# Patient Record
Sex: MALE | Race: WHITE | NOT HISPANIC OR LATINO | URBAN - METROPOLITAN AREA
[De-identification: names, ages, dates, MRNs, and addresses within clinical notes are randomized per-mention and may not be internally consistent; named-entity substitution may affect disease eponyms.]

---

## 2022-01-15 ENCOUNTER — INPATIENT (INPATIENT)
Facility: HOSPITAL | Age: 82
LOS: 25 days | Discharge: NURSING, REHAB OR LONG-TERM CARE | DRG: 64 | End: 2022-02-10
Attending: STUDENT IN AN ORGANIZED HEALTH CARE EDUCATION/TRAINING PROGRAM | Admitting: NEUROLOGICAL SURGERY
Payer: MEDICARE

## 2022-01-15 VITALS
DIASTOLIC BLOOD PRESSURE: 70 MMHG | HEART RATE: 69 BPM | HEIGHT: 74 IN | OXYGEN SATURATION: 98 % | WEIGHT: 309.97 LBS | TEMPERATURE: 98 F | SYSTOLIC BLOOD PRESSURE: 120 MMHG | RESPIRATION RATE: 18 BRPM

## 2022-01-15 DIAGNOSIS — I61.9 NONTRAUMATIC INTRACEREBRAL HEMORRHAGE, UNSPECIFIED: ICD-10-CM

## 2022-01-15 LAB
ALBUMIN SERPL ELPH-MCNC: 4.4 G/DL — SIGNIFICANT CHANGE UP (ref 3.3–5)
ALP SERPL-CCNC: 65 U/L — SIGNIFICANT CHANGE UP (ref 40–120)
ALT FLD-CCNC: 18 U/L — SIGNIFICANT CHANGE UP (ref 10–45)
ANION GAP SERPL CALC-SCNC: 15 MMOL/L — SIGNIFICANT CHANGE UP (ref 5–17)
APTT BLD: 26.5 SEC — LOW (ref 27.5–35.5)
AST SERPL-CCNC: 32 U/L — SIGNIFICANT CHANGE UP (ref 10–40)
BASOPHILS # BLD AUTO: 0.02 K/UL — SIGNIFICANT CHANGE UP (ref 0–0.2)
BASOPHILS NFR BLD AUTO: 0.2 % — SIGNIFICANT CHANGE UP (ref 0–2)
BILIRUB SERPL-MCNC: 0.6 MG/DL — SIGNIFICANT CHANGE UP (ref 0.2–1.2)
BLD GP AB SCN SERPL QL: NEGATIVE — SIGNIFICANT CHANGE UP
BUN SERPL-MCNC: 30 MG/DL — HIGH (ref 7–23)
CALCIUM SERPL-MCNC: 10 MG/DL — SIGNIFICANT CHANGE UP (ref 8.4–10.5)
CHLORIDE SERPL-SCNC: 99 MMOL/L — SIGNIFICANT CHANGE UP (ref 96–108)
CO2 SERPL-SCNC: 25 MMOL/L — SIGNIFICANT CHANGE UP (ref 22–31)
CREAT SERPL-MCNC: 1.16 MG/DL — SIGNIFICANT CHANGE UP (ref 0.5–1.3)
EOSINOPHIL # BLD AUTO: 0.02 K/UL — SIGNIFICANT CHANGE UP (ref 0–0.5)
EOSINOPHIL NFR BLD AUTO: 0.2 % — SIGNIFICANT CHANGE UP (ref 0–6)
GLUCOSE BLDC GLUCOMTR-MCNC: 145 MG/DL — HIGH (ref 70–99)
GLUCOSE BLDC GLUCOMTR-MCNC: 165 MG/DL — HIGH (ref 70–99)
GLUCOSE SERPL-MCNC: 162 MG/DL — HIGH (ref 70–99)
HCT VFR BLD CALC: 42.1 % — SIGNIFICANT CHANGE UP (ref 39–50)
HGB BLD-MCNC: 13.7 G/DL — SIGNIFICANT CHANGE UP (ref 13–17)
IMM GRANULOCYTES NFR BLD AUTO: 0.7 % — SIGNIFICANT CHANGE UP (ref 0–1.5)
LYMPHOCYTES # BLD AUTO: 1.02 K/UL — SIGNIFICANT CHANGE UP (ref 1–3.3)
LYMPHOCYTES # BLD AUTO: 12 % — LOW (ref 13–44)
MCHC RBC-ENTMCNC: 30.6 PG — SIGNIFICANT CHANGE UP (ref 27–34)
MCHC RBC-ENTMCNC: 32.5 GM/DL — SIGNIFICANT CHANGE UP (ref 32–36)
MCV RBC AUTO: 94 FL — SIGNIFICANT CHANGE UP (ref 80–100)
MONOCYTES # BLD AUTO: 0.42 K/UL — SIGNIFICANT CHANGE UP (ref 0–0.9)
MONOCYTES NFR BLD AUTO: 4.9 % — SIGNIFICANT CHANGE UP (ref 2–14)
NEUTROPHILS # BLD AUTO: 6.96 K/UL — SIGNIFICANT CHANGE UP (ref 1.8–7.4)
NEUTROPHILS NFR BLD AUTO: 82 % — HIGH (ref 43–77)
NRBC # BLD: 0 /100 WBCS — SIGNIFICANT CHANGE UP (ref 0–0)
PA ADP PRP-ACNC: 171 PRU — LOW (ref 194–417)
PLATELET # BLD AUTO: 158 K/UL — SIGNIFICANT CHANGE UP (ref 150–400)
PLATELET RESPONSE ASPIRIN RESULT: 564 ARU — SIGNIFICANT CHANGE UP (ref 350–700)
POTASSIUM SERPL-MCNC: 5.1 MMOL/L — SIGNIFICANT CHANGE UP (ref 3.5–5.3)
POTASSIUM SERPL-SCNC: 5.1 MMOL/L — SIGNIFICANT CHANGE UP (ref 3.5–5.3)
PROT SERPL-MCNC: 7.7 G/DL — SIGNIFICANT CHANGE UP (ref 6–8.3)
RAPID RVP RESULT: SIGNIFICANT CHANGE UP
RBC # BLD: 4.48 M/UL — SIGNIFICANT CHANGE UP (ref 4.2–5.8)
RBC # FLD: 14 % — SIGNIFICANT CHANGE UP (ref 10.3–14.5)
RH IG SCN BLD-IMP: POSITIVE — SIGNIFICANT CHANGE UP
RH IG SCN BLD-IMP: POSITIVE — SIGNIFICANT CHANGE UP
SARS-COV-2 RNA SPEC QL NAA+PROBE: SIGNIFICANT CHANGE UP
SODIUM SERPL-SCNC: 139 MMOL/L — SIGNIFICANT CHANGE UP (ref 135–145)
WBC # BLD: 8.5 K/UL — SIGNIFICANT CHANGE UP (ref 3.8–10.5)
WBC # FLD AUTO: 8.5 K/UL — SIGNIFICANT CHANGE UP (ref 3.8–10.5)

## 2022-01-15 PROCEDURE — 72170 X-RAY EXAM OF PELVIS: CPT | Mod: 26

## 2022-01-15 PROCEDURE — 74177 CT ABD & PELVIS W/CONTRAST: CPT | Mod: 26

## 2022-01-15 PROCEDURE — 93010 ELECTROCARDIOGRAM REPORT: CPT

## 2022-01-15 PROCEDURE — 72125 CT NECK SPINE W/O DYE: CPT | Mod: 26

## 2022-01-15 PROCEDURE — 99291 CRITICAL CARE FIRST HOUR: CPT

## 2022-01-15 PROCEDURE — 71260 CT THORAX DX C+: CPT | Mod: 26

## 2022-01-15 PROCEDURE — 71045 X-RAY EXAM CHEST 1 VIEW: CPT | Mod: 26

## 2022-01-15 PROCEDURE — 70450 CT HEAD/BRAIN W/O DYE: CPT | Mod: 26,59

## 2022-01-15 PROCEDURE — 70496 CT ANGIOGRAPHY HEAD: CPT | Mod: 26

## 2022-01-15 PROCEDURE — 70498 CT ANGIOGRAPHY NECK: CPT | Mod: 26

## 2022-01-15 PROCEDURE — 70450 CT HEAD/BRAIN W/O DYE: CPT | Mod: 26,59,77

## 2022-01-15 RX ORDER — DEXTROSE 50 % IN WATER 50 %
25 SYRINGE (ML) INTRAVENOUS ONCE
Refills: 0 | Status: DISCONTINUED | OUTPATIENT
Start: 2022-01-15 | End: 2022-01-29

## 2022-01-15 RX ORDER — SODIUM CHLORIDE 9 MG/ML
1000 INJECTION, SOLUTION INTRAVENOUS
Refills: 0 | Status: DISCONTINUED | OUTPATIENT
Start: 2022-01-15 | End: 2022-01-21

## 2022-01-15 RX ORDER — THIAMINE MONONITRATE (VIT B1) 100 MG
100 TABLET ORAL DAILY
Refills: 0 | Status: DISCONTINUED | OUTPATIENT
Start: 2022-01-15 | End: 2022-01-15

## 2022-01-15 RX ORDER — CHLORHEXIDINE GLUCONATE 213 G/1000ML
1 SOLUTION TOPICAL
Refills: 0 | Status: DISCONTINUED | OUTPATIENT
Start: 2022-01-15 | End: 2022-01-24

## 2022-01-15 RX ORDER — GLUCAGON INJECTION, SOLUTION 0.5 MG/.1ML
1 INJECTION, SOLUTION SUBCUTANEOUS ONCE
Refills: 0 | Status: DISCONTINUED | OUTPATIENT
Start: 2022-01-15 | End: 2022-01-20

## 2022-01-15 RX ORDER — SODIUM CHLORIDE 9 MG/ML
1000 INJECTION, SOLUTION INTRAVENOUS
Refills: 0 | Status: DISCONTINUED | OUTPATIENT
Start: 2022-01-15 | End: 2022-01-29

## 2022-01-15 RX ORDER — LEVETIRACETAM 250 MG/1
500 TABLET, FILM COATED ORAL EVERY 12 HOURS
Refills: 0 | Status: DISCONTINUED | OUTPATIENT
Start: 2022-01-15 | End: 2022-01-16

## 2022-01-15 RX ORDER — DEXTROSE 50 % IN WATER 50 %
15 SYRINGE (ML) INTRAVENOUS ONCE
Refills: 0 | Status: DISCONTINUED | OUTPATIENT
Start: 2022-01-15 | End: 2022-01-20

## 2022-01-15 RX ORDER — DESMOPRESSIN ACETATE 0.1 MG/1
40 TABLET ORAL ONCE
Refills: 0 | Status: COMPLETED | OUTPATIENT
Start: 2022-01-15 | End: 2022-01-15

## 2022-01-15 RX ORDER — INSULIN LISPRO 100/ML
VIAL (ML) SUBCUTANEOUS EVERY 6 HOURS
Refills: 0 | Status: DISCONTINUED | OUTPATIENT
Start: 2022-01-15 | End: 2022-01-29

## 2022-01-15 RX ORDER — SODIUM CHLORIDE 5 G/100ML
1000 INJECTION, SOLUTION INTRAVENOUS
Refills: 0 | Status: DISCONTINUED | OUTPATIENT
Start: 2022-01-15 | End: 2022-01-16

## 2022-01-15 RX ORDER — ACETAMINOPHEN 500 MG
650 TABLET ORAL EVERY 6 HOURS
Refills: 0 | Status: DISCONTINUED | OUTPATIENT
Start: 2022-01-15 | End: 2022-01-21

## 2022-01-15 RX ORDER — DEXTROSE 50 % IN WATER 50 %
12.5 SYRINGE (ML) INTRAVENOUS ONCE
Refills: 0 | Status: DISCONTINUED | OUTPATIENT
Start: 2022-01-15 | End: 2022-01-29

## 2022-01-15 RX ORDER — SODIUM CHLORIDE 9 MG/ML
1000 INJECTION INTRAMUSCULAR; INTRAVENOUS; SUBCUTANEOUS
Refills: 0 | Status: DISCONTINUED | OUTPATIENT
Start: 2022-01-15 | End: 2022-01-15

## 2022-01-15 RX ORDER — THIAMINE MONONITRATE (VIT B1) 100 MG
100 TABLET ORAL DAILY
Refills: 0 | Status: DISCONTINUED | OUTPATIENT
Start: 2022-01-15 | End: 2022-01-20

## 2022-01-15 RX ORDER — SENNA PLUS 8.6 MG/1
2 TABLET ORAL AT BEDTIME
Refills: 0 | Status: DISCONTINUED | OUTPATIENT
Start: 2022-01-15 | End: 2022-01-16

## 2022-01-15 RX ADMIN — LEVETIRACETAM 400 MILLIGRAM(S): 250 TABLET, FILM COATED ORAL at 17:33

## 2022-01-15 RX ADMIN — Medication 2: at 23:17

## 2022-01-15 RX ADMIN — SODIUM CHLORIDE 75 MILLILITER(S): 9 INJECTION INTRAMUSCULAR; INTRAVENOUS; SUBCUTANEOUS at 16:15

## 2022-01-15 RX ADMIN — DESMOPRESSIN ACETATE 240 MICROGRAM(S): 0.1 TABLET ORAL at 18:28

## 2022-01-15 RX ADMIN — SODIUM CHLORIDE 50 MILLILITER(S): 5 INJECTION, SOLUTION INTRAVENOUS at 18:28

## 2022-01-15 RX ADMIN — SODIUM CHLORIDE 50 MILLILITER(S): 5 INJECTION, SOLUTION INTRAVENOUS at 19:46

## 2022-01-15 RX ADMIN — CHLORHEXIDINE GLUCONATE 1 APPLICATION(S): 213 SOLUTION TOPICAL at 21:19

## 2022-01-15 NOTE — PROGRESS NOTE ADULT - ASSESSMENT
ASSESSMENT/PLAN:    81M hx of HTN, not on AC/AP, txer from [facility] after mechanical fall, CTH with R frontal IPH w/ mass effect, minimal MLS, ICH score 2.    NEURO:  - neuro checks q1h  - repeat CTH 4h  - Keppra 500mg BID  - ARU/PRU  - MIND Screen  - Activity: PT/OT as tolerated    CVS:  Hx of AAA repair 7 years ago ([facility]), also with known Aneurysm of ascending and iliac aorta >5cm, planned for evaluation for possible stenting this tuesday prior to arrival  - SBP goal 100-160  - TTE  - vascular consult    PULM:  hx of COVID 2 years ago and 2 weeks ago  - RA  - IS As tolerated  - Aspiration precautions    RENAL:  - Fluids: 75cc/h while NPO  - daily IOs    GI:  - Diet: NPO  - Bowel regimen: miralax, senna    ENDO:   - FS goal 120-180    HEME/ONC:  - SCDs  - Chemoppx: hold d/t fresh heme    ID:  - monitor for fevers      Patient is at high risk of neurologic deterioration/death due to:  ***      Time spent: 35 critical care minutes

## 2022-01-15 NOTE — CONSULT NOTE ADULT - ASSESSMENT
Assessment:  81y Male here with large right ICH, vascular consulted to evaluate as patient has known hx of ascending aortic aneurysm 4cm, infrarenal AAA 5cm and right ALEXA aneurysm 5cm, this was reported by daughter based on last CT scan done in [place] on 11/20/21.     Plan:  - no acute vascular surgery intervention at this time  - patient can either follow up with his vascular surgeon or Dr Hays in office  - plan dw fellow on behalf of fellow Assessment:  81y Male here with large right ICH, vascular consulted to evaluate as patient has known hx of ascending aortic aneurysm 4cm, infrarenal AAA 5cm and right ALEXA aneurysm 5cm, this was reported by daughter based on last CT scan done in [place] on 11/20/21.     Plan:  - no acute vascular surgery intervention at this time  - patient can either follow up with his vascular surgeon or Dr Hays in office  - please reconsult as necessary  - plan dw fellow on behalf of fellow    Vascular [id number]

## 2022-01-15 NOTE — H&P ADULT - NSHPPHYSICALEXAM_GEN_ALL_CORE
Exam: French-speaking, AOx2, Left pupil 3R, Right pupil 2R, Left nasolabial flattening.  RUE 5/5, LUE 4/5, RHF 4+/5, RLE distally 3/5, LHF 4/5, LLE distally 2/5.

## 2022-01-15 NOTE — ED PROVIDER NOTE - ATTENDING CONTRIBUTION TO CARE
Attending Colleen: I performed a history and physical exam of the patient and discussed their management with the resident/fellow/ACP/student. I have reviewed the resident/fellow/ACP/student note and agree with the documented findings and plan of care, except as noted. I have personally performed a substantive portion of the visit including all aspects of the medical decision making. My medical decision making and observations are found above. Please refer to any progress notes for updates on clinical course.

## 2022-01-15 NOTE — ED PROVIDER NOTE - PHYSICAL EXAMINATION
Physical Exam:  Gen: NAD, AOx2 (person and place)  Head: NCAT  HEENT: EOMI, R pupil 1mm reactive, L pupil 2mm reactive, normal conjunctiva, tongue midline, oral mucosa moist  Lung: CTAB, no respiratory distress, no wheezes/rhonchi/rales B/L, speaking in full sentences  CV: RRR, no murmurs, rubs or gallops  Abd: soft, NT, ND, no guarding, no rigidity  MSK: no visible deformities, MS RUE 5/5, LUE 4/5, RLE 5/5, LLE 4L5  Neuro: CN2-12 grossly intact, A&Ox2, gross sensation intact in UE and LE BL, negative pronator drift  Skin: Warm, well perfused, no rash, 2+ pitting edema to B/L MISSY Shore D.O. -Resident

## 2022-01-15 NOTE — ED ADULT NURSE NOTE - OBJECTIVE STATEMENT
81 y M pmhx of HTN and AAA presents to the ED from [facility] after a fall due to weakness with a head bleed. Pt is confused and baseline is normally A&Ox3, per EMS. Pt asking inappropriate questions and minimally following commands. On assessment, Alert L pupil 2mm and reactive to light and R pupil 1mm and reactive to light. [place]. no facial droop. strength weaker on the L than the R. Pt has some effort against gravity in lower extremities. Breathing spontaneously and unlabored on Room air. No Peripheral edema. Cap refill 2s. No JVD. Peripheral pulses strong and equal bilaterally. On cardiac monitor. Abdomen soft, nontender, nondistended. Chavarria placed in pt. 16Fr catheter used. 10 cc Balloon inflated. 2 RN at bedside for insertion, sterile technique maintained. Procedure explained to pt. Tolerated well. Urine collected and sent to lab. IV placed 20g in L hand from [facility] and R forearm from RN. Labs drawn and sent. Pt safety maintained. Call bell within reach. Side rails in upward position. Pt awaiting dispo.   Neuro flowsheet placed in chart. 81 y M pmhx of HTN and AAA presents to the ED from [facility] after a fall due to weakness with a head bleed. Pt is confused and baseline is normally A&Ox3, per EMS. Pt asking inappropriate questions and minimally following commands. On assessment, Alert L pupil 2mm and reactive to light and R pupil 1mm and reactive to light. [place]. no facial droop. strength weaker on the L than the R. 5/5 in LUE and RUE and 3/5 in LLE and 5/5 in LUE. Pt has some effort against gravity in lower extremities. Breathing spontaneously and unlabored on Room air. No Peripheral edema. Cap refill 2s. No JVD. Peripheral pulses strong and equal bilaterally. On cardiac monitor. Abdomen soft, nontender, nondistended. Chavarria placed in pt. 16Fr catheter used. 10 cc Balloon inflated. 2 RN at bedside for insertion, sterile technique maintained. Procedure explained to pt. Tolerated well. Urine collected and sent to lab. IV placed 20g in L hand from [facility] and R forearm from RN. Labs drawn and sent. Pt safety maintained. Call bell within reach. Side rails in upward position. Pt awaiting dispo.   Neuro flowsheet placed in chart. 81 y M pmhx of HTN and AAA presents to the ED from [facility] after a fall due to weakness with a head bleed. Pt is confused and baseline is normally A&Ox3, per EMS. Pt asking inappropriate questions and minimally following commands. Per EMS pt received keppra and reglan at [facility]. On assessment, Alert L pupil 2mm and reactive to light and R pupil 1mm and reactive to light. [place]. no facial droop. strength weaker on the L than the R. 5/5 in LUE and RUE and 3/5 in LLE and 5/5 in LUE. Pt has some effort against gravity in lower extremities. Breathing spontaneously and unlabored on Room air. No Peripheral edema. Cap refill 2s. No JVD. Peripheral pulses strong and equal bilaterally. On cardiac monitor. Abdomen soft, nontender, nondistended. Chavarria placed in pt. 16Fr catheter used. 10 cc Balloon inflated. 2 RN at bedside for insertion, sterile technique maintained. Procedure explained to pt. Tolerated well. Urine collected and sent to lab. IV placed 20g in L hand from [facility] and R forearm from RN. Labs drawn and sent. Pt safety maintained. Call bell within reach. Side rails in upward position. Pt awaiting dispo.   Neuro flowsheet placed in chart. 81 y M pmhx of HTN, AAA, covid 2 weeks ago presents to the ED from [facility] after a fall due to weakness with a head bleed. Pt is confused and baseline is normally A&Ox3, per EMS. Pt asking inappropriate questions and minimally following commands. Per EMS pt received keppra and reglan at [facility]. On assessment, Alert L pupil 2mm and reactive to light and R pupil 1mm and reactive to light. [place]. no facial droop. strength weaker on the L than the R. 5/5 in LUE and RUE and 3/5 in LLE and 5/5 in LUE. Pt has some effort against gravity in lower extremities. Breathing spontaneously and unlabored on Room air. No Peripheral edema. Cap refill 2s. No JVD. Peripheral pulses strong and equal bilaterally. On cardiac monitor. Abdomen soft, nontender, nondistended. Chavarria placed in pt. 16Fr catheter used. 10 cc Balloon inflated. 2 RN at bedside for insertion, sterile technique maintained. Procedure explained to pt. Tolerated well. Urine collected and sent to lab. IV placed 20g in L hand from [facility] and R forearm from RN. Labs drawn and sent. Pt safety maintained. Call bell within reach. Side rails in upward position. Pt awaiting dispo.   Neuro flowsheet placed in chart.

## 2022-01-15 NOTE — ED PROVIDER NOTE - PROGRESS NOTE DETAILS
L pupil > R pupil. Pan scan. NSGY aware. WIll admit. BP goal 160 SBP Attending Colleen: [facility] accepted pt to NSICU under Dr. Moreno Attending Colleen: [facility] accepted pt to NSICU under Dr. Vazquez L pupil > R pupil. Pan scan. NSGY aware. Will admit. BP goal 160 SBP

## 2022-01-15 NOTE — PROGRESS NOTE ADULT - SUBJECTIVE AND OBJECTIVE BOX
HPI:  81y m W/ PMHx HTN presenting to the ED from [facility] as a transfer for R frontal parenchymal hemorrhage on CT after fall at home. Patient is poor historian but states that he was diagnosed with COVID 2 weeks ago and has been feeling "tired" at home, states he felt dizzy earlier today but unable to state what time he fell or further elucidate. Patient denies AC use. Per EMS was also recently diagnosed with a AAA but has been unable to undergo surgical repair due to recent dx of COVID. Patient still endorsing he feels "tired", denies current headache, vision changes, dizziness, chest pain, nausea, back pain, focal numbness/tingling. Endorses weakness to LLE 2/2 pain. (15 Mono 2022 15:50)  ICH score 2  NIHSS 6    EVENTS:   No acute events overnight.    VITALS:  T(C): , Max: 36.8 (01-15-22 @ 14:42)  HR:  (69 - 88)  BP:  (116/61 - 151/92)  ABP: --  RR:  (13 - 20)  SpO2:  (94% - 100%)  Wt(kg): --      01-15-22 @ 07:01  -  01-15-22 @ 20:19  --------------------------------------------------------  IN: 350 mL / OUT: 550 mL / NET: -200 mL      LABS:  Na: 139 (01-15 @ 15:25)  K: 5.1 (01-15 @ 15:25)  Cl: 99 (01-15 @ 15:25)  CO2: 25 (01-15 @ 15:25)  BUN: 30 (01-15 @ 15:25)  Cr: 1.16 (01-15 @ 15:25)  Glu: 162(01-15 @ 15:25)    Hgb: 13.7 (01-15 @ 15:25)  Hct: 42.1 (01-15 @ 15:25)  WBC: 8.50 (01-15 @ 15:25)  Plt: 158 (01-15 @ 15:25)    INR: 0.99 01-15-22 @ 15:25  PTT: 26.5 01-15-22 @ 15:25    IMAGING:   Recent imaging studies were reviewed.    MEDICATIONS:  acetaminophen     Tablet .. 650 milliGRAM(s) Oral every 6 hours PRN  chlorhexidine 4% Liquid 1 Application(s) Topical <User Schedule>  dextrose 40% Gel 15 Gram(s) Oral once  dextrose 5%. 1000 milliLiter(s) IV Continuous <Continuous>  dextrose 5%. 1000 milliLiter(s) IV Continuous <Continuous>  dextrose 50% Injectable 25 Gram(s) IV Push once  dextrose 50% Injectable 12.5 Gram(s) IV Push once  dextrose 50% Injectable 25 Gram(s) IV Push once  glucagon  Injectable 1 milliGRAM(s) IntraMuscular once  insulin lispro (ADMELOG) corrective regimen sliding scale   SubCutaneous every 6 hours  levETIRAcetam  IVPB 500 milliGRAM(s) IV Intermittent every 12 hours  senna 2 Tablet(s) Oral at bedtime PRN  sodium chloride 2% . 1000 milliLiter(s) IV Continuous <Continuous>  thiamine Injectable 100 milliGRAM(s) IV Push daily    PHYSICAL EXAM:    General: calm  CVS: RRR  Pulm: CTAB  GI: Soft, NTND  Extremities: No LE Edema  Neuro: per NSG (in transport):  AOx2, Left pupil 3R, Right pupil 2R, Left facial, moves all limbs against gravity, left UE drift    HPI:  81y m W/ PMHx HTN presenting to the ED from [facility] as a transfer for R frontal parenchymal hemorrhage on CT after fall at home. Patient is poor historian but states that he was diagnosed with COVID 2 weeks ago and has been feeling "tired" at home, states he felt dizzy earlier today but unable to state what time he fell or further elucidate. Patient denies AC use. Per EMS was also recently diagnosed with a AAA but has been unable to undergo surgical repair due to recent dx of COVID. Patient still endorsing he feels "tired", denies current headache, vision changes, dizziness, chest pain, nausea, back pain, focal numbness/tingling. Endorses weakness to LLE 2/2 pain. (15 Mono 2022 15:50)  ICH score 2  NIHSS 7    EVENTS:   No acute events overnight.    VITALS:  T(C): , Max: 36.8 (01-15-22 @ 14:42)  HR:  (69 - 88)  BP:  (116/61 - 151/92)  ABP: --  RR:  (13 - 20)  SpO2:  (94% - 100%)  Wt(kg): --      01-15-22 @ 07:01  -  01-15-22 @ 20:19  --------------------------------------------------------  IN: 350 mL / OUT: 550 mL / NET: -200 mL      LABS:  Na: 139 (01-15 @ 15:25)  K: 5.1 (01-15 @ 15:25)  Cl: 99 (01-15 @ 15:25)  CO2: 25 (01-15 @ 15:25)  BUN: 30 (01-15 @ 15:25)  Cr: 1.16 (01-15 @ 15:25)  Glu: 162(01-15 @ 15:25)    Hgb: 13.7 (01-15 @ 15:25)  Hct: 42.1 (01-15 @ 15:25)  WBC: 8.50 (01-15 @ 15:25)  Plt: 158 (01-15 @ 15:25)    INR: 0.99 01-15-22 @ 15:25  PTT: 26.5 01-15-22 @ 15:25    IMAGING:   Recent imaging studies were reviewed.    MEDICATIONS:  acetaminophen     Tablet .. 650 milliGRAM(s) Oral every 6 hours PRN  chlorhexidine 4% Liquid 1 Application(s) Topical <User Schedule>  dextrose 40% Gel 15 Gram(s) Oral once  dextrose 5%. 1000 milliLiter(s) IV Continuous <Continuous>  dextrose 5%. 1000 milliLiter(s) IV Continuous <Continuous>  dextrose 50% Injectable 25 Gram(s) IV Push once  dextrose 50% Injectable 12.5 Gram(s) IV Push once  dextrose 50% Injectable 25 Gram(s) IV Push once  glucagon  Injectable 1 milliGRAM(s) IntraMuscular once  insulin lispro (ADMELOG) corrective regimen sliding scale   SubCutaneous every 6 hours  levETIRAcetam  IVPB 500 milliGRAM(s) IV Intermittent every 12 hours  senna 2 Tablet(s) Oral at bedtime PRN  sodium chloride 2% . 1000 milliLiter(s) IV Continuous <Continuous>  thiamine Injectable 100 milliGRAM(s) IV Push daily    PHYSICAL EXAM:    General: calm  CVS: RRR  Pulm: CTAB  GI: Soft, NTND  Extremities: No LE Edema  Neuro: per NSG (in transport):  AOx2, Left pupil 3R, Right pupil 2R, Left facial, moves all limbs against gravity, left UE drift, left LE 3/5 at the hip, 2/5 at knees and ankles

## 2022-01-15 NOTE — CHART NOTE - NSCHARTNOTEFT_GEN_A_CORE
CAPRINI SCORE [CLOT] Score on Admission for     AGE RELATED RISK FACTORS                                                       MOBILITY RELATED FACTORS  [ ] Age 41-60 years                                            (1 Point)                  [ ] Bed rest                                                        (1 Point)  [ ] Age: 61-74 years                                           (2 Points)                 [ ] Plaster cast                                                   (2 Points)  [ x ] Age= 75 years                                              (3 Points)                 [ ] Bed bound for more than 72 hours                 (2 Points)    DISEASE RELATED RISK FACTORS                                               GENDER SPECIFIC FACTORS  [ ] Edema in the lower extremities                       (1 Point)                  [ ] Pregnancy                                                     (1 Point)  [ ] Varicose veins                                               (1 Point)                  [ ] Post-partum < 6 weeks                                   (1 Point)             [ x ] BMI > 25 Kg/m2                                            (1 Point)                  [ ] Hormonal therapy  or oral contraception          (1 Point)                 [ ] Sepsis (in the previous month)                        (1 Point)                  [ ] History of pregnancy complications                 (1 point)  [ ] Pneumonia or serious lung disease                                               [ ] Unexplained or recurrent                     (1 Point)           (in the previous month)                               (1 Point)  [ ] Abnormal pulmonary function test                     (1 Point)                 SURGERY RELATED RISK FACTORS (include planned surgeries)  [ ] Acute myocardial infarction                              (1 Point)                 [ ]  Section                                             (1 Point)  [ ] Congestive heart failure (in the previous month)  (1 Point)         [ ] Minor surgery                                                  (1 Point)   [ ] Inflammatory bowel disease                             (1 Point)                 [ ] Arthroscopic surgery                                        (2 Points)  [ ] Central venous access                                      (2 Points)                [ ] General surgery lasting more than 45 minutes   (2 Points)       [ x ] Stroke (in the previous month)                          (5 Points)               [ ] Elective arthroplasty                                         (5 Points)            [ ] current or past malignancy                              (2 Points)                                                                                                       HEMATOLOGY RELATED FACTORS                                                 TRAUMA RELATED RISK FACTORS  [ ] Prior episodes of VTE                                     (3 Points)                [ ] Fracture of the hip, pelvis, or leg                       (5 Points)  [ ] Positive family history for VTE                         (3 Points)                 [ ] Acute spinal cord injury (in the previous month)  (5 Points)  [ ] Prothrombin 89165 A                                     (3 Points)                 [ ] Paralysis  (less than 1 month)                             (5 Points)  [ ] Factor V Leiden                                             (3 Points)                  [ ] Multiple Trauma within 1 month                        (5 Points)  [ ] Lupus anticoagulants                                     (3 Points)                                                           [ ] Anticardiolipin antibodies                               (3 Points)                                                       [ ] High homocysteine in the blood                      (3 Points)                                             [ ] Other congenital or acquired thrombophilia      (3 Points)                                                [ ] Heparin induced thrombocytopenia                  (3 Points)   [ x ] Previous Covid-19 confirmed infection             ( 3 Points)                                        Total Score [     12      ]    Risk:  Very low 0   Low 1 to 2   Moderate 3 to 4   High =5       VTE Prophylasix Recommednations:  [ x ] mechanical pneumatic compression devices                                      [ ] contraindicated due to: _____________________  [ ] chemo prophylasix                                                                                   [ x ] contraindicated due to: Fresh bleed    **** MODERATE/HIGH LIKELIHOOD DVT PRESENT ON ADMISSION  [ x ] (please order LE dopplers within 24 hours of admission)

## 2022-01-15 NOTE — ED PROVIDER NOTE - CLINICAL SUMMARY MEDICAL DECISION MAKING FREE TEXT BOX
81y m W/ PMHx HTN presenting to the ED from [facility] as a transfer for R frontal parenchymal hemorrhage on CT after fall at home. 81y m W/ PMHx HTN presenting to the ED from [facility] as a transfer for R frontal parenchymal hemorrhage on CT after fall at home.    Attending Taylero: 82 y/o M w/ PMH of HTN presenting w/ ICH. Transfer from [place]. Seen in green. Colombian speaking,  used. Pt A&O x2. Hx from pt and EMS. Pt w/ recent covid infection, has been feeling weak as a result. Earlier today had felt particularly weak and fell, striking head. Unwitnessed, unclear if LOC. Pt denies AC use. Pt endorsing feeling tired and has L leg weakness. At OSH was found to have frontal IPH, transferred here for NSGY eval. Pt well appearing on exam, no acute distress. Lungs clear. HR regular. Abdomen nondistended/soft/nontender. L pupil 2 mm, 2 pupil 2 mm. Moving all extremities, LLE weaker compared to RLE. Head NCAT. No midline spinal tenderness. Pt w/ IPH s/p fall at home. NSGY at bedside shortly after arrival. Requesting rpt scan, goal SBP <160. Given pt not fully A&O, will obtain pan scan to eval for additional traumatic imaging. Expect admission to NSICU. Plan for labs, imaging, EKG, meds PRN. Will reassess the need for additional interventions as clinically warranted.

## 2022-01-15 NOTE — ED PROVIDER NOTE - OBJECTIVE STATEMENT
81y m W/ PMHx HTN presenting to the ED from [facility] as a transfer for R frontal parenchymal hemorrhage on CT after fall at home. Patient is poor historian but states that he was diagnosed with COVID 2 weeks ago and has been feeling "tired" at home, states he felt dizzy earlier today but unable to state what time he fell or further elucidate. Patient denies AC use. Per EMS was also recently diagnosed with a AAA but has been unable to undergo surgical repair due to recent dx of COVID. Patient still endorsing he feels "tired", denies current headache, vision changes, dizziness, chest pain, nausea, back pain, focal numbness/tingling. Endorses weakness to LLE 2/2 pain.

## 2022-01-15 NOTE — H&P ADULT - HISTORY OF PRESENT ILLNESS
81y m W/ PMHx HTN presenting to the ED from [facility] as a transfer for R frontal parenchymal hemorrhage on CT after fall at home. Patient is poor historian but states that he was diagnosed with COVID 2 weeks ago and has been feeling "tired" at home, states he felt dizzy earlier today but unable to state what time he fell or further elucidate. Patient denies AC use. Per EMS was also recently diagnosed with a AAA but has been unable to undergo surgical repair due to recent dx of COVID. Patient still endorsing he feels "tired", denies current headache, vision changes, dizziness, chest pain, nausea, back pain, focal numbness/tingling. Endorses weakness to LLE 2/2 pain.

## 2022-01-15 NOTE — H&P ADULT - ASSESSMENT
Fundator, Led  81M w/ HTN, no AC/AP per patient, txfer from [facility]. S/p mechanical fall this AM. CT: Right frontal IPH w/ mass effect, minimal MLS. ICH score 2. Exam: French-speaking, AOx2, Left pupil 3R, Right pupil 2R, Left nasolabial flattening.  RUE 5/5, LUE 4/5, RHF 4+/5, RLE distally 3/5, LHF 4/5, LLE distally 2/5.   -Adm NSCU, SBP<160  -4hr repeat CT at 4:30pm  -ARU/PRU  -Keppra 500BID  -Plt, coag labs pending  - MIND screen. Pending h/o from family

## 2022-01-15 NOTE — CONSULT NOTE ADULT - SUBJECTIVE AND OBJECTIVE BOX
FUNDATORNAKIA [id number]  81y Male      HPI:  81y m W/ PMHx HTN presenting to the ED from [facility] as a transfer for R frontal parenchymal hemorrhage on CT after fall at home. Patient is poor historian but states that he was diagnosed with COVID 2 weeks ago and has been feeling "tired" at home, states he felt dizzy earlier today but unable to state what time he fell or further elucidate. Patient denies AC use. Per EMS was also recently diagnosed with a AAA but has been unable to undergo surgical repair due to recent dx of COVID. Patient still endorsing he feels "tired", denies current headache, vision changes, dizziness, chest pain, nausea, back pain, focal numbness/tingling. Endorses weakness to LLE 2/2 pain. (15 Mono 2022 15:50)    Vascular surgery was consulted as patient has hx of aneurysms. History was obtained from daughter Amy. Patient had a history of ?open thoracic aneurysm repair 7 years ago in [place]. And patient has known hx ascending aorta aneurysm measuring 4cm, infrarenal AAA 5cm and right common iliac aneurysm measuring 5cm as reported by daughter based on last CT scan done in [place] in November 11/20. Patient is here with ICH. Vascular was called to evaluate.      MEDICATIONS  (STANDING):  desmopressin IVPB 40 MICROGram(s) IV Intermittent once  dextrose 40% Gel 15 Gram(s) Oral once  dextrose 5%. 1000 milliLiter(s) (50 mL/Hr) IV Continuous <Continuous>  dextrose 5%. 1000 milliLiter(s) (100 mL/Hr) IV Continuous <Continuous>  dextrose 50% Injectable 25 Gram(s) IV Push once  dextrose 50% Injectable 12.5 Gram(s) IV Push once  dextrose 50% Injectable 25 Gram(s) IV Push once  glucagon  Injectable 1 milliGRAM(s) IntraMuscular once  insulin lispro (ADMELOG) corrective regimen sliding scale   SubCutaneous every 6 hours  levETIRAcetam  IVPB 500 milliGRAM(s) IV Intermittent every 12 hours  sodium chloride 2% . 1000 milliLiter(s) (50 mL/Hr) IV Continuous <Continuous>    MEDICATIONS  (PRN):  acetaminophen     Tablet .. 650 milliGRAM(s) Oral every 6 hours PRN Mild Pain (1 - 3)  senna 2 Tablet(s) Oral at bedtime PRN Constipation      Allergies    No Known Allergies    Intolerances        REVIEW OF SYSTEMS    [ ] A ten-point review of systems was otherwise negative except as noted.  [ ] Due to altered mental status/intubation, subjective information were not able to be obtained from the patient. History was obtained, to the extent possible, from review of the chart and collateral sources of information.      Vital Signs Last 24 Hrs  T(C): 36.4 (15 Mono 2022 17:30), Max: 36.8 (15 Mono 2022 14:42)  T(F): 97.5 (15 Mono 2022 17:30), Max: 98.2 (15 Mono 2022 14:42)  HR: 72 (15 Mono 2022 17:30) (69 - 88)  BP: 144/82 (15 Mono 2022 17:30) (116/61 - 144/82)  BP(mean): 101 (15 Mono 2022 17:30) (101 - 101)  RR: 19 (15 Mono 2022 17:30) (18 - 19)  SpO2: 98% (15 Mono 2022 17:30) (94% - 98%)    PHYSICAL EXAM:  GENERAL: NAD  ABDOMEN: Soft, Nontender, distended, right groin erythema  EXTREMITIES:  palpable distal pulses      LABS:  Labs:  CAPILLARY BLOOD GLUCOSE      POCT Blood Glucose.: 145 mg/dL (15 Mono 2022 17:30)                          13.7   8.50  )-----------( 158      ( 15 Mono 2022 15:25 )             42.1       Auto Immature Granulocyte %: 0.7 % (01-15-22 @ 15:25)  Auto Neutrophil %: 82.0 % (01-15-22 @ 15:25)    01-15    139  |  99  |  30<H>  ----------------------------<  162<H>  5.1   |  25  |  1.16      eGFR if Non African American: 59 mL/min/1.73M2 (01-15-22 @ 15:25)      LFTs:             7.7  | 0.6  | 32       ------------------[65      ( 15 Mono 2022 15:25 )  4.4  | x    | 18          Lipase:x      Amylase:x           Coags:     11.9   ----< 0.99    ( 15 Mono 2022 15:25 )     26.5        RADIOLOGY & ADDITIONAL STUDIES:  CT pending official read

## 2022-01-15 NOTE — PROGRESS NOTE ADULT - SUBJECTIVE AND OBJECTIVE BOX
NSCU Progress Note    Assessment/Hospital Course:        24 Hour Events/Subjective:  - Admitted.      REVIEW OF SYSTEMS:  - negative except as above    VITALS:   - T(C): 36.6 (01-15-22 @ 15:14), Max: 36.8 (01-15-22 @ 14:42)  T(F): 97.9 (01-15-22 @ 15:14), Max: 98.2 (01-15-22 @ 14:42)  HR: 88 (01-15-22 @ 15:14) (69 - 88)  BP: 116/61 (01-15-22 @ 15:14) (116/61 - 120/70)  ABP: --  ABP(mean): --  RR: 18 (01-15-22 @ 15:14) (18 - 18)  SpO2: 94% (01-15-22 @ 15:14) (94% - 98%)      IMAGING/DATA:   - Reviewed          PHYSICAL EXAM:    General: calm  CVS: RRR  Pulm: CTAB  GI: Soft, NTND  Extremities: No LE Edema  Neuro: per NSG (in transport):  AOx2, Left pupil 3R, Right pupil 2R, Left nasolabial flattening.  RUE 5/5, LUE 4/5, RHF 4+/5, RLE distally 3/5, LHF 4/5, LLE distally 2/5.   NSCU Progress Note      24 Hour Events/Subjective:  - Admitted.      REVIEW OF SYSTEMS:  - negative except as above    VITALS:   - T(C): 36.6 (01-15-22 @ 15:14), Max: 36.8 (01-15-22 @ 14:42)  T(F): 97.9 (01-15-22 @ 15:14), Max: 98.2 (01-15-22 @ 14:42)  HR: 88 (01-15-22 @ 15:14) (69 - 88)  BP: 116/61 (01-15-22 @ 15:14) (116/61 - 120/70)  ABP: --  ABP(mean): --  RR: 18 (01-15-22 @ 15:14) (18 - 18)  SpO2: 94% (01-15-22 @ 15:14) (94% - 98%)      IMAGING/DATA:   - Reviewed          PHYSICAL EXAM:    General: calm  CVS: RRR  Pulm: CTAB  GI: Soft, NTND  Extremities: No LE Edema  Neuro: per NSG (in transport):  AOx2, Left pupil 3R, Right pupil 2R, Left nasolabial flattening.  RUE 5/5, LUE 4/5, RHF 4+/5, RLE distally 3/5, LHF 4/5, LLE distally 2/5.

## 2022-01-15 NOTE — PROGRESS NOTE ADULT - ASSESSMENT
ASSESSMENT/PLAN:    81M hx of HTN, not on AC/AP, txer from [place] after mechanical fall, CTH with R frontal IPH w/ mass effect, minimal MLS, ICH score 2.    NEURO:  - neuro checks q1h  - repeat CTH 4h  - Keppra 500mg BID  - ARU/PRU  - MIND Screen  - Activity: PT/OT as tolerated    CVS:  Hx of AAA repair 7 years ago ([place]), also with known Aneurysm of ascending and iliac aorta >5cm, planned for evaluation for possible stenting this tuesday prior to arrival  - SBP goal 100-160  - TTE  - vascular consult    PULM:  hx of COVID 2 years ago and 2 weeks ago  - RA  - IS As tolerated  - Aspiration precautions    RENAL:  - Fluids: 75cc/h while NPO  - daily IOs    GI:  - Diet: NPO  - Bowel regimen: miralax, senna    ENDO:   - FS goal 120-180    HEME/ONC:  - SCDs  - Chemoppx: hold d/t fresh heme    ID:  - monitor for fevers      Patient is at high risk of neurologic deterioration/death due to:  ***   ASSESSMENT/PLAN:    81M hx of HTN, not on AC/AP, txer from [place] after mechanical fall, CTH with R frontal IPH w/ mass effect, minimal MLS, ICH score 2.    NEURO:  - neuro checks q1h  - CT head follow up is stable  - Keppra 500mg BID  - /  -Repeat PRU in the morning  - MIND Screen  - Activity: PT/OT as tolerated  -Thiamine    CVS:  Hx of AAA repair 7 years ago ([place]), also with known Aneurysm of ascending and iliac aorta >5cm, planned for evaluation for possible stenting this tuesday prior to arrival  - SBP goal 100-160  - TTE  - vascular consult    PULM:  hx of COVID 2 years ago and 2 weeks ago  - RA  - IS As tolerated  - Aspiration precautions    RENAL:  - Fluids: 75cc/h 2% NS  - daily IOs  - Na 145-155    GI:  - Diet: NPO  -Speech and swallow assessment   - Bowel regimen: miralax, senna    ENDO:   - FS goal 120-180    HEME/ONC:  - SCDs  - Chemoppx: hold d/t fresh heme    ID:  - monitor for fevers      Patient is at high risk of neurologic deterioration/death due to: expansion of the hematoma    ASSESSMENT/PLAN:    81M hx of HTN, not on AC/AP, txer from [place] after mechanical fall, CTH with R frontal IPH w/ mass effect, minimal MLS, ICH score 2.    NEURO:  - neuro checks q1h  - CT head follow up is stable with slight IVH  - Repeat CT tomorrow morning   - Keppra 500mg BID  - /  -Repeat PRU in the morning  - MIND Screen  - Activity: PT/OT as tolerated  -Thiamine    CVS:  Hx of AAA repair 7 years ago (con), also with known Aneurysm of ascending and iliac aorta >5cm, planned for evaluation for possible stenting this tuesday prior to arrival  - SBP goal 100-160  - TTE  - vascular consult    PULM:  hx of COVID 2 years ago and 2 weeks ago  - RA  - IS As tolerated  - Aspiration precautions    RENAL:  - Fluids: 75cc/h 2% NS  - daily IOs  - Na 145-155    GI:  - Diet: NPO  -Speech and swallow assessment   - Bowel regimen: miralax, senna    ENDO:   - FS goal 120-180    HEME/ONC:  - SCDs  - Chemoppx: hold d/t fresh heme    ID:  - monitor for fevers      Patient is at high risk of neurologic deterioration/death due to: expansion of the hematoma

## 2022-01-15 NOTE — ED ADULT NURSE NOTE - NSIMPLEMENTINTERV_GEN_ALL_ED
Problem: Patient Care Overview  Goal: Plan of Care Review  Outcome: Ongoing (interventions implemented as appropriate)  Infant remains on 2lpm Vapotherm nasal cannula @ 23-25%.        Implemented All Fall with Harm Risk Interventions:  [place] to call system. Call bell, personal items and telephone within reach. Instruct patient to call for assistance. Room bathroom lighting operational. Non-slip footwear when patient is off stretcher. Physically safe environment: no spills, clutter or unnecessary equipment. Stretcher in lowest position, wheels locked, appropriate side rails in place. Provide visual cue, wrist band, yellow gown, etc. Monitor gait and stability. Monitor for mental status changes and reorient to person, place, and time. Review medications for side effects contributing to fall risk. Reinforce activity limits and safety measures with patient and family. Provide visual clues: red socks.

## 2022-01-16 LAB
ANION GAP SERPL CALC-SCNC: 12 MMOL/L — SIGNIFICANT CHANGE UP (ref 5–17)
ANION GAP SERPL CALC-SCNC: 13 MMOL/L — SIGNIFICANT CHANGE UP (ref 5–17)
ANION GAP SERPL CALC-SCNC: 14 MMOL/L — SIGNIFICANT CHANGE UP (ref 5–17)
ANION GAP SERPL CALC-SCNC: 15 MMOL/L — SIGNIFICANT CHANGE UP (ref 5–17)
BUN SERPL-MCNC: 11 MG/DL — SIGNIFICANT CHANGE UP (ref 7–23)
BUN SERPL-MCNC: 13 MG/DL — SIGNIFICANT CHANGE UP (ref 7–23)
BUN SERPL-MCNC: 13 MG/DL — SIGNIFICANT CHANGE UP (ref 7–23)
BUN SERPL-MCNC: 14 MG/DL — SIGNIFICANT CHANGE UP (ref 7–23)
BUN SERPL-MCNC: 15 MG/DL — SIGNIFICANT CHANGE UP (ref 7–23)
BUN SERPL-MCNC: 22 MG/DL — SIGNIFICANT CHANGE UP (ref 7–23)
CALCIUM SERPL-MCNC: 8.1 MG/DL — LOW (ref 8.4–10.5)
CALCIUM SERPL-MCNC: 8.4 MG/DL — SIGNIFICANT CHANGE UP (ref 8.4–10.5)
CALCIUM SERPL-MCNC: 9.2 MG/DL — SIGNIFICANT CHANGE UP (ref 8.4–10.5)
CALCIUM SERPL-MCNC: 9.3 MG/DL — SIGNIFICANT CHANGE UP (ref 8.4–10.5)
CALCIUM SERPL-MCNC: 9.3 MG/DL — SIGNIFICANT CHANGE UP (ref 8.4–10.5)
CALCIUM SERPL-MCNC: 9.5 MG/DL — SIGNIFICANT CHANGE UP (ref 8.4–10.5)
CHLORIDE SERPL-SCNC: 100 MMOL/L — SIGNIFICANT CHANGE UP (ref 96–108)
CHLORIDE SERPL-SCNC: 100 MMOL/L — SIGNIFICANT CHANGE UP (ref 96–108)
CHLORIDE SERPL-SCNC: 101 MMOL/L — SIGNIFICANT CHANGE UP (ref 96–108)
CHLORIDE SERPL-SCNC: 101 MMOL/L — SIGNIFICANT CHANGE UP (ref 96–108)
CHLORIDE SERPL-SCNC: 103 MMOL/L — SIGNIFICANT CHANGE UP (ref 96–108)
CHLORIDE SERPL-SCNC: 104 MMOL/L — SIGNIFICANT CHANGE UP (ref 96–108)
CO2 SERPL-SCNC: 22 MMOL/L — SIGNIFICANT CHANGE UP (ref 22–31)
CO2 SERPL-SCNC: 24 MMOL/L — SIGNIFICANT CHANGE UP (ref 22–31)
CO2 SERPL-SCNC: 25 MMOL/L — SIGNIFICANT CHANGE UP (ref 22–31)
CO2 SERPL-SCNC: 25 MMOL/L — SIGNIFICANT CHANGE UP (ref 22–31)
CO2 SERPL-SCNC: 26 MMOL/L — SIGNIFICANT CHANGE UP (ref 22–31)
CO2 SERPL-SCNC: 27 MMOL/L — SIGNIFICANT CHANGE UP (ref 22–31)
CREAT SERPL-MCNC: 0.72 MG/DL — SIGNIFICANT CHANGE UP (ref 0.5–1.3)
CREAT SERPL-MCNC: 0.73 MG/DL — SIGNIFICANT CHANGE UP (ref 0.5–1.3)
CREAT SERPL-MCNC: 0.77 MG/DL — SIGNIFICANT CHANGE UP (ref 0.5–1.3)
CREAT SERPL-MCNC: 0.85 MG/DL — SIGNIFICANT CHANGE UP (ref 0.5–1.3)
CREAT SERPL-MCNC: 0.89 MG/DL — SIGNIFICANT CHANGE UP (ref 0.5–1.3)
CREAT SERPL-MCNC: 0.91 MG/DL — SIGNIFICANT CHANGE UP (ref 0.5–1.3)
GLUCOSE BLDC GLUCOMTR-MCNC: 145 MG/DL — HIGH (ref 70–99)
GLUCOSE BLDC GLUCOMTR-MCNC: 154 MG/DL — HIGH (ref 70–99)
GLUCOSE BLDC GLUCOMTR-MCNC: 166 MG/DL — HIGH (ref 70–99)
GLUCOSE SERPL-MCNC: 142 MG/DL — HIGH (ref 70–99)
GLUCOSE SERPL-MCNC: 145 MG/DL — HIGH (ref 70–99)
GLUCOSE SERPL-MCNC: 150 MG/DL — HIGH (ref 70–99)
GLUCOSE SERPL-MCNC: 152 MG/DL — HIGH (ref 70–99)
GLUCOSE SERPL-MCNC: 152 MG/DL — HIGH (ref 70–99)
GLUCOSE SERPL-MCNC: 173 MG/DL — HIGH (ref 70–99)
HCT VFR BLD CALC: 38.9 % — LOW (ref 39–50)
HGB BLD-MCNC: 12.9 G/DL — LOW (ref 13–17)
MAGNESIUM SERPL-MCNC: 1.8 MG/DL — SIGNIFICANT CHANGE UP (ref 1.6–2.6)
MAGNESIUM SERPL-MCNC: 1.8 MG/DL — SIGNIFICANT CHANGE UP (ref 1.6–2.6)
MAGNESIUM SERPL-MCNC: 1.9 MG/DL — SIGNIFICANT CHANGE UP (ref 1.6–2.6)
MAGNESIUM SERPL-MCNC: 2 MG/DL — SIGNIFICANT CHANGE UP (ref 1.6–2.6)
MCHC RBC-ENTMCNC: 30.5 PG — SIGNIFICANT CHANGE UP (ref 27–34)
MCHC RBC-ENTMCNC: 33.2 GM/DL — SIGNIFICANT CHANGE UP (ref 32–36)
MCV RBC AUTO: 92 FL — SIGNIFICANT CHANGE UP (ref 80–100)
NRBC # BLD: 0 /100 WBCS — SIGNIFICANT CHANGE UP (ref 0–0)
PA ADP PRP-ACNC: 230 PRU — SIGNIFICANT CHANGE UP (ref 194–417)
PHOSPHATE SERPL-MCNC: 2.4 MG/DL — LOW (ref 2.5–4.5)
PHOSPHATE SERPL-MCNC: 2.5 MG/DL — SIGNIFICANT CHANGE UP (ref 2.5–4.5)
PHOSPHATE SERPL-MCNC: 3.5 MG/DL — SIGNIFICANT CHANGE UP (ref 2.5–4.5)
PHOSPHATE SERPL-MCNC: 3.5 MG/DL — SIGNIFICANT CHANGE UP (ref 2.5–4.5)
PLATELET # BLD AUTO: 142 K/UL — LOW (ref 150–400)
POTASSIUM SERPL-MCNC: 4 MMOL/L — SIGNIFICANT CHANGE UP (ref 3.5–5.3)
POTASSIUM SERPL-MCNC: 4.1 MMOL/L — SIGNIFICANT CHANGE UP (ref 3.5–5.3)
POTASSIUM SERPL-MCNC: 4.1 MMOL/L — SIGNIFICANT CHANGE UP (ref 3.5–5.3)
POTASSIUM SERPL-MCNC: 4.5 MMOL/L — SIGNIFICANT CHANGE UP (ref 3.5–5.3)
POTASSIUM SERPL-MCNC: >9 MMOL/L — CRITICAL HIGH (ref 3.5–5.3)
POTASSIUM SERPL-MCNC: >9 MMOL/L — CRITICAL HIGH (ref 3.5–5.3)
POTASSIUM SERPL-SCNC: 4 MMOL/L — SIGNIFICANT CHANGE UP (ref 3.5–5.3)
POTASSIUM SERPL-SCNC: 4.1 MMOL/L — SIGNIFICANT CHANGE UP (ref 3.5–5.3)
POTASSIUM SERPL-SCNC: 4.1 MMOL/L — SIGNIFICANT CHANGE UP (ref 3.5–5.3)
POTASSIUM SERPL-SCNC: 4.5 MMOL/L — SIGNIFICANT CHANGE UP (ref 3.5–5.3)
POTASSIUM SERPL-SCNC: >9 MMOL/L — CRITICAL HIGH (ref 3.5–5.3)
POTASSIUM SERPL-SCNC: >9 MMOL/L — CRITICAL HIGH (ref 3.5–5.3)
RBC # BLD: 4.23 M/UL — SIGNIFICANT CHANGE UP (ref 4.2–5.8)
RBC # FLD: 14.1 % — SIGNIFICANT CHANGE UP (ref 10.3–14.5)
SODIUM SERPL-SCNC: 137 MMOL/L — SIGNIFICANT CHANGE UP (ref 135–145)
SODIUM SERPL-SCNC: 140 MMOL/L — SIGNIFICANT CHANGE UP (ref 135–145)
SODIUM SERPL-SCNC: 141 MMOL/L — SIGNIFICANT CHANGE UP (ref 135–145)
WBC # BLD: 7.24 K/UL — SIGNIFICANT CHANGE UP (ref 3.8–10.5)
WBC # FLD AUTO: 7.24 K/UL — SIGNIFICANT CHANGE UP (ref 3.8–10.5)

## 2022-01-16 PROCEDURE — 70450 CT HEAD/BRAIN W/O DYE: CPT | Mod: 26

## 2022-01-16 PROCEDURE — 99291 CRITICAL CARE FIRST HOUR: CPT

## 2022-01-16 PROCEDURE — 95720 EEG PHY/QHP EA INCR W/VEEG: CPT

## 2022-01-16 PROCEDURE — 99292 CRITICAL CARE ADDL 30 MIN: CPT

## 2022-01-16 PROCEDURE — 93970 EXTREMITY STUDY: CPT | Mod: 26

## 2022-01-16 PROCEDURE — 99223 1ST HOSP IP/OBS HIGH 75: CPT

## 2022-01-16 RX ORDER — MAGNESIUM SULFATE 500 MG/ML
1 VIAL (ML) INJECTION ONCE
Refills: 0 | Status: COMPLETED | OUTPATIENT
Start: 2022-01-16 | End: 2022-01-16

## 2022-01-16 RX ORDER — MAGNESIUM SULFATE 500 MG/ML
1 VIAL (ML) INJECTION ONCE
Refills: 0 | Status: DISCONTINUED | OUTPATIENT
Start: 2022-01-16 | End: 2022-01-17

## 2022-01-16 RX ORDER — SODIUM CHLORIDE 5 G/100ML
1000 INJECTION, SOLUTION INTRAVENOUS
Refills: 0 | Status: DISCONTINUED | OUTPATIENT
Start: 2022-01-16 | End: 2022-01-19

## 2022-01-16 RX ORDER — POTASSIUM PHOSPHATE, MONOBASIC POTASSIUM PHOSPHATE, DIBASIC 236; 224 MG/ML; MG/ML
30 INJECTION, SOLUTION INTRAVENOUS ONCE
Refills: 0 | Status: COMPLETED | OUTPATIENT
Start: 2022-01-16 | End: 2022-01-16

## 2022-01-16 RX ORDER — HYDRALAZINE HCL 50 MG
10 TABLET ORAL ONCE
Refills: 0 | Status: COMPLETED | OUTPATIENT
Start: 2022-01-16 | End: 2022-01-16

## 2022-01-16 RX ORDER — ENOXAPARIN SODIUM 100 MG/ML
40 INJECTION SUBCUTANEOUS
Refills: 0 | Status: DISCONTINUED | OUTPATIENT
Start: 2022-01-16 | End: 2022-01-16

## 2022-01-16 RX ORDER — LACOSAMIDE 50 MG/1
100 TABLET ORAL EVERY 12 HOURS
Refills: 0 | Status: DISCONTINUED | OUTPATIENT
Start: 2022-01-16 | End: 2022-01-20

## 2022-01-16 RX ORDER — SENNA PLUS 8.6 MG/1
2 TABLET ORAL AT BEDTIME
Refills: 0 | Status: DISCONTINUED | OUTPATIENT
Start: 2022-01-17 | End: 2022-01-21

## 2022-01-16 RX ORDER — POLYETHYLENE GLYCOL 3350 17 G/17G
17 POWDER, FOR SOLUTION ORAL DAILY
Refills: 0 | Status: DISCONTINUED | OUTPATIENT
Start: 2022-01-17 | End: 2022-01-21

## 2022-01-16 RX ADMIN — SODIUM CHLORIDE 75 MILLILITER(S): 5 INJECTION, SOLUTION INTRAVENOUS at 01:00

## 2022-01-16 RX ADMIN — Medication 100 MILLIGRAM(S): at 11:45

## 2022-01-16 RX ADMIN — Medication 10 MILLIGRAM(S): at 22:21

## 2022-01-16 RX ADMIN — LACOSAMIDE 120 MILLIGRAM(S): 50 TABLET ORAL at 10:55

## 2022-01-16 RX ADMIN — LEVETIRACETAM 400 MILLIGRAM(S): 250 TABLET, FILM COATED ORAL at 05:09

## 2022-01-16 RX ADMIN — Medication 2: at 05:09

## 2022-01-16 RX ADMIN — SODIUM CHLORIDE 75 MILLILITER(S): 5 INJECTION, SOLUTION INTRAVENOUS at 10:55

## 2022-01-16 RX ADMIN — LACOSAMIDE 120 MILLIGRAM(S): 50 TABLET ORAL at 17:31

## 2022-01-16 RX ADMIN — Medication 100 GRAM(S): at 18:38

## 2022-01-16 RX ADMIN — CHLORHEXIDINE GLUCONATE 1 APPLICATION(S): 213 SOLUTION TOPICAL at 22:21

## 2022-01-16 RX ADMIN — SODIUM CHLORIDE 100 MILLILITER(S): 5 INJECTION, SOLUTION INTRAVENOUS at 22:21

## 2022-01-16 RX ADMIN — Medication 100 GRAM(S): at 02:25

## 2022-01-16 RX ADMIN — POTASSIUM PHOSPHATE, MONOBASIC POTASSIUM PHOSPHATE, DIBASIC 83.33 MILLIMOLE(S): 236; 224 INJECTION, SOLUTION INTRAVENOUS at 03:30

## 2022-01-16 RX ADMIN — Medication 2: at 11:44

## 2022-01-16 NOTE — SWALLOW BEDSIDE ASSESSMENT ADULT - SWALLOW EVAL: RECOMMENDED DIET
NPO/ NGT pending GOC; Pt's mentation is NOT supportive of po at this time; high risk for aspiration. NPO/ NGT pending GOC; Pt's mentation is NOT supportive of po at this time; high risk for aspiration. Plan to follow up in 2-3 days.

## 2022-01-16 NOTE — PROGRESS NOTE ADULT - ASSESSMENT
81 M s/p  fall and R ICH    - Repeat CTH x 2 stable  - Hold ASA  - ARU and PRU elevated with no reported use of Plavix. Will repeat tomorrow   - CTH in AM  - Vascular surgery for Aortic and iliac aneurysms. Will monitor and treat possible as outpatient due to c/i to AC  - Possible MIND study candidate

## 2022-01-16 NOTE — PHYSICAL THERAPY INITIAL EVALUATION ADULT - PERTINENT HX OF CURRENT PROBLEM, REHAB EVAL
81y m W/ PMHx HTN presenting to the ED from [facility] as a transfer for R frontal parenchymal hemorrhage on CT after fall at home. Patient is poor historian but states that he was diagnosed with COVID 2 weeks ago and has been feeling "tired" at home, states he felt dizzy earlier today but unable to state what time he fell or further elucidate. Patient denies AC use.

## 2022-01-16 NOTE — SWALLOW BEDSIDE ASSESSMENT ADULT - NS SPL SWALLOW CLINIC TRIAL FT
Pt was SIGNIFICANTLY LETHARGIC and require frequent cues to maintain alertness. Currently mentation is NOT supportive of safe po intake. Plan to follow up in 2-3 days pending improvement in mentation. D/w RN. Purposeful proactive rounding reinforced and 5 Ps addressed. Pt left in no distress.

## 2022-01-16 NOTE — SWALLOW BEDSIDE ASSESSMENT ADULT - SWALLOW EVAL: DIAGNOSIS
Pt p/w R ICA in presence of encephalopathy; AMS/Lethargic state. Oropharyngeal swallow skills p/w a deficit in presence of AMS, limited evaluation given poor participation, ability to follow commands, as well as remain alert. Pt is at a high risk for aspiration given current AMS. Pt is a 81 M s/p fall and R ICH in presence of encephalopathy; AMS/Lethargic state. Oropharyngeal swallow skills p/w a deficit in presence of AMS, limited evaluation given poor participation, ability to follow commands, as well as remain alert. Pt is at a high risk for aspiration given current AMS.

## 2022-01-16 NOTE — SPEECH LANGUAGE PATHOLOGY EVALUATION - COMMENTS
Auditory comprehension skills p/w deficits, however limited intervention given current AMS; reduced ability to maintain wakeful state.  indicating difficulty with comprehension of the patient at times. Noted pt speaking Kazakh and English; code switching. Pt did not open his eyes despite max cues for functional assessment; unable to assess. Cognitive linguistic skills p/w deficits, however limited intervention given current AMS; reduced ability to maintain wakeful state. Writing was not able to be assessed given current AMS; reduced ability to maintain wakeful state. Verbal expression skills p/w deficits, however limited intervention given current AMS; reduced ability to maintain wakeful state.   Additional testing warranted, pending improvement in state. >GOAL; Pt to improve expressive/receptive language skills for functional communication during course. Unable to trial as pt not opening his eyes Continued hx:   > Vascular surgery for Aortic and iliac aneurysms. Will monitor and treat possible as outpatient due to c/i to AC  > Possible MIND study candidate    Swallow / Speech history: No reports in SCM. No studies in PACS. Pt is new to this service.

## 2022-01-16 NOTE — OCCUPATIONAL THERAPY INITIAL EVALUATION ADULT - ANTICIPATED DISCHARGE DISPOSITION, OT EVAL
Please also note IR notes from Thursday and Friday about abdominal pain, fever, etc. Patient is taking oral antibiotics prescribed by IR.    Acute TBI rehab/rehabilitation facility

## 2022-01-16 NOTE — PROGRESS NOTE ADULT - ASSESSMENT
ASSESSMENT/PLAN:    81M hx of HTN, not on AC/AP, txer from [place] after mechanical fall, CTH with R frontal IPH w/ mass effect, minimal MLS, ICH score 2.    NEURO:  - neuro checks q1h  - CT head follow up is stable with slight IVH  - Repeat CT tomorrow morning   - Keppra 500mg BID  - /  -Repeat PRU in the morning  - MIND Screen  - Activity: PT/OT as tolerated  -Thiamine    CVS:  Hx of AAA repair 7 years ago (con), also with known Aneurysm of ascending and iliac aorta >5cm, planned for evaluation for possible stenting this tuesday prior to arrival  - SBP goal 100-160  - TTE  - vascular consult    PULM:  hx of COVID 2 years ago and 2 weeks ago  - RA  - IS As tolerated  - Aspiration precautions    RENAL:  - Fluids: 75cc/h 2% NS  - daily IOs  - Na 145-155    GI:  - Diet: NPO  -Speech and swallow assessment   - Bowel regimen: miralax, senna    ENDO:   - FS goal 120-180    HEME/ONC:  - SCDs  - Chemoppx: hold d/t fresh heme    ID:  - monitor for fevers      Patient is at high risk of neurologic deterioration/death due to: expansion of the hematoma    ASSESSMENT/PLAN:    81M hx of HTN, not on AC/AP, txer from [place] after mechanical fall, CTH with R frontal IPH w/ mass effect, minimal MLS, ICH score 2.    NEURO:  - neuro checks q1h  - CT head follow up is stable with slight IVH  - Repeat CT today  - Keppra 500mg BID  - /  -Repeat PRU in the morning  - MIND Screen  - Activity: PT/OT as tolerated  -Thiamine    CVS:  Hx of AAA repair 7 years ago ([place]), also with known Aneurysm of ascending and iliac aorta >5cm, planned for evaluation for possible stenting this tuesday prior to arrival  - SBP goal 100-160  - TTE- p  - vascular consult    PULM:  hx of COVID 2 years ago and 2 weeks ago  - RA  - IS As tolerated- ?comply  - Aspiration precautions    RENAL:  - Fluids: 75cc/h 2% NS  - daily IOs  - Na 145-155    GI:  - Diet: NPO  -Speech and swallow assessment   - Bowel regimen: miralax, senna    ENDO: Type 2 DM   Mod dose SSI  Check HGBa1C  Lipid panel   - FS goal 120-180    HEME/ONC:  - SCDs  - Chemoppx: hold d/t fresh heme    ID:  - monitor for fevers      Patient is at high risk of neurologic deterioration/death due to: expansion of the hematoma    ASSESSMENT/PLAN:    81M hx of HTN, not on AC/AP, txer from [place] after mechanical fall, CTH with R frontal IPH w/ mass effect, minimal MLS, ICH score 2.    NEURO:  - neuro checks q1h  - EEG  - CT head follow up is stable with slight IVH  - Repeat CT today  - Keppra 500mg BID---> Vimpat 100mg  [id number]  -Repeat PRU in the morning  - MIND Screen  - Activity: PT/OT as tolerated  -Thiamine    CVS:  Hx of AAA repair 7 years ago (con), also with known Aneurysm of ascending and iliac aorta >5cm, planned for evaluation for possible stenting this tuesday prior to arrival  - SBP goal 100-160  - TTE- p  - vascular consult    PULM:  hx of COVID 2 years ago and 2 weeks ago  - RA  - IS As tolerated- ?comply  - Aspiration precautions    RENAL:  - Fluids: 75cc/h 2% NS  - daily IOs  - Na 145-155    GI:  - Diet: NPO  -Speech and swallow assessment   - Bowel regimen: miralax, senna    ENDO: Type 2 DM   Mod dose SSI  Check HGBa1C  Lipid panel   - FS goal 120-180    HEME/ONC:  - SCDs  - Chemoppx: hold d/t fresh heme    ID:  - monitor for fevers      Patient is at high risk of neurologic deterioration/death due to: expansion of the hematoma    ASSESSMENT/PLAN:    81M hx of HTN, not on AC/AP, txer from [place] after mechanical fall, CTH with R frontal IPH w/ mass effect, minimal MLS, ICH score 2.    NEURO:  - neuro checks q1h- fluctuating exam likely seizure place on continuous EEG  - CT head follow up is stable with slight IVH  - Repeat CT today  - Keppra 500mg BID---> Vimpat 100mg  [id number]  -Repeat PRU in the morning  - MIND Screen  - Activity: PT/OT as tolerated  -Thiamine    CVS:  Hx of AAA repair 7 years ago (con), also with known Aneurysm of ascending and iliac aorta >5cm, planned for evaluation for possible stenting this tuesday prior to arrival  - SBP goal 100-160  - TTE- p  - vascular consult    PULM:  hx of COVID 2 years ago and 2 weeks ago  - RA  - IS As tolerated- ?comply  - Aspiration precautions    RENAL:  - Fluids: 75cc/h 2% NS  - daily IOs  - Na 145-155    GI:  - Diet: NPO  -Speech and swallow assessment   - Bowel regimen: miralax, senna    ENDO: Type 2 DM   Mod dose SSI  Check HGBa1C  Lipid panel   - FS goal 120-180    HEME/ONC:  - SCDs  - Chemoppx: hold d/t fresh heme    ID:  - monitor for fevers      Patient is at high risk of neurologic deterioration/death due to: expansion of the hematoma

## 2022-01-16 NOTE — SWALLOW BEDSIDE ASSESSMENT ADULT - ORAL PREPARATORY PHASE
Required instruction to open mouth/orient to tsp/Reduced oral grading/Bolus falls into anterior sulcus/Bolus falls into left lateral sulci

## 2022-01-16 NOTE — SWALLOW BEDSIDE ASSESSMENT ADULT - SLP PERTINENT HISTORY OF CURRENT PROBLEM
Mr. Robert Woody is an 81y m W/ PMHx HTN presenting to the ED from [facility] as a transfer for R frontal parenchymal hemorrhage on CT after fall at home. Patient is poor historian but states that he was diagnosed with COVID 2 weeks ago and has been feeling "tired" at home, states he felt dizzy earlier today but unable to state what time he fell or further elucidate. Patient denies AC use. Per EMS was also recently diagnosed with a AAA but has been unable to undergo surgical repair due to recent dx of COVID. Vascular surgery was consulted as patient has hx of aneurysms. History was obtained from daughter Amy. Patient had a history of ?open thoracic aneurysm repair 7 years ago in [place]. And patient has known hx ascending aorta aneurysm measuring 4cm, infrarenal AAA 5cm and right common iliac aneurysm measuring 5cm as reported by daughter based on last CT scan done in [place] in November 11/20. Patient is here with ICH. Vascular was called to evaluate.

## 2022-01-16 NOTE — SPEECH LANGUAGE PATHOLOGY EVALUATION - SLP PERTINENT HISTORY OF CURRENT PROBLEM
Mr. Robert Woody is an 81y m W/ PMHx HTN presenting to the ED from [facility] as a transfer for R frontal parenchymal hemorrhage on CT after fall at home. Patient is poor historian but states that he was diagnosed with COVID 2 weeks ago and has been feeling "tired" at home, states he felt dizzy earlier today but unable to state what time he fell or further elucidate. Patient denies AC use. Per EMS was also recently diagnosed with a AAA but has been unable to undergo surgical repair due to recent dx of COVID. Vascular surgery was consulted as patient has hx of aneurysms. History was obtained from daughter Amy. Patient had a history of ?open thoracic aneurysm repair 7 years ago in [place]. And patient has known hx ascending aorta aneurysm measuring 4cm, infrarenal AAA 5cm and right common iliac aneurysm measuring 5cm as reported by daughter based on last CT scan done in [place] in November 11/20. Patient is here with ICH. Vascular was called to evaluate.

## 2022-01-16 NOTE — PROGRESS NOTE ADULT - SUBJECTIVE AND OBJECTIVE BOX
HPI:  81y m W/ PMHx HTN presenting to the ED from [facility] as a transfer for R frontal parenchymal hemorrhage on CT after fall at home. Patient is poor historian but states that he was diagnosed with COVID 2 weeks ago and has been feeling "tired" at home, states he felt dizzy earlier today but unable to state what time he fell or further elucidate. Patient denies AC use. Per EMS was also recently diagnosed with a AAA but has been unable to undergo surgical repair due to recent dx of COVID. Patient still endorsing he feels "tired", denies current headache, vision changes, dizziness, chest pain, nausea, back pain, focal numbness/tingling. Endorses weakness to LLE 2/2 pain. (15 Mono 2022 15:50)  ICH score 2  NIHSS 7    OVERNIGHT EVENTS:   No acute events overnight.    VITALS:  T(C): , Max: 37.8 (01-16-22 @ 03:00)  HR:  (68 - 88)  BP:  (116/61 - 152/93)  ABP: --  RR:  (13 - 20)  SpO2:  (92% - 100%)  Wt(kg): --      01-15-22 @ 07:01  -  01-16-22 @ 05:21  --------------------------------------------------------  IN: 1383.2 mL / OUT: 2015 mL / NET: -631.8 mL      LABS:  Na: 140 (01-16 @ 00:11), 139 (01-15 @ 15:25)  K: 4.0 (01-16 @ 00:11), 5.1 (01-15 @ 15:25)  Cl: 101 (01-16 @ 00:11), 99 (01-15 @ 15:25)  CO2: 26 (01-16 @ 00:11), 25 (01-15 @ 15:25)  BUN: 22 (01-16 @ 00:11), 30 (01-15 @ 15:25)  Cr: 0.91 (01-16 @ 00:11), 1.16 (01-15 @ 15:25)  Glu: 173(01-16 @ 00:11), 162(01-15 @ 15:25)    Hgb: 12.9 (01-16 @ 00:11), 13.7 (01-15 @ 15:25)  Hct: 38.9 (01-16 @ 00:11), 42.1 (01-15 @ 15:25)  WBC: 7.24 (01-16 @ 00:11), 8.50 (01-15 @ 15:25)  Plt: 142 (01-16 @ 00:11), 158 (01-15 @ 15:25)    INR: 0.99 01-15-22 @ 15:25  PTT: 26.5 01-15-22 @ 15:25    IMAGING:   Recent imaging studies were reviewed.    MEDICATIONS:  acetaminophen     Tablet .. 650 milliGRAM(s) Oral every 6 hours PRN  chlorhexidine 4% Liquid 1 Application(s) Topical <User Schedule>  dextrose 40% Gel 15 Gram(s) Oral once  dextrose 5%. 1000 milliLiter(s) IV Continuous <Continuous>  dextrose 5%. 1000 milliLiter(s) IV Continuous <Continuous>  dextrose 50% Injectable 25 Gram(s) IV Push once  dextrose 50% Injectable 12.5 Gram(s) IV Push once  dextrose 50% Injectable 25 Gram(s) IV Push once  glucagon  Injectable 1 milliGRAM(s) IntraMuscular once  insulin lispro (ADMELOG) corrective regimen sliding scale   SubCutaneous every 6 hours  levETIRAcetam  IVPB 500 milliGRAM(s) IV Intermittent every 12 hours  senna 2 Tablet(s) Oral at bedtime PRN  sodium chloride 2% . 1000 milliLiter(s) IV Continuous <Continuous>  thiamine Injectable 100 milliGRAM(s) IV Push daily    PHYSICAL EXAM:    General: calm  CVS: RRR  Pulm: CTAB  GI: Soft, NTND  Extremities: No LE Edema  Neuro: per NSG (in transport):  AOx2, Left pupil 3R, Right pupil 2R, Left facial, moves all limbs against gravity, left UE drift, left LE 3/5 at the hip, 2/5 at knees and ankles HPI:  81y m W/ PMHx HTN presenting to the ED from [facility] as a transfer for R frontal parenchymal hemorrhage on CT after fall at home. Patient is poor historian but states that he was diagnosed with COVID 2 weeks ago and has been feeling "tired" at home, states he felt dizzy earlier today but unable to state what time he fell or further elucidate. Patient denies AC use. Per EMS was also recently diagnosed with a AAA but has been unable to undergo surgical repair due to recent dx of COVID. Patient still endorsing he feels "tired", denies current headache, vision changes, dizziness, chest pain, nausea, back pain, focal numbness/tingling. Endorses weakness to LLE 2/2 pain. (15 Mono 2022 15:50)  ICH score 2  NIHSS 7    OVERNIGHT EVENTS:   No acute events overnight.    VITALS:  T(C): , Max: 37.8 (01-16-22 @ 03:00)  HR:  (68 - 88)  BP:  (116/61 - 152/93)  ABP: --  RR:  (13 - 20)  SpO2:  (92% - 100%)  Wt(kg): --      01-15-22 @ 07:01  -  01-16-22 @ 05:21  --------------------------------------------------------  IN: 1383.2 mL / OUT: 2015 mL / NET: -631.8 mL      LABS:      Na: 140 (01-16 @ 00:11), 139 (01-15 @ 15:25)  K: 4.0 (01-16 @ 00:11), 5.1 (01-15 @ 15:25)  Cl: 101 (01-16 @ 00:11), 99 (01-15 @ 15:25)  CO2: 26 (01-16 @ 00:11), 25 (01-15 @ 15:25)  BUN: 22 (01-16 @ 00:11), 30 (01-15 @ 15:25)  Cr: 0.91 (01-16 @ 00:11), 1.16 (01-15 @ 15:25)  Glu: 173(01-16 @ 00:11), 162(01-15 @ 15:25)    Hgb: 12.9 (01-16 @ 00:11), 13.7 (01-15 @ 15:25)  Hct: 38.9 (01-16 @ 00:11), 42.1 (01-15 @ 15:25)  WBC: 7.24 (01-16 @ 00:11), 8.50 (01-15 @ 15:25)  Plt: 142 (01-16 @ 00:11), 158 (01-15 @ 15:25)    INR: 0.99 01-15-22 @ 15:25  PTT: 26.5 01-15-22 @ 15:25    IMAGING:    CT Head No Cont (01.15.22 @ 21:07) >  IMPRESSION:    A large right frontal lobe parenchymal hematoma with surrounding edema   and mass effect is again noted, grossly stable in overall size. The right   to left shift is also unchanged.    New layering intraventricular hemorrhage is noted within the atrium and   occipital horn of the right lateral ventricle. The ventricles are however   stable in size. Serial imaging follow-up is recommended to monitor for   stability.      CT Angio Head w/ IV Cont (01.15.22 @ 17:16) >  IMPRESSION:  *  NO TRAUMATIC OCCLUSION, DISSECTION, OR PSEUDOANEURYSM.  *  NO ABNORMAL ENHANCEMENT ASSOCIATED WITH THE MODERATE TO LARGE RIGHT   FRONTAL PARENCHYMAL HEMORRHAGE.  *  NO PUDDLING OF CONTRAST. NO ANEURYSM OR AVM SEEN.        CT Abdomen and Pelvis w/ IV Cont (01.15.22 @ 17:17) >  IMPRESSION:  1.  No evidence of acute traumatic injury in the chest, abdomen, or   pelvis.  2.  Hepatic steatosis.  3.  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  4.  Bladder wall thickening.  5.  Infrarenal abdominal aortic aneurysm measuring 4.7 cm. 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 Smaller saccular aneurysm   of the LEFT common iliac artery measuring 3 cm.  6.  Small fat-containing periumbilical hernia.    >        MEDICATIONS:  acetaminophen     Tablet .. 650 milliGRAM(s) Oral every 6 hours PRN  chlorhexidine 4% Liquid 1 Application(s) Topical <User Schedule>  dextrose 40% Gel 15 Gram(s) Oral once  dextrose 5%. 1000 milliLiter(s) IV Continuous <Continuous>  dextrose 5%. 1000 milliLiter(s) IV Continuous <Continuous>  dextrose 50% Injectable 25 Gram(s) IV Push once  dextrose 50% Injectable 12.5 Gram(s) IV Push once  dextrose 50% Injectable 25 Gram(s) IV Push once  glucagon  Injectable 1 milliGRAM(s) IntraMuscular once  insulin lispro (ADMELOG) corrective regimen sliding scale   SubCutaneous every 6 hours  levETIRAcetam  IVPB 500 milliGRAM(s) IV Intermittent every 12 hours  senna 2 Tablet(s) Oral at bedtime PRN  sodium chloride 2% . 1000 milliLiter(s) IV Continuous <Continuous>  thiamine Injectable 100 milliGRAM(s) IV Push daily    PHYSICAL EXAM:    General: calm  CVS: RRR  Pulm: CTAB  GI: Soft, NTND  Extremities: No LE Edema  Neuro: per NSG (in transport):  AOx2, Left pupil 3R, Right pupil 2R, Left facial, moves all limbs against gravity, left UE drift, left LE 3/5 at the hip, 2/5 at knees and ankles HPI:  81y m W/ PMHx HTN presenting to the ED from [facility] as a transfer for R frontal parenchymal hemorrhage on CT after fall at home. Patient is poor historian but states that he was diagnosed with COVID 2 weeks ago and has been feeling "tired" at home, states he felt dizzy earlier today but unable to state what time he fell or further elucidate. Patient denies AC use. Per EMS was also recently diagnosed with a AAA but has been unable to undergo surgical repair due to recent dx of COVID. Patient still endorsing he feels "tired", denies current headache, vision changes, dizziness, chest pain, nausea, back pain, focal numbness/tingling. Endorses weakness to LLE 2/2 pain. (15 Mono 2022 15:50)  ICH score 2  NIHSS 7    OVERNIGHT EVENTS:   No acute events overnight.    VITALS:  T(C): , Max: 37.8 (01-16-22 @ 03:00)  HR:  (68 - 88)  BP:  (116/61 - 152/93)  ABP: --  RR:  (13 - 20)  SpO2:  (92% - 100%)  Wt(kg): --      01-15-22 @ 07:01  -  01-16-22 @ 05:21  --------------------------------------------------------  IN: 1383.2 mL / OUT: 2015 mL / NET: -631.8 mL      LABS:      Na: 140 (01-16 @ 00:11), 139 (01-15 @ 15:25)  K: 4.0 (01-16 @ 00:11), 5.1 (01-15 @ 15:25)  Cl: 101 (01-16 @ 00:11), 99 (01-15 @ 15:25)  CO2: 26 (01-16 @ 00:11), 25 (01-15 @ 15:25)  BUN: 22 (01-16 @ 00:11), 30 (01-15 @ 15:25)  Cr: 0.91 (01-16 @ 00:11), 1.16 (01-15 @ 15:25)  Glu: 173(01-16 @ 00:11), 162(01-15 @ 15:25)    Hgb: 12.9 (01-16 @ 00:11), 13.7 (01-15 @ 15:25)  Hct: 38.9 (01-16 @ 00:11), 42.1 (01-15 @ 15:25)  WBC: 7.24 (01-16 @ 00:11), 8.50 (01-15 @ 15:25)  Plt: 142 (01-16 @ 00:11), 158 (01-15 @ 15:25)    INR: 0.99 01-15-22 @ 15:25  PTT: 26.5 01-15-22 @ 15:25        IMAGING:    CT Head No Cont (01.15.22 @ 21:07) >  IMPRESSION:    A large right frontal lobe parenchymal hematoma with surrounding edema   and mass effect is again noted, grossly stable in overall size. The right   to left shift is also unchanged.    New layering intraventricular hemorrhage is noted within the atrium and   occipital horn of the right lateral ventricle. The ventricles are however   stable in size. Serial imaging follow-up is recommended to monitor for   stability.      CT Angio Head w/ IV Cont (01.15.22 @ 17:16) >  IMPRESSION:  *  NO TRAUMATIC OCCLUSION, DISSECTION, OR PSEUDOANEURYSM.  *  NO ABNORMAL ENHANCEMENT ASSOCIATED WITH THE MODERATE TO LARGE RIGHT   FRONTAL PARENCHYMAL HEMORRHAGE.  *  NO PUDDLING OF CONTRAST. NO ANEURYSM OR AVM SEEN.        CT Abdomen and Pelvis w/ IV Cont (01.15.22 @ 17:17) >  IMPRESSION:  1.  No evidence of acute traumatic injury in the chest, abdomen, or   pelvis.  2.  Hepatic steatosis.  3.  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  4.  Bladder wall thickening.  5.  Infrarenal abdominal aortic aneurysm measuring 4.7 cm. 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 Smaller saccular aneurysm   of the LEFT common iliac artery measuring 3 cm.  6.  Small fat-containing periumbilical hernia.    >        MEDICATIONS:  acetaminophen     Tablet .. 650 milliGRAM(s) Oral every 6 hours PRN  chlorhexidine 4% Liquid 1 Application(s) Topical <User Schedule>  dextrose 40% Gel 15 Gram(s) Oral once  dextrose 5%. 1000 milliLiter(s) IV Continuous <Continuous>  dextrose 5%. 1000 milliLiter(s) IV Continuous <Continuous>  dextrose 50% Injectable 25 Gram(s) IV Push once  dextrose 50% Injectable 12.5 Gram(s) IV Push once  dextrose 50% Injectable 25 Gram(s) IV Push once  glucagon  Injectable 1 milliGRAM(s) IntraMuscular once  insulin lispro (ADMELOG) corrective regimen sliding scale   SubCutaneous every 6 hours  levETIRAcetam  IVPB 500 milliGRAM(s) IV Intermittent every 12 hours  senna 2 Tablet(s) Oral at bedtime PRN  sodium chloride 2% . 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 Patient is poor historian but states that he was diagnosed with COVID 2 weeks ago and has been feeling "tired" at home, states he felt dizzy earlier today but unable to state what time he fell or further elucidate. Patient denies AC use. Per EMS was also recently diagnosed with a AAA but has been unable to undergo surgical repair due to recent dx of COVID. Patient still endorsing he feels "tired", denies current headache, vision changes, dizziness, chest pain, nausea, back pain, focal numbness/tingling. Endorses weakness to LLE 2/2 pain. (15 Mono 2022 15:50)  ICH score 2  NIHSS 7    OVERNIGHT EVENTS:   No acute events overnight.    VITALS:  T(C): , Max: 37.8 (01-16-22 @ 03:00)  HR:  (68 - 88)  BP:  (116/61 - 152/93)  ABP: --  RR:  (13 - 20)  SpO2:  (92% - 100%)  Wt(kg): --      01-15-22 @ 07:01  -  01-16-22 @ 05:21  --------------------------------------------------------  IN: 1383.2 mL / OUT: 2015 mL / NET: -631.8 mL      LABS:      Na: 140 (01-16 @ 00:11), 139 (01-15 @ 15:25)  K: 4.0 (01-16 @ 00:11), 5.1 (01-15 @ 15:25)  Cl: 101 (01-16 @ 00:11), 99 (01-15 @ 15:25)  CO2: 26 (01-16 @ 00:11), 25 (01-15 @ 15:25)  BUN: 22 (01-16 @ 00:11), 30 (01-15 @ 15:25)  Cr: 0.91 (01-16 @ 00:11), 1.16 (01-15 @ 15:25)  Glu: 173(01-16 @ 00:11), 162(01-15 @ 15:25)    Hgb: 12.9 (01-16 @ 00:11), 13.7 (01-15 @ 15:25)  Hct: 38.9 (01-16 @ 00:11), 42.1 (01-15 @ 15:25)  WBC: 7.24 (01-16 @ 00:11), 8.50 (01-15 @ 15:25)  Plt: 142 (01-16 @ 00:11), 158 (01-15 @ 15:25)    INR: 0.99 01-15-22 @ 15:25  PTT: 26.5 01-15-22 @ 15:25        IMAGING:    CT Head No Cont (01.15.22 @ 21:07) >  IMPRESSION:    A large right frontal lobe parenchymal hematoma with surrounding edema   and mass effect is again noted, grossly stable in overall size. The right   to left shift is also unchanged.    New layering intraventricular hemorrhage is noted within the atrium and   occipital horn of the right lateral ventricle. The ventricles are however   stable in size. Serial imaging follow-up is recommended to monitor for   stability.      CT Angio Head w/ IV Cont (01.15.22 @ 17:16) >  IMPRESSION:  *  NO TRAUMATIC OCCLUSION, DISSECTION, OR PSEUDOANEURYSM.  *  NO ABNORMAL ENHANCEMENT ASSOCIATED WITH THE MODERATE TO LARGE RIGHT   FRONTAL PARENCHYMAL HEMORRHAGE.  *  NO PUDDLING OF CONTRAST. NO ANEURYSM OR AVM SEEN.        CT Abdomen and Pelvis w/ IV Cont (01.15.22 @ 17:17) >  IMPRESSION:  1.  No evidence of acute traumatic injury in the chest, abdomen, or   pelvis.  2.  Hepatic steatosis.  3.  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  4.  Bladder wall thickening.  5.  Infrarenal abdominal aortic aneurysm measuring 4.7 cm. 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 Smaller saccular aneurysm   of the LEFT common iliac artery measuring 3 cm.  6.  Small fat-containing periumbilical hernia.    >        MEDICATIONS:  acetaminophen     Tablet .. 650 milliGRAM(s) Oral every 6 hours PRN  chlorhexidine 4% Liquid 1 Application(s) Topical <User Schedule>  dextrose 40% Gel 15 Gram(s) Oral once  dextrose 5%. 1000 milliLiter(s) IV Continuous <Continuous>  dextrose 5%. 1000 milliLiter(s) IV Continuous <Continuous>  dextrose 50% Injectable 25 Gram(s) IV Push once  dextrose 50% Injectable 12.5 Gram(s) IV Push once  dextrose 50% Injectable 25 Gram(s) IV Push once  glucagon  Injectable 1 milliGRAM(s) IntraMuscular once  insulin lispro (ADMELOG) corrective regimen sliding scale   SubCutaneous every 6 hours  levETIRAcetam  IVPB 500 milliGRAM(s) IV Intermittent every 12 hours  senna 2 Tablet(s) Oral at bedtime PRN  sodium chloride 2% . 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

## 2022-01-16 NOTE — OCCUPATIONAL THERAPY INITIAL EVALUATION ADULT - PERTINENT HX OF CURRENT PROBLEM, REHAB EVAL
81y m W/ PMHx HTN presenting to the ED from [facility] as a transfer for R frontal parenchymal hemorrhage on CT after fall at home. Patient is poor historian but states that he was diagnosed with COVID 2 weeks ago and has been feeling "tired" at home, states he felt dizzy earlier today but unable to state what time he fell or further elucidate. Patient denies AC use.

## 2022-01-16 NOTE — OCCUPATIONAL THERAPY INITIAL EVALUATION ADULT - PRECAUTIONS/LIMITATIONS, REHAB EVAL
Per EMS was also recently diagnosed with a AAA but has been unable to undergo surgical repair due to recent dx of COVID. Patient still endorsing he feels "tired", denies current headache, vision changes, dizziness, chest pain, nausea, back pain, focal numbness/tingling. Endorses weakness to LLE 2/2 pain. ICH score 2, NIHSS 7. CT head follow up is stable with slight IVH. Hx AAA repair 7 yrs ago, known Anyeursym of ascending and iliac aorta. SBP goal 100-160. CThead Slightly decreased size of large right frontal parenchymal hemorrhage./fall precautions

## 2022-01-16 NOTE — PHYSICAL THERAPY INITIAL EVALUATION ADULT - PRECAUTIONS/LIMITATIONS, REHAB EVAL
Per EMS was also recently diagnosed with a AAA but has been unable to undergo surgical repair due to recent dx of COVID. Patient still endorsing he feels "tired", denies current headache, vision changes, dizziness, chest pain, nausea, back pain, focal numbness/tingling. Endorses weakness to LLE 2/2 pain. ICH score 2, NIHSS 7. CT head follow up is stable with slight IVH. Hx AAA repair 7 yrs ago, known Anyeursym of ascending and iliac aorta. SBP goal 100-160. Per EMS was also recently diagnosed with a AAA but has been unable to undergo surgical repair due to recent dx of COVID. Patient still endorsing he feels "tired", denies current headache, vision changes, dizziness, chest pain, nausea, back pain, focal numbness/tingling. Endorses weakness to LLE 2/2 pain. ICH score 2, NIHSS 7. CT head follow up is stable with slight IVH. Hx AAA repair 7 yrs ago, known Anyeursym of ascending and iliac aorta. SBP goal 100-160. CThead Slightly decreased size of large right frontal parenchymal hemorrhage. Per EMS was also recently diagnosed with a AAA but has been unable to undergo surgical repair due to recent dx of COVID. Patient still endorsing he feels "tired", denies current headache, vision changes, dizziness, chest pain, nausea, back pain, focal numbness/tingling. Endorses weakness to LLE 2/2 pain. ICH score 2, NIHSS 7. CT head follow up is stable with slight IVH. Hx AAA repair 7 yrs ago, known Anyeursym of ascending and iliac aorta. SBP goal 100-160. CThead Slightly decreased size of large right frontal parenchymal hemorrhage./fall precautions/seizure precautions

## 2022-01-16 NOTE — PHYSICAL THERAPY INITIAL EVALUATION ADULT - GENERAL OBSERVATIONS, REHAB EVAL
Pt received supine in bed with +VEEG, +PIV, +cardiac monitor, +Chavarria. Pt minimally responsive in supine, improved in seated edge of bed.

## 2022-01-16 NOTE — SWALLOW BEDSIDE ASSESSMENT ADULT - COMMENTS
Continued hx:   > Vascular surgery for Aortic and iliac aneurysms. Will monitor and treat possible as outpatient due to c/i to AC  > Possible MIND study candidate    Swallow history: No reports in SCM. No studies in PACS. Pt is new to this service.

## 2022-01-16 NOTE — SWALLOW BEDSIDE ASSESSMENT ADULT - PHARYNGEAL PHASE
significant latent swallow response, with max verbal cues to 'swallow'. significant latent swallow response, with max verbal cues to 'swallow'. Cessation of trials as pt would not maintain alertness despite max verbal and tactile stimuli including noxious stim.

## 2022-01-16 NOTE — CHART NOTE - NSCHARTNOTEFT_GEN_A_CORE
Patient reviewed and all imaging reviewed. The CTA abdomen shows infrarenal AAA (<5cm in size) and right common iliac aneurysm approx 5.1cm in size (however imaging cuts off short of full extent of iliac artery and in coronal cross section cuts you cannot accurately measure diameter; furthermore this a fusiform aneurysm not saccular). Patient has no extremity hypoperfusion and no signs of impending rupture and is not symptomatic from this pathology.    Patient has known aneurysm and had vascular out patient follow up for it.    There is no contraindication to NSG procedure, it should take priority. If A/C will need to be held post-operatively is also does not interfere with patient's vascular management.    We will follow patient in the office and plan for elective endovascular repair of iliac aneurysm once he is discharged and overall functional status improves.    Case, imaging & plan discussed in detail with Dr. Chaitanya Hays vascular surgeon on call.

## 2022-01-16 NOTE — PROGRESS NOTE ADULT - SUBJECTIVE AND OBJECTIVE BOX
Patient seen and examined at bedside.    --Anticoagulation--    T(C): 36.9 (01-15-22 @ 19:00), Max: 36.9 (01-15-22 @ 19:00)  HR: 78 (01-15-22 @ 20:00) (69 - 88)  BP: 151/90 (01-15-22 @ 21:00) (116/61 - 151/92)  RR: 20 (01-15-22 @ 21:00) (13 - 20)  SpO2: 100% (01-15-22 @ 21:00) (94% - 100%)  Wt(kg): --    Exam:  Ox1-2, sleepy but arousable,fc, R 4/5, LUE2/5, LLE 3/5

## 2022-01-16 NOTE — PROVIDER CONTACT NOTE (OTHER) - SITUATION
patient is neurologically more lethargic than baseline exam, patient has a new finding of numbness on RLE

## 2022-01-16 NOTE — PROVIDER CONTACT NOTE (OTHER) - SITUATION
pt stroke scale score has increased from 6am 9 to 7am 15. pt is not following commands and is only oriented to self. pt is increasingly lethargic than what previous nurse had told during report.

## 2022-01-16 NOTE — OCCUPATIONAL THERAPY INITIAL EVALUATION ADULT - ADL RETRAINING, OT EVAL
Pt will be I with UB dressing within 4 weeks. Pt will be I with basic grooming within 4 weeks. Pt will perform LB dressing within minimal assist within 4 weeks.

## 2022-01-16 NOTE — SWALLOW BEDSIDE ASSESSMENT ADULT - SLP GENERAL OBSERVATIONS
Encountered in bed, resting however awake. Oriented to himself as well as location/hospital; indicating he was in [place] when provided with multiple choices, not able to indicate year despite choices or reason for admission.

## 2022-01-16 NOTE — CHART NOTE - NSCHARTNOTEFT_GEN_A_CORE
PRELIMINARY EEG REVIEW    EEG reviewed to 17:05  Date: 01-16-22    - No epileptiform pattern or seizure seen.    This Preliminary report is based on fellow review. Final report pending Completion of study tomorrow morning and following attending review.    Reading Room: [phone]  On Call Service After Hours: [phone]    Josue Moreira MD PGY-5  Epilepsy Fellow

## 2022-01-16 NOTE — PROGRESS NOTE ADULT - SUBJECTIVE AND OBJECTIVE BOX
O: EEG done showed no seizures or epileptiform discharges     T(C): 36.9 (01-16-22 @ 15:00), Max: 37.8 (01-16-22 @ 03:00)  HR: 71 (01-16-22 @ 18:00) (55 - 82)  BP: 161/77 (01-16-22 @ 18:00) (130/79 - 163/79)  RR: 21 (01-16-22 @ 18:00) (16 - 28)  SpO2: 95% (01-16-22 @ 18:00) (92% - 100%)  01-15-22 @ 07:01  -  01-16-22 @ 07:00  --------------------------------------------------------  IN: 1641.5 mL / OUT: 2470 mL / NET: -828.5 mL    01-16-22 @ 07:01 - 01-16-22 @ 19:03  --------------------------------------------------------  IN: 1183.3 mL / OUT: 2050 mL / NET: -866.7 mL    acetaminophen     Tablet .. 650 milliGRAM(s) Oral every 6 hours PRN  chlorhexidine 4% Liquid 1 Application(s) Topical <User Schedule>  dextrose 40% Gel 15 Gram(s) Oral once  dextrose 5%. 1000 milliLiter(s) IV Continuous <Continuous>  dextrose 5%. 1000 milliLiter(s) IV Continuous <Continuous>  dextrose 50% Injectable 25 Gram(s) IV Push once  dextrose 50% Injectable 12.5 Gram(s) IV Push once  dextrose 50% Injectable 25 Gram(s) IV Push once  glucagon  Injectable 1 milliGRAM(s) IntraMuscular once  insulin lispro (ADMELOG) corrective regimen sliding scale   SubCutaneous every 6 hours  lacosamide IVPB 100 milliGRAM(s) IV Intermittent every 12 hours  senna 2 Tablet(s) Oral at bedtime PRN  sodium chloride 2% . 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 , Left facial, RUE - 4/5, RLE- 4/5 - on stimulation  against gravity, L UE - increased tone min withdrawal and RLE - spasticity  F/U exam in One hour- Now oriented and alert x3, follows simple commands ; RUE and RLE 4/5 and LUE 3/5 and LLE - 4-/5; sensation intact    LABS:  Na: 140 (01-16 @ 12:37), 140 (01-16 @ 09:30), 140 (01-16 @ 08:12), 141 (01-16 @ 06:40), 140 (01-16 @ 00:11), 139 (01-15 @ 15:25)  K: 4.1 (01-16 @ 12:37), 4.1 (01-16 @ 09:30), >9.0 (01-16 @ 08:12), >9.0 (01-16 @ 06:40), 4.0 (01-16 @ 00:11), 5.1 (01-15 @ 15:25)  Cl: 101 (01-16 @ 12:37), 100 (01-16 @ 09:30), 103 (01-16 @ 08:12), 104 (01-16 @ 06:40), 101 (01-16 @ 00:11), 99 (01-15 @ 15:25)  CO2: 25 (01-16 @ 12:37), 27 (01-16 @ 09:30), 25 (01-16 @ 08:12), 22 (01-16 @ 06:40), 26 (01-16 @ 00:11), 25 (01-15 @ 15:25)  BUN: 13 (01-16 @ 12:37), 14 (01-16 @ 09:30), 13 (01-16 @ 08:12), 15 (01-16 @ 06:40), 22 (01-16 @ 00:11), 30 (01-15 @ 15:25)  Cr: 0.85 (01-16 @ 12:37), 0.89 (01-16 @ 09:30), 0.73 (01-16 @ 08:12), 0.72 (01-16 @ 06:40), 0.91 (01-16 @ 00:11), 1.16 (01-15 @ 15:25)  Glu: 150(01-16 @ 12:37), 152(01-16 @ 09:30), 142(01-16 @ 08:12), 145(01-16 @ 06:40), 173(01-16 @ 00:11), 162(01-15 @ 15:25)    Hgb: 12.9 (01-16 @ 00:11), 13.7 (01-15 @ 15:25)  Hct: 38.9 (01-16 @ 00:11), 42.1 (01-15 @ 15:25)  WBC: 7.24 (01-16 @ 00:11), 8.50 (01-15 @ 15:25)  Plt: 142 (01-16 @ 00:11), 158 (01-15 @ 15:25)    INR: 0.99 01-15-22 @ 15:25  PTT: 26.5 01-15-22 @ 15:25            a/p right frontal ICH with brain edema and brain compression with IVH   could be from amyloid angiopathy   neuro checks q 1 hr   fluctuation in neuro exam, EEG no seizures on vimpat 100 mg q 12 hr   CTA no vascular malformation   PT/OT  Brain edema,  keep sodium in 140-150    CV : SBP goal   100-160 mmhg    CTA abdomen shows infrarenal AAA (<5cm in size) and right common iliac aneurysm approx 5.1cm in size  vascular surgery on on consult   Pulm: acute respiratory failure, intubated, ABG and adjust vent settings accordingly   GI: on TF, at goal   Renal: see neuro   ID afebrile  Endo: DM, target sugar 120-180   Hem: SCD,   hold chemoprophylaxis as POD     35 critical care time as patient is at risk for brain henrniation, seizures, ICH expansion  O: EEG done showed no seizures or epileptiform discharges     T(C): 36.9 (01-16-22 @ 15:00), Max: 37.8 (01-16-22 @ 03:00)  HR: 71 (01-16-22 @ 18:00) (55 - 82)  BP: 161/77 (01-16-22 @ 18:00) (130/79 - 163/79)  RR: 21 (01-16-22 @ 18:00) (16 - 28)  SpO2: 95% (01-16-22 @ 18:00) (92% - 100%)  01-15-22 @ 07:01  -  01-16-22 @ 07:00  --------------------------------------------------------  IN: 1641.5 mL / OUT: 2470 mL / NET: -828.5 mL    01-16-22 @ 07:01 - 01-16-22 @ 19:03  --------------------------------------------------------  IN: 1183.3 mL / OUT: 2050 mL / NET: -866.7 mL    acetaminophen     Tablet .. 650 milliGRAM(s) Oral every 6 hours PRN  chlorhexidine 4% Liquid 1 Application(s) Topical <User Schedule>  dextrose 40% Gel 15 Gram(s) Oral once  dextrose 5%. 1000 milliLiter(s) IV Continuous <Continuous>  dextrose 5%. 1000 milliLiter(s) IV Continuous <Continuous>  dextrose 50% Injectable 25 Gram(s) IV Push once  dextrose 50% Injectable 12.5 Gram(s) IV Push once  dextrose 50% Injectable 25 Gram(s) IV Push once  glucagon  Injectable 1 milliGRAM(s) IntraMuscular once  insulin lispro (ADMELOG) corrective regimen sliding scale   SubCutaneous every 6 hours  lacosamide IVPB 100 milliGRAM(s) IV Intermittent every 12 hours  senna 2 Tablet(s) Oral at bedtime PRN  sodium chloride 2% . 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 , Left facial, RUE - 4/5, RLE- 4/5 - on stimulation  against gravity, L UE - weaker compared to right but at least antigravity and left LE withdraws   F/U exam in One hour- Now oriented and alert x3, follows simple commands ; RUE and RLE 4/5 and LUE 3/5 and LLE - 4-/5; sensation intact    LABS:  Na: 140 (01-16 @ 12:37), 140 (01-16 @ 09:30), 140 (01-16 @ 08:12), 141 (01-16 @ 06:40), 140 (01-16 @ 00:11), 139 (01-15 @ 15:25)  K: 4.1 (01-16 @ 12:37), 4.1 (01-16 @ 09:30), >9.0 (01-16 @ 08:12), >9.0 (01-16 @ 06:40), 4.0 (01-16 @ 00:11), 5.1 (01-15 @ 15:25)  Cl: 101 (01-16 @ 12:37), 100 (01-16 @ 09:30), 103 (01-16 @ 08:12), 104 (01-16 @ 06:40), 101 (01-16 @ 00:11), 99 (01-15 @ 15:25)  CO2: 25 (01-16 @ 12:37), 27 (01-16 @ 09:30), 25 (01-16 @ 08:12), 22 (01-16 @ 06:40), 26 (01-16 @ 00:11), 25 (01-15 @ 15:25)  BUN: 13 (01-16 @ 12:37), 14 (01-16 @ 09:30), 13 (01-16 @ 08:12), 15 (01-16 @ 06:40), 22 (01-16 @ 00:11), 30 (01-15 @ 15:25)  Cr: 0.85 (01-16 @ 12:37), 0.89 (01-16 @ 09:30), 0.73 (01-16 @ 08:12), 0.72 (01-16 @ 06:40), 0.91 (01-16 @ 00:11), 1.16 (01-15 @ 15:25)  Glu: 150(01-16 @ 12:37), 152(01-16 @ 09:30), 142(01-16 @ 08:12), 145(01-16 @ 06:40), 173(01-16 @ 00:11), 162(01-15 @ 15:25)    Hgb: 12.9 (01-16 @ 00:11), 13.7 (01-15 @ 15:25)  Hct: 38.9 (01-16 @ 00:11), 42.1 (01-15 @ 15:25)  WBC: 7.24 (01-16 @ 00:11), 8.50 (01-15 @ 15:25)  Plt: 142 (01-16 @ 00:11), 158 (01-15 @ 15:25)    INR: 0.99 01-15-22 @ 15:25  PTT: 26.5 01-15-22 @ 15:25            a/p right frontal ICH with brain edema and brain compression with IVH   could be from amyloid angiopathy   neuro checks q 1 hr   fluctuation in neuro exam, EEG no seizures on vimpat 100 mg q 12 hr   CTA no vascular malformation   PT/OT  Brain edema,  keep sodium in 140-150    CV : SBP goal   100-160 mmhg    CTA abdomen shows infrarenal AAA (<5cm in size) and right common iliac aneurysm approx 5.1cm in size  vascular surgery on on consult   Pulm: acute respiratory failure, intubated, ABG and adjust vent settings accordingly   GI: on TF, at goal   Renal: see neuro   ID afebrile  Endo: DM, target sugar 120-180   Hem: SCD,   hold chemoprophylaxis as POD     35 critical care time as patient is at risk for brain henrniation, seizures, ICH expansion  O: EEG done showed no seizures or epileptiform discharges     T(C): 36.9 (01-16-22 @ 15:00), Max: 37.8 (01-16-22 @ 03:00)  HR: 71 (01-16-22 @ 18:00) (55 - 82)  BP: 161/77 (01-16-22 @ 18:00) (130/79 - 163/79)  RR: 21 (01-16-22 @ 18:00) (16 - 28)  SpO2: 95% (01-16-22 @ 18:00) (92% - 100%)  01-15-22 @ 07:01  -  01-16-22 @ 07:00  --------------------------------------------------------  IN: 1641.5 mL / OUT: 2470 mL / NET: -828.5 mL    01-16-22 @ 07:01 - 01-16-22 @ 19:03  --------------------------------------------------------  IN: 1183.3 mL / OUT: 2050 mL / NET: -866.7 mL    acetaminophen     Tablet .. 650 milliGRAM(s) Oral every 6 hours PRN  chlorhexidine 4% Liquid 1 Application(s) Topical <User Schedule>  dextrose 40% Gel 15 Gram(s) Oral once  dextrose 5%. 1000 milliLiter(s) IV Continuous <Continuous>  dextrose 5%. 1000 milliLiter(s) IV Continuous <Continuous>  dextrose 50% Injectable 25 Gram(s) IV Push once  dextrose 50% Injectable 12.5 Gram(s) IV Push once  dextrose 50% Injectable 25 Gram(s) IV Push once  glucagon  Injectable 1 milliGRAM(s) IntraMuscular once  insulin lispro (ADMELOG) corrective regimen sliding scale   SubCutaneous every 6 hours  lacosamide IVPB 100 milliGRAM(s) IV Intermittent every 12 hours  senna 2 Tablet(s) Oral at bedtime PRN  sodium chloride 2% . 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 , Left facial, RUE - 4/5, RLE- 4/5 - on stimulation  against gravity, L UE - weaker compared to right but at least antigravity and left LE withdraws   F/U exam in One hour- Now oriented and alert x3, follows simple commands ; RUE and RLE 4/5 and LUE 3/5 and LLE - 4-/5; sensation intact    LABS:  Na: 140 (01-16 @ 12:37), 140 (01-16 @ 09:30), 140 (01-16 @ 08:12), 141 (01-16 @ 06:40), 140 (01-16 @ 00:11), 139 (01-15 @ 15:25)  K: 4.1 (01-16 @ 12:37), 4.1 (01-16 @ 09:30), >9.0 (01-16 @ 08:12), >9.0 (01-16 @ 06:40), 4.0 (01-16 @ 00:11), 5.1 (01-15 @ 15:25)  Cl: 101 (01-16 @ 12:37), 100 (01-16 @ 09:30), 103 (01-16 @ 08:12), 104 (01-16 @ 06:40), 101 (01-16 @ 00:11), 99 (01-15 @ 15:25)  CO2: 25 (01-16 @ 12:37), 27 (01-16 @ 09:30), 25 (01-16 @ 08:12), 22 (01-16 @ 06:40), 26 (01-16 @ 00:11), 25 (01-15 @ 15:25)  BUN: 13 (01-16 @ 12:37), 14 (01-16 @ 09:30), 13 (01-16 @ 08:12), 15 (01-16 @ 06:40), 22 (01-16 @ 00:11), 30 (01-15 @ 15:25)  Cr: 0.85 (01-16 @ 12:37), 0.89 (01-16 @ 09:30), 0.73 (01-16 @ 08:12), 0.72 (01-16 @ 06:40), 0.91 (01-16 @ 00:11), 1.16 (01-15 @ 15:25)  Glu: 150(01-16 @ 12:37), 152(01-16 @ 09:30), 142(01-16 @ 08:12), 145(01-16 @ 06:40), 173(01-16 @ 00:11), 162(01-15 @ 15:25)    Hgb: 12.9 (01-16 @ 00:11), 13.7 (01-15 @ 15:25)  Hct: 38.9 (01-16 @ 00:11), 42.1 (01-15 @ 15:25)  WBC: 7.24 (01-16 @ 00:11), 8.50 (01-15 @ 15:25)  Plt: 142 (01-16 @ 00:11), 158 (01-15 @ 15:25)    INR: 0.99 01-15-22 @ 15:25  PTT: 26.5 01-15-22 @ 15:25            A/P right frontal ICH with brain edema and brain compression with IVH   could be from amyloid angiopathy  CT head today stable    neuro checks q 1 hr   fluctuation in neuro exam, EEG no seizures on vimpat 100 mg q 12 hr   CTA no vascular malformation   PT/OT  Brain edema,  keep sodium in 140-150  , 2 % 75 ml/hr, BMP q 6 hr   CV : SBP goal   100-160 mmhg    CTA abdomen shows infrarenal AAA (<5cm in size) and right common iliac aneurysm approx 5.1cm in size  vascular surgery on on consult   Pulm: acute respiratory failure, intubated, ABG and adjust vent settings accordingly   GI: NPO for now  failed dysphagia   Renal: see neuro   d/c aly   ID afebrile  Endo: DM, target sugar 120-180   finger sticks q 6 hr   Hem: SCD,   hold chemoprophylaxis as PBD  1      35 critical care time as patient is at risk for brain herniation seizures, ICH expansion

## 2022-01-17 LAB
A1C WITH ESTIMATED AVERAGE GLUCOSE RESULT: 6.7 % — HIGH (ref 4–5.6)
ANION GAP SERPL CALC-SCNC: 11 MMOL/L — SIGNIFICANT CHANGE UP (ref 5–17)
ANION GAP SERPL CALC-SCNC: 13 MMOL/L — SIGNIFICANT CHANGE UP (ref 5–17)
ANION GAP SERPL CALC-SCNC: 15 MMOL/L — SIGNIFICANT CHANGE UP (ref 5–17)
BUN SERPL-MCNC: 10 MG/DL — SIGNIFICANT CHANGE UP (ref 7–23)
BUN SERPL-MCNC: 7 MG/DL — SIGNIFICANT CHANGE UP (ref 7–23)
BUN SERPL-MCNC: 9 MG/DL — SIGNIFICANT CHANGE UP (ref 7–23)
CALCIUM SERPL-MCNC: 5.8 MG/DL — CRITICAL LOW (ref 8.4–10.5)
CALCIUM SERPL-MCNC: 9 MG/DL — SIGNIFICANT CHANGE UP (ref 8.4–10.5)
CALCIUM SERPL-MCNC: 9.1 MG/DL — SIGNIFICANT CHANGE UP (ref 8.4–10.5)
CALCIUM SERPL-MCNC: 9.1 MG/DL — SIGNIFICANT CHANGE UP (ref 8.4–10.5)
CALCIUM SERPL-MCNC: 9.2 MG/DL — SIGNIFICANT CHANGE UP (ref 8.4–10.5)
CHLORIDE SERPL-SCNC: 100 MMOL/L — SIGNIFICANT CHANGE UP (ref 96–108)
CHLORIDE SERPL-SCNC: 100 MMOL/L — SIGNIFICANT CHANGE UP (ref 96–108)
CHLORIDE SERPL-SCNC: 102 MMOL/L — SIGNIFICANT CHANGE UP (ref 96–108)
CHLORIDE SERPL-SCNC: 102 MMOL/L — SIGNIFICANT CHANGE UP (ref 96–108)
CHLORIDE SERPL-SCNC: >135 MMOL/L — HIGH (ref 96–108)
CHOLEST SERPL-MCNC: 96 MG/DL — SIGNIFICANT CHANGE UP
CO2 SERPL-SCNC: 15 MMOL/L — LOW (ref 22–31)
CO2 SERPL-SCNC: 22 MMOL/L — SIGNIFICANT CHANGE UP (ref 22–31)
CO2 SERPL-SCNC: 23 MMOL/L — SIGNIFICANT CHANGE UP (ref 22–31)
CO2 SERPL-SCNC: 23 MMOL/L — SIGNIFICANT CHANGE UP (ref 22–31)
CO2 SERPL-SCNC: 24 MMOL/L — SIGNIFICANT CHANGE UP (ref 22–31)
CREAT SERPL-MCNC: 0.47 MG/DL — LOW (ref 0.5–1.3)
CREAT SERPL-MCNC: 0.66 MG/DL — SIGNIFICANT CHANGE UP (ref 0.5–1.3)
CREAT SERPL-MCNC: 0.68 MG/DL — SIGNIFICANT CHANGE UP (ref 0.5–1.3)
CREAT SERPL-MCNC: 0.69 MG/DL — SIGNIFICANT CHANGE UP (ref 0.5–1.3)
CREAT SERPL-MCNC: 0.72 MG/DL — SIGNIFICANT CHANGE UP (ref 0.5–1.3)
ESTIMATED AVERAGE GLUCOSE: 146 MG/DL — HIGH (ref 68–114)
GLUCOSE BLDC GLUCOMTR-MCNC: 119 MG/DL — HIGH (ref 70–99)
GLUCOSE BLDC GLUCOMTR-MCNC: 166 MG/DL — HIGH (ref 70–99)
GLUCOSE BLDC GLUCOMTR-MCNC: 167 MG/DL — HIGH (ref 70–99)
GLUCOSE BLDC GLUCOMTR-MCNC: 169 MG/DL — HIGH (ref 70–99)
GLUCOSE SERPL-MCNC: 124 MG/DL — HIGH (ref 70–99)
GLUCOSE SERPL-MCNC: 162 MG/DL — HIGH (ref 70–99)
GLUCOSE SERPL-MCNC: 165 MG/DL — HIGH (ref 70–99)
GLUCOSE SERPL-MCNC: 167 MG/DL — HIGH (ref 70–99)
GLUCOSE SERPL-MCNC: 169 MG/DL — HIGH (ref 70–99)
HCT VFR BLD CALC: 31.1 % — LOW (ref 39–50)
HDLC SERPL-MCNC: 31 MG/DL — LOW
HGB BLD-MCNC: 10.4 G/DL — LOW (ref 13–17)
LIPID PNL WITH DIRECT LDL SERPL: 43 MG/DL — SIGNIFICANT CHANGE UP
MAGNESIUM SERPL-MCNC: 1.2 MG/DL — LOW (ref 1.6–2.6)
MAGNESIUM SERPL-MCNC: 1.8 MG/DL — SIGNIFICANT CHANGE UP (ref 1.6–2.6)
MAGNESIUM SERPL-MCNC: 2 MG/DL — SIGNIFICANT CHANGE UP (ref 1.6–2.6)
MAGNESIUM SERPL-MCNC: 2.1 MG/DL — SIGNIFICANT CHANGE UP (ref 1.6–2.6)
MAGNESIUM SERPL-MCNC: 2.3 MG/DL — SIGNIFICANT CHANGE UP (ref 1.6–2.6)
MCHC RBC-ENTMCNC: 31.1 PG — SIGNIFICANT CHANGE UP (ref 27–34)
MCHC RBC-ENTMCNC: 33.4 GM/DL — SIGNIFICANT CHANGE UP (ref 32–36)
MCV RBC AUTO: 93.1 FL — SIGNIFICANT CHANGE UP (ref 80–100)
NON HDL CHOLESTEROL: 65 MG/DL — SIGNIFICANT CHANGE UP
NRBC # BLD: 0 /100 WBCS — SIGNIFICANT CHANGE UP (ref 0–0)
PHOSPHATE SERPL-MCNC: 1.4 MG/DL — LOW (ref 2.5–4.5)
PHOSPHATE SERPL-MCNC: 2.2 MG/DL — LOW (ref 2.5–4.5)
PHOSPHATE SERPL-MCNC: 2.4 MG/DL — LOW (ref 2.5–4.5)
PHOSPHATE SERPL-MCNC: 2.8 MG/DL — SIGNIFICANT CHANGE UP (ref 2.5–4.5)
PHOSPHATE SERPL-MCNC: 2.8 MG/DL — SIGNIFICANT CHANGE UP (ref 2.5–4.5)
PLATELET # BLD AUTO: 113 K/UL — LOW (ref 150–400)
POTASSIUM SERPL-MCNC: 2.6 MMOL/L — CRITICAL LOW (ref 3.5–5.3)
POTASSIUM SERPL-MCNC: 3.9 MMOL/L — SIGNIFICANT CHANGE UP (ref 3.5–5.3)
POTASSIUM SERPL-MCNC: 4 MMOL/L — SIGNIFICANT CHANGE UP (ref 3.5–5.3)
POTASSIUM SERPL-SCNC: 2.6 MMOL/L — CRITICAL LOW (ref 3.5–5.3)
POTASSIUM SERPL-SCNC: 3.9 MMOL/L — SIGNIFICANT CHANGE UP (ref 3.5–5.3)
POTASSIUM SERPL-SCNC: 4 MMOL/L — SIGNIFICANT CHANGE UP (ref 3.5–5.3)
RBC # BLD: 3.34 M/UL — LOW (ref 4.2–5.8)
RBC # FLD: 13.8 % — SIGNIFICANT CHANGE UP (ref 10.3–14.5)
SODIUM SERPL-SCNC: 137 MMOL/L — SIGNIFICANT CHANGE UP (ref 135–145)
SODIUM SERPL-SCNC: 137 MMOL/L — SIGNIFICANT CHANGE UP (ref 135–145)
SODIUM SERPL-SCNC: 138 MMOL/L — SIGNIFICANT CHANGE UP (ref 135–145)
SODIUM SERPL-SCNC: 138 MMOL/L — SIGNIFICANT CHANGE UP (ref 135–145)
SODIUM SERPL-SCNC: >170 MMOL/L — CRITICAL HIGH (ref 135–145)
TRIGL SERPL-MCNC: 110 MG/DL — SIGNIFICANT CHANGE UP
WBC # BLD: 6.52 K/UL — SIGNIFICANT CHANGE UP (ref 3.8–10.5)
WBC # FLD AUTO: 6.52 K/UL — SIGNIFICANT CHANGE UP (ref 3.8–10.5)

## 2022-01-17 PROCEDURE — 99291 CRITICAL CARE FIRST HOUR: CPT

## 2022-01-17 PROCEDURE — 95720 EEG PHY/QHP EA INCR W/VEEG: CPT

## 2022-01-17 PROCEDURE — 71045 X-RAY EXAM CHEST 1 VIEW: CPT | Mod: 26

## 2022-01-17 RX ORDER — HYDRALAZINE HCL 50 MG
10 TABLET ORAL ONCE
Refills: 0 | Status: COMPLETED | OUTPATIENT
Start: 2022-01-17 | End: 2022-01-17

## 2022-01-17 RX ORDER — HYDRALAZINE HCL 50 MG
5 TABLET ORAL ONCE
Refills: 0 | Status: COMPLETED | OUTPATIENT
Start: 2022-01-17 | End: 2022-01-17

## 2022-01-17 RX ORDER — MAGNESIUM SULFATE 500 MG/ML
2 VIAL (ML) INJECTION ONCE
Refills: 0 | Status: COMPLETED | OUTPATIENT
Start: 2022-01-17 | End: 2022-01-17

## 2022-01-17 RX ORDER — POTASSIUM PHOSPHATE, MONOBASIC POTASSIUM PHOSPHATE, DIBASIC 236; 224 MG/ML; MG/ML
15 INJECTION, SOLUTION INTRAVENOUS ONCE
Refills: 0 | Status: COMPLETED | OUTPATIENT
Start: 2022-01-17 | End: 2022-01-17

## 2022-01-17 RX ORDER — LEVETIRACETAM 250 MG/1
1000 TABLET, FILM COATED ORAL EVERY 12 HOURS
Refills: 0 | Status: DISCONTINUED | OUTPATIENT
Start: 2022-01-17 | End: 2022-01-19

## 2022-01-17 RX ORDER — LEVETIRACETAM 250 MG/1
1000 TABLET, FILM COATED ORAL ONCE
Refills: 0 | Status: COMPLETED | OUTPATIENT
Start: 2022-01-17 | End: 2022-01-17

## 2022-01-17 RX ORDER — SODIUM CHLORIDE 9 MG/ML
2 INJECTION INTRAMUSCULAR; INTRAVENOUS; SUBCUTANEOUS EVERY 6 HOURS
Refills: 0 | Status: DISCONTINUED | OUTPATIENT
Start: 2022-01-17 | End: 2022-01-19

## 2022-01-17 RX ADMIN — CHLORHEXIDINE GLUCONATE 1 APPLICATION(S): 213 SOLUTION TOPICAL at 21:34

## 2022-01-17 RX ADMIN — Medication 2 MILLIGRAM(S): at 15:30

## 2022-01-17 RX ADMIN — POTASSIUM PHOSPHATE, MONOBASIC POTASSIUM PHOSPHATE, DIBASIC 62.5 MILLIMOLE(S): 236; 224 INJECTION, SOLUTION INTRAVENOUS at 05:35

## 2022-01-17 RX ADMIN — LACOSAMIDE 120 MILLIGRAM(S): 50 TABLET ORAL at 17:14

## 2022-01-17 RX ADMIN — LEVETIRACETAM 400 MILLIGRAM(S): 250 TABLET, FILM COATED ORAL at 15:56

## 2022-01-17 RX ADMIN — LACOSAMIDE 120 MILLIGRAM(S): 50 TABLET ORAL at 05:35

## 2022-01-17 RX ADMIN — SODIUM CHLORIDE 100 MILLILITER(S): 5 INJECTION, SOLUTION INTRAVENOUS at 21:34

## 2022-01-17 RX ADMIN — LEVETIRACETAM 400 MILLIGRAM(S): 250 TABLET, FILM COATED ORAL at 17:14

## 2022-01-17 RX ADMIN — Medication 5 MILLIGRAM(S): at 11:19

## 2022-01-17 RX ADMIN — Medication 100 MILLIGRAM(S): at 11:14

## 2022-01-17 RX ADMIN — Medication 10 MILLIGRAM(S): at 21:33

## 2022-01-17 RX ADMIN — POTASSIUM PHOSPHATE, MONOBASIC POTASSIUM PHOSPHATE, DIBASIC 62.5 MILLIMOLE(S): 236; 224 INJECTION, SOLUTION INTRAVENOUS at 21:38

## 2022-01-17 RX ADMIN — Medication 2: at 05:35

## 2022-01-17 RX ADMIN — Medication 2: at 12:09

## 2022-01-17 RX ADMIN — Medication 25 GRAM(S): at 04:32

## 2022-01-17 RX ADMIN — Medication 2: at 17:28

## 2022-01-17 NOTE — PROGRESS NOTE ADULT - SUBJECTIVE AND OBJECTIVE BOX
O:  T(C): 36.3 (01-17-22 @ 11:00), Max: 37.3 (01-16-22 @ 23:00)  HR: 73 (01-17-22 @ 16:00) (64 - 76)  BP: 153/90 (01-17-22 @ 16:00) (132/114 - 174/93)  RR: 20 (01-17-22 @ 16:00) (16 - 24)  SpO2: 95% (01-17-22 @ 16:00) (94% - 100%)  01-16-22 @ 07:01  -  01-17-22 @ 07:00  --------------------------------------------------------  IN: 2558.3 mL / OUT: 3375 mL / NET: -816.7 mL    01-17-22 @ 07:01  -  01-17-22 @ 18:01  --------------------------------------------------------  IN: 1000 mL / OUT: 0 mL / NET: 1000 mL    acetaminophen     Tablet .. 650 milliGRAM(s) Oral every 6 hours PRN  chlorhexidine 4% Liquid 1 Application(s) Topical <User Schedule>  dextrose 40% Gel 15 Gram(s) Oral once  dextrose 5%. 1000 milliLiter(s) IV Continuous <Continuous>  dextrose 5%. 1000 milliLiter(s) IV Continuous <Continuous>  dextrose 50% Injectable 25 Gram(s) IV Push once  dextrose 50% Injectable 12.5 Gram(s) IV Push once  dextrose 50% Injectable 25 Gram(s) IV Push once  glucagon  Injectable 1 milliGRAM(s) IntraMuscular once  insulin lispro (ADMELOG) corrective regimen sliding scale   SubCutaneous every 6 hours  lacosamide IVPB 100 milliGRAM(s) IV Intermittent every 12 hours  levETIRAcetam  IVPB 1000 milliGRAM(s) IV Intermittent every 12 hours  polyethylene glycol 3350 17 Gram(s) Oral daily  potassium phosphate IVPB 15 milliMole(s) IV Intermittent once  senna 2 Tablet(s) Oral at bedtime  sodium chloride 2 Gram(s) Oral every 6 hours  sodium chloride 2% . 1000 milliLiter(s) IV Continuous <Continuous>  thiamine Injectable 100 milliGRAM(s) IV Push daily    NEURO EXAM     LABS:  Na: 138 (01-17 @ 16:46), 137 (01-17 @ 10:12), 137 (01-17 @ 03:40), >170 (01-17 @ 02:31), 137 (01-16 @ 18:50), 140 (01-16 @ 12:37), 140 (01-16 @ 09:30), 140 (01-16 @ 08:12), 141 (01-16 @ 06:40), 140 (01-16 @ 00:11), 139 (01-15 @ 15:25)  K: 3.9 (01-17 @ 16:46), 4.0 (01-17 @ 10:12), 3.9 (01-17 @ 03:40), 2.6 (01-17 @ 02:31), 4.5 (01-16 @ 18:50), 4.1 (01-16 @ 12:37), 4.1 (01-16 @ 09:30), >9.0 (01-16 @ 08:12), >9.0 (01-16 @ 06:40), 4.0 (01-16 @ 00:11), 5.1 (01-15 @ 15:25)  Cl: 102 (01-17 @ 16:46), 100 (01-17 @ 10:12), 100 (01-17 @ 03:40), >135 (01-17 @ 02:31), 100 (01-16 @ 18:50), 101 (01-16 @ 12:37), 100 (01-16 @ 09:30), 103 (01-16 @ 08:12), 104 (01-16 @ 06:40), 101 (01-16 @ 00:11), 99 (01-15 @ 15:25)  CO2: 23 (01-17 @ 16:46), 24 (01-17 @ 10:12), 22 (01-17 @ 03:40), 15 (01-17 @ 02:31), 24 (01-16 @ 18:50), 25 (01-16 @ 12:37), 27 (01-16 @ 09:30), 25 (01-16 @ 08:12), 22 (01-16 @ 06:40), 26 (01-16 @ 00:11), 25 (01-15 @ 15:25)  BUN: 10 (01-17 @ 16:46), 9 (01-17 @ 10:12), 10 (01-17 @ 03:40), 7 (01-17 @ 02:31), 11 (01-16 @ 18:50), 13 (01-16 @ 12:37), 14 (01-16 @ 09:30), 13 (01-16 @ 08:12), 15 (01-16 @ 06:40), 22 (01-16 @ 00:11), 30 (01-15 @ 15:25)  Cr: 0.66 (01-17 @ 16:46), 0.69 (01-17 @ 10:12), 0.72 (01-17 @ 03:40), 0.47 (01-17 @ 02:31), 0.77 (01-16 @ 18:50), 0.85 (01-16 @ 12:37), 0.89 (01-16 @ 09:30), 0.73 (01-16 @ 08:12), 0.72 (01-16 @ 06:40), 0.91 (01-16 @ 00:11), 1.16 (01-15 @ 15:25)  Glu: 169(01-17 @ 16:46), 162(01-17 @ 10:12), 167(01-17 @ 03:40), 124(01-17 @ 02:31), 152(01-16 @ 18:50), 150(01-16 @ 12:37), 152(01-16 @ 09:30), 142(01-16 @ 08:12), 145(01-16 @ 06:40), 173(01-16 @ 00:11), 162(01-15 @ 15:25)    Hgb: 10.4 (01-17 @ 02:31), 12.9 (01-16 @ 00:11), 13.7 (01-15 @ 15:25)  Hct: 31.1 (01-17 @ 02:31), 38.9 (01-16 @ 00:11), 42.1 (01-15 @ 15:25)  WBC: 6.52 (01-17 @ 02:31), 7.24 (01-16 @ 00:11), 8.50 (01-15 @ 15:25)  Plt: 113 (01-17 @ 02:31), 142 (01-16 @ 00:11), 158 (01-15 @ 15:25)    INR: 0.99 01-15-22 @ 15:25  PTT: 26.5 01-15-22 @ 15:25            a/p    seizure like activity left side shaking, EEG no seizures   on vimpat , keppra added today   Brain edema, hypertonic saline                 , keep sodium in                  , BMP q 6 hr   CV : SBP goal   100-160 mmhg   Pulm: acute respiratory failure, intubated, ABG and adjust vent settings accordingly     GI: on TF, at goal   Renal: see neuro   ID afebrile  Endo: DM, target sugar 120-180   Hem: SCD,   hold chemoprophylaxis as POD     35 critical care time as patient is at risk for brain henrniation, ICP crisis, seizures, ICH  O: seizure like activity, s/p ativan today     T(C): 36.3 (01-17-22 @ 11:00), Max: 37.3 (01-16-22 @ 23:00)  HR: 73 (01-17-22 @ 16:00) (64 - 76)  BP: 153/90 (01-17-22 @ 16:00) (132/114 - 174/93)  RR: 20 (01-17-22 @ 16:00) (16 - 24)  SpO2: 95% (01-17-22 @ 16:00) (94% - 100%)  01-16-22 @ 07:01  -  01-17-22 @ 07:00  --------------------------------------------------------  IN: 2558.3 mL / OUT: 3375 mL / NET: -816.7 mL    01-17-22 @ 07:01  -  01-17-22 @ 18:01  --------------------------------------------------------  IN: 1000 mL / OUT: 0 mL / NET: 1000 mL    acetaminophen     Tablet .. 650 milliGRAM(s) Oral every 6 hours PRN  chlorhexidine 4% Liquid 1 Application(s) Topical <User Schedule>  dextrose 40% Gel 15 Gram(s) Oral once  dextrose 5%. 1000 milliLiter(s) IV Continuous <Continuous>  dextrose 5%. 1000 milliLiter(s) IV Continuous <Continuous>  dextrose 50% Injectable 25 Gram(s) IV Push once  dextrose 50% Injectable 12.5 Gram(s) IV Push once  dextrose 50% Injectable 25 Gram(s) IV Push once  glucagon  Injectable 1 milliGRAM(s) IntraMuscular once  insulin lispro (ADMELOG) corrective regimen sliding scale   SubCutaneous every 6 hours  lacosamide IVPB 100 milliGRAM(s) IV Intermittent every 12 hours  levETIRAcetam  IVPB 1000 milliGRAM(s) IV Intermittent every 12 hours  polyethylene glycol 3350 17 Gram(s) Oral daily  potassium phosphate IVPB 15 milliMole(s) IV Intermittent once  senna 2 Tablet(s) Oral at bedtime  sodium chloride 2 Gram(s) Oral every 6 hours  sodium chloride 2% . 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 , Left facial, RUE - 4/5, RLE- 4/5 - on stimulation  against gravity, L UE - weaker compared to right but at least antigravity and left LE withdraws   F/U exam in One hour- Now oriented and alert x3, follows simple commands ; RUE and RLE 4/5 and LUE 3/5 and LLE - 4-/5; sensation intact    LABS:  Na: 138 (01-17 @ 16:46), 137 (01-17 @ 10:12), 137 (01-17 @ 03:40), >170 (01-17 @ 02:31), 137 (01-16 @ 18:50), 140 (01-16 @ 12:37), 140 (01-16 @ 09:30), 140 (01-16 @ 08:12), 141 (01-16 @ 06:40), 140 (01-16 @ 00:11), 139 (01-15 @ 15:25)  K: 3.9 (01-17 @ 16:46), 4.0 (01-17 @ 10:12), 3.9 (01-17 @ 03:40), 2.6 (01-17 @ 02:31), 4.5 (01-16 @ 18:50), 4.1 (01-16 @ 12:37), 4.1 (01-16 @ 09:30), >9.0 (01-16 @ 08:12), >9.0 (01-16 @ 06:40), 4.0 (01-16 @ 00:11), 5.1 (01-15 @ 15:25)  Cl: 102 (01-17 @ 16:46), 100 (01-17 @ 10:12), 100 (01-17 @ 03:40), >135 (01-17 @ 02:31), 100 (01-16 @ 18:50), 101 (01-16 @ 12:37), 100 (01-16 @ 09:30), 103 (01-16 @ 08:12), 104 (01-16 @ 06:40), 101 (01-16 @ 00:11), 99 (01-15 @ 15:25)  CO2: 23 (01-17 @ 16:46), 24 (01-17 @ 10:12), 22 (01-17 @ 03:40), 15 (01-17 @ 02:31), 24 (01-16 @ 18:50), 25 (01-16 @ 12:37), 27 (01-16 @ 09:30), 25 (01-16 @ 08:12), 22 (01-16 @ 06:40), 26 (01-16 @ 00:11), 25 (01-15 @ 15:25)  BUN: 10 (01-17 @ 16:46), 9 (01-17 @ 10:12), 10 (01-17 @ 03:40), 7 (01-17 @ 02:31), 11 (01-16 @ 18:50), 13 (01-16 @ 12:37), 14 (01-16 @ 09:30), 13 (01-16 @ 08:12), 15 (01-16 @ 06:40), 22 (01-16 @ 00:11), 30 (01-15 @ 15:25)  Cr: 0.66 (01-17 @ 16:46), 0.69 (01-17 @ 10:12), 0.72 (01-17 @ 03:40), 0.47 (01-17 @ 02:31), 0.77 (01-16 @ 18:50), 0.85 (01-16 @ 12:37), 0.89 (01-16 @ 09:30), 0.73 (01-16 @ 08:12), 0.72 (01-16 @ 06:40), 0.91 (01-16 @ 00:11), 1.16 (01-15 @ 15:25)  Glu: 169(01-17 @ 16:46), 162(01-17 @ 10:12), 167(01-17 @ 03:40), 124(01-17 @ 02:31), 152(01-16 @ 18:50), 150(01-16 @ 12:37), 152(01-16 @ 09:30), 142(01-16 @ 08:12), 145(01-16 @ 06:40), 173(01-16 @ 00:11), 162(01-15 @ 15:25)    Hgb: 10.4 (01-17 @ 02:31), 12.9 (01-16 @ 00:11), 13.7 (01-15 @ 15:25)  Hct: 31.1 (01-17 @ 02:31), 38.9 (01-16 @ 00:11), 42.1 (01-15 @ 15:25)  WBC: 6.52 (01-17 @ 02:31), 7.24 (01-16 @ 00:11), 8.50 (01-15 @ 15:25)  Plt: 113 (01-17 @ 02:31), 142 (01-16 @ 00:11), 158 (01-15 @ 15:25)    INR: 0.99 01-15-22 @ 15:25  PTT: 26.5 01-15-22 @ 15:25        A/P right frontal ICH with brain edema and brain compression with IVH 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  neuro checks q 1 hr   seizure like activity left side shaking, EEG no seizures   on vimpat , keppra added today   continue EEG monitoring   PT/OT  Brain edema,  keep sodium in 140-150  ,  last sodium 138, 2 % 75 ml/hr, BMP q 6 hr   CV : SBP goal   100-160 mmhg    CTA abdomen shows infrarenal AAA vascular surgery on on consult   Pulm: RA   GI: NG start TF   failed dysphagia   Renal: see neuro   ID afebrile  Endo:  target sugar 120-180   finger sticks q 6 hr   Hem: SCD,   start lovenox 40 mg sc qhs if ok with NS     35 critical care time as patient is at risk for brain herniation seizures, ICH expansion      O: seizure like activity, s/p ativan today     T(C): 36.3 (01-17-22 @ 11:00), Max: 37.3 (01-16-22 @ 23:00)  HR: 73 (01-17-22 @ 16:00) (64 - 76)  BP: 153/90 (01-17-22 @ 16:00) (132/114 - 174/93)  RR: 20 (01-17-22 @ 16:00) (16 - 24)  SpO2: 95% (01-17-22 @ 16:00) (94% - 100%)  01-16-22 @ 07:01  -  01-17-22 @ 07:00  --------------------------------------------------------  IN: 2558.3 mL / OUT: 3375 mL / NET: -816.7 mL    01-17-22 @ 07:01  -  01-17-22 @ 18:01  --------------------------------------------------------  IN: 1000 mL / OUT: 0 mL / NET: 1000 mL    acetaminophen     Tablet .. 650 milliGRAM(s) Oral every 6 hours PRN  chlorhexidine 4% Liquid 1 Application(s) Topical <User Schedule>  dextrose 40% Gel 15 Gram(s) Oral once  dextrose 5%. 1000 milliLiter(s) IV Continuous <Continuous>  dextrose 5%. 1000 milliLiter(s) IV Continuous <Continuous>  dextrose 50% Injectable 25 Gram(s) IV Push once  dextrose 50% Injectable 12.5 Gram(s) IV Push once  dextrose 50% Injectable 25 Gram(s) IV Push once  glucagon  Injectable 1 milliGRAM(s) IntraMuscular once  insulin lispro (ADMELOG) corrective regimen sliding scale   SubCutaneous every 6 hours  lacosamide IVPB 100 milliGRAM(s) IV Intermittent every 12 hours  levETIRAcetam  IVPB 1000 milliGRAM(s) IV Intermittent every 12 hours  polyethylene glycol 3350 17 Gram(s) Oral daily  potassium phosphate IVPB 15 milliMole(s) IV Intermittent once  senna 2 Tablet(s) Oral at bedtime  sodium chloride 2 Gram(s) Oral every 6 hours  sodium chloride 2% . 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 , Left facial, RUE - 4/5, RLE- 4/5 - on stimulation  against gravity, L UE - weaker compared to right but at least antigravity and left LE withdraws     LABS:  Na: 138 (01-17 @ 16:46), 137 (01-17 @ 10:12), 137 (01-17 @ 03:40), >170 (01-17 @ 02:31), 137 (01-16 @ 18:50), 140 (01-16 @ 12:37), 140 (01-16 @ 09:30), 140 (01-16 @ 08:12), 141 (01-16 @ 06:40), 140 (01-16 @ 00:11), 139 (01-15 @ 15:25)  K: 3.9 (01-17 @ 16:46), 4.0 (01-17 @ 10:12), 3.9 (01-17 @ 03:40), 2.6 (01-17 @ 02:31), 4.5 (01-16 @ 18:50), 4.1 (01-16 @ 12:37), 4.1 (01-16 @ 09:30), >9.0 (01-16 @ 08:12), >9.0 (01-16 @ 06:40), 4.0 (01-16 @ 00:11), 5.1 (01-15 @ 15:25)  Cl: 102 (01-17 @ 16:46), 100 (01-17 @ 10:12), 100 (01-17 @ 03:40), >135 (01-17 @ 02:31), 100 (01-16 @ 18:50), 101 (01-16 @ 12:37), 100 (01-16 @ 09:30), 103 (01-16 @ 08:12), 104 (01-16 @ 06:40), 101 (01-16 @ 00:11), 99 (01-15 @ 15:25)  CO2: 23 (01-17 @ 16:46), 24 (01-17 @ 10:12), 22 (01-17 @ 03:40), 15 (01-17 @ 02:31), 24 (01-16 @ 18:50), 25 (01-16 @ 12:37), 27 (01-16 @ 09:30), 25 (01-16 @ 08:12), 22 (01-16 @ 06:40), 26 (01-16 @ 00:11), 25 (01-15 @ 15:25)  BUN: 10 (01-17 @ 16:46), 9 (01-17 @ 10:12), 10 (01-17 @ 03:40), 7 (01-17 @ 02:31), 11 (01-16 @ 18:50), 13 (01-16 @ 12:37), 14 (01-16 @ 09:30), 13 (01-16 @ 08:12), 15 (01-16 @ 06:40), 22 (01-16 @ 00:11), 30 (01-15 @ 15:25)  Cr: 0.66 (01-17 @ 16:46), 0.69 (01-17 @ 10:12), 0.72 (01-17 @ 03:40), 0.47 (01-17 @ 02:31), 0.77 (01-16 @ 18:50), 0.85 (01-16 @ 12:37), 0.89 (01-16 @ 09:30), 0.73 (01-16 @ 08:12), 0.72 (01-16 @ 06:40), 0.91 (01-16 @ 00:11), 1.16 (01-15 @ 15:25)  Glu: 169(01-17 @ 16:46), 162(01-17 @ 10:12), 167(01-17 @ 03:40), 124(01-17 @ 02:31), 152(01-16 @ 18:50), 150(01-16 @ 12:37), 152(01-16 @ 09:30), 142(01-16 @ 08:12), 145(01-16 @ 06:40), 173(01-16 @ 00:11), 162(01-15 @ 15:25)    Hgb: 10.4 (01-17 @ 02:31), 12.9 (01-16 @ 00:11), 13.7 (01-15 @ 15:25)  Hct: 31.1 (01-17 @ 02:31), 38.9 (01-16 @ 00:11), 42.1 (01-15 @ 15:25)  WBC: 6.52 (01-17 @ 02:31), 7.24 (01-16 @ 00:11), 8.50 (01-15 @ 15:25)  Plt: 113 (01-17 @ 02:31), 142 (01-16 @ 00:11), 158 (01-15 @ 15:25)    INR: 0.99 01-15-22 @ 15:25  PTT: 26.5 01-15-22 @ 15:25        A/P right frontal ICH with brain edema and brain compression with IVH 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  neuro checks q 1 hr   seizure like activity left side shaking, EEG no seizures   on vimpat , keppra added today   continue EEG monitoring   PT/OT  Brain edema,  keep sodium in 140-150  ,  last sodium 138, 2 % 100 ml/hr, BMP q 6 hr   CV : SBP goal   100-160 mmhg    CTA abdomen shows infrarenal AAA vascular surgery on on consult   lisinopril 5 mg daily   Pulm: RA   GI: NG start TF   failed dysphagia   Renal: see neuro   ID afebrile  Endo:  target sugar 120-180   finger sticks q 6 hr   Hem: SCD,   hold chemoprophylaxis per NS      35 critical care time as patient is at risk for brain herniation seizures, ICH expansion

## 2022-01-17 NOTE — PROGRESS NOTE ADULT - SUBJECTIVE AND OBJECTIVE BOX
Patient seen and examined at bedside.    --Anticoagulation--    T(C): 37.3 (01-16-22 @ 23:00), Max: 37.8 (01-16-22 @ 03:00)  HR: 70 (01-16-22 @ 23:00) (55 - 82)  BP: 158/81 (01-16-22 @ 23:00) (130/79 - 174/93)  RR: 24 (01-16-22 @ 23:00) (16 - 28)  SpO2: 94% (01-16-22 @ 23:00) (92% - 100%)  Wt(kg): --    Exam:  Sleepy, EOV, Ox1-2 in [place], R side 5/5, LUE 3/5, LLE 2/5

## 2022-01-17 NOTE — PROGRESS NOTE ADULT - SUBJECTIVE AND OBJECTIVE BOX
[facility] Progress Note    Assessment/Hospital Course:        24 Hour Events/Subjective:  - no sz on eeg      REVIEW OF SYSTEMS:  - negative except as above    VITALS:   - T(C): 37 (01-17-22 @ 03:00), Max: 37.3 (01-16-22 @ 23:00)  T(F): 98.6 (01-17-22 @ 03:00), Max: 99.2 (01-16-22 @ 23:00)  HR: 66 (01-17-22 @ 07:00) (55 - 76)  BP: 152/82 (01-17-22 @ 07:00) (132/114 - 174/93)  ABP: --  ABP(mean): --  RR: 20 (01-17-22 @ 07:00) (16 - 28)  SpO2: 95% (01-17-22 @ 07:00) (94% - 100%)      IMAGING/DATA:   - Reviewed          PHYSICAL EXAM:    General: calm  CVS: RRR  Pulm: CTAB  GI: Soft, NTND  Extremities: No LE Edema  Neuro: AO x3, follows simple commands; R pupil 2mmR (light perception impaired), L pupil 3mm, Rt side 4+/5 and LUE 3/5 and LLE 4-/5   [facility] Progress Note    Assessment/Hospital Course:        24 Hour Events/Subjective:  - Risk for seizures from the right temporooccipital region prelim read  -       REVIEW OF SYSTEMS:  - negative except as above    VITALS:   - T(C): 37 (01-17-22 @ 03:00), Max: 37.3 (01-16-22 @ 23:00)  T(F): 98.6 (01-17-22 @ 03:00), Max: 99.2 (01-16-22 @ 23:00)  HR: 66 (01-17-22 @ 07:00) (55 - 76)  BP: 152/82 (01-17-22 @ 07:00) (132/114 - 174/93)  ABP: --  ABP(mean): --  RR: 20 (01-17-22 @ 07:00) (16 - 28)  SpO2: 95% (01-17-22 @ 07:00) (94% - 100%)      IMAGING/DATA:   - Reviewed          PHYSICAL EXAM:    General: calm  CVS: RRR  Pulm: CTAB  GI: Soft, NTND  Extremities: No LE Edema  Neuro: AO x3, follows simple commands; R pupil 2mmR (light perception impaired), L pupil 3mm, Rt side 4+/5 and LUE 3/5 and LLE 4-/5   [facility] Progress Note    Assessment/Hospital Course:        24 Hour Events/Subjective:  - Risk for seizures from the right temporooccipital region prelim read        REVIEW OF SYSTEMS:  - negative except as above    VITALS:   - T(C): 37 (01-17-22 @ 03:00), Max: 37.3 (01-16-22 @ 23:00)  T(F): 98.6 (01-17-22 @ 03:00), Max: 99.2 (01-16-22 @ 23:00)  HR: 66 (01-17-22 @ 07:00) (55 - 76)  BP: 152/82 (01-17-22 @ 07:00) (132/114 - 174/93)  ABP: --  ABP(mean): --  RR: 20 (01-17-22 @ 07:00) (16 - 28)  SpO2: 95% (01-17-22 @ 07:00) (94% - 100%)      IMAGING/DATA:   - Reviewed          PHYSICAL EXAM:    General: calm  CVS: RRR  Pulm: CTAB  GI: Soft, NTND  Extremities: No LE Edema  Neuro: AO x3, follows simple commands; R pupil 2mmR (light perception impaired), L pupil 3mm, Rt side 4+/5 and LUE 3/5 and LLE 4-/5   [facility] Progress Note    Assessment/Hospital Course:        24 Hour Events/Subjective:  - Risk for seizures from the right temporooccipital region prelim read  - at 15:00, stopped following commands, LUE twitching, given 2mg ativan followed by ?post-ictal state then returned to baseline, no seizure on eeg; loaded with 1g keppra        REVIEW OF SYSTEMS:  - negative except as above    VITALS:   - T(C): 37 (01-17-22 @ 03:00), Max: 37.3 (01-16-22 @ 23:00)  T(F): 98.6 (01-17-22 @ 03:00), Max: 99.2 (01-16-22 @ 23:00)  HR: 66 (01-17-22 @ 07:00) (55 - 76)  BP: 152/82 (01-17-22 @ 07:00) (132/114 - 174/93)  ABP: --  ABP(mean): --  RR: 20 (01-17-22 @ 07:00) (16 - 28)  SpO2: 95% (01-17-22 @ 07:00) (94% - 100%)      IMAGING/DATA:   - Reviewed          PHYSICAL EXAM:    General: calm  CVS: RRR  Pulm: CTAB  GI: Soft, NTND  Extremities: No LE Edema  Neuro: AO x3, follows simple commands; R pupil 2mmR (light perception impaired), L pupil 3mm, Rt side 4+/5 and LUE 3/5 and LLE 4-/5

## 2022-01-17 NOTE — EEG REPORT - NS EEG TEXT BOX
REPORT OF CONTINUOUS VIDEO EEG   [facility]: [address], [place] [address], Ph#: [phone] [facility]: [id number], [place] [address], Ph#: [phone] Office: [address] [place] [address] Ph#: [phone]  Patient Name: Robert Woody   Age: 81 year, : 1940 MRN #: MR# [id number], [place]: [place] [address] Referring Physician: - EEG #: [id number]  Study Date: 2022   Start Time: 9:52:37 AM    End Date: 22         End Time: 0800 am    Study Duration: ~22H  Study Information:  EEG Recording Technique: The patient underwent continuous Video-EEG monitoring, using Telemetry System hardware on the XLTek Digital System. EEG and video data were stored on a computer hard drive with important events saved in digital archive files. The material was reviewed by a physician (electroencephalographer / epileptologist) on a daily basis. Landon and seizure detection algorithms were utilized and reviewed. An EEG Technician attended to the patient, and was available throughout daytime work hours.  The epilepsy center neurologist was available in person or on call 24-hours per day.  EEG Placement and Labeling of Electrodes: The EEG was performed utilizing 20 channel referential EEG connections (coronal over temporal over parasagittal montage) using all standard 10-20 electrode placements with EKG, with additional electrodes placed in the inferior temporal region using the modified 10-10 montage electrode placements for elective admissions, or if deemed necessary. Recording was at a sampling rate of 256 samples per second per channel. Time synchronized digital video recording was done simultaneously with EEG recording. A low light infrared camera was used for low light recording.   History: -  Medication Tylenol Keppra (Levetiracetam)  Interpretation:  [Abbreviation Key:  PDR=alpha rhythm/posterior dominant rhythm. A-P=anterior posterior gradient.  Amplitude: ‘very low’:<20; ‘low’:20-50; ‘medium’:; ‘high’:>200uV.  Persistence for periodic/rhythmic patterns (% of epoch) ‘rare’:<1%; ‘occasional’:1-10%; ‘frequent’:10-50%; ‘abundant’:50-90%; ‘continuous’:>90%.  Persistence for sporadic discharges: ‘rare’:<1/hr; ‘occasional’:1/min-1/hr; ‘frequent’:>1/min; ‘abundant’:>1/10 sec.  GRDA=generalized rhythmic delta activity, LRDA=lateralized rhythmic delta activity, TIRDA=temporal intermittent rhythmic delta activity, FIRDA=frontal intermittent rhythmic activity. LPD=PLED=lateralized periodic discharges, GPD=generalized periodic discharges, BiPDs=BiPLEDs=bilateral independent periodic epileptiform discharges, SIRPID=stimulus induced rhythmic, periodic, or ictal appearing discharges.  Modifiers: +F=with fast component, +S=with spike component, +R=with rhythmic component.  S-B=burst suppression pattern.  PFA: paroxysmal fast activity. Max=maximal. N1-drowsy, N2-stage II sleep, N3-slow wave sleep.  HV=hyperventilation, PS=photic stimulation]   Daily EEG Visual Analysis  FINDINGS: The background was continuous, spontaneously variable and reactive. During wakefulness, the posterior dominant rhythm consisted of symmetric 7 Hz activity, with amplitude to 30 uV, that attenuated to eye opening.   Background Slowing: Generalized slowing: diffuse theta/delta slowing Focal Slowing: continuous polymorphic theta/delta slowing over the right hemisphere  Sleep Background: Drowsiness was characterized by fragmentation, attenuation, and slowing of the background activity.   Stage II sleep transients were not recorded.  Other Non-Epileptiform Findings: None were present.  Interictal Epileptiform Activity:  Frequent sharp wave discharges over the right temporooccipital region (T8/O2), at times with a field to the right parietal region (P4)  Events: Clinical events: None recorded. Seizures: None recorded.  Activation Procedures:  Hyperventilation was not performed.   Photic stimulation was not performed.  Artifacts: Intermittent myogenic and movement artifacts were noted.  ECG: The heart rate on single channel ECG was predominantly between 70-80 BPM.  EEG Summary / Classification:   Abnormal EEG in the awake / drowsy / asleep states. -Frequent sharp wave discharges, focal, right temporooccipital region, at times with a field to the right parietal region -Continuous polymorphic slowing, focal, right hemisphere -Mild to moderate background slowing, generalized   EEG Impression / Clinical Correlate:  Risk for seizures from the right temporooccipital region Structural abnormality in the right hemisphere. Mild to moderate multifocal/diffuse nonspecific cerebral dysfunction.  Preliminary Fellow Report, final report pending attending review  Scott Lopez MD Epilepsy Fellow  Reading Room: [phone] On Call Service After Hours: [phone]    REPORT OF CONTINUOUS VIDEO EEG   [facility]: [address], [place] [address], Ph#: [phone] [facility]: [id number], [place] [address], Ph#: [phone] Office: [address] [place] [address] Ph#: [phone]  Patient Name: Robert Woody   Age: 81 year, : 1940 MRN #: MR# [id number], [place]: [place] [address] Referring Physician: - EEG #: [id number]  Study Date: 2022   Start Time: 9:52:37 AM    End Date: 22         End Time: 0800 am    Study Duration: ~22H  Study Information:  EEG Recording Technique: The patient underwent continuous Video-EEG monitoring, using Telemetry System hardware on the XLTek Digital System. EEG and video data were stored on a computer hard drive with important events saved in digital archive files. The material was reviewed by a physician (electroencephalographer / epileptologist) on a daily basis. Landon and seizure detection algorithms were utilized and reviewed. An EEG Technician attended to the patient, and was available throughout daytime work hours.  The epilepsy center neurologist was available in person or on call 24-hours per day.  EEG Placement and Labeling of Electrodes: The EEG was performed utilizing 20 channel referential EEG connections (coronal over temporal over parasagittal montage) using all standard 10-20 electrode placements with EKG, with additional electrodes placed in the inferior temporal region using the modified 10-10 montage electrode placements for elective admissions, or if deemed necessary. Recording was at a sampling rate of 256 samples per second per channel. Time synchronized digital video recording was done simultaneously with EEG recording. A low light infrared camera was used for low light recording.   History: -  Medication Tylenol Keppra (Levetiracetam)  Interpretation:  [Abbreviation Key:  PDR=alpha rhythm/posterior dominant rhythm. A-P=anterior posterior gradient.  Amplitude: ‘very low’:<20; ‘low’:20-50; ‘medium’:; ‘high’:>200uV.  Persistence for periodic/rhythmic patterns (% of epoch) ‘rare’:<1%; ‘occasional’:1-10%; ‘frequent’:10-50%; ‘abundant’:50-90%; ‘continuous’:>90%.  Persistence for sporadic discharges: ‘rare’:<1/hr; ‘occasional’:1/min-1/hr; ‘frequent’:>1/min; ‘abundant’:>1/10 sec.  GRDA=generalized rhythmic delta activity, LRDA=lateralized rhythmic delta activity, TIRDA=temporal intermittent rhythmic delta activity, FIRDA=frontal intermittent rhythmic activity. LPD=PLED=lateralized periodic discharges, GPD=generalized periodic discharges, BiPDs=BiPLEDs=bilateral independent periodic epileptiform discharges, SIRPID=stimulus induced rhythmic, periodic, or ictal appearing discharges.  Modifiers: +F=with fast component, +S=with spike component, +R=with rhythmic component.  S-B=burst suppression pattern.  PFA: paroxysmal fast activity. Max=maximal. N1-drowsy, N2-stage II sleep, N3-slow wave sleep.  HV=hyperventilation, PS=photic stimulation]   Daily EEG Visual Analysis  FINDINGS: The background was continuous, spontaneously variable and reactive. During wakefulness, the posterior dominant rhythm consisted of symmetric 7 Hz activity, with amplitude to 30 uV, that attenuated to eye opening.   Background Slowing: Generalized slowing: diffuse theta/delta slowing Focal Slowing: continuous polymorphic theta/delta slowing over the right hemisphere  Sleep Background: Drowsiness was characterized by fragmentation, attenuation, and slowing of the background activity.   Stage II sleep transients were not recorded.  Other Non-Epileptiform Findings: None were present.  Interictal Epileptiform Activity:  Frequent sharp wave discharges over the right temporooccipital region (T8/O2), at times with a field to the right parietal region (P4)  Events: Clinical events: None recorded. Seizures: None recorded.  Activation Procedures:  Hyperventilation was not performed.   Photic stimulation was not performed.  Artifacts: Intermittent myogenic and movement artifacts were noted.  ECG: The heart rate on single channel ECG was predominantly between 70-80 BPM.  EEG Summary / Classification:   Abnormal EEG in the awake / drowsy / asleep states. -Frequent sharp wave discharges, focal, right temporooccipital region, at times with a field to the right parietal region -Continuous polymorphic slowing, focal, right hemisphere -Mild to moderate background slowing, generalized   EEG Impression / Clinical Correlate:  Risk for seizures from the right temporooccipital region Structural abnormality in the right hemisphere. Mild to moderate multifocal/diffuse nonspecific cerebral dysfunction.   Scott Lopez MD Epilepsy Fellow  Reading Room: [phone] On Call Service After Hours: [phone]

## 2022-01-17 NOTE — PROGRESS NOTE ADULT - ASSESSMENT
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, planned for evaluation for possible stenting this Tuesday prior to arrival  - SBP goal 100-160  - TTE- p  - vascular recs    PULM:  hx of COVID 2 years ago and 2 weeks ago  - RA  - IS As tolerated- ?comply  - Aspiration precautions    RENAL:  - Fluids: HTS 2% 100cc/h   - daily IOs  - Na 145-155    GI:  - Diet: NPO  - Speech and swallow assessment   - Bowel regimen: miralax, senna    ENDO: Type 2 DM  - FS goal 120-180    HEME/ONC:  - SCDs  - Chemoppx: hold d/t fresh heme    ID:  - monitor for fevers      Patient is at high risk of neurologic deterioration/death due to: expansion of the hematoma    ASSESSMENT/PLAN:    81M hx of HTN, not on AC/AP, txer from [place] after mechanical fall, CTH with R frontal IPH w/ mass effect, minimal MLS, ICH score 2    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; CTA negative  - neuro checks q1h  - fluctuating exam, continuous EEG  - Vimpat 100mg  q12  - Cerebral Edema: HTS as below  - Activity: PT/OT as tolerated  -Thiamine    CVS:  Hx of AAA repair 7 years ago (con), also with 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  - SBP goal 100-160  - TTE- p  - vascular recs    PULM:  hx of COVID 2 years ago and 2 weeks ago  - RA  - IS As tolerated  - Aspiration precautions    RENAL:  - Fluids: HTS 2% 100cc/h   - daily IOs  - Na 145-155    GI:  - Diet: NPO, pending family decision on NGT  - Speech and swallow assessment   - Bowel regimen: miralax, senna    ENDO: Type 2 DM  - FS goal 120-180    HEME/ONC:  - SCDs  - Chemoppx: hold d/t fresh heme    ID:  - monitor for fevers      Patient is at high risk of neurologic deterioration/death due to: expansion of the hematoma    ASSESSMENT/PLAN:    81M hx of HTN, not on AC/AP, txer from [place] after mechanical fall, CTH with R frontal IPH w/ mass effect, minimal MLS, ICH score 2    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; CTA negative  - neuro checks q1h  - fluctuating exam, continuous EEG  - Vimpat 100mg  q12  - Cerebral Edema: HTS as below  - Activity: PT/OT as tolerated  - Thiamine    CVS:  Hx of AAA repair 7 years ago (con), also with 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  - SBP goal 100-160  - TTE- p  - vascular recs  - needs repeat imaging from abdomen to mid-thigh per vascular, CTA    PULM:  hx of COVID 2 years ago and 2 weeks ago  - RA  - IS As tolerated  - Aspiration precautions    RENAL:  - Fluids: HTS 2% 100cc/h   - daily IOs  - Na 145-155    GI:  - Diet: NPO, pending family decision on NGT  - Speech and swallow assessment   - Bowel regimen: miralax, senna    ENDO: Type 2 DM  - FS goal 120-180    HEME/ONC:  - SCDs  - Chemoppx: hold d/t fresh heme    ID:  - monitor for fevers      Patient is at high risk of neurologic deterioration/death due to: expansion of the hematoma    ASSESSMENT/PLAN:    81M hx of HTN, not on AC/AP, txer from [place] after mechanical fall, CTH with R frontal IPH w/ mass effect, minimal MLS, ICH score 2    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; CTA negative  on 1/17 stopped following commands, LUE twitching, given 2mg ativan followed by ?post-ictal state then returned to baseline, no seizure on eeg; loaded with 1g keppra  - neuro checks q1h  - fluctuating exam, continuous EEG  - Vimpat 100mg  q12  - s/p 1g keppra load, 1g BID standing  - Cerebral Edema: HTS as below  - Activity: PT/OT as tolerated  - Thiamine    CVS:  Hx of AAA repair 7 years ago ([place]), also with 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  - SBP goal 100-160  - TTE- p  - vascular recs  - needs repeat imaging from abdomen to mid-thigh per vascular, CTA    PULM:  hx of COVID 2 years ago and 2 weeks ago  - RA  - IS As tolerated  - Aspiration precautions    RENAL:  - Fluids: HTS 2% 100cc/h   - daily IOs  - Na 145-155    GI:  - Diet: NPO, pending family decision on NGT  - Speech and swallow assessment   - Bowel regimen: miralax, senna    ENDO: Type 2 DM  - FS goal 120-180    HEME/ONC:  - SCDs  - Chemoppx: hold d/t fresh heme    ID:  - monitor for fevers      Patient is at high risk of neurologic deterioration/death due to: expansion of the hematoma

## 2022-01-18 LAB
ANION GAP SERPL CALC-SCNC: 12 MMOL/L — SIGNIFICANT CHANGE UP (ref 5–17)
ANION GAP SERPL CALC-SCNC: 13 MMOL/L — SIGNIFICANT CHANGE UP (ref 5–17)
ANION GAP SERPL CALC-SCNC: 14 MMOL/L — SIGNIFICANT CHANGE UP (ref 5–17)
BUN SERPL-MCNC: 12 MG/DL — SIGNIFICANT CHANGE UP (ref 7–23)
BUN SERPL-MCNC: 12 MG/DL — SIGNIFICANT CHANGE UP (ref 7–23)
BUN SERPL-MCNC: 13 MG/DL — SIGNIFICANT CHANGE UP (ref 7–23)
CALCIUM SERPL-MCNC: 9.1 MG/DL — SIGNIFICANT CHANGE UP (ref 8.4–10.5)
CALCIUM SERPL-MCNC: 9.2 MG/DL — SIGNIFICANT CHANGE UP (ref 8.4–10.5)
CALCIUM SERPL-MCNC: 9.3 MG/DL — SIGNIFICANT CHANGE UP (ref 8.4–10.5)
CHLORIDE SERPL-SCNC: 105 MMOL/L — SIGNIFICANT CHANGE UP (ref 96–108)
CHLORIDE SERPL-SCNC: 106 MMOL/L — SIGNIFICANT CHANGE UP (ref 96–108)
CHLORIDE SERPL-SCNC: 108 MMOL/L — SIGNIFICANT CHANGE UP (ref 96–108)
CO2 SERPL-SCNC: 21 MMOL/L — LOW (ref 22–31)
CO2 SERPL-SCNC: 23 MMOL/L — SIGNIFICANT CHANGE UP (ref 22–31)
CO2 SERPL-SCNC: 23 MMOL/L — SIGNIFICANT CHANGE UP (ref 22–31)
CREAT SERPL-MCNC: 0.71 MG/DL — SIGNIFICANT CHANGE UP (ref 0.5–1.3)
CREAT SERPL-MCNC: 0.72 MG/DL — SIGNIFICANT CHANGE UP (ref 0.5–1.3)
CREAT SERPL-MCNC: 0.74 MG/DL — SIGNIFICANT CHANGE UP (ref 0.5–1.3)
GAS PNL BLDA: SIGNIFICANT CHANGE UP
GLUCOSE BLDC GLUCOMTR-MCNC: 141 MG/DL — HIGH (ref 70–99)
GLUCOSE BLDC GLUCOMTR-MCNC: 165 MG/DL — HIGH (ref 70–99)
GLUCOSE BLDC GLUCOMTR-MCNC: 167 MG/DL — HIGH (ref 70–99)
GLUCOSE SERPL-MCNC: 176 MG/DL — HIGH (ref 70–99)
GLUCOSE SERPL-MCNC: 179 MG/DL — HIGH (ref 70–99)
GLUCOSE SERPL-MCNC: 196 MG/DL — HIGH (ref 70–99)
HCT VFR BLD CALC: 41.3 % — SIGNIFICANT CHANGE UP (ref 39–50)
HGB BLD-MCNC: 13.6 G/DL — SIGNIFICANT CHANGE UP (ref 13–17)
MAGNESIUM SERPL-MCNC: 2 MG/DL — SIGNIFICANT CHANGE UP (ref 1.6–2.6)
MAGNESIUM SERPL-MCNC: 2.1 MG/DL — SIGNIFICANT CHANGE UP (ref 1.6–2.6)
MCHC RBC-ENTMCNC: 30.3 PG — SIGNIFICANT CHANGE UP (ref 27–34)
MCHC RBC-ENTMCNC: 32.9 GM/DL — SIGNIFICANT CHANGE UP (ref 32–36)
MCV RBC AUTO: 92 FL — SIGNIFICANT CHANGE UP (ref 80–100)
NRBC # BLD: 0 /100 WBCS — SIGNIFICANT CHANGE UP (ref 0–0)
PHOSPHATE SERPL-MCNC: 2.2 MG/DL — LOW (ref 2.5–4.5)
PHOSPHATE SERPL-MCNC: 2.6 MG/DL — SIGNIFICANT CHANGE UP (ref 2.5–4.5)
PLATELET # BLD AUTO: 175 K/UL — SIGNIFICANT CHANGE UP (ref 150–400)
POTASSIUM SERPL-MCNC: 3.8 MMOL/L — SIGNIFICANT CHANGE UP (ref 3.5–5.3)
POTASSIUM SERPL-MCNC: 3.8 MMOL/L — SIGNIFICANT CHANGE UP (ref 3.5–5.3)
POTASSIUM SERPL-MCNC: 3.9 MMOL/L — SIGNIFICANT CHANGE UP (ref 3.5–5.3)
POTASSIUM SERPL-SCNC: 3.8 MMOL/L — SIGNIFICANT CHANGE UP (ref 3.5–5.3)
POTASSIUM SERPL-SCNC: 3.8 MMOL/L — SIGNIFICANT CHANGE UP (ref 3.5–5.3)
POTASSIUM SERPL-SCNC: 3.9 MMOL/L — SIGNIFICANT CHANGE UP (ref 3.5–5.3)
RBC # BLD: 4.49 M/UL — SIGNIFICANT CHANGE UP (ref 4.2–5.8)
RBC # FLD: 13.7 % — SIGNIFICANT CHANGE UP (ref 10.3–14.5)
SODIUM SERPL-SCNC: 140 MMOL/L — SIGNIFICANT CHANGE UP (ref 135–145)
SODIUM SERPL-SCNC: 142 MMOL/L — SIGNIFICANT CHANGE UP (ref 135–145)
SODIUM SERPL-SCNC: 143 MMOL/L — SIGNIFICANT CHANGE UP (ref 135–145)
WBC # BLD: 6.67 K/UL — SIGNIFICANT CHANGE UP (ref 3.8–10.5)
WBC # FLD AUTO: 6.67 K/UL — SIGNIFICANT CHANGE UP (ref 3.8–10.5)

## 2022-01-18 PROCEDURE — 71045 X-RAY EXAM CHEST 1 VIEW: CPT | Mod: 26

## 2022-01-18 PROCEDURE — 95720 EEG PHY/QHP EA INCR W/VEEG: CPT

## 2022-01-18 PROCEDURE — 99231 SBSQ HOSP IP/OBS SF/LOW 25: CPT

## 2022-01-18 PROCEDURE — 99233 SBSQ HOSP IP/OBS HIGH 50: CPT

## 2022-01-18 PROCEDURE — 93306 TTE W/DOPPLER COMPLETE: CPT | Mod: 26

## 2022-01-18 PROCEDURE — 70450 CT HEAD/BRAIN W/O DYE: CPT | Mod: 26

## 2022-01-18 RX ORDER — AMLODIPINE BESYLATE 2.5 MG/1
5 TABLET ORAL DAILY
Refills: 0 | Status: DISCONTINUED | OUTPATIENT
Start: 2022-01-18 | End: 2022-01-18

## 2022-01-18 RX ORDER — LABETALOL HCL 100 MG
100 TABLET ORAL EVERY 8 HOURS
Refills: 0 | Status: DISCONTINUED | OUTPATIENT
Start: 2022-01-18 | End: 2022-01-23

## 2022-01-18 RX ORDER — AMLODIPINE BESYLATE 2.5 MG/1
10 TABLET ORAL DAILY
Refills: 0 | Status: DISCONTINUED | OUTPATIENT
Start: 2022-01-18 | End: 2022-01-29

## 2022-01-18 RX ORDER — AMLODIPINE BESYLATE 2.5 MG/1
5 TABLET ORAL ONCE
Refills: 0 | Status: COMPLETED | OUTPATIENT
Start: 2022-01-18 | End: 2022-01-18

## 2022-01-18 RX ORDER — HYDRALAZINE HCL 50 MG
25 TABLET ORAL EVERY 8 HOURS
Refills: 0 | Status: DISCONTINUED | OUTPATIENT
Start: 2022-01-18 | End: 2022-01-19

## 2022-01-18 RX ORDER — HYDRALAZINE HCL 50 MG
10 TABLET ORAL ONCE
Refills: 0 | Status: COMPLETED | OUTPATIENT
Start: 2022-01-18 | End: 2022-01-18

## 2022-01-18 RX ORDER — LABETALOL HCL 100 MG
10 TABLET ORAL ONCE
Refills: 0 | Status: COMPLETED | OUTPATIENT
Start: 2022-01-18 | End: 2022-01-18

## 2022-01-18 RX ADMIN — SODIUM CHLORIDE 100 MILLILITER(S): 5 INJECTION, SOLUTION INTRAVENOUS at 19:38

## 2022-01-18 RX ADMIN — Medication 10 MILLIGRAM(S): at 19:35

## 2022-01-18 RX ADMIN — Medication 100 MILLIGRAM(S): at 12:45

## 2022-01-18 RX ADMIN — LEVETIRACETAM 400 MILLIGRAM(S): 250 TABLET, FILM COATED ORAL at 18:40

## 2022-01-18 RX ADMIN — LACOSAMIDE 120 MILLIGRAM(S): 50 TABLET ORAL at 05:38

## 2022-01-18 RX ADMIN — SENNA PLUS 2 TABLET(S): 8.6 TABLET ORAL at 21:30

## 2022-01-18 RX ADMIN — AMLODIPINE BESYLATE 5 MILLIGRAM(S): 2.5 TABLET ORAL at 14:36

## 2022-01-18 RX ADMIN — Medication 10 MILLIGRAM(S): at 15:00

## 2022-01-18 RX ADMIN — AMLODIPINE BESYLATE 5 MILLIGRAM(S): 2.5 TABLET ORAL at 05:38

## 2022-01-18 RX ADMIN — Medication 2: at 18:34

## 2022-01-18 RX ADMIN — POLYETHYLENE GLYCOL 3350 17 GRAM(S): 17 POWDER, FOR SOLUTION ORAL at 12:45

## 2022-01-18 RX ADMIN — SODIUM CHLORIDE 2 GRAM(S): 9 INJECTION INTRAMUSCULAR; INTRAVENOUS; SUBCUTANEOUS at 18:35

## 2022-01-18 RX ADMIN — SODIUM CHLORIDE 2 GRAM(S): 9 INJECTION INTRAMUSCULAR; INTRAVENOUS; SUBCUTANEOUS at 00:47

## 2022-01-18 RX ADMIN — Medication 2: at 12:58

## 2022-01-18 RX ADMIN — LEVETIRACETAM 400 MILLIGRAM(S): 250 TABLET, FILM COATED ORAL at 06:30

## 2022-01-18 RX ADMIN — SODIUM CHLORIDE 2 GRAM(S): 9 INJECTION INTRAMUSCULAR; INTRAVENOUS; SUBCUTANEOUS at 05:38

## 2022-01-18 RX ADMIN — CHLORHEXIDINE GLUCONATE 1 APPLICATION(S): 213 SOLUTION TOPICAL at 21:30

## 2022-01-18 RX ADMIN — Medication 100 MILLIGRAM(S): at 21:30

## 2022-01-18 RX ADMIN — SODIUM CHLORIDE 2 GRAM(S): 9 INJECTION INTRAMUSCULAR; INTRAVENOUS; SUBCUTANEOUS at 12:45

## 2022-01-18 RX ADMIN — Medication 100 MILLIGRAM(S): at 14:36

## 2022-01-18 RX ADMIN — LACOSAMIDE 120 MILLIGRAM(S): 50 TABLET ORAL at 18:35

## 2022-01-18 RX ADMIN — Medication 25 MILLIGRAM(S): at 21:30

## 2022-01-18 RX ADMIN — Medication 2: at 00:47

## 2022-01-18 NOTE — DIETITIAN INITIAL EVALUATION ADULT. - ENTERAL
1) If within GOC, recommend Glucerna 1.5 at 55 mL/hr x24 hours to provide total volume 1320mL, 1980 kcals, 109 gm protein, and 1002 mL free water. Meets 15.8 kcals/kG based on daily wt 125.2 kG (1/18) and 1.3 gm protein/kG based on IBW 86.1 kG. Keep HOB >45 degrees during feeds for aspiration precautions.

## 2022-01-18 NOTE — DIETITIAN INITIAL EVALUATION ADULT. - REASON INDICATOR FOR ASSESSMENT
Seen for length of stay on NSCU  Source: Medical record and RD observation (pt disoriented and asleep during RD visit)

## 2022-01-18 NOTE — CONSULT NOTE ADULT - ASSESSMENT
HTN  add hydralazine   once able to take PO meds , will consolidate , amend meds    ICH  as per nsx

## 2022-01-18 NOTE — DIETITIAN INITIAL EVALUATION ADULT. - PERTINENT LABORATORY DATA
01-18 @ 06:54: Na 140, BUN 12, Cr 0.74, <H>, K+ 3.9  01-17 @ 22:38: Na 138, BUN 10, Cr 0.68, <H>, K+ 3.9, Phos 2.8, Mg 2.0  01-17 @ 16:46: Na 138, BUN 10, Cr 0.66, <H>, K+ 3.9, Phos 2.4<L>, Mg 2.1  A1C with Estimated Average Glucose Result: 6.7 % (01-17-22 @ 09:23)  CAPILLARY BLOOD GLUCOSE  POCT Blood Glucose.: 141 mg/dL (18 Jan 2022 05:31)  POCT Blood Glucose.: 167 mg/dL (18 Jan 2022 00:44)  POCT Blood Glucose.: 169 mg/dL (17 Jan 2022 17:14)  POCT Blood Glucose.: 167 mg/dL (17 Jan 2022 12:07)

## 2022-01-18 NOTE — EEG REPORT - NS EEG TEXT BOX
[facility]   COMPREHENSIVE EPILEPSY CENTER   REPORT OF LONG-TERM VIDEO EEG     [facility]: [address], [place] [address], Ph#: [phone]  [facility]: [address], [place] [address], Ph#: [phone]  [facility]: [address] [place] [address], Ph#: [phone]    Patient Name: LEV FUNDATOR  Age and : 81y (42)  MRN #: [id number]  Location: [place] [address]  Referring Physician: Onesimo Vazquez    Start Time/Date: 08:00 on   End Time/Date: 08:00 on    Duration: 24H    _____________________________________________________________  STUDY INFORMATION    EEG Recording Technique:  The patient underwent continuous Video-EEG monitoring, using Telemetry System hardware on the XLTek Digital System. EEG and video data were stored on a computer hard drive with important events saved in digital archive files. The material was reviewed by a physician (electroencephalographer / epileptologist) on a daily basis. Landon and seizure detection algorithms were utilized and reviewed. An EEG Technician attended to the patient, and was available throughout daytime work hours.  The epilepsy center neurologist was available in person or on call 24-hours per day.    EEG Placement and Labeling of Electrodes:  The EEG was performed utilizing 20 channel referential EEG connections (coronal over temporal over parasagittal montage) using all standard 10-20 electrode placements with EKG, with additional electrodes placed in the inferior temporal region using the modified 10-10 montage electrode placements for elective admissions, or if deemed necessary. Recording was at a sampling rate of 256 samples per second per channel. Time synchronized digital video recording was done simultaneously with EEG recording. A low light infrared camera was used for low light recording.     _____________________________________________________________  HISTORY    Patient is a 81y old male with HTN and R frontal IPH w/ mass effect after mechanical fall. At 15:00 22, stopped following commands, LUE twitching, given 2mg ativan followed by ?post-ictal state then returned to baseline. Also loaded with 1g keppra      PERTINENT MEDICATION:  lacosamide IVPB: 120 mL/Hr IV Intermittent ( @ 17:14),  120 mL/Hr IV Intermittent ( @ 05:38)  levETIRAcetam  IVPB: 400 mL/Hr IV Intermittent ( @ 15:56)  levETIRAcetam  IVPB: 400 mL/Hr IV Intermittent ( @ 17:14),  400 mL/Hr IV Intermittent ( @ 06:30)  LORazepam   Injectable: 2 milliGRAM(s) IV Push ( @ 15:30)    _____________________________________________________________  STUDY INTERPRETATION    Findings: The background was continuous and reactive. During wakefulness, the posterior dominant rhythm consisted of symmetric, well-modulated 7 Hz activity, with amplitude to 30 uV, that attenuated to eye opening.      Background Slowing:  Diffuse theta and polymorphic delta slowing.    Focal Slowing:   Continuous theta/delta slowing in the right posterior head region.     Sleep Background:  Drowsiness was characterized by fragmentation, attenuation, and slowing of the background activity.    Sleep was characterized by the presence of vertex waves, symmetric sleep spindles and K-complexes.    Other Non-Epileptiform Findings:  None were present.  Excess diffuse beta activity.    Interictal Epileptiform Activity:   Occasional sharp wave discharges over the right temporooccipital region (T8/O2), at times with a field to the right parietal region (P4), less frequent in the second half of the recording    Events:  Clinical events: Patient event button from 1703-7731 on 22 was clinically associated with decreased level of alertness and not following commands. Per chart note LUE twitching noted, though this was not well appreciated on video. EEG during this period revealed arousal to touch and quasi-rhythmic right posterior temporal slowing with no clear evolution.   Seizures: None recorded.    Activation Procedures:   Hyperventilation was not performed.    Photic stimulation was not performed.     Artifacts:  Intermittent myogenic and movement artifacts were noted.    ECG:  The heart rate on single channel ECG was predominantly between 65-75 BPM.    _____________________________________________________________  EEG SUMMARY/CLASSIFICATION    Abnormal EEG in the awake, drowsy and asleep states.  - Occasional sharp wave discharges over the right temporooccipital region (T8/O2), at times with a field to the right parietal region (P4), less frequent in the second half of the recording  - Continuous polymorphic slowing, focal, right hemisphere  - Mild to moderate background slowing, generalized     _____________________________________________________________  EEG IMPRESSION/CLINICAL CORRELATE    Abnormal EEG study.  1. Risk for seizures from the right temporooccipital region  2. Structural abnormality in the right hemisphere.  3. Mild to moderate multifocal/diffuse nonspecific cerebral dysfunction.    Patient event button from 1404-0813 on 22 was clinically associated with decreased level of alertness and not following commands. Per chart note LUE twitching noted, though this was not well appreciated on video. EEG during this period revealed arousal to touch and quasi-rhythmic right posterior temporal slowing with no clear evolution.     _____________________________________________________________    Harmony Cárdenas MD  Epilepsy Fellow       *******************THIS IS A PRELIMINARY EEG REPORT PENDING ATTENDING REVIEW ******************* [facility]   COMPREHENSIVE EPILEPSY CENTER   REPORT OF LONG-TERM VIDEO EEG     [facility]: [address], [place] [address], Ph#: [phone]  [facility]: [address], [place] [address], Ph#: [phone]  [facility]: [address] [place] [address], Ph#: [phone]    Patient Name: LEV FUNDATOR  Age and : 81y (42)  MRN #: [id number]  Location: [place] [address]  Referring Physician: Onesimo Vazquez    Start Time/Date: 08:00 on   End Time/Date: 08:00 on    Duration: 24H    _____________________________________________________________  STUDY INFORMATION    EEG Recording Technique:  The patient underwent continuous Video-EEG monitoring, using Telemetry System hardware on the XLTek Digital System. EEG and video data were stored on a computer hard drive with important events saved in digital archive files. The material was reviewed by a physician (electroencephalographer / epileptologist) on a daily basis. Landon and seizure detection algorithms were utilized and reviewed. An EEG Technician attended to the patient, and was available throughout daytime work hours.  The epilepsy center neurologist was available in person or on call 24-hours per day.    EEG Placement and Labeling of Electrodes:  The EEG was performed utilizing 20 channel referential EEG connections (coronal over temporal over parasagittal montage) using all standard 10-20 electrode placements with EKG, with additional electrodes placed in the inferior temporal region using the modified 10-10 montage electrode placements for elective admissions, or if deemed necessary. Recording was at a sampling rate of 256 samples per second per channel. Time synchronized digital video recording was done simultaneously with EEG recording. A low light infrared camera was used for low light recording.     _____________________________________________________________  HISTORY    Patient is a 81y old male with HTN and R frontal IPH w/ mass effect after mechanical fall. At 15:00 22, stopped following commands, LUE twitching, given 2mg ativan followed by ?post-ictal state then returned to baseline. Also loaded with 1g keppra      PERTINENT MEDICATION:  lacosamide IVPB: 120 mL/Hr IV Intermittent ( @ 17:14),  120 mL/Hr IV Intermittent ( @ 05:38)  levETIRAcetam  IVPB: 400 mL/Hr IV Intermittent ( @ 15:56)  levETIRAcetam  IVPB: 400 mL/Hr IV Intermittent ( @ 17:14),  400 mL/Hr IV Intermittent ( @ 06:30)  LORazepam   Injectable: 2 milliGRAM(s) IV Push ( @ 15:30)    _____________________________________________________________  STUDY INTERPRETATION    Findings: The background was continuous and reactive. During wakefulness, the posterior dominant rhythm consisted of symmetric, well-modulated 7 Hz activity, with amplitude to 30 uV, that attenuated to eye opening.      Background Slowing:  Diffuse theta and polymorphic delta slowing.    Focal Slowing:   Continuous theta/delta slowing in the right posterior head region.     Sleep Background:  Drowsiness was characterized by fragmentation, attenuation, and slowing of the background activity.    Sleep was characterized by the presence of vertex waves, symmetric sleep spindles and K-complexes.    Other Non-Epileptiform Findings:  None were present.  Excess diffuse beta activity.    Interictal Epileptiform Activity:   Occasional sharp wave discharges over the right ydwglfg-anrcifw-zlybpscep region (P4/T8/O2) briefly quasiperiodic near 1.5hz,  less frequent in the second half of the recording    Events:  Clinical events: Patient event button from 0146-0692 on 22 was clinically associated with decreased level of alertness and not following commands. Per chart note LUE twitching noted, though this was not well appreciated on video. EEG during this period revealed arousal to touch and quasi-rhythmic right posterior quadrant slowing with no clear evolution.   Seizures: None recorded.    Activation Procedures:   Hyperventilation was not performed.    Photic stimulation was not performed.     Artifacts:  Intermittent myogenic and movement artifacts were noted.    ECG:  The heart rate on single channel ECG was predominantly between 65-75 BPM.    _____________________________________________________________  EEG SUMMARY/CLASSIFICATION    Abnormal EEG in the awake, drowsy and asleep states.  - Occasional quasiperiodic sharp wave discharges over the right posterior head region  - Continuous polymorphic slowing, focal, right hemisphere  - Moderate background slowing, generalized     _____________________________________________________________  EEG IMPRESSION/CLINICAL CORRELATE    Abnormal EEG study.  1. Risk for seizures from the right posterior region  2. Structural abnormality in the right hemisphere.  3. Moderate multifocal/diffuse nonspecific cerebral dysfunction.    Patient event button from 6677-7028 on 22 was clinically associated with decreased level of alertness and not following commands. Per chart note LUE twitching noted, though this was not well appreciated on video. EEG during this period without overt electrographic seizure activity.  _____________________________________________________________    Harmony Cárdenas MD  Epilepsy Fellow

## 2022-01-18 NOTE — DIETITIAN INITIAL EVALUATION ADULT. - ORAL INTAKE PTA/DIET HISTORY
Unknown how pt was eating PTA. Unknown if pt was following therapeutic diet. No known food allergies. Seen by SLP on 1/16 for bedside swallow with recommendation, "NPO/ NGT pending GOC; Pt's mentation is NOT supportive of po at this time; high risk for aspiration. Plan to follow up in 2-3 days." No known Hx of micronutrient or oral nutrient supplement use.

## 2022-01-18 NOTE — DIETITIAN INITIAL EVALUATION ADULT. - OTHER INFO
Dosing wt: 309.9 lbs (140.6 kG). RD obtained wt in k.2 (). UBW unknown. No other wts to address. RD will continue to trend as new wts available/able. Wt difference between RD wt and dosing wt possibly 2/2 fluid shifts as pt with Hx of edema during admission.     GI: No known recent N/V, diarrhea, or constipation. Last BM PTA.     Per NSICU Fellow on , "NPO, pending family decision on NGT." Received trickle feeds of Vital 1.5 at 10 mL/hr this morning  per flowsheet and RD observation of bottle hanging unattached to NGT at bedside.     Nutrition Concerns:   - On IV Sodium chloride 2% (100 mL/hr per flowsheet) and dextrose 5%  - On sliding scale of insulin. A1C: 6.7 % (22 @ 09:23). Unknown how pt managed PTA.   - Ordered for injectable thiamine

## 2022-01-18 NOTE — PROGRESS NOTE ADULT - SUBJECTIVE AND OBJECTIVE BOX
NSCU Progress Note      24 Hour Events/Subjective:  - at 15:00 yesterday, stopped following commands, LUE twitching, given 2mg ativan followed by ?post-ictal state then returned to baseline, no seizure on eeg; loaded with 1g keppra  - no seizures on eeg      REVIEW OF SYSTEMS:  - negative except as above    VITALS:   - Reviewed      IMAGING/DATA:   - Reviewed      PHYSICAL EXAM:    General: calm  CVS: RRR  Pulm: CTAB  GI: Soft, NTND  Extremities: No LE Edema  Neuro: AO x1-2, follows simple commands; R pupil 2mmR (light perception impaired), L pupil 3mm, Rt side 4+/5 and LUE 3/5 and LLE 3/5

## 2022-01-18 NOTE — DIETITIAN INITIAL EVALUATION ADULT. - OTHER CALCULATIONS
Energy based on RD obtained wt: 125.2 kG (1/18). Protein based on  lbs (86.1 kG). Fluid needs deferred to provider.

## 2022-01-18 NOTE — PROGRESS NOTE ADULT - SUBJECTIVE AND OBJECTIVE BOX
Patient seen and examined at bedside.    --Anticoagulation--    T(C): 36.9 (01-17-22 @ 23:00), Max: 37 (01-17-22 @ 03:00)  HR: 72 (01-18-22 @ 01:00) (61 - 79)  BP: 159/94 (01-18-22 @ 01:00) (132/114 - 176/86)  RR: 29 (01-18-22 @ 01:00) (15 - 29)  SpO2: 96% (01-18-22 @ 01:00) (93% - 100%)  Wt(kg): --    Exam:    Sleepy, DAVID, Ox1 in russian, R side 5/5, LUE 2-3/5, LLE 2/5

## 2022-01-18 NOTE — PROGRESS NOTE ADULT - ASSESSMENT
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; CTA negative  on 1/17 stopped following commands, LUE twitching, given 2mg ativan followed by ?post-ictal state then returned to baseline, no seizure on eeg; loaded with 1g keppra  - neuro checks q1h  - fluctuating exam, continuous EEG  - Vimpat 100mg  q12  - Keppra 1g BID (started on 1/17 for seizure like activity)  - Cerebral Edema: HTS as below  - Activity: PT/OT as tolerated  - Thiamine    CVS:  Hx of AAA repair 7 years ago (con), also with 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  - SBP goal 100-160  - TTE- p  - vascular recs  - needs repeat imaging from abdomen to mid-thigh per vascular, CTA  - amlodipine 5    PULM:  hx of COVID 2 years ago and 2 weeks ago  - RA  - IS As tolerated  - Aspiration precautions    RENAL:  - Fluids: HTS 2% 100cc/h   - daily IOs  - Na 145-155    GI:  - Diet: NPO, pending family decision on NGT  - Speech and swallow assessment   - Bowel regimen: miralax, senna    ENDO: Type 2 DM  - FS goal 120-180    HEME/ONC:  - SCDs  - Chemoppx: hold d/t heme expansion    ID:  - monitor for fevers      Patient is at high risk of neurologic deterioration/death due to: expansion of the hematoma    ASSESSMENT/PLAN:    81M hx of HTN, not on AC/AP, txer from [place] after mechanical fall, CTH with R frontal IPH w/ mass effect, minimal MLS, ICH score 2    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; CTA negative  on 1/17 stopped following commands, LUE twitching, given 2mg ativan followed by ?post-ictal state then returned to baseline, no seizure on eeg; loaded with 1g keppra  - neuro checks q1h  - fluctuating exam, continuous EEG  - Vimpat 100mg  q12  - Keppra 1g BID (started on 1/17 for seizure like activity)  - Cerebral Edema: HTS as below  - Activity: PT/OT as tolerated  - Thiamine    CVS:  Hx of thoracic aneurysm repair 7 years ago (con), also with 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  - SBP goal 100-160  - TTE- p  - vascular recs  - needs repeat imaging from abdomen to mid-thigh per vascular, CTA  - amlodipine 5    PULM:  hx of COVID 2 years ago and 2 weeks ago  - RA  - IS As tolerated  - Aspiration precautions    RENAL:  - Fluids: HTS 2% 100cc/h   - daily IOs  - Na 145-155    GI:  - Diet: NPO, pending family decision on NGT  - Speech and swallow assessment   - Bowel regimen: miralax, senna    ENDO: Type 2 DM  - FS goal 120-180    HEME/ONC:  - SCDs  - Chemoppx: hold d/t heme expansion    ID:  - monitor for fevers      Patient is at high risk of neurologic deterioration/death due to: expansion of the hematoma

## 2022-01-18 NOTE — DIETITIAN INITIAL EVALUATION ADULT. - CHIEF COMPLAINT
Per Chart: Pt is a 81M hx of HTN, not on AC/AP, txer from [place] after mechanical fall, CTH with R frontal IPH w/ mass effect, minimal MLS, ICH score 2

## 2022-01-18 NOTE — DIETITIAN INITIAL EVALUATION ADULT. - PHYSCIAL ASSESSMENT
IBW%: 145%   Skin per nursing documentation: No pressure injuries noted.  BMI based on RD obtained wt: 125.2 kG (1/18)

## 2022-01-18 NOTE — DIETITIAN INITIAL EVALUATION ADULT. - ADD RECOMMEND
2) As medically feasible, continue to provide micronutrient. 3) Obtain subjective information as able. 4) Continue to trend labs, weight, skin integrity, and provision of EN.

## 2022-01-18 NOTE — DIETITIAN INITIAL EVALUATION ADULT. - PERTINENT MEDS FT
MEDICATIONS  (STANDING):  amLODIPine   Tablet 5 milliGRAM(s) Oral daily  chlorhexidine 4% Liquid 1 Application(s) Topical <User Schedule>  dextrose 40% Gel 15 Gram(s) Oral once  dextrose 5%. 1000 milliLiter(s) (50 mL/Hr) IV Continuous <Continuous>  dextrose 5%. 1000 milliLiter(s) (100 mL/Hr) IV Continuous <Continuous>  dextrose 50% Injectable 25 Gram(s) IV Push once  dextrose 50% Injectable 12.5 Gram(s) IV Push once  dextrose 50% Injectable 25 Gram(s) IV Push once  glucagon  Injectable 1 milliGRAM(s) IntraMuscular once  insulin lispro (ADMELOG) corrective regimen sliding scale   SubCutaneous every 6 hours  lacosamide IVPB 100 milliGRAM(s) IV Intermittent every 12 hours  levETIRAcetam  IVPB 1000 milliGRAM(s) IV Intermittent every 12 hours  polyethylene glycol 3350 17 Gram(s) Oral daily  senna 2 Tablet(s) Oral at bedtime  sodium chloride 2 Gram(s) Oral every 6 hours  sodium chloride 2% . 1000 milliLiter(s) (100 mL/Hr) IV Continuous <Continuous>  thiamine Injectable 100 milliGRAM(s) IV Push daily

## 2022-01-18 NOTE — PROGRESS NOTE ADULT - SUBJECTIVE AND OBJECTIVE BOX
Patient seen and examined    T(C): 37.1 (01-18-22 @ 11:00), Max: 37.1 (01-18-22 @ 03:00)  HR: 66 (01-18-22 @ 16:00) (65 - 80)  BP: 114/100 (01-18-22 @ 16:00) (106/96 - 178/95)  RR: 20 (01-18-22 @ 16:00) (16 - 29)  SpO2: 97% (01-18-22 @ 16:00) (92% - 100%)  01-17-22 @ 07:01  -  01-18-22 @ 07:00  --------------------------------------------------------  IN: 3190 mL / OUT: 3000 mL / NET: 190 mL    01-18-22 @ 07:01  -  01-18-22 @ 18:19  --------------------------------------------------------  IN: 480 mL / OUT: 0 mL / NET: 480 mL    acetaminophen     Tablet .. 650 milliGRAM(s) Oral every 6 hours PRN  amLODIPine   Tablet 10 milliGRAM(s) Oral daily  chlorhexidine 4% Liquid 1 Application(s) Topical <User Schedule>  dextrose 40% Gel 15 Gram(s) Oral once  dextrose 5%. 1000 milliLiter(s) IV Continuous <Continuous>  dextrose 5%. 1000 milliLiter(s) IV Continuous <Continuous>  dextrose 50% Injectable 25 Gram(s) IV Push once  dextrose 50% Injectable 12.5 Gram(s) IV Push once  dextrose 50% Injectable 25 Gram(s) IV Push once  glucagon  Injectable 1 milliGRAM(s) IntraMuscular once  hydrALAZINE 25 milliGRAM(s) Oral every 8 hours  insulin lispro (ADMELOG) corrective regimen sliding scale   SubCutaneous every 6 hours  labetalol 100 milliGRAM(s) Oral every 8 hours  lacosamide IVPB 100 milliGRAM(s) IV Intermittent every 12 hours  levETIRAcetam  IVPB 1000 milliGRAM(s) IV Intermittent every 12 hours  polyethylene glycol 3350 17 Gram(s) Oral daily  senna 2 Tablet(s) Oral at bedtime  sodium chloride 2 Gram(s) Oral every 6 hours  sodium chloride 2% . 1000 milliLiter(s) IV Continuous <Continuous>  thiamine Injectable 100 milliGRAM(s) IV Push daily        Exam stable    labs and imaging reviewed     Continue same management     [facility] attending

## 2022-01-18 NOTE — CONSULT NOTE ADULT - SUBJECTIVE AND OBJECTIVE BOX
CHIEF COMPLAINT:Patient is a 81y old  Male who presents with a chief complaint of R ICH (18 Jan 2022 10:49)      HISTORY OF PRESENT ILLNESS:    81 year old male with history as below admitted with ICH  ROS limited  cardiology is called for HTN     PAST MEDICAL & SURGICAL HISTORY:          MEDICATIONS:  amLODIPine   Tablet 10 milliGRAM(s) Oral daily  hydrALAZINE 25 milliGRAM(s) Oral every 8 hours  labetalol 100 milliGRAM(s) Oral every 8 hours        acetaminophen     Tablet .. 650 milliGRAM(s) Oral every 6 hours PRN  lacosamide IVPB 100 milliGRAM(s) IV Intermittent every 12 hours  levETIRAcetam  IVPB 1000 milliGRAM(s) IV Intermittent every 12 hours    polyethylene glycol 3350 17 Gram(s) Oral daily  senna 2 Tablet(s) Oral at bedtime    dextrose 40% Gel 15 Gram(s) Oral once  dextrose 50% Injectable 25 Gram(s) IV Push once  dextrose 50% Injectable 12.5 Gram(s) IV Push once  dextrose 50% Injectable 25 Gram(s) IV Push once  glucagon  Injectable 1 milliGRAM(s) IntraMuscular once  insulin lispro (ADMELOG) corrective regimen sliding scale   SubCutaneous every 6 hours    chlorhexidine 4% Liquid 1 Application(s) Topical <User Schedule>  dextrose 5%. 1000 milliLiter(s) IV Continuous <Continuous>  dextrose 5%. 1000 milliLiter(s) IV Continuous <Continuous>  sodium chloride 2 Gram(s) Oral every 6 hours  sodium chloride 2% . 1000 milliLiter(s) IV Continuous <Continuous>  thiamine Injectable 100 milliGRAM(s) IV Push daily      FAMILY HISTORY:      Non-contributory    SOCIAL HISTORY:    No tobacco, drugs or etoh    Allergies    No Known Allergies    Intolerances    	    REVIEW OF SYSTEMS:  as above  The rest of the 14 points ROS reviewed and except above they are unremarkable.        PHYSICAL EXAM:  T(C): 37.1 (01-18-22 @ 11:00), Max: 37.1 (01-18-22 @ 03:00)  HR: 66 (01-18-22 @ 16:00) (61 - 80)  BP: 114/100 (01-18-22 @ 16:00) (106/96 - 178/95)  RR: 20 (01-18-22 @ 16:00) (15 - 29)  SpO2: 97% (01-18-22 @ 16:00) (92% - 100%)  Wt(kg): --  I&O's Summary    17 Jan 2022 07:01  -  18 Jan 2022 07:00  --------------------------------------------------------  IN: 3190 mL / OUT: 3000 mL / NET: 190 mL    18 Jan 2022 07:01  -  18 Jan 2022 17:03  --------------------------------------------------------  IN: 480 mL / OUT: 0 mL / NET: 480 mL        JVP: Normal  Neck: supple  Lung: clear   CV: S1 S2 , Murmur:  Abd: soft  Ext: No edema    Psych: flat affect  Skin: normal      LABS/DATA:    TELEMETRY: 	    ECG:  	  < from: 12 Lead ECG (01.15.22 @ 15:38) >    NONSPECIFIC T WAVE ABNORMALITY  ABNORMAL ECG  NO PREVIOUS ECGS AVAILABLE  Confirmed by GEGE HILL MD ([id number]) on 1/17/2022 10:53:36 AM    < end of copied text >   	  CARDIAC MARKERS:    < from: TTE with Doppler (w/Cont) (01.18.22 @ 10:12) >  Conclusions:  Endocardial visualization enhanced with intravenous  injection of Ultrasonic Enhancing Agent (Definity).  Normal left ventricular systolic function. No segmental  wall motion abnormalities.  ------------------------------------------------------------------------  Confirmed on  1/18/2022 - 11:59:20 by Remi Johnson MD, FASE  ------------------------------------------------------------------------    < end of copied text >                                    10.4   6.52  )-----------( 113      ( 17 Jan 2022 02:31 )             31.1     01-18    142  |  106  |  12  ----------------------------<  179<H>  3.8   |  23  |  0.72    Ca    9.3      18 Jan 2022 13:57  Phos  2.6     01-18  Mg     2.1     01-18      proBNP:   Lipid Profile:   HgA1c:   TSH:

## 2022-01-19 LAB
ANION GAP SERPL CALC-SCNC: 13 MMOL/L — SIGNIFICANT CHANGE UP (ref 5–17)
ANION GAP SERPL CALC-SCNC: 13 MMOL/L — SIGNIFICANT CHANGE UP (ref 5–17)
ANION GAP SERPL CALC-SCNC: 14 MMOL/L — SIGNIFICANT CHANGE UP (ref 5–17)
BUN SERPL-MCNC: 16 MG/DL — SIGNIFICANT CHANGE UP (ref 7–23)
BUN SERPL-MCNC: 18 MG/DL — SIGNIFICANT CHANGE UP (ref 7–23)
BUN SERPL-MCNC: 19 MG/DL — SIGNIFICANT CHANGE UP (ref 7–23)
CALCIUM SERPL-MCNC: 9.2 MG/DL — SIGNIFICANT CHANGE UP (ref 8.4–10.5)
CALCIUM SERPL-MCNC: 9.3 MG/DL — SIGNIFICANT CHANGE UP (ref 8.4–10.5)
CALCIUM SERPL-MCNC: 9.4 MG/DL — SIGNIFICANT CHANGE UP (ref 8.4–10.5)
CHLORIDE SERPL-SCNC: 109 MMOL/L — HIGH (ref 96–108)
CHLORIDE SERPL-SCNC: 110 MMOL/L — HIGH (ref 96–108)
CHLORIDE SERPL-SCNC: 116 MMOL/L — HIGH (ref 96–108)
CO2 SERPL-SCNC: 22 MMOL/L — SIGNIFICANT CHANGE UP (ref 22–31)
CO2 SERPL-SCNC: 23 MMOL/L — SIGNIFICANT CHANGE UP (ref 22–31)
CO2 SERPL-SCNC: 25 MMOL/L — SIGNIFICANT CHANGE UP (ref 22–31)
CREAT SERPL-MCNC: 0.83 MG/DL — SIGNIFICANT CHANGE UP (ref 0.5–1.3)
CREAT SERPL-MCNC: 0.84 MG/DL — SIGNIFICANT CHANGE UP (ref 0.5–1.3)
CREAT SERPL-MCNC: 0.86 MG/DL — SIGNIFICANT CHANGE UP (ref 0.5–1.3)
GAS PNL BLDA: SIGNIFICANT CHANGE UP
GLUCOSE BLDC GLUCOMTR-MCNC: 163 MG/DL — HIGH (ref 70–99)
GLUCOSE BLDC GLUCOMTR-MCNC: 169 MG/DL — HIGH (ref 70–99)
GLUCOSE BLDC GLUCOMTR-MCNC: 169 MG/DL — HIGH (ref 70–99)
GLUCOSE BLDC GLUCOMTR-MCNC: 193 MG/DL — HIGH (ref 70–99)
GLUCOSE BLDC GLUCOMTR-MCNC: 212 MG/DL — HIGH (ref 70–99)
GLUCOSE BLDC GLUCOMTR-MCNC: 249 MG/DL — HIGH (ref 70–99)
GLUCOSE SERPL-MCNC: 181 MG/DL — HIGH (ref 70–99)
GLUCOSE SERPL-MCNC: 196 MG/DL — HIGH (ref 70–99)
GLUCOSE SERPL-MCNC: 239 MG/DL — HIGH (ref 70–99)
HCT VFR BLD CALC: 40.5 % — SIGNIFICANT CHANGE UP (ref 39–50)
HGB BLD-MCNC: 13.1 G/DL — SIGNIFICANT CHANGE UP (ref 13–17)
MAGNESIUM SERPL-MCNC: 2.1 MG/DL — SIGNIFICANT CHANGE UP (ref 1.6–2.6)
MAGNESIUM SERPL-MCNC: 2.2 MG/DL — SIGNIFICANT CHANGE UP (ref 1.6–2.6)
MCHC RBC-ENTMCNC: 30.5 PG — SIGNIFICANT CHANGE UP (ref 27–34)
MCHC RBC-ENTMCNC: 32.3 GM/DL — SIGNIFICANT CHANGE UP (ref 32–36)
MCV RBC AUTO: 94.4 FL — SIGNIFICANT CHANGE UP (ref 80–100)
NRBC # BLD: 0 /100 WBCS — SIGNIFICANT CHANGE UP (ref 0–0)
PHOSPHATE SERPL-MCNC: 3 MG/DL — SIGNIFICANT CHANGE UP (ref 2.5–4.5)
PHOSPHATE SERPL-MCNC: 4.7 MG/DL — HIGH (ref 2.5–4.5)
PLATELET # BLD AUTO: 156 K/UL — SIGNIFICANT CHANGE UP (ref 150–400)
POTASSIUM SERPL-MCNC: 3.9 MMOL/L — SIGNIFICANT CHANGE UP (ref 3.5–5.3)
POTASSIUM SERPL-MCNC: 4 MMOL/L — SIGNIFICANT CHANGE UP (ref 3.5–5.3)
POTASSIUM SERPL-MCNC: 4.1 MMOL/L — SIGNIFICANT CHANGE UP (ref 3.5–5.3)
POTASSIUM SERPL-SCNC: 3.9 MMOL/L — SIGNIFICANT CHANGE UP (ref 3.5–5.3)
POTASSIUM SERPL-SCNC: 4 MMOL/L — SIGNIFICANT CHANGE UP (ref 3.5–5.3)
POTASSIUM SERPL-SCNC: 4.1 MMOL/L — SIGNIFICANT CHANGE UP (ref 3.5–5.3)
RBC # BLD: 4.29 M/UL — SIGNIFICANT CHANGE UP (ref 4.2–5.8)
RBC # FLD: 14 % — SIGNIFICANT CHANGE UP (ref 10.3–14.5)
SODIUM SERPL-SCNC: 147 MMOL/L — HIGH (ref 135–145)
SODIUM SERPL-SCNC: 147 MMOL/L — HIGH (ref 135–145)
SODIUM SERPL-SCNC: 151 MMOL/L — HIGH (ref 135–145)
WBC # BLD: 8.97 K/UL — SIGNIFICANT CHANGE UP (ref 3.8–10.5)
WBC # FLD AUTO: 8.97 K/UL — SIGNIFICANT CHANGE UP (ref 3.8–10.5)

## 2022-01-19 PROCEDURE — 71045 X-RAY EXAM CHEST 1 VIEW: CPT | Mod: 26

## 2022-01-19 PROCEDURE — 31500 INSERT EMERGENCY AIRWAY: CPT

## 2022-01-19 PROCEDURE — 70450 CT HEAD/BRAIN W/O DYE: CPT | Mod: 26,77

## 2022-01-19 PROCEDURE — 95720 EEG PHY/QHP EA INCR W/VEEG: CPT

## 2022-01-19 PROCEDURE — 99233 SBSQ HOSP IP/OBS HIGH 50: CPT

## 2022-01-19 PROCEDURE — 99291 CRITICAL CARE FIRST HOUR: CPT | Mod: 25

## 2022-01-19 PROCEDURE — 99231 SBSQ HOSP IP/OBS SF/LOW 25: CPT

## 2022-01-19 PROCEDURE — 71045 X-RAY EXAM CHEST 1 VIEW: CPT | Mod: 26,77

## 2022-01-19 PROCEDURE — 70450 CT HEAD/BRAIN W/O DYE: CPT | Mod: 26

## 2022-01-19 RX ORDER — HYDRALAZINE HCL 50 MG
50 TABLET ORAL EVERY 8 HOURS
Refills: 0 | Status: DISCONTINUED | OUTPATIENT
Start: 2022-01-19 | End: 2022-01-20

## 2022-01-19 RX ORDER — ENOXAPARIN SODIUM 100 MG/ML
30 INJECTION SUBCUTANEOUS EVERY 12 HOURS
Refills: 0 | Status: DISCONTINUED | OUTPATIENT
Start: 2022-01-19 | End: 2022-01-20

## 2022-01-19 RX ORDER — LABETALOL HCL 100 MG
10 TABLET ORAL ONCE
Refills: 0 | Status: COMPLETED | OUTPATIENT
Start: 2022-01-19 | End: 2022-01-19

## 2022-01-19 RX ORDER — POTASSIUM PHOSPHATE, MONOBASIC POTASSIUM PHOSPHATE, DIBASIC 236; 224 MG/ML; MG/ML
15 INJECTION, SOLUTION INTRAVENOUS ONCE
Refills: 0 | Status: COMPLETED | OUTPATIENT
Start: 2022-01-19 | End: 2022-01-19

## 2022-01-19 RX ORDER — POTASSIUM CHLORIDE 20 MEQ
20 PACKET (EA) ORAL ONCE
Refills: 0 | Status: COMPLETED | OUTPATIENT
Start: 2022-01-19 | End: 2022-01-19

## 2022-01-19 RX ORDER — SODIUM CHLORIDE 9 MG/ML
1000 INJECTION INTRAMUSCULAR; INTRAVENOUS; SUBCUTANEOUS ONCE
Refills: 0 | Status: COMPLETED | OUTPATIENT
Start: 2022-01-19 | End: 2022-01-19

## 2022-01-19 RX ORDER — CHLORHEXIDINE GLUCONATE 213 G/1000ML
15 SOLUTION TOPICAL EVERY 12 HOURS
Refills: 0 | Status: DISCONTINUED | OUTPATIENT
Start: 2022-01-19 | End: 2022-01-23

## 2022-01-19 RX ADMIN — Medication 100 MILLIGRAM(S): at 13:18

## 2022-01-19 RX ADMIN — SODIUM CHLORIDE 6000 MILLILITER(S): 9 INJECTION INTRAMUSCULAR; INTRAVENOUS; SUBCUTANEOUS at 20:15

## 2022-01-19 RX ADMIN — Medication 25 MILLIGRAM(S): at 05:36

## 2022-01-19 RX ADMIN — POTASSIUM PHOSPHATE, MONOBASIC POTASSIUM PHOSPHATE, DIBASIC 62.5 MILLIMOLE(S): 236; 224 INJECTION, SOLUTION INTRAVENOUS at 05:35

## 2022-01-19 RX ADMIN — Medication 2: at 17:29

## 2022-01-19 RX ADMIN — LEVETIRACETAM 400 MILLIGRAM(S): 250 TABLET, FILM COATED ORAL at 05:35

## 2022-01-19 RX ADMIN — LACOSAMIDE 120 MILLIGRAM(S): 50 TABLET ORAL at 17:30

## 2022-01-19 RX ADMIN — AMLODIPINE BESYLATE 10 MILLIGRAM(S): 2.5 TABLET ORAL at 05:36

## 2022-01-19 RX ADMIN — Medication 100 MILLIGRAM(S): at 11:48

## 2022-01-19 RX ADMIN — Medication 4: at 23:51

## 2022-01-19 RX ADMIN — ENOXAPARIN SODIUM 30 MILLIGRAM(S): 100 INJECTION SUBCUTANEOUS at 17:30

## 2022-01-19 RX ADMIN — ENOXAPARIN SODIUM 30 MILLIGRAM(S): 100 INJECTION SUBCUTANEOUS at 11:47

## 2022-01-19 RX ADMIN — LACOSAMIDE 120 MILLIGRAM(S): 50 TABLET ORAL at 05:35

## 2022-01-19 RX ADMIN — Medication 2: at 11:51

## 2022-01-19 RX ADMIN — Medication 4: at 01:30

## 2022-01-19 RX ADMIN — Medication 2: at 05:35

## 2022-01-19 RX ADMIN — CHLORHEXIDINE GLUCONATE 1 APPLICATION(S): 213 SOLUTION TOPICAL at 22:43

## 2022-01-19 RX ADMIN — SENNA PLUS 2 TABLET(S): 8.6 TABLET ORAL at 22:44

## 2022-01-19 RX ADMIN — Medication 20 MILLIEQUIVALENT(S): at 01:34

## 2022-01-19 RX ADMIN — Medication 10 MILLIGRAM(S): at 17:29

## 2022-01-19 RX ADMIN — Medication 100 MILLIGRAM(S): at 05:36

## 2022-01-19 RX ADMIN — SODIUM CHLORIDE 2 GRAM(S): 9 INJECTION INTRAMUSCULAR; INTRAVENOUS; SUBCUTANEOUS at 05:38

## 2022-01-19 RX ADMIN — POLYETHYLENE GLYCOL 3350 17 GRAM(S): 17 POWDER, FOR SOLUTION ORAL at 13:18

## 2022-01-19 RX ADMIN — SODIUM CHLORIDE 2 GRAM(S): 9 INJECTION INTRAMUSCULAR; INTRAVENOUS; SUBCUTANEOUS at 01:32

## 2022-01-19 RX ADMIN — Medication 25 MILLIGRAM(S): at 13:18

## 2022-01-19 NOTE — PROGRESS NOTE ADULT - SUBJECTIVE AND OBJECTIVE BOX
NSCU Progress Note      24 Hour Events/Subjective:  - pending eeg report  - stable exam      REVIEW OF SYSTEMS:  - negative except as above    VITALS:   - Reviewed      IMAGING/DATA:   - Reviewed      PHYSICAL EXAM:    General: calm  CVS: RRR  Pulm: CTAB  GI: Soft, NTND  Extremities: No LE Edema  Neuro: AO x1-2, follows simple commands; R pupil 2mmR (light perception impaired), L pupil 3mm, Rt side 4+/5 and LUE 3/5 and LLE 3/5

## 2022-01-19 NOTE — PROGRESS NOTE ADULT - SUBJECTIVE AND OBJECTIVE BOX
DATE OF SERVICE: 01-19-22 @ 10:53    Subjective: Patient seen and examined. No new events except as noted.     SUBJECTIVE/ROS:    ROS limited     MEDICATIONS:  MEDICATIONS  (STANDING):  amLODIPine   Tablet 10 milliGRAM(s) Oral daily  chlorhexidine 4% Liquid 1 Application(s) Topical <User Schedule>  dextrose 40% Gel 15 Gram(s) Oral once  dextrose 5%. 1000 milliLiter(s) (50 mL/Hr) IV Continuous <Continuous>  dextrose 5%. 1000 milliLiter(s) (100 mL/Hr) IV Continuous <Continuous>  dextrose 50% Injectable 25 Gram(s) IV Push once  dextrose 50% Injectable 12.5 Gram(s) IV Push once  dextrose 50% Injectable 25 Gram(s) IV Push once  enoxaparin Injectable 30 milliGRAM(s) SubCutaneous every 12 hours  glucagon  Injectable 1 milliGRAM(s) IntraMuscular once  hydrALAZINE 25 milliGRAM(s) Oral every 8 hours  insulin lispro (ADMELOG) corrective regimen sliding scale   SubCutaneous every 6 hours  labetalol 100 milliGRAM(s) Oral every 8 hours  lacosamide IVPB 100 milliGRAM(s) IV Intermittent every 12 hours  polyethylene glycol 3350 17 Gram(s) Oral daily  senna 2 Tablet(s) Oral at bedtime  sodium chloride 2 Gram(s) Oral every 6 hours  thiamine Injectable 100 milliGRAM(s) IV Push daily      PHYSICAL EXAM:  T(C): 36.4 (01-19-22 @ 07:00), Max: 37.6 (01-19-22 @ 03:00)  HR: 78 (01-19-22 @ 10:00) (64 - 82)  BP: 142/70 (01-19-22 @ 10:00) (114/100 - 178/95)  RR: 23 (01-19-22 @ 10:00) (16 - 34)  SpO2: 97% (01-19-22 @ 10:00) (89% - 100%)  Wt(kg): --  I&O's Summary    18 Jan 2022 07:01  -  19 Jan 2022 07:00  --------------------------------------------------------  IN: 4030 mL / OUT: 1900 mL / NET: 2130 mL    19 Jan 2022 07:01  -  19 Jan 2022 10:53  --------------------------------------------------------  IN: 545 mL / OUT: 0 mL / NET: 545 mL            JVP: Normal  Neck: supple  Lung: clear   CV: S1 S2 , Murmur:  Abd: soft  Ext: No edema  Psych: flat affect  Skin: normal``    LABS/DATA:    CARDIAC MARKERS:                                13.6   6.67  )-----------( 175      ( 18 Jan 2022 20:34 )             41.3     01-19    151<H>  |  116<H>  |  18  ----------------------------<  196<H>  3.9   |  22  |  0.83    Ca    9.2      19 Jan 2022 09:28  Phos  3.0     01-19  Mg     2.1     01-19      proBNP:   Lipid Profile:   HgA1c:   TSH:     TELE:  EKG:

## 2022-01-19 NOTE — PROVIDER CONTACT NOTE (OTHER) - SITUATION
pt with increase lethargy, destating to 70% O2 on 40% face mask; increase secretions and abnormal breathing pattern.

## 2022-01-19 NOTE — CONSULT NOTE ADULT - TIME BILLING
81-year-old ? handed gentleman first evaluated at [facility] on 1/20/2022 with left hemiparesis. History and exam as above. ROS otherwise negative. CT head to my eye showed a moderately large right parasagittal lobar hemorrhage, mostly frontal and perhaps slightly anterior parietal. CTA neck and head was unremarkable.   Impression. Right frontal parasagittal lobar hemorrhage, most likely due to amyloid angiopathy. An underlying lesion is unlikely but can be further screened for electively. Suggest. If and when stable, and depending on goals of care, MRI brain with contrast, and possibly elective repeat MRI brain with contrast/MRA neck and head in 6-8 weeks; keep blood pressure less than 140/90; when possible, PT/OT.

## 2022-01-19 NOTE — PROGRESS NOTE ADULT - ASSESSMENT
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; CTA negative  on 1/17 stopped following commands, LUE twitching, given 2mg ativan followed by ?post-ictal state then returned to baseline, no seizure on eeg; loaded with 1g keppra  - neuro checks q1h  - EEG will remain on while weaning AEDs  - Vimpat 100mg  q12  - DC Keppra  - Cerebral Edema: off HTS  - Activity: PT/OT as tolerated  - Thiamine    CVS:  Hx of thoracic aneurysm repair 7 years ago ([place]), also with 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  - SBP goal 100-160  - TTE- p  - vascular recs  - needs repeat imaging from abdomen to mid-thigh per vascular, CTA; family refused CTA  - amlodipine 10  - labetalol 100q8h    PULM:  hx of COVID 2 years ago and 2 weeks ago  - RA  - IS As tolerated  - Aspiration precautions    RENAL:  - Fluids: IVL, net+4L  - daily IOs  - Na 145-155    GI:  - Diet: NGT  - Speech and swallow assessment   - Bowel regimen: miralax, senna    ENDO: Type 2 DM  - FS goal 120-180    HEME/ONC:  - SCDs  - Chemoppx: SQL    ID:  - monitor for fevers      Patient is at high risk of neurologic deterioration/death due to: expansion of the hematoma      ASSESSMENT/PLAN:    81M hx of HTN, not on AC/AP, txer from [place] after mechanical fall, CTH with R frontal IPH w/ mass effect, minimal MLS, ICH score 2    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; CTA negative  on 1/17 stopped following commands, LUE twitching, given 2mg ativan followed by ?post-ictal state then returned to baseline, no seizure on eeg; loaded with 1g keppra  - neuro checks q1h  - EEG will remain on while weaning AEDs  - Vimpat 100mg  q12  - DC Keppra  - Cerebral Edema: off HTS  - Activity: PT/OT as tolerated  - Thiamine    CVS:  Hx of thoracic aneurysm repair 7 years ago ([place]), also with 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  - SBP goal 100-160  - TTE- p  - vascular recs  - needs repeat imaging from abdomen to mid-thigh per vascular, CTA; family refused CTA  - amlodipine 10  - labetalol 100q8h    PULM:  hx of COVID 2 years ago and 2 weeks ago  acute respiratory failure, hypoxic, hypercapnic requiring intubation possibly 2/2 aspiration   intubated now  PRVC, hypertonic saline/pulm toilet   ABG/CXR     RENAL:  - Fluids: IVL, net+4L  - daily IOs  - Na 145-155    GI:  - Diet: NGT, resume tube feeding  - Bowel regimen: miralax, senna    ENDO: Type 2 DM  - FS goal 120-180    HEME/ONC:  - SCDs  - Chemoppx: SQL    ID:  - monitor for fevers      Patient is at high risk of neurologic deterioration/death due to: acute respiratory failure requiring intubation, IPH, cerebral edema  ICU  full code  daughter called, respiratory status updated, confirmed full code at this time, patient intubated       ASSESSMENT/PLAN:    81M hx of HTN, not on AC/AP, txer from [place] after mechanical fall, CTH with R frontal IPH w/ mass effect, minimal MLS, ICH score 2    NEURO:  repeat CTH in am, if no improvement in exam or further deterioration, may need overnight   not requiring sedation, RASS goal 0 to -2  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; CTA negative  on 1/17 stopped following commands, LUE twitching, given 2mg ativan followed by ?post-ictal state then returned to baseline, no seizure on eeg; loaded with 1g keppra  - neuro checks q1h  - EEG will remain on while weaning AEDs  - Vimpat 100mg  q12  - DC Keppra  - Cerebral Edema: off HTS  - Activity: PT/OT as tolerated  - Thiamine    CVS:  Hx of thoracic aneurysm repair 7 years ago ([place]), also with 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  - SBP goal 100-160  - TTE- p  - vascular recs  - needs repeat imaging from abdomen to mid-thigh per vascular, CTA; family refused CTA  - amlodipine 10  - labetalol 100q8h    PULM:  hx of COVID 2 years ago and 2 weeks ago  acute respiratory failure, hypoxic, hypercapnic requiring intubation possibly 2/2 aspiration   intubated now  PRVC, hypertonic saline/pulm toilet   ABG/CXR     RENAL:  - Fluids: IVL, net+4L  - daily IOs  - Na 145-155    GI:  - Diet: NGT, resume tube feeding  - Bowel regimen: miralax, senna    ENDO: Type 2 DM  - FS goal 120-180    HEME/ONC:  - SCDs  - Chemoppx: SQL    ID:  - monitor for fevers      Patient is at high risk of neurologic deterioration/death due to: acute respiratory failure requiring intubation, IPH, cerebral edema  ICU  full code  daughter called, respiratory status updated, confirmed full code at this time, patient intubated

## 2022-01-19 NOTE — PROGRESS NOTE ADULT - SUBJECTIVE AND OBJECTIVE BOX
Patient seen and examined at bedside.    --Anticoagulation--    T(C): 36.8 (01-18-22 @ 23:00), Max: 37.1 (01-18-22 @ 03:00)  HR: 73 (01-18-22 @ 23:20) (64 - 80)  BP: 137/75 (01-18-22 @ 23:00) (106/96 - 178/95)  RR: 19 (01-18-22 @ 23:20) (16 - 34)  SpO2: 95% (01-18-22 @ 23:20) (89% - 100%)  Wt(kg): --    Exam:    Sleepy, DAVID, Ox1 in russian, R side 5/5, LUE 2-3/5, LLE 2/5, FC bilaterally

## 2022-01-19 NOTE — CONSULT NOTE ADULT - ASSESSMENT
Robert Woody is an 81 year old male with a past medical history of HTN who presented as a transfer from [facility] for R frontal parenchymal hemorrhage on CT after fall at home.    Impression: L hemiparesis secondary to R brain dysfunction in setting of R intraparenchymal hemorrhage of unclear origin but consider Hypertensive vs amyloid    Recs:  [] Strict BP control goal <140/90  [] DVT PPx as per NSCU  [] Telemetry Monitoring  [] MRI brain w/ and w/o cont to look for underlying lesions, malformations.   [x] HgA1c 6.7, LDL 43  [] PT/OT/speech therapy when stable  [] TTE  [] continue Vimpat 100mg bid  [] if no further seizures recorded on EEG, would discontinue  [] Target serum sodium 145-155mEq/L  [] Elevate head of bed to 30º  [] upon stability, may follow up outpatient with neurology, Dr. Neal at [address] [facility]. ([phone]) or at [address] [facility] ([phone])    Case to be seen and discussed with neurology attending, Dr. Neal.

## 2022-01-19 NOTE — PROGRESS NOTE ADULT - ASSESSMENT
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; CTA negative  on 1/17 stopped following commands, LUE twitching, given 2mg ativan followed by ?post-ictal state then returned to baseline, no seizure on eeg; loaded with 1g keppra  - neuro checks q1h  - DC EEG  - Vimpat 100mg  q12  - Keppra 1g BID (started on 1/17 for seizure like activity)  - Cerebral Edema: HTS as below  - Activity: PT/OT as tolerated  - Thiamine    CVS:  Hx of thoracic aneurysm repair 7 years ago ([place]), also with 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  - SBP goal 100-160  - TTE- p  - vascular recs  - needs repeat imaging from abdomen to mid-thigh per vascular, CTA; family refused CTA  - amlodipine 10  - labetalol 100q8h    PULM:  hx of COVID 2 years ago and 2 weeks ago  - RA  - IS As tolerated  - Aspiration precautions    RENAL:  - Fluids: HTS 2% 100cc/h   - daily IOs  - Na 145-155    GI:  - Diet: NGT  - Speech and swallow assessment   - Bowel regimen: miralax, senna    ENDO: Type 2 DM  - FS goal 120-180    HEME/ONC:  - SCDs  - Chemoppx: hold d/t heme expansion    ID:  - monitor for fevers      Patient is at high risk of neurologic deterioration/death due to: expansion of the hematoma    ASSESSMENT/PLAN:    81M hx of HTN, not on AC/AP, txer from [place] after mechanical fall, CTH with R frontal IPH w/ mass effect, minimal MLS, ICH score 2    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; CTA negative  on 1/17 stopped following commands, LUE twitching, given 2mg ativan followed by ?post-ictal state then returned to baseline, no seizure on eeg; loaded with 1g keppra  - neuro checks q1h  - EEG will remain on while weaning AEDs  - Vimpat 100mg  q12  - DC Keppra  - Cerebral Edema: off HTS  - Activity: PT/OT as tolerated  - Thiamine    CVS:  Hx of thoracic aneurysm repair 7 years ago ([place]), also with 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  - SBP goal 100-160  - TTE- p  - vascular recs  - needs repeat imaging from abdomen to mid-thigh per vascular, CTA; family refused CTA  - amlodipine 10  - labetalol 100q8h    PULM:  hx of COVID 2 years ago and 2 weeks ago  - RA  - IS As tolerated  - Aspiration precautions    RENAL:  - Fluids: IVL, net+4L  - daily IOs  - Na 145-155    GI:  - Diet: NGT  - Speech and swallow assessment   - Bowel regimen: miralax, senna    ENDO: Type 2 DM  - FS goal 120-180    HEME/ONC:  - SCDs  - Chemoppx: SQL    ID:  - monitor for fevers      Patient is at high risk of neurologic deterioration/death due to: expansion of the hematoma

## 2022-01-19 NOTE — PROGRESS NOTE ADULT - ATTENDING COMMENTS
PT seen earlier today and follows commands, mumbling, resisting passive eye opening strongly,  Left side fluctuates in movement.  Squeezes left hand.  Continue monitoring closely.

## 2022-01-19 NOTE — CONSULT NOTE ADULT - SUBJECTIVE AND OBJECTIVE BOX
HPI:  Robert Woody is an 81 year old male with a past medical history of HTN who presented as a transfer from [facility] for R frontal parenchymal hemorrhage on CT after fall at home. Patient was diagnosed with COVID 2 weeks prior to admission and has been feeling "tired" at home and noted that he felt dizzy prior to presentation but was unable to state what time he fell or further elucidate the symptoms that brought him to the hospital. No reported AC use. Was also recently diagnosed with a AAA but has been unable to undergo surgical repair due to recent dx of COVID. Was admitted and found to have a R frontal IPH with mass effect on the ventricle without midline shift and mild surrounding vasogenic edema. Found to have L hemiparesis. Neurosurgery discussed the potential for intervention for the patient however the family expressed that they did not want to proceed with surgery unless absolutely necessary and was management conservatively. Patient was started on EEG monitoring which did not note any seizures but did note an increased susceptibility for seizure activity in the R temporooccipital region. As per notes, did have episodes of behavior arrest for which he was provided with Ativan 2mg and Keppra 1g, however events were not associated with seizures on EEG. Keppra was subsequently switched to Vimpat. Patient continues to have L hemiparesis, CT scans stable, and exam stable. Neurology was consulted for continued assistance in management of ICH.  Patient was seen and examined at the bedside. Did not open eyes to command but responded promptly to commands and to questions. Voice however, was severely dysarthric so patient's responses are not understandable.     REVIEW OF SYSTEMS    A 10-system ROS was performed and is negative except for those items noted above and/or in the HPI.    PAST MEDICAL & SURGICAL HISTORY:  HTN    FAMILY HISTORY:  Unknown    SOCIAL HISTORY:   T/E/D: unknown  Lives with family    MEDICATIONS (HOME):  Home Medications:  amlodipine-olmesartan 10 mg-20 mg oral tablet: 1 tab(s) orally once a day (18 Jan 2022 12:34)  Aspir 81 oral delayed release tablet: 1 tab(s) orally once a day (18 Jan 2022 12:34)  Bystolic:  (18 Jan 2022 12:34)  doxazosin 2 mg oral tablet:  (18 Jan 2022 12:34)  Janumet 50 mg-1000 mg oral tablet: 1 tab(s) orally once a day (18 Jan 2022 12:34)  Linzess:  (18 Jan 2022 12:50)  modafinil:  (18 Jan 2022 12:34)  olopatadine ophthalmic:  (18 Jan 2022 12:34)  omeprazole-sodium bicarbonate:  (18 Jan 2022 12:34)  rivastigmine:  (18 Jan 2022 12:34)  Uloric 40 mg oral tablet: 1 tab(s) orally once a day, As Needed (18 Jan 2022 12:34)  Zetia 10 mg oral tablet: 1 tab(s) orally once a day (18 Jan 2022 12:34)    MEDICATIONS  (STANDING):  amLODIPine   Tablet 10 milliGRAM(s) Oral daily  chlorhexidine 4% Liquid 1 Application(s) Topical <User Schedule>  dextrose 40% Gel 15 Gram(s) Oral once  dextrose 5%. 1000 milliLiter(s) (50 mL/Hr) IV Continuous <Continuous>  dextrose 5%. 1000 milliLiter(s) (100 mL/Hr) IV Continuous <Continuous>  dextrose 50% Injectable 25 Gram(s) IV Push once  dextrose 50% Injectable 12.5 Gram(s) IV Push once  dextrose 50% Injectable 25 Gram(s) IV Push once  enoxaparin Injectable 30 milliGRAM(s) SubCutaneous every 12 hours  glucagon  Injectable 1 milliGRAM(s) IntraMuscular once  hydrALAZINE 25 milliGRAM(s) Oral every 8 hours  insulin lispro (ADMELOG) corrective regimen sliding scale   SubCutaneous every 6 hours  labetalol 100 milliGRAM(s) Oral every 8 hours  lacosamide IVPB 100 milliGRAM(s) IV Intermittent every 12 hours  polyethylene glycol 3350 17 Gram(s) Oral daily  senna 2 Tablet(s) Oral at bedtime  sodium chloride 2 Gram(s) Oral every 6 hours  thiamine Injectable 100 milliGRAM(s) IV Push daily    MEDICATIONS  (PRN):  acetaminophen     Tablet .. 650 milliGRAM(s) Oral every 6 hours PRN Mild Pain (1 - 3)    ALLERGIES/INTOLERANCES:  Allergies  No Known Allergies    Intolerances    VITALS & EXAMINATION:  Vital Signs Last 24 Hrs  T(C): 36.4 (19 Jan 2022 07:00), Max: 37.6 (19 Jan 2022 03:00)  T(F): 97.5 (19 Jan 2022 07:00), Max: 99.6 (19 Jan 2022 03:00)  HR: 79 (19 Jan 2022 13:15) (64 - 82)  BP: 151/78 (19 Jan 2022 13:15) (111/98 - 174/85)  BP(mean): 99 (19 Jan 2022 13:15) (87 - 129)  RR: 21 (19 Jan 2022 13:15) (16 - 34)  SpO2: 96% (19 Jan 2022 13:15) (89% - 99%)    General:  Constitutional: Appears stated age    Neurological (>12):  MS: Eyes closed, opens very briefly to verbal stimuli. Responds to questions in Austrian however speech unintelligible. Respond promptly to simple commands in Austrian.     Language: Severely dysarthric speech. Unable to assess fluency.     CNs: PERRL (R = 2mm, L = 3mm). Trace L nasolabial fold flattening.    Motor: Periodic twitching of the proximal LUE.  4+/5  strength on the R  4/5  strength on the L  Able to lift R arm against gravity  Moves L arm in the plane of the bed.  Spontaneously moves R leg against gravity.  Moves L leg in the plane of the bed.      Sensation: Moderate response to noxious stimuli in all 4 extremities.    Reflexes:              Biceps(C5)       BR(C6)                   Patellar(L4)    Achilles(S1)    Plantar Resp  R	1	          1	             	        0		    0		Down   L	1	          1	           		        0		    0		Down     Coordination: Unable to comply with test    Gait: Deferred    LABORATORY:  CBC                       13.6   6.67  )-----------( 175      ( 18 Jan 2022 20:34 )             41.3     Chem 01-19    151<H>  |  116<H>  |  18  ----------------------------<  196<H>  3.9   |  22  |  0.83    Ca    9.2      19 Jan 2022 09:28  Phos  3.0     01-19  Mg     2.1     01-19      Lipid Panel 01-17 Chol 96 LDL -- HDL 31<L> Trig 110    STUDIES & IMAGING:    Radiology (XR, CT, MR, U/S, TTE/PRUDENCE):    CT Head No Cont (01.15.22 @ 17:09)  IMPRESSION:  Moderate to large parenchymal hemorrhage right frontal lobe with regional   mass effect. 5 mm right to left midline shift. Preserved basal cisterns.   No hydrocephalus. Continued follow-up recommended      CT Angio Head w/ IV Cont (01.15.22 @ 17:16)  IMPRESSION:  *  NO TRAUMATIC OCCLUSION, DISSECTION, OR PSEUDOANEURYSM.  *  NO ABNORMAL ENHANCEMENT ASSOCIATED WITH THE MODERATE TO LARGE RIGHT   FRONTAL PARENCHYMAL HEMORRHAGE.  *  NO PUDDLING OF CONTRAST. NO ANEURYSM OR AVM SEEN.    CT Head No Cont (01.15.22 @ 21:07)  IMPRESSION:    A large right frontal lobe parenchymal hematoma with surrounding edema   and mass effect is again noted, grossly stable in overall size. The right   to left shift is also unchanged.    New layering intraventricular hemorrhage is noted within the atrium and   occipital horn of the right lateral ventricle. The ventricles are however   stable in size. Serial imaging follow-up is recommended to monitor for   stability.    CT Head No Cont (01.16.22 @ 10:07)  IMPRESSION:  Slightly decreased size of large right frontal parenchymal hemorrhage  Slightly increased hemorrhage layering in the occipital horns.  No midline shift. Basal cisterns are visualized.  Ventricles are similar in size, no large hydrocephalus.    CT Head No Cont (01.18.22 @ 09:42)  IMPRESSION:  Interval stability compared with the prior in appearance of right frontal   parenchymal hematoma with the presence of slightly increased   intraventricular hemorrhage likely related to circulating blood. Evidence   of circulating subarachnoid hemorrhage as seen previously.    CT Head No Cont (01.19.22 @ 08:50)  IMPRESSION:  Stable right frontal intraparenchymal hemorrhage.  Stable intraventricular hemorrhage.    EEG  1/17/22  EEG Summary / Classification:     Abnormal EEG in the awake / drowsy / asleep states.  -Frequent sharp wave discharges, focal, right temporooccipital region, at times with a field to the right parietal region  -Continuous polymorphic slowing, focal, right hemisphere  -Mild to moderate background slowing, generalized     EEG Impression / Clinical Correlate:    Risk for seizures from the right temporooccipital region  Structural abnormality in the right hemisphere.  Mild to moderate multifocal/diffuse nonspecific cerebral dysfunction.      1/18/22  EEG SUMMARY/CLASSIFICATION    Abnormal EEG in the awake, drowsy and asleep states.  - Occasional quasiperiodic sharp wave discharges over the right posterior head region  - Continuous polymorphic slowing, focal, right hemisphere  - Moderate background slowing, generalized     _____________________________________________________________  EEG IMPRESSION/CLINICAL CORRELATE    Abnormal EEG study.  1. Risk for seizures from the right posterior region  2. Structural abnormality in the right hemisphere.  3. Moderate multifocal/diffuse nonspecific cerebral dysfunction.      1/19/22  EEG SUMMARY/CLASSIFICATION    Abnormal EEG in the awake, drowsy and asleep states.  - Intermittent polymorphic slowing, focal, right hemisphere  - Moderate background slowing, generalized     _____________________________________________________________  EEG IMPRESSION/CLINICAL CORRELATE    Abnormal EEG study.  1. Structural abnormality in the right hemisphere.  2. Moderate multifocal/diffuse nonspecific cerebral dysfunction.

## 2022-01-19 NOTE — EEG REPORT - NS EEG TEXT BOX
[facility]   COMPREHENSIVE EPILEPSY CENTER   REPORT OF LONG-TERM VIDEO EEG     [facility]: [address], [place] [address], Ph#: [phone]  [facility]: [address], [place] [address], Ph#: [phone]  [facility]: [address] [place] [address], Ph#: [phone]    Patient Name: LEV FUNDATOR  Age and : 81y (42)  MRN #: [id number]  Location: [place] [address]  Referring Physician: Onesimo Vazquez    Start Time/Date: 08:00 on 22  End Time/Date: 08:00 on 22   Duration: 24H    _____________________________________________________________  STUDY INFORMATION    EEG Recording Technique:  The patient underwent continuous Video-EEG monitoring, using Telemetry System hardware on the XLTek Digital System. EEG and video data were stored on a computer hard drive with important events saved in digital archive files. The material was reviewed by a physician (electroencephalographer / epileptologist) on a daily basis. Landon and seizure detection algorithms were utilized and reviewed. An EEG Technician attended to the patient, and was available throughout daytime work hours.  The epilepsy center neurologist was available in person or on call 24-hours per day.    EEG Placement and Labeling of Electrodes:  The EEG was performed utilizing 20 channel referential EEG connections (coronal over temporal over parasagittal montage) using all standard 10-20 electrode placements with EKG, with additional electrodes placed in the inferior temporal region using the modified 10-10 montage electrode placements for elective admissions, or if deemed necessary. Recording was at a sampling rate of 256 samples per second per channel. Time synchronized digital video recording was done simultaneously with EEG recording. A low light infrared camera was used for low light recording.     _____________________________________________________________  HISTORY    Patient is a 81y old male with HTN and R frontal IPH w/ mass effect after mechanical fall. At 15:00 on 22, he stopped following commands, had LUE twitching, given 2mg ativan followed by ?post-ictal state then returned to baseline. Loaded with 1g keppra. Multiple patient events since captured with no epileptiform correlate on EEG. Per ICU note 22, plan to wean Keppra.    PERTINENT MEDICATION:  lacosamide IVPB: 120 mL/Hr IV Intermittent ( @ 18:35),  120 mL/Hr IV Intermittent ( @ 05:35)  levETIRAcetam  IVPB: 400 mL/Hr IV Intermittent ( @ 18:40),  400 mL/Hr IV Intermittent ( @ 05:35)    _____________________________________________________________  STUDY INTERPRETATION    Findings: The background was continuous and reactive. During wakefulness, the posterior dominant rhythm consisted of symmetric, 7 Hz activity, with amplitude to 30 uV, that attenuated to eye opening.      Background Slowing:  Diffuse theta and polymorphic delta slowing.    Focal Slowing:   Intermittent theta/delta slowing in the right posterior head region.     Sleep Background:  Drowsiness was characterized by fragmentation, attenuation, and slowing of the background activity.    Sleep was characterized by the presence of vertex waves, symmetric sleep spindles and K-complexes.    Other Non-Epileptiform Findings:  None were present.  Excess diffuse beta activity.    Interictal Epileptiform Activity:   None    Events:  Clinical events: Multiple push button events at 1407, 1832, 1851, 1900, 2314 on 22 and 0012, 0126 on 22. Many characterized by decreased responsiveness and not following commands. Many characterized by clenched fists and shaking of one or both arms with eyes closed. There was no epileptiform changes on EEG during these periods.   Seizures: None recorded.    Activation Procedures:   Hyperventilation was not performed.    Photic stimulation was not performed.     Artifacts:  Intermittent myogenic and movement artifacts were noted.    ECG:  The heart rate on single channel ECG was predominantly between 65-80 BPM.    _____________________________________________________________  EEG SUMMARY/CLASSIFICATION    Abnormal EEG in the awake, drowsy and asleep states.  - Intermittent polymorphic slowing, focal, right hemisphere  - Moderate background slowing, generalized     _____________________________________________________________  EEG IMPRESSION/CLINICAL CORRELATE    Abnormal EEG study.  1. Structural abnormality in the right hemisphere.  2. Moderate multifocal/diffuse nonspecific cerebral dysfunction.    Multiple push button events at 1407, 1832, 1851, 1900, 2314 on 22 and 0012, 0126 on 22. Many characterized by decreased responsiveness and not following commands. Many characterized by clenched fists and shaking of one or both arms with eyes closed. There was no epileptiform changes on EEG during these periods.   _____________________________________________________________    Harmony Cárdenas MD  Epilepsy Fellow     [facility]   COMPREHENSIVE EPILEPSY CENTER   REPORT OF LONG-TERM VIDEO EEG     [facility]: [address], [place] [address], Ph#: [phone]  [facility]: [address], [place] [address], Ph#: [phone]  [facility]: [address] [place] [address], Ph#: [phone]    Patient Name: LEV FUNDATOR  Age and : 81y (42)  MRN #: [id number]  Location: [place] [address]  Referring Physician: Onesimo Vazquez    Start Time/Date: 08:00 on 22  End Time/Date: 08:00 on 22   Duration: 24H    _____________________________________________________________  STUDY INFORMATION    EEG Recording Technique:  The patient underwent continuous Video-EEG monitoring, using Telemetry System hardware on the XLTek Digital System. EEG and video data were stored on a computer hard drive with important events saved in digital archive files. The material was reviewed by a physician (electroencephalographer / epileptologist) on a daily basis. Landon and seizure detection algorithms were utilized and reviewed. An EEG Technician attended to the patient, and was available throughout daytime work hours.  The epilepsy center neurologist was available in person or on call 24-hours per day.    EEG Placement and Labeling of Electrodes:  The EEG was performed utilizing 20 channel referential EEG connections (coronal over temporal over parasagittal montage) using all standard 10-20 electrode placements with EKG, with additional electrodes placed in the inferior temporal region using the modified 10-10 montage electrode placements for elective admissions, or if deemed necessary. Recording was at a sampling rate of 256 samples per second per channel. Time synchronized digital video recording was done simultaneously with EEG recording. A low light infrared camera was used for low light recording.     _____________________________________________________________  HISTORY    Patient is a 81y old male with HTN and R frontal IPH w/ mass effect after mechanical fall. At 15:00 on 22, he stopped following commands, had LUE twitching, given 2mg ativan followed by ?post-ictal state then returned to baseline. Loaded with 1g keppra. Multiple patient events since captured with no epileptiform correlate on EEG. Per ICU note 22, plan to wean Keppra.    PERTINENT MEDICATION:  lacosamide IVPB: 120 mL/Hr IV Intermittent ( @ 18:35),  120 mL/Hr IV Intermittent ( @ 05:35)  levETIRAcetam  IVPB: 400 mL/Hr IV Intermittent ( @ 18:40),  400 mL/Hr IV Intermittent ( @ 05:35)    _____________________________________________________________  STUDY INTERPRETATION    Findings: The background was continuous and reactive. During wakefulness, the posterior dominant rhythm consisted of symmetric, 7 Hz activity, with amplitude to 30 uV, that attenuated to eye opening.      Background Slowing:  Diffuse theta and polymorphic delta slowing.    Focal Slowing:   Intermittent theta/delta slowing in the right posterior head region.     Sleep Background:  Drowsiness was characterized by fragmentation, attenuation, and slowing of the background activity.    Sleep was characterized by the presence of vertex waves, symmetric sleep spindles and K-complexes.    Other Non-Epileptiform Findings:  None were present.  Excess diffuse beta activity.    Interictal Epileptiform Activity:   None    Events:  Clinical events: Multiple push button events at 1407, 1832, 1851, 1900, 2314 on 22 and 0012, 0126 on 22. Many characterized by decreased responsiveness and not following commands. Many characterized by clenched fists and shaking of one or both arms with eyes closed. There was no epileptiform changes on EEG during these periods.   Seizures: None recorded.    Activation Procedures:   Hyperventilation was not performed.    Photic stimulation was not performed.     Artifacts:  Intermittent myogenic and movement artifacts were noted.    ECG:  The heart rate on single channel ECG was predominantly between 65-80 BPM.    _____________________________________________________________  EEG SUMMARY/CLASSIFICATION    Abnormal EEG in the awake, drowsy and asleep states.  - Intermittent polymorphic slowing, focal, right hemisphere  - Moderate background slowing, generalized     _____________________________________________________________  EEG IMPRESSION/CLINICAL CORRELATE    Abnormal EEG study.  1. Structural abnormality in the right hemisphere.  2. Moderate multifocal/diffuse nonspecific cerebral dysfunction.    Multiple push button events at 1407, 1832, 1851, 1900, 2314 on 22 and 0012, 0126 on 22. Many characterized by decreased responsiveness and not following commands. Many characterized by clenched fists and shaking of one or both arms with eyes closed. There was no epileptiform changes on EEG during these periods.   _____________________________________________________________    Harmony Cárdenas MD  Epilepsy Fellow    Ru Rick MD  EEG/Epilepsy Attending

## 2022-01-19 NOTE — PROGRESS NOTE ADULT - SUBJECTIVE AND OBJECTIVE BOX
HPI:  81y m W/ PMHx HTN presenting to the ED from [facility] as a transfer for R frontal parenchymal hemorrhage on CT after fall at home. Patient is poor historian but states that he was diagnosed with COVID 2 weeks ago and has been feeling "tired" at home, states he felt dizzy earlier today but unable to state what time he fell or further elucidate. Patient denies AC use. Per EMS was also recently diagnosed with a AAA but has been unable to undergo surgical repair due to recent dx of COVID. Patient still endorsing he feels "tired", denies current headache, vision changes, dizziness, chest pain, nausea, back pain, focal numbness/tingling. Endorses weakness to LLE 2/2 pain. (15 Mono 2022 15:50)    EVENTS:   Respiratory failure, hypoxia, hypercapnia   Deteriorated LOC GCS 7  Intubated to protect airways    VITALS:  T(C): , Max: 37.6 (01-19-22 @ 03:00)  HR:  (69 - 92)  BP:  (111/98 - 179/90)  ABP: --  RR:  (16 - 34)  SpO2:  (76% - 98%)  Wt(kg): --  Device: Avea, Mode: AC/ CMV (Assist Control/ Continuous Mandatory Ventilation), RR (machine): 16, TV (machine): 500, FiO2: 60, PEEP: 5, ITime: 1, MAP: 11, PIP: 23    01-18-22 @ 07:01  -  01-19-22 @ 07:00  --------------------------------------------------------  IN: 4030 mL / OUT: 1900 mL / NET: 2130 mL    01-19-22 @ 07:01  -  01-19-22 @ 19:27  --------------------------------------------------------  IN: 975 mL / OUT: 1100 mL / NET: -125 mL      LABS:  Na: 151 (01-19 @ 09:28), 147 (01-19 @ 02:55), 143 (01-18 @ 20:34), 142 (01-18 @ 13:57), 140 (01-18 @ 06:54), 138 (01-17 @ 22:38), 138 (01-17 @ 16:46), 137 (01-17 @ 10:12), 137 (01-17 @ 03:40), >170 (01-17 @ 02:31)  K: 3.9 (01-19 @ 09:28), 4.1 (01-19 @ 02:55), 3.8 (01-18 @ 20:34), 3.8 (01-18 @ 13:57), 3.9 (01-18 @ 06:54), 3.9 (01-17 @ 22:38), 3.9 (01-17 @ 16:46), 4.0 (01-17 @ 10:12), 3.9 (01-17 @ 03:40), 2.6 (01-17 @ 02:31)  Cl: 116 (01-19 @ 09:28), 110 (01-19 @ 02:55), 108 (01-18 @ 20:34), 106 (01-18 @ 13:57), 105 (01-18 @ 06:54), 102 (01-17 @ 22:38), 102 (01-17 @ 16:46), 100 (01-17 @ 10:12), 100 (01-17 @ 03:40), >135 (01-17 @ 02:31)  CO2: 22 (01-19 @ 09:28), 23 (01-19 @ 02:55), 23 (01-18 @ 20:34), 23 (01-18 @ 13:57), 21 (01-18 @ 06:54), 23 (01-17 @ 22:38), 23 (01-17 @ 16:46), 24 (01-17 @ 10:12), 22 (01-17 @ 03:40), 15 (01-17 @ 02:31)  BUN: 18 (01-19 @ 09:28), 16 (01-19 @ 02:55), 13 (01-18 @ 20:34), 12 (01-18 @ 13:57), 12 (01-18 @ 06:54), 10 (01-17 @ 22:38), 10 (01-17 @ 16:46), 9 (01-17 @ 10:12), 10 (01-17 @ 03:40), 7 (01-17 @ 02:31)  Cr: 0.83 (01-19 @ 09:28), 0.84 (01-19 @ 02:55), 0.71 (01-18 @ 20:34), 0.72 (01-18 @ 13:57), 0.74 (01-18 @ 06:54), 0.68 (01-17 @ 22:38), 0.66 (01-17 @ 16:46), 0.69 (01-17 @ 10:12), 0.72 (01-17 @ 03:40), 0.47 (01-17 @ 02:31)  Glu: 196(01-19 @ 09:28), 181(01-19 @ 02:55), 196(01-18 @ 20:34), 179(01-18 @ 13:57), 176(01-18 @ 06:54), 165(01-17 @ 22:38), 169(01-17 @ 16:46), 162(01-17 @ 10:12), 167(01-17 @ 03:40), 124(01-17 @ 02:31)    Hgb: 13.6 (01-18 @ 20:34), 10.4 (01-17 @ 02:31)  Hct: 41.3 (01-18 @ 20:34), 31.1 (01-17 @ 02:31)  WBC: 6.67 (01-18 @ 20:34), 6.52 (01-17 @ 02:31)  Plt: 175 (01-18 @ 20:34), 113 (01-17 @ 02:31)    INR:   PTT:     IMAGING:   Recent imaging studies were reviewed.    MEDICATIONS:  acetaminophen     Tablet .. 650 milliGRAM(s) Oral every 6 hours PRN  amLODIPine   Tablet 10 milliGRAM(s) Oral daily  chlorhexidine 0.12% Liquid 15 milliLiter(s) Oral Mucosa every 12 hours  chlorhexidine 4% Liquid 1 Application(s) Topical <User Schedule>  dextrose 40% Gel 15 Gram(s) Oral once  dextrose 5%. 1000 milliLiter(s) IV Continuous <Continuous>  dextrose 5%. 1000 milliLiter(s) IV Continuous <Continuous>  dextrose 50% Injectable 25 Gram(s) IV Push once  dextrose 50% Injectable 12.5 Gram(s) IV Push once  dextrose 50% Injectable 25 Gram(s) IV Push once  enoxaparin Injectable 30 milliGRAM(s) SubCutaneous every 12 hours  glucagon  Injectable 1 milliGRAM(s) IntraMuscular once  hydrALAZINE 50 milliGRAM(s) Oral every 8 hours  insulin lispro (ADMELOG) corrective regimen sliding scale   SubCutaneous every 6 hours  labetalol 100 milliGRAM(s) Oral every 8 hours  lacosamide IVPB 100 milliGRAM(s) IV Intermittent every 12 hours  polyethylene glycol 3350 17 Gram(s) Oral daily  senna 2 Tablet(s) Oral at bedtime  sodium chloride 2 Gram(s) Oral every 6 hours  thiamine Injectable 100 milliGRAM(s) IV Push daily      PHYSICAL EXAM:    General: Obtunded   CVS: RRR  Pulm: CTAB  GI: Soft, NTND  Extremities: No LE Edema  Neuro: (prior to intubation) GCS E1M5Vt ; R pupil 2mmR (light perception impaired), localizing with the right side, left side no spontaneous movement    HPI:  81y m W/ PMHx HTN presenting to the ED from [facility] as a transfer for R frontal parenchymal hemorrhage on CT after fall at home. Patient is poor historian but states that he was diagnosed with COVID 2 weeks ago and has been feeling "tired" at home, states he felt dizzy earlier today but unable to state what time he fell or further elucidate. Patient denies AC use. Per EMS was also recently diagnosed with a AAA but has been unable to undergo surgical repair due to recent dx of COVID. Patient still endorsing he feels "tired", denies current headache, vision changes, dizziness, chest pain, nausea, back pain, focal numbness/tingling. Endorses weakness to LLE 2/2 pain. (15 Mono 2022 15:50)    EVENTS:   Respiratory failure, hypoxia, hypercapnia   Deteriorated LOC GCS 7  Intubated to protect airways    VITALS:  T(C): , Max: 37.6 (01-19-22 @ 03:00)  HR:  (69 - 92)  BP:  (111/98 - 179/90)  ABP: --  RR:  (16 - 34)  SpO2:  (76% - 98%)  Wt(kg): --  Device: Avea, Mode: AC/ CMV (Assist Control/ Continuous Mandatory Ventilation), RR (machine): 16, TV (machine): 500, FiO2: 60, PEEP: 5, ITime: 1, MAP: 11, PIP: 23    01-18-22 @ 07:01  -  01-19-22 @ 07:00  --------------------------------------------------------  IN: 4030 mL / OUT: 1900 mL / NET: 2130 mL    01-19-22 @ 07:01  -  01-19-22 @ 19:27  --------------------------------------------------------  IN: 975 mL / OUT: 1100 mL / NET: -125 mL      LABS:  Na: 151 (01-19 @ 09:28), 147 (01-19 @ 02:55), 143 (01-18 @ 20:34), 142 (01-18 @ 13:57), 140 (01-18 @ 06:54), 138 (01-17 @ 22:38), 138 (01-17 @ 16:46), 137 (01-17 @ 10:12), 137 (01-17 @ 03:40), >170 (01-17 @ 02:31)  K: 3.9 (01-19 @ 09:28), 4.1 (01-19 @ 02:55), 3.8 (01-18 @ 20:34), 3.8 (01-18 @ 13:57), 3.9 (01-18 @ 06:54), 3.9 (01-17 @ 22:38), 3.9 (01-17 @ 16:46), 4.0 (01-17 @ 10:12), 3.9 (01-17 @ 03:40), 2.6 (01-17 @ 02:31)  Cl: 116 (01-19 @ 09:28), 110 (01-19 @ 02:55), 108 (01-18 @ 20:34), 106 (01-18 @ 13:57), 105 (01-18 @ 06:54), 102 (01-17 @ 22:38), 102 (01-17 @ 16:46), 100 (01-17 @ 10:12), 100 (01-17 @ 03:40), >135 (01-17 @ 02:31)  CO2: 22 (01-19 @ 09:28), 23 (01-19 @ 02:55), 23 (01-18 @ 20:34), 23 (01-18 @ 13:57), 21 (01-18 @ 06:54), 23 (01-17 @ 22:38), 23 (01-17 @ 16:46), 24 (01-17 @ 10:12), 22 (01-17 @ 03:40), 15 (01-17 @ 02:31)  BUN: 18 (01-19 @ 09:28), 16 (01-19 @ 02:55), 13 (01-18 @ 20:34), 12 (01-18 @ 13:57), 12 (01-18 @ 06:54), 10 (01-17 @ 22:38), 10 (01-17 @ 16:46), 9 (01-17 @ 10:12), 10 (01-17 @ 03:40), 7 (01-17 @ 02:31)  Cr: 0.83 (01-19 @ 09:28), 0.84 (01-19 @ 02:55), 0.71 (01-18 @ 20:34), 0.72 (01-18 @ 13:57), 0.74 (01-18 @ 06:54), 0.68 (01-17 @ 22:38), 0.66 (01-17 @ 16:46), 0.69 (01-17 @ 10:12), 0.72 (01-17 @ 03:40), 0.47 (01-17 @ 02:31)  Glu: 196(01-19 @ 09:28), 181(01-19 @ 02:55), 196(01-18 @ 20:34), 179(01-18 @ 13:57), 176(01-18 @ 06:54), 165(01-17 @ 22:38), 169(01-17 @ 16:46), 162(01-17 @ 10:12), 167(01-17 @ 03:40), 124(01-17 @ 02:31)    Hgb: 13.6 (01-18 @ 20:34), 10.4 (01-17 @ 02:31)  Hct: 41.3 (01-18 @ 20:34), 31.1 (01-17 @ 02:31)  WBC: 6.67 (01-18 @ 20:34), 6.52 (01-17 @ 02:31)  Plt: 175 (01-18 @ 20:34), 113 (01-17 @ 02:31)    INR:   PTT:     IMAGING:   Recent imaging studies were reviewed.    MEDICATIONS:  acetaminophen     Tablet .. 650 milliGRAM(s) Oral every 6 hours PRN  amLODIPine   Tablet 10 milliGRAM(s) Oral daily  chlorhexidine 0.12% Liquid 15 milliLiter(s) Oral Mucosa every 12 hours  chlorhexidine 4% Liquid 1 Application(s) Topical <User Schedule>  dextrose 40% Gel 15 Gram(s) Oral once  dextrose 5%. 1000 milliLiter(s) IV Continuous <Continuous>  dextrose 5%. 1000 milliLiter(s) IV Continuous <Continuous>  dextrose 50% Injectable 25 Gram(s) IV Push once  dextrose 50% Injectable 12.5 Gram(s) IV Push once  dextrose 50% Injectable 25 Gram(s) IV Push once  enoxaparin Injectable 30 milliGRAM(s) SubCutaneous every 12 hours  glucagon  Injectable 1 milliGRAM(s) IntraMuscular once  hydrALAZINE 50 milliGRAM(s) Oral every 8 hours  insulin lispro (ADMELOG) corrective regimen sliding scale   SubCutaneous every 6 hours  labetalol 100 milliGRAM(s) Oral every 8 hours  lacosamide IVPB 100 milliGRAM(s) IV Intermittent every 12 hours  polyethylene glycol 3350 17 Gram(s) Oral daily  senna 2 Tablet(s) Oral at bedtime  sodium chloride 2 Gram(s) Oral every 6 hours  thiamine Injectable 100 milliGRAM(s) IV Push daily      PHYSICAL EXAM:    General: Obtunded   CVS: RRR  Pulm: CTAB  GI: Soft, NTND  Extremities: No LE Edema  Neuro: (prior to intubation) GCS E1M5Vt ; R pupil 2mmR (light perception impaired), EO to noxious, BUE R>>>L, no FC, BLE spont at least 2/5 R>L

## 2022-01-20 LAB
ANION GAP SERPL CALC-SCNC: 11 MMOL/L — SIGNIFICANT CHANGE UP (ref 5–17)
BUN SERPL-MCNC: 23 MG/DL — SIGNIFICANT CHANGE UP (ref 7–23)
CALCIUM SERPL-MCNC: 9.4 MG/DL — SIGNIFICANT CHANGE UP (ref 8.4–10.5)
CHLORIDE SERPL-SCNC: 106 MMOL/L — SIGNIFICANT CHANGE UP (ref 96–108)
CO2 SERPL-SCNC: 26 MMOL/L — SIGNIFICANT CHANGE UP (ref 22–31)
CREAT SERPL-MCNC: 0.83 MG/DL — SIGNIFICANT CHANGE UP (ref 0.5–1.3)
GAS PNL BLDA: SIGNIFICANT CHANGE UP
GAS PNL BLDA: SIGNIFICANT CHANGE UP
GLUCOSE BLDC GLUCOMTR-MCNC: 192 MG/DL — HIGH (ref 70–99)
GLUCOSE BLDC GLUCOMTR-MCNC: 201 MG/DL — HIGH (ref 70–99)
GLUCOSE BLDC GLUCOMTR-MCNC: 226 MG/DL — HIGH (ref 70–99)
GLUCOSE SERPL-MCNC: 220 MG/DL — HIGH (ref 70–99)
HCT VFR BLD CALC: 36.8 % — LOW (ref 39–50)
HGB BLD-MCNC: 11.8 G/DL — LOW (ref 13–17)
LMWH PPP CHRO-ACNC: 0.13 IU/ML — LOW (ref 0.5–1.1)
MAGNESIUM SERPL-MCNC: 2.3 MG/DL — SIGNIFICANT CHANGE UP (ref 1.6–2.6)
MCHC RBC-ENTMCNC: 31.1 PG — SIGNIFICANT CHANGE UP (ref 27–34)
MCHC RBC-ENTMCNC: 32.1 GM/DL — SIGNIFICANT CHANGE UP (ref 32–36)
MCV RBC AUTO: 96.8 FL — SIGNIFICANT CHANGE UP (ref 80–100)
NRBC # BLD: 0 /100 WBCS — SIGNIFICANT CHANGE UP (ref 0–0)
PHOSPHATE SERPL-MCNC: 2.3 MG/DL — LOW (ref 2.5–4.5)
PLATELET # BLD AUTO: 141 K/UL — LOW (ref 150–400)
POTASSIUM SERPL-MCNC: 3.8 MMOL/L — SIGNIFICANT CHANGE UP (ref 3.5–5.3)
POTASSIUM SERPL-SCNC: 3.8 MMOL/L — SIGNIFICANT CHANGE UP (ref 3.5–5.3)
RBC # BLD: 3.8 M/UL — LOW (ref 4.2–5.8)
RBC # FLD: 14 % — SIGNIFICANT CHANGE UP (ref 10.3–14.5)
SODIUM SERPL-SCNC: 143 MMOL/L — SIGNIFICANT CHANGE UP (ref 135–145)
WBC # BLD: 6.57 K/UL — SIGNIFICANT CHANGE UP (ref 3.8–10.5)
WBC # FLD AUTO: 6.57 K/UL — SIGNIFICANT CHANGE UP (ref 3.8–10.5)

## 2022-01-20 PROCEDURE — 99223 1ST HOSP IP/OBS HIGH 75: CPT

## 2022-01-20 PROCEDURE — 99291 CRITICAL CARE FIRST HOUR: CPT

## 2022-01-20 PROCEDURE — 99231 SBSQ HOSP IP/OBS SF/LOW 25: CPT

## 2022-01-20 PROCEDURE — 95718 EEG PHYS/QHP 2-12 HR W/VEEG: CPT

## 2022-01-20 RX ORDER — THIAMINE MONONITRATE (VIT B1) 100 MG
100 TABLET ORAL DAILY
Refills: 0 | Status: DISCONTINUED | OUTPATIENT
Start: 2022-01-20 | End: 2022-01-22

## 2022-01-20 RX ORDER — AMLODIPINE BESYLATE AND OLMESARTRAN MEDOXOMIL 10; 40 MG/1; MG/1
1 TABLET, FILM COATED ORAL
Qty: 0 | Refills: 0 | DISCHARGE

## 2022-01-20 RX ORDER — PANTOPRAZOLE SODIUM 20 MG/1
40 TABLET, DELAYED RELEASE ORAL DAILY
Refills: 0 | Status: DISCONTINUED | OUTPATIENT
Start: 2022-01-20 | End: 2022-01-22

## 2022-01-20 RX ORDER — MEPERIDINE HYDROCHLORIDE 50 MG/ML
25 INJECTION INTRAMUSCULAR; INTRAVENOUS; SUBCUTANEOUS ONCE
Refills: 0 | Status: DISCONTINUED | OUTPATIENT
Start: 2022-01-20 | End: 2022-01-20

## 2022-01-20 RX ORDER — LACOSAMIDE 50 MG/1
100 TABLET ORAL
Refills: 0 | Status: DISCONTINUED | OUTPATIENT
Start: 2022-01-20 | End: 2022-01-26

## 2022-01-20 RX ORDER — MODAFINIL 200 MG/1
0 TABLET ORAL
Qty: 0 | Refills: 0 | DISCHARGE

## 2022-01-20 RX ORDER — POTASSIUM PHOSPHATE, MONOBASIC POTASSIUM PHOSPHATE, DIBASIC 236; 224 MG/ML; MG/ML
30 INJECTION, SOLUTION INTRAVENOUS ONCE
Refills: 0 | Status: COMPLETED | OUTPATIENT
Start: 2022-01-20 | End: 2022-01-20

## 2022-01-20 RX ORDER — LINACLOTIDE 145 UG/1
0 CAPSULE, GELATIN COATED ORAL
Qty: 0 | Refills: 0 | DISCHARGE

## 2022-01-20 RX ORDER — OMEPRAZOLE AND SODIUM BICARBONATE 40; 1100 MG/1; MG/1
0 CAPSULE, GELATIN COATED ORAL
Qty: 0 | Refills: 0 | DISCHARGE

## 2022-01-20 RX ORDER — OLOPATADINE HYDROCHLORIDE 1 MG/ML
0 SOLUTION/ DROPS OPHTHALMIC
Qty: 0 | Refills: 0 | DISCHARGE

## 2022-01-20 RX ORDER — HUMAN INSULIN 100 [IU]/ML
5 INJECTION, SUSPENSION SUBCUTANEOUS EVERY 6 HOURS
Refills: 0 | Status: DISCONTINUED | OUTPATIENT
Start: 2022-01-20 | End: 2022-01-21

## 2022-01-20 RX ORDER — SITAGLIPTIN AND METFORMIN HYDROCHLORIDE 500; 50 MG/1; MG/1
1 TABLET, FILM COATED ORAL
Qty: 0 | Refills: 0 | DISCHARGE

## 2022-01-20 RX ORDER — NEBIVOLOL HYDROCHLORIDE 5 MG/1
0 TABLET ORAL
Qty: 0 | Refills: 0 | DISCHARGE

## 2022-01-20 RX ORDER — DOXAZOSIN MESYLATE 4 MG
0 TABLET ORAL
Qty: 0 | Refills: 0 | DISCHARGE

## 2022-01-20 RX ORDER — POTASSIUM CHLORIDE 20 MEQ
10 PACKET (EA) ORAL ONCE
Refills: 0 | Status: COMPLETED | OUTPATIENT
Start: 2022-01-20 | End: 2022-01-20

## 2022-01-20 RX ORDER — RIVASTIGMINE 4.6 MG/24H
0 PATCH, EXTENDED RELEASE TRANSDERMAL
Qty: 0 | Refills: 0 | DISCHARGE

## 2022-01-20 RX ORDER — SODIUM CHLORIDE 9 MG/ML
4 INJECTION INTRAMUSCULAR; INTRAVENOUS; SUBCUTANEOUS EVERY 12 HOURS
Refills: 0 | Status: DISCONTINUED | OUTPATIENT
Start: 2022-01-20 | End: 2022-01-22

## 2022-01-20 RX ORDER — ENOXAPARIN SODIUM 100 MG/ML
40 INJECTION SUBCUTANEOUS EVERY 12 HOURS
Refills: 0 | Status: DISCONTINUED | OUTPATIENT
Start: 2022-01-20 | End: 2022-01-29

## 2022-01-20 RX ADMIN — POLYETHYLENE GLYCOL 3350 17 GRAM(S): 17 POWDER, FOR SOLUTION ORAL at 12:23

## 2022-01-20 RX ADMIN — PANTOPRAZOLE SODIUM 40 MILLIGRAM(S): 20 TABLET, DELAYED RELEASE ORAL at 12:23

## 2022-01-20 RX ADMIN — Medication 4: at 12:25

## 2022-01-20 RX ADMIN — ENOXAPARIN SODIUM 30 MILLIGRAM(S): 100 INJECTION SUBCUTANEOUS at 06:37

## 2022-01-20 RX ADMIN — ENOXAPARIN SODIUM 40 MILLIGRAM(S): 100 INJECTION SUBCUTANEOUS at 17:41

## 2022-01-20 RX ADMIN — Medication 100 MILLIGRAM(S): at 21:10

## 2022-01-20 RX ADMIN — CHLORHEXIDINE GLUCONATE 15 MILLILITER(S): 213 SOLUTION TOPICAL at 06:37

## 2022-01-20 RX ADMIN — MEPERIDINE HYDROCHLORIDE 25 MILLIGRAM(S): 50 INJECTION INTRAMUSCULAR; INTRAVENOUS; SUBCUTANEOUS at 02:00

## 2022-01-20 RX ADMIN — Medication 100 MILLIGRAM(S): at 12:23

## 2022-01-20 RX ADMIN — SENNA PLUS 2 TABLET(S): 8.6 TABLET ORAL at 21:10

## 2022-01-20 RX ADMIN — Medication 10 MILLIEQUIVALENT(S): at 22:11

## 2022-01-20 RX ADMIN — MEPERIDINE HYDROCHLORIDE 25 MILLIGRAM(S): 50 INJECTION INTRAMUSCULAR; INTRAVENOUS; SUBCUTANEOUS at 01:39

## 2022-01-20 RX ADMIN — LACOSAMIDE 120 MILLIGRAM(S): 50 TABLET ORAL at 06:38

## 2022-01-20 RX ADMIN — HUMAN INSULIN 5 UNIT(S): 100 INJECTION, SUSPENSION SUBCUTANEOUS at 12:24

## 2022-01-20 RX ADMIN — HUMAN INSULIN 5 UNIT(S): 100 INJECTION, SUSPENSION SUBCUTANEOUS at 17:42

## 2022-01-20 RX ADMIN — SODIUM CHLORIDE 4 MILLILITER(S): 9 INJECTION INTRAMUSCULAR; INTRAVENOUS; SUBCUTANEOUS at 18:23

## 2022-01-20 RX ADMIN — CHLORHEXIDINE GLUCONATE 1 APPLICATION(S): 213 SOLUTION TOPICAL at 21:10

## 2022-01-20 RX ADMIN — CHLORHEXIDINE GLUCONATE 15 MILLILITER(S): 213 SOLUTION TOPICAL at 17:42

## 2022-01-20 RX ADMIN — Medication 2: at 17:42

## 2022-01-20 RX ADMIN — LACOSAMIDE 100 MILLIGRAM(S): 50 TABLET ORAL at 17:42

## 2022-01-20 RX ADMIN — Medication 4: at 06:37

## 2022-01-20 RX ADMIN — POTASSIUM PHOSPHATE, MONOBASIC POTASSIUM PHOSPHATE, DIBASIC 83.33 MILLIMOLE(S): 236; 224 INJECTION, SOLUTION INTRAVENOUS at 22:11

## 2022-01-20 NOTE — PROGRESS NOTE ADULT - SUBJECTIVE AND OBJECTIVE BOX
DATE OF SERVICE: 01-20-22 @ 11:01    Subjective: Patient seen and examined. No new events except as noted.     SUBJECTIVE/ROS:  ROS is limited as pt currently sedated on ventilator.       MEDICATIONS:  MEDICATIONS  (STANDING):  amLODIPine   Tablet 10 milliGRAM(s) Oral daily  chlorhexidine 0.12% Liquid 15 milliLiter(s) Oral Mucosa every 12 hours  chlorhexidine 4% Liquid 1 Application(s) Topical <User Schedule>  dextrose 40% Gel 15 Gram(s) Oral once  dextrose 5%. 1000 milliLiter(s) (50 mL/Hr) IV Continuous <Continuous>  dextrose 5%. 1000 milliLiter(s) (100 mL/Hr) IV Continuous <Continuous>  dextrose 50% Injectable 25 Gram(s) IV Push once  dextrose 50% Injectable 12.5 Gram(s) IV Push once  dextrose 50% Injectable 25 Gram(s) IV Push once  enoxaparin Injectable 30 milliGRAM(s) SubCutaneous every 12 hours  glucagon  Injectable 1 milliGRAM(s) IntraMuscular once  insulin lispro (ADMELOG) corrective regimen sliding scale   SubCutaneous every 6 hours  insulin NPH human recombinant 5 Unit(s) SubCutaneous every 6 hours  labetalol 100 milliGRAM(s) Oral every 8 hours  lacosamide Solution 100 milliGRAM(s) Oral two times a day  pantoprazole  Injectable 40 milliGRAM(s) IV Push daily  polyethylene glycol 3350 17 Gram(s) Oral daily  senna 2 Tablet(s) Oral at bedtime  sodium chloride 3%  Inhalation 4 milliLiter(s) Inhalation every 12 hours  thiamine 100 milliGRAM(s) Oral daily      PHYSICAL EXAM:  T(C): 37.6 (01-20-22 @ 10:00), Max: 37.7 (01-20-22 @ 08:00)  HR: 68 (01-20-22 @ 10:00) (65 - 92)  BP: 138/50 (01-20-22 @ 10:00) (80/37 - 179/90)  RR: 19 (01-20-22 @ 10:00) (15 - 27)  SpO2: 99% (01-20-22 @ 10:00) (76% - 100%)  Wt(kg): --  I&O's Summary    19 Jan 2022 07:01  -  20 Jan 2022 07:00  --------------------------------------------------------  IN: 2825 mL / OUT: 1650 mL / NET: 1175 mL          Appearance: on vent 	  Cardiovascular: Normal S1 S2,    Murmur:   Neck: JVP limited to be evaluated   Respiratory: Lungs few rhonchi   Gastrointestinal:  Soft  Skin: normal   Neuro: limited as pt on vent      LABS/DATA:    CARDIAC MARKERS:                                13.1   8.97  )-----------( 156      ( 19 Jan 2022 20:02 )             40.5     01-19    147<H>  |  109<H>  |  19  ----------------------------<  239<H>  4.0   |  25  |  0.86    Ca    9.4      19 Jan 2022 20:02  Phos  4.7     01-19  Mg     2.2     01-19      proBNP:   Lipid Profile:   HgA1c:   TSH:     TELE:  EKG:

## 2022-01-20 NOTE — PROGRESS NOTE ADULT - ATTENDING COMMENTS
Pt seen on 1/20, with stable exam.  Continue to monitor closely. Pt seen on 1/21, had been intubated for low oxygen but CT scan remained unchanged and no evidence of brainstem compression.  Pt was able to follow commands on right and some left spontaneous movement in leg.  Continue close monitoring.

## 2022-01-20 NOTE — EEG REPORT - NS EEG TEXT BOX
[facility]   COMPREHENSIVE EPILEPSY CENTER   REPORT OF LONG-TERM VIDEO EEG     [facility]: [address], [place] [address], Ph#: [phone]  [facility]: [address], [place] [address], Ph#: [phone]  [facility]: [address] [place] [address], Ph#: [phone]    Patient Name: LEV FUNDATOR  Age and : 81y (42)  MRN #: [id number]  Location: [place] [address]  Referring Physician: Onesimo Vazquez    Start Time/Date: 08:00 on 22  End Time/Date: 08:00 on 22   Duration: 24H    _____________________________________________________________  STUDY INFORMATION    EEG Recording Technique:  The patient underwent continuous Video-EEG monitoring, using Telemetry System hardware on the XLTek Digital System. EEG and video data were stored on a computer hard drive with important events saved in digital archive files. The material was reviewed by a physician (electroencephalographer / epileptologist) on a daily basis. Landon and seizure detection algorithms were utilized and reviewed. An EEG Technician attended to the patient, and was available throughout daytime work hours.  The epilepsy center neurologist was available in person or on call 24-hours per day.    EEG Placement and Labeling of Electrodes:  The EEG was performed utilizing 20 channel referential EEG connections (coronal over temporal over parasagittal montage) using all standard 10-20 electrode placements with EKG, with additional electrodes placed in the inferior temporal region using the modified 10-10 montage electrode placements for elective admissions, or if deemed necessary. Recording was at a sampling rate of 256 samples per second per channel. Time synchronized digital video recording was done simultaneously with EEG recording. A low light infrared camera was used for low light recording.     _____________________________________________________________  HISTORY    Patient is a 81y old male with HTN and R frontal IPH w/ mass effect after mechanical fall. At 15:00 on 22, he stopped following commands, had LUE twitching, given 2mg ativan followed by ?post-ictal state then returned to baseline. Loaded with 1g keppra. Multiple patient events since captured with no epileptiform correlate on EEG. Per ICU note 22, plan to wean Keppra.    _____________________________________________________________  STUDY INTERPRETATION    Findings: The background was continuous and reactive. During wakefulness, the posterior dominant rhythm consisted of symmetric, 7 Hz activity, with amplitude to 30 uV, that attenuated to eye opening.      Background Slowing:  Diffuse theta and polymorphic delta slowing.    Focal Slowing:   Intermittent theta/delta slowing in the right posterior head region.     Sleep Background:  Drowsiness was characterized by fragmentation, attenuation, and slowing of the background activity.    Sleep was characterized by the presence of vertex waves, symmetric sleep spindles and K-complexes.    Other Non-Epileptiform Findings:  None were present.  Excess diffuse beta activity.    Interictal Epileptiform Activity:   None    Events:  Clinical events: Multiple push button events for unclear clinical reasons. There was no epileptiform changes on EEG during these periods.   Seizures: None recorded.    Activation Procedures:   Hyperventilation was not performed.    Photic stimulation was not performed.     Artifacts:  Intermittent myogenic and movement artifacts were noted.    ECG:  The heart rate on single channel ECG was predominantly between 65-80 BPM.    _____________________________________________________________  EEG SUMMARY/CLASSIFICATION    Abnormal EEG in the awake, drowsy and asleep states.  - Intermittent polymorphic slowing, focal, right hemisphere  - Moderate background slowing, generalized     _____________________________________________________________  EEG IMPRESSION/CLINICAL CORRELATE    Abnormal EEG study.  1. Structural abnormality in the right hemisphere.  2. Moderate multifocal/diffuse nonspecific cerebral dysfunction.    Multiple push button events without epileptiform changes on EEG during these periods.   _____________________________________________________________    **In absence of additional clinical concerns, recommend consideration for discontinuation of current EEG study with reconnection in future if warranted.    Ru Rick MD  EEG/Epilepsy Attending    [facility]   COMPREHENSIVE EPILEPSY CENTER   REPORT OF LONG-TERM VIDEO EEG     [facility]: [address], [place] [address], Ph#: [phone]  [facility]: [address], [place] [address], Ph#: [phone]  [facility]: [address] [place] [address], Ph#: [phone]    Patient Name: LEV FUNDATOR  Age and : 81y (42)  MRN #: [id number]  Location: [place] [address]  Referring Physician: Onesimo Vazquez    Start Time/Date: 08:00 on 22  End Time/Date: 11:00 on 22   Duration: 27H    _____________________________________________________________  STUDY INFORMATION    EEG Recording Technique:  The patient underwent continuous Video-EEG monitoring, using Telemetry System hardware on the XLTek Digital System. EEG and video data were stored on a computer hard drive with important events saved in digital archive files. The material was reviewed by a physician (electroencephalographer / epileptologist) on a daily basis. Landon and seizure detection algorithms were utilized and reviewed. An EEG Technician attended to the patient, and was available throughout daytime work hours.  The epilepsy center neurologist was available in person or on call 24-hours per day.    EEG Placement and Labeling of Electrodes:  The EEG was performed utilizing 20 channel referential EEG connections (coronal over temporal over parasagittal montage) using all standard 10-20 electrode placements with EKG, with additional electrodes placed in the inferior temporal region using the modified 10-10 montage electrode placements for elective admissions, or if deemed necessary. Recording was at a sampling rate of 256 samples per second per channel. Time synchronized digital video recording was done simultaneously with EEG recording. A low light infrared camera was used for low light recording.     _____________________________________________________________  HISTORY    Patient is a 81y old male with HTN and R frontal IPH w/ mass effect after mechanical fall. At 15:00 on 22, he stopped following commands, had LUE twitching, given 2mg ativan followed by ?post-ictal state then returned to baseline. Loaded with 1g keppra. Multiple patient events since captured with no epileptiform correlate on EEG. Per ICU note 22, plan to wean Keppra.    _____________________________________________________________  STUDY INTERPRETATION    Findings: The background was continuous and reactive. During wakefulness, the posterior dominant rhythm consisted of symmetric, 7 Hz activity, with amplitude to 30 uV, that attenuated to eye opening.      Background Slowing:  Diffuse theta and polymorphic delta slowing.    Focal Slowing:   Intermittent theta/delta slowing in the right posterior head region.     Sleep Background:  Drowsiness was characterized by fragmentation, attenuation, and slowing of the background activity.    Sleep was characterized by the presence of vertex waves, symmetric sleep spindles and K-complexes.    Other Non-Epileptiform Findings:  None were present.  Excess diffuse beta activity.    Interictal Epileptiform Activity:   None    Events:  Clinical events: Multiple push button events for unclear clinical reasons. There was no epileptiform changes on EEG during these periods.   Seizures: None recorded.    Activation Procedures:   Hyperventilation was not performed.    Photic stimulation was not performed.     Artifacts:  Intermittent myogenic and movement artifacts were noted.    ECG:  The heart rate on single channel ECG was predominantly between 65-80 BPM.    _____________________________________________________________  EEG SUMMARY/CLASSIFICATION    Abnormal EEG in the awake, drowsy and asleep states.  - Intermittent polymorphic slowing, focal, right hemisphere  - Moderate background slowing, generalized     _____________________________________________________________  EEG IMPRESSION/CLINICAL CORRELATE    Abnormal EEG study.  1. Structural abnormality in the right hemisphere.  2. Moderate multifocal/diffuse nonspecific cerebral dysfunction.    Multiple push button events without epileptiform changes on EEG during these periods.   _____________________________________________________________    **In absence of additional clinical concerns, recommend consideration for discontinuation of current EEG study with reconnection in future if warranted.    Ru Rick MD  EEG/Epilepsy Attending

## 2022-01-20 NOTE — DISCHARGE NOTE NURSING/CASE MANAGEMENT/SOCIAL WORK - PATIENT PORTAL LINK FT
You can access the FollowMyHealth Patient Portal offered by [facility] by registering at the following website: [email or web address]. By joining [facility]’s FollowMyHealth portal, you will also be able to view your health information using other applications (apps) compatible with our system.

## 2022-01-20 NOTE — PROGRESS NOTE ADULT - SUBJECTIVE AND OBJECTIVE BOX
NSCU Progress Note      24 Hour Events/Subjective:  - yesterday at shift change, patient began to desat to mid 80s, noted to have thick secretions, worsening exam with no FC; ABG with hypoxia/hyerpcarbia, discussed with family who wished to proceed with intubation, was intubated for airway protection and acute hypoxemic/hypercapnic respiratory failure  - exam improving this am  - placed back one eeg      REVIEW OF SYSTEMS:  - negative except as above    VITALS:   - Reviewed      IMAGING/DATA:   - Reviewed      PHYSICAL EXAM:    General: intubated  CVS: RRR  Pulm: CTAB  GI: Soft, NTND  Extremities: No LE Edema  Neuro: ***   NSCU Progress Note      24 Hour Events/Subjective:  - yesterday at shift change, patient began to desat to mid 80s, noted to have thick secretions, worsening exam with no FC; ABG with hypoxia/hyerpcarbia, discussed with family who wished to proceed with intubation, was intubated for airway protection and acute hypoxemic/hypercapnic respiratory failure  - exam improving this am  - placed back one eeg  - tolerating CPAP this morning      REVIEW OF SYSTEMS:  - negative except as above    VITALS:   - Reviewed      IMAGING/DATA:   - Reviewed      PHYSICAL EXAM:    General: intubated  CVS: RRR  Pulm: CTAB  GI: Soft, NTND  Extremities: No LE Edema  Neuro: ***

## 2022-01-20 NOTE — PROGRESS NOTE ADULT - ASSESSMENT
Patient seen and examined at bedside.  -No OR unless absolutely necessary  -Q1 neurochecks  -Intubated  -Repeat CTH stable

## 2022-01-20 NOTE — PROGRESS NOTE ADULT - ASSESSMENT
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  TTE EF 70%, no WMA  - SBP goal 100-160  - vascular recs  - needs repeat imaging from abdomen to mid-thigh per vascular, CTA; family refused CTA  - amlodipine 10  - labetalol 100q8h    PULM:  hx of COVID 2 years ago and 2 weeks ago  - ETT to vent  - VAP bundle  - wean to extubate as tolerated    RENAL:  - Fluids: IVL  - daily IOs  - Na 145-155    GI:  - Diet: NGT  - PPI while intubated  - Bowel regimen: miralax, senna    ENDO: Type 2 DM  - FS goal 120-180    HEME/ONC:  - SCDs  - Chemoppx: SQL    ID:  - monitor for fevers      Patient is at high risk of neurologic deterioration/death due to: expansion of the hematoma, mechanical ventillation

## 2022-01-20 NOTE — PROGRESS NOTE ADULT - ASSESSMENT
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; CTA negative  on 1/17 stopped following commands, LUE twitching, given 2mg ativan followed by ?post-ictal state then returned to baseline, no seizure on eeg; loaded with 1g keppra  - neuro checks q1h  - EEG  - Vimpat 100mg  q12  - DC Keppra  - Cerebral Edema: off HTS  - Activity: PT/OT as tolerated  - Thiamine    CVS:  Hx of thoracic aneurysm repair 7 years ago ([place]), also with 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  TTE EF 70%, no WMA  - SBP goal 100-160  - vascular recs  - needs repeat imaging from abdomen to mid-thigh per vascular, CTA; family refused CTA  - amlodipine 10  - labetalol 100q8h    PULM:  hx of COVID 2 years ago and 2 weeks ago  - ETT to vent  - VAP bundle  - wean to extubate as tolerated    RENAL:  - Fluids: IVL  - daily IOs  - Na 145-155    GI:  - Diet: NGT  - PPI while intubated  - Bowel regimen: miralax, senna    ENDO: Type 2 DM  - FS goal 120-180    HEME/ONC:  - SCDs  - Chemoppx: SQL    ID:  - monitor for fevers      Patient is at high risk of neurologic deterioration/death due to: expansion of the hematoma, mechanical ventillation   ASSESSMENT/PLAN:    81M hx of HTN, not on AC/AP, txer from [place] after mechanical fall, CTH with R frontal IPH w/ mass effect, minimal MLS, ICH score 2    NEURO:  - neuro checks q2h  - Vimpat 100mg  q12  - Activity: PT/OT as tolerated  - Thiamine  - tylenol    CVS:  Hx of thoracic aneurysm repair 7 years ago ([place]), also with 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  TTE EF 70%, no WMA  - SBP goal 100-160  - vascular recs  - needs repeat imaging from abdomen to mid-thigh per vascular, CTA; family refused CTA  - amlodipine 10  - labetalol 100q8h    PULM:  hx of COVID 2 years ago and 2 weeks ago  - ETT to vent, CPAP 10/5  - ABG  - VAP bundle  - wean to extubate as tolerated    RENAL:  - Fluids: IVL  - daily IOs  - Na 145-155    GI:  - Diet: NGT  - PPI while intubated  - Bowel regimen: miralax, senna    ENDO: Type 2 DM  - FS goal 120-180  -Hyperglycemia  -Started on NPH Q6 hrs  -ISS    HEME/ONC:  - SCDs  - Chemoppx: Lovenox 40    ID:  - monitor for fevers      Patient is at high risk of neurologic deterioration/death due to: expansion of the hematoma, mechanical ventillation

## 2022-01-20 NOTE — PROGRESS NOTE ADULT - SUBJECTIVE AND OBJECTIVE BOX
HPI:  81y m W/ PMHx HTN presenting to the ED from [facility] as a transfer for R frontal parenchymal hemorrhage on CT after fall at home. Patient is poor historian but states that he was diagnosed with COVID 2 weeks ago and has been feeling "tired" at home, states he felt dizzy earlier today but unable to state what time he fell or further elucidate. Patient denies AC use. Per EMS was also recently diagnosed with a AAA but has been unable to undergo surgical repair due to recent dx of COVID. Patient still endorsing he feels "tired", denies current headache, vision changes, dizziness, chest pain, nausea, back pain, focal numbness/tingling. Endorses weakness to LLE 2/2 pain. (15 Mono 2022 15:50)  Respiratory failure, hypoxia, hypercapnia   Deteriorated LOC GCS 7  Intubated to protect airways 1/19    EVENTS:   No acute events overnight.    VITALS:  T(C): , Max: 37.7 (01-20-22 @ 08:00)  HR:  (65 - 83)  BP:  (80/37 - 157/73)  ABP: --  RR:  (15 - 23)  SpO2:  (89% - 100%)  Wt(kg): --  Device: Avea, Mode: CPAP with PS, FiO2: 50, PEEP: 5, PS: 10, MAP: 9, PIP: 17    01-19-22 @ 07:01  -  01-20-22 @ 07:00  --------------------------------------------------------  IN: 2825 mL / OUT: 1650 mL / NET: 1175 mL    01-20-22 @ 07:01  -  01-20-22 @ 18:47  --------------------------------------------------------  IN: 440 mL / OUT: 625 mL / NET: -185 mL      LABS:  Na: 147 (01-19 @ 20:02), 151 (01-19 @ 09:28), 147 (01-19 @ 02:55), 143 (01-18 @ 20:34), 142 (01-18 @ 13:57), 140 (01-18 @ 06:54), 138 (01-17 @ 22:38)  K: 4.0 (01-19 @ 20:02), 3.9 (01-19 @ 09:28), 4.1 (01-19 @ 02:55), 3.8 (01-18 @ 20:34), 3.8 (01-18 @ 13:57), 3.9 (01-18 @ 06:54), 3.9 (01-17 @ 22:38)  Cl: 109 (01-19 @ 20:02), 116 (01-19 @ 09:28), 110 (01-19 @ 02:55), 108 (01-18 @ 20:34), 106 (01-18 @ 13:57), 105 (01-18 @ 06:54), 102 (01-17 @ 22:38)  CO2: 25 (01-19 @ 20:02), 22 (01-19 @ 09:28), 23 (01-19 @ 02:55), 23 (01-18 @ 20:34), 23 (01-18 @ 13:57), 21 (01-18 @ 06:54), 23 (01-17 @ 22:38)  BUN: 19 (01-19 @ 20:02), 18 (01-19 @ 09:28), 16 (01-19 @ 02:55), 13 (01-18 @ 20:34), 12 (01-18 @ 13:57), 12 (01-18 @ 06:54), 10 (01-17 @ 22:38)  Cr: 0.86 (01-19 @ 20:02), 0.83 (01-19 @ 09:28), 0.84 (01-19 @ 02:55), 0.71 (01-18 @ 20:34), 0.72 (01-18 @ 13:57), 0.74 (01-18 @ 06:54), 0.68 (01-17 @ 22:38)  Glu: 239(01-19 @ 20:02), 196(01-19 @ 09:28), 181(01-19 @ 02:55), 196(01-18 @ 20:34), 179(01-18 @ 13:57), 176(01-18 @ 06:54), 165(01-17 @ 22:38)    Hgb: 13.1 (01-19 @ 20:02), 13.6 (01-18 @ 20:34)  Hct: 40.5 (01-19 @ 20:02), 41.3 (01-18 @ 20:34)  WBC: 8.97 (01-19 @ 20:02), 6.67 (01-18 @ 20:34)  Plt: 156 (01-19 @ 20:02), 175 (01-18 @ 20:34)    INR:   PTT:     IMAGING:   Recent imaging studies were reviewed.    MEDICATIONS:  acetaminophen     Tablet .. 650 milliGRAM(s) Oral every 6 hours PRN  amLODIPine   Tablet 10 milliGRAM(s) Oral daily  chlorhexidine 0.12% Liquid 15 milliLiter(s) Oral Mucosa every 12 hours  chlorhexidine 4% Liquid 1 Application(s) Topical <User Schedule>  dextrose 40% Gel 15 Gram(s) Oral once  dextrose 5%. 1000 milliLiter(s) IV Continuous <Continuous>  dextrose 5%. 1000 milliLiter(s) IV Continuous <Continuous>  dextrose 50% Injectable 25 Gram(s) IV Push once  dextrose 50% Injectable 12.5 Gram(s) IV Push once  dextrose 50% Injectable 25 Gram(s) IV Push once  enoxaparin Injectable 40 milliGRAM(s) SubCutaneous every 12 hours  glucagon  Injectable 1 milliGRAM(s) IntraMuscular once  insulin lispro (ADMELOG) corrective regimen sliding scale   SubCutaneous every 6 hours  insulin NPH human recombinant 5 Unit(s) SubCutaneous every 6 hours  labetalol 100 milliGRAM(s) Oral every 8 hours  lacosamide Solution 100 milliGRAM(s) Oral two times a day  pantoprazole  Injectable 40 milliGRAM(s) IV Push daily  polyethylene glycol 3350 17 Gram(s) Oral daily  senna 2 Tablet(s) Oral at bedtime  sodium chloride 3%  Inhalation 4 milliLiter(s) Inhalation every 12 hours  thiamine 100 milliGRAM(s) Oral daily    PHYSICAL EXAM:    General: Obtunded   CVS: RRR  Pulm: CTAB  GI: Soft, NTND  Extremities: No LE Edema  Neuro: (prior to intubation) GCS E1M5Vt ; R pupil 2mmR (light perception impaired), EO to noxious, BUE R>>>L, no FC, BLE spont at least 2/5 R>L   HPI:  81y m W/ PMHx HTN presenting to the ED from [facility] as a transfer for R frontal parenchymal hemorrhage on CT after fall at home. Patient is poor historian but states that he was diagnosed with COVID 2 weeks ago and has been feeling "tired" at home, states he felt dizzy earlier today but unable to state what time he fell or further elucidate. Patient denies AC use. Per EMS was also recently diagnosed with a AAA but has been unable to undergo surgical repair due to recent dx of COVID. Patient still endorsing he feels "tired", denies current headache, vision changes, dizziness, chest pain, nausea, back pain, focal numbness/tingling. Endorses weakness to LLE 2/2 pain. (15 Mono 2022 15:50)  Respiratory failure, hypoxia, hypercapnia   Deteriorated LOC GCS 7  Intubated to protect airways 1/19    EVENTS:   Started CPAP today     VITALS:  T(C): , Max: 37.7 (01-20-22 @ 08:00)  HR:  (65 - 83)  BP:  (80/37 - 157/73)  ABP: --  RR:  (15 - 23)  SpO2:  (89% - 100%)  Wt(kg): --  Device: Avea, Mode: CPAP with PS, FiO2: 50, PEEP: 5, PS: 10, MAP: 9, PIP: 17    01-19-22 @ 07:01  -  01-20-22 @ 07:00  --------------------------------------------------------  IN: 2825 mL / OUT: 1650 mL / NET: 1175 mL    01-20-22 @ 07:01  -  01-20-22 @ 18:47  --------------------------------------------------------  IN: 440 mL / OUT: 625 mL / NET: -185 mL      LABS:  Na: 147 (01-19 @ 20:02), 151 (01-19 @ 09:28), 147 (01-19 @ 02:55), 143 (01-18 @ 20:34), 142 (01-18 @ 13:57), 140 (01-18 @ 06:54), 138 (01-17 @ 22:38)  K: 4.0 (01-19 @ 20:02), 3.9 (01-19 @ 09:28), 4.1 (01-19 @ 02:55), 3.8 (01-18 @ 20:34), 3.8 (01-18 @ 13:57), 3.9 (01-18 @ 06:54), 3.9 (01-17 @ 22:38)  Cl: 109 (01-19 @ 20:02), 116 (01-19 @ 09:28), 110 (01-19 @ 02:55), 108 (01-18 @ 20:34), 106 (01-18 @ 13:57), 105 (01-18 @ 06:54), 102 (01-17 @ 22:38)  CO2: 25 (01-19 @ 20:02), 22 (01-19 @ 09:28), 23 (01-19 @ 02:55), 23 (01-18 @ 20:34), 23 (01-18 @ 13:57), 21 (01-18 @ 06:54), 23 (01-17 @ 22:38)  BUN: 19 (01-19 @ 20:02), 18 (01-19 @ 09:28), 16 (01-19 @ 02:55), 13 (01-18 @ 20:34), 12 (01-18 @ 13:57), 12 (01-18 @ 06:54), 10 (01-17 @ 22:38)  Cr: 0.86 (01-19 @ 20:02), 0.83 (01-19 @ 09:28), 0.84 (01-19 @ 02:55), 0.71 (01-18 @ 20:34), 0.72 (01-18 @ 13:57), 0.74 (01-18 @ 06:54), 0.68 (01-17 @ 22:38)  Glu: 239(01-19 @ 20:02), 196(01-19 @ 09:28), 181(01-19 @ 02:55), 196(01-18 @ 20:34), 179(01-18 @ 13:57), 176(01-18 @ 06:54), 165(01-17 @ 22:38)    Hgb: 13.1 (01-19 @ 20:02), 13.6 (01-18 @ 20:34)  Hct: 40.5 (01-19 @ 20:02), 41.3 (01-18 @ 20:34)  WBC: 8.97 (01-19 @ 20:02), 6.67 (01-18 @ 20:34)  Plt: 156 (01-19 @ 20:02), 175 (01-18 @ 20:34)    INR:   PTT:     IMAGING:   Recent imaging studies were reviewed.    MEDICATIONS:  acetaminophen     Tablet .. 650 milliGRAM(s) Oral every 6 hours PRN  amLODIPine   Tablet 10 milliGRAM(s) Oral daily  chlorhexidine 0.12% Liquid 15 milliLiter(s) Oral Mucosa every 12 hours  chlorhexidine 4% Liquid 1 Application(s) Topical <User Schedule>  dextrose 40% Gel 15 Gram(s) Oral once  dextrose 5%. 1000 milliLiter(s) IV Continuous <Continuous>  dextrose 5%. 1000 milliLiter(s) IV Continuous <Continuous>  dextrose 50% Injectable 25 Gram(s) IV Push once  dextrose 50% Injectable 12.5 Gram(s) IV Push once  dextrose 50% Injectable 25 Gram(s) IV Push once  enoxaparin Injectable 40 milliGRAM(s) SubCutaneous every 12 hours  glucagon  Injectable 1 milliGRAM(s) IntraMuscular once  insulin lispro (ADMELOG) corrective regimen sliding scale   SubCutaneous every 6 hours  insulin NPH human recombinant 5 Unit(s) SubCutaneous every 6 hours  labetalol 100 milliGRAM(s) Oral every 8 hours  lacosamide Solution 100 milliGRAM(s) Oral two times a day  pantoprazole  Injectable 40 milliGRAM(s) IV Push daily  polyethylene glycol 3350 17 Gram(s) Oral daily  senna 2 Tablet(s) Oral at bedtime  sodium chloride 3%  Inhalation 4 milliLiter(s) Inhalation every 12 hours  thiamine 100 milliGRAM(s) Oral daily    PHYSICAL EXAM:    General: intubated  CVS: RRR  Pulm: GAEB  GI: Soft, Distended  Extremities: No LE Edema  Neuro: GCS E3M5Vt ; Pupils 2 mm reactive bilateral, right cataract, EO to voice, right UE antigravity, right lower antigravity left UE is antigravity but WEAKER THAN RIGHT, Left LE spontaneous

## 2022-01-20 NOTE — PROGRESS NOTE ADULT - SUBJECTIVE AND OBJECTIVE BOX
Patient seen and examined at bedside.    --Anticoagulation--  enoxaparin Injectable 30 milliGRAM(s) SubCutaneous every 12 hours    T(C): 37.6 (01-19-22 @ 23:15), Max: 37.6 (01-19-22 @ 03:00)  HR: 74 (01-19-22 @ 23:15) (65 - 92)  BP: 144/71 (01-19-22 @ 23:15) (80/37 - 179/90)  RR: 19 (01-19-22 @ 23:15) (15 - 27)  SpO2: 100% (01-19-22 @ 22:00) (76% - 100%)  Wt(kg): --    Exam:  Intubated, SHANT, R side moving strong and purposefully, minimal WD L side

## 2022-01-20 NOTE — DISCHARGE NOTE NURSING/CASE MANAGEMENT/SOCIAL WORK - NSDCPEFALRISK_GEN_ALL_CORE
For information on Fall & Injury Prevention, visit: [email or web address] OR  [email or web address] OR  https://www.cdc.gov/steadi/patient.html

## 2022-01-21 LAB
ANION GAP SERPL CALC-SCNC: 11 MMOL/L — SIGNIFICANT CHANGE UP (ref 5–17)
BUN SERPL-MCNC: 28 MG/DL — HIGH (ref 7–23)
CALCIUM SERPL-MCNC: 9.2 MG/DL — SIGNIFICANT CHANGE UP (ref 8.4–10.5)
CHLORIDE SERPL-SCNC: 106 MMOL/L — SIGNIFICANT CHANGE UP (ref 96–108)
CO2 SERPL-SCNC: 27 MMOL/L — SIGNIFICANT CHANGE UP (ref 22–31)
CREAT SERPL-MCNC: 0.89 MG/DL — SIGNIFICANT CHANGE UP (ref 0.5–1.3)
GAS PNL BLDA: SIGNIFICANT CHANGE UP
GLUCOSE BLDC GLUCOMTR-MCNC: 160 MG/DL — HIGH (ref 70–99)
GLUCOSE BLDC GLUCOMTR-MCNC: 178 MG/DL — HIGH (ref 70–99)
GLUCOSE BLDC GLUCOMTR-MCNC: 203 MG/DL — HIGH (ref 70–99)
GLUCOSE BLDC GLUCOMTR-MCNC: 207 MG/DL — HIGH (ref 70–99)
GLUCOSE BLDC GLUCOMTR-MCNC: 220 MG/DL — HIGH (ref 70–99)
GLUCOSE SERPL-MCNC: 202 MG/DL — HIGH (ref 70–99)
HCT VFR BLD CALC: 34.8 % — LOW (ref 39–50)
HGB BLD-MCNC: 11.1 G/DL — LOW (ref 13–17)
MAGNESIUM SERPL-MCNC: 2.7 MG/DL — HIGH (ref 1.6–2.6)
MCHC RBC-ENTMCNC: 30.3 PG — SIGNIFICANT CHANGE UP (ref 27–34)
MCHC RBC-ENTMCNC: 31.9 GM/DL — LOW (ref 32–36)
MCV RBC AUTO: 95.1 FL — SIGNIFICANT CHANGE UP (ref 80–100)
NRBC # BLD: 0 /100 WBCS — SIGNIFICANT CHANGE UP (ref 0–0)
PHOSPHATE SERPL-MCNC: 2.1 MG/DL — LOW (ref 2.5–4.5)
PLATELET # BLD AUTO: 154 K/UL — SIGNIFICANT CHANGE UP (ref 150–400)
POTASSIUM SERPL-MCNC: 3.8 MMOL/L — SIGNIFICANT CHANGE UP (ref 3.5–5.3)
POTASSIUM SERPL-SCNC: 3.8 MMOL/L — SIGNIFICANT CHANGE UP (ref 3.5–5.3)
RBC # BLD: 3.66 M/UL — LOW (ref 4.2–5.8)
RBC # FLD: 13.6 % — SIGNIFICANT CHANGE UP (ref 10.3–14.5)
SODIUM SERPL-SCNC: 144 MMOL/L — SIGNIFICANT CHANGE UP (ref 135–145)
WBC # BLD: 5.73 K/UL — SIGNIFICANT CHANGE UP (ref 3.8–10.5)
WBC # FLD AUTO: 5.73 K/UL — SIGNIFICANT CHANGE UP (ref 3.8–10.5)

## 2022-01-21 PROCEDURE — 71045 X-RAY EXAM CHEST 1 VIEW: CPT | Mod: 26

## 2022-01-21 PROCEDURE — 99231 SBSQ HOSP IP/OBS SF/LOW 25: CPT

## 2022-01-21 PROCEDURE — 99233 SBSQ HOSP IP/OBS HIGH 50: CPT

## 2022-01-21 PROCEDURE — 99232 SBSQ HOSP IP/OBS MODERATE 35: CPT

## 2022-01-21 RX ORDER — DEXMEDETOMIDINE HYDROCHLORIDE IN 0.9% SODIUM CHLORIDE 4 UG/ML
0.3 INJECTION INTRAVENOUS
Qty: 200 | Refills: 0 | Status: DISCONTINUED | OUTPATIENT
Start: 2022-01-21 | End: 2022-01-21

## 2022-01-21 RX ORDER — SENNA PLUS 8.6 MG/1
2 TABLET ORAL AT BEDTIME
Refills: 0 | Status: DISCONTINUED | OUTPATIENT
Start: 2022-01-21 | End: 2022-01-29

## 2022-01-21 RX ORDER — HUMAN INSULIN 100 [IU]/ML
11 INJECTION, SUSPENSION SUBCUTANEOUS EVERY 6 HOURS
Refills: 0 | Status: DISCONTINUED | OUTPATIENT
Start: 2022-01-22 | End: 2022-01-22

## 2022-01-21 RX ORDER — MULTIVIT WITH MIN/MFOLATE/K2 340-15/3 G
1 POWDER (GRAM) ORAL ONCE
Refills: 0 | Status: COMPLETED | OUTPATIENT
Start: 2022-01-21 | End: 2022-01-21

## 2022-01-21 RX ORDER — DEXMEDETOMIDINE HYDROCHLORIDE IN 0.9% SODIUM CHLORIDE 4 UG/ML
0.3 INJECTION INTRAVENOUS
Qty: 200 | Refills: 0 | Status: DISCONTINUED | OUTPATIENT
Start: 2022-01-21 | End: 2022-01-22

## 2022-01-21 RX ORDER — HUMAN INSULIN 100 [IU]/ML
7 INJECTION, SUSPENSION SUBCUTANEOUS EVERY 6 HOURS
Refills: 0 | Status: DISCONTINUED | OUTPATIENT
Start: 2022-01-21 | End: 2022-01-21

## 2022-01-21 RX ORDER — ACETAMINOPHEN 500 MG
650 TABLET ORAL EVERY 6 HOURS
Refills: 0 | Status: DISCONTINUED | OUTPATIENT
Start: 2022-01-21 | End: 2022-01-29

## 2022-01-21 RX ORDER — HUMAN INSULIN 100 [IU]/ML
4 INJECTION, SUSPENSION SUBCUTANEOUS ONCE
Refills: 0 | Status: COMPLETED | OUTPATIENT
Start: 2022-01-21 | End: 2022-01-21

## 2022-01-21 RX ORDER — POLYETHYLENE GLYCOL 3350 17 G/17G
17 POWDER, FOR SOLUTION ORAL EVERY 12 HOURS
Refills: 0 | Status: DISCONTINUED | OUTPATIENT
Start: 2022-01-21 | End: 2022-01-22

## 2022-01-21 RX ORDER — FENTANYL CITRATE 50 UG/ML
25 INJECTION INTRAVENOUS ONCE
Refills: 0 | Status: DISCONTINUED | OUTPATIENT
Start: 2022-01-21 | End: 2022-01-21

## 2022-01-21 RX ADMIN — SODIUM CHLORIDE 4 MILLILITER(S): 9 INJECTION INTRAMUSCULAR; INTRAVENOUS; SUBCUTANEOUS at 17:26

## 2022-01-21 RX ADMIN — PANTOPRAZOLE SODIUM 40 MILLIGRAM(S): 20 TABLET, DELAYED RELEASE ORAL at 11:07

## 2022-01-21 RX ADMIN — HUMAN INSULIN 5 UNIT(S): 100 INJECTION, SUSPENSION SUBCUTANEOUS at 05:57

## 2022-01-21 RX ADMIN — CHLORHEXIDINE GLUCONATE 15 MILLILITER(S): 213 SOLUTION TOPICAL at 17:03

## 2022-01-21 RX ADMIN — POLYETHYLENE GLYCOL 3350 17 GRAM(S): 17 POWDER, FOR SOLUTION ORAL at 17:09

## 2022-01-21 RX ADMIN — Medication 650 MILLIGRAM(S): at 21:45

## 2022-01-21 RX ADMIN — HUMAN INSULIN 5 UNIT(S): 100 INJECTION, SUSPENSION SUBCUTANEOUS at 00:18

## 2022-01-21 RX ADMIN — Medication 4: at 17:04

## 2022-01-21 RX ADMIN — LACOSAMIDE 100 MILLIGRAM(S): 50 TABLET ORAL at 17:03

## 2022-01-21 RX ADMIN — Medication 650 MILLIGRAM(S): at 21:15

## 2022-01-21 RX ADMIN — Medication 4: at 00:18

## 2022-01-21 RX ADMIN — Medication 100 MILLIGRAM(S): at 11:08

## 2022-01-21 RX ADMIN — ENOXAPARIN SODIUM 40 MILLIGRAM(S): 100 INJECTION SUBCUTANEOUS at 17:28

## 2022-01-21 RX ADMIN — LACOSAMIDE 100 MILLIGRAM(S): 50 TABLET ORAL at 05:15

## 2022-01-21 RX ADMIN — ENOXAPARIN SODIUM 40 MILLIGRAM(S): 100 INJECTION SUBCUTANEOUS at 05:15

## 2022-01-21 RX ADMIN — FENTANYL CITRATE 25 MICROGRAM(S): 50 INJECTION INTRAVENOUS at 23:00

## 2022-01-21 RX ADMIN — CHLORHEXIDINE GLUCONATE 1 APPLICATION(S): 213 SOLUTION TOPICAL at 21:21

## 2022-01-21 RX ADMIN — HUMAN INSULIN 7 UNIT(S): 100 INJECTION, SUSPENSION SUBCUTANEOUS at 11:08

## 2022-01-21 RX ADMIN — SODIUM CHLORIDE 4 MILLILITER(S): 9 INJECTION INTRAMUSCULAR; INTRAVENOUS; SUBCUTANEOUS at 05:52

## 2022-01-21 RX ADMIN — Medication 100 MILLIGRAM(S): at 13:18

## 2022-01-21 RX ADMIN — Medication 2: at 05:57

## 2022-01-21 RX ADMIN — Medication 100 MILLIGRAM(S): at 21:21

## 2022-01-21 RX ADMIN — Medication 10 MILLIGRAM(S): at 11:17

## 2022-01-21 RX ADMIN — Medication 100 MILLIGRAM(S): at 05:16

## 2022-01-21 RX ADMIN — CHLORHEXIDINE GLUCONATE 15 MILLILITER(S): 213 SOLUTION TOPICAL at 05:15

## 2022-01-21 RX ADMIN — AMLODIPINE BESYLATE 10 MILLIGRAM(S): 2.5 TABLET ORAL at 05:15

## 2022-01-21 RX ADMIN — HUMAN INSULIN 7 UNIT(S): 100 INJECTION, SUSPENSION SUBCUTANEOUS at 17:04

## 2022-01-21 RX ADMIN — FENTANYL CITRATE 25 MICROGRAM(S): 50 INJECTION INTRAVENOUS at 23:15

## 2022-01-21 RX ADMIN — HUMAN INSULIN 4 UNIT(S): 100 INJECTION, SUSPENSION SUBCUTANEOUS at 17:56

## 2022-01-21 RX ADMIN — Medication 1 BOTTLE: at 11:07

## 2022-01-21 RX ADMIN — Medication 2: at 11:46

## 2022-01-21 NOTE — PROGRESS NOTE ADULT - ASSESSMENT
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  TTE EF 70%, no WMA  - SBP goal 100-160  - vascular recs  - needs repeat imaging from abdomen to mid-thigh per vascular, CTA; family refused CTA  - amlodipine 10  - labetalol 100q8h    PULM:  hx of COVID 2 years ago and 2 weeks ago  - ETT to vent  - VAP bundle  - wean to extubate as tolerated  - repeat abg    RENAL:  - Fluids: IVL  - daily IOs  - Na 145-155    GI:  - Diet: NGT  - PPI while intubated  - Bowel regimen: miralax, senna    ENDO: Type 2 DM  - FS goal 120-180    HEME/ONC:  - SCDs  - Chemoppx: SQL    ID:  - monitor for fevers      Patient is at high risk of neurologic deterioration/death due to: expansion of the hematoma, mechanical ventillation   ASSESSMENT/PLAN:    81M hx of HTN, not on AC/AP, txer from [place] after mechanical fall, CTH with R frontal IPH w/ mass effect, minimal MLS, ICH score 2    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; CTA negative  on 1/17 stopped following commands, LUE twitching, given 2mg ativan followed by ?post-ictal state then returned to baseline, no seizure on eeg; loaded with 1g keppra  - neuro checks q2h  - Vimpat 100mg  q12  - DC Keppra  - Cerebral Edema: off HTS  - Activity: PT/OT as tolerated  - Thiamine    CVS:  Hx of thoracic aneurysm repair 7 years ago ([place]), also with 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  TTE EF 70%, no WMA  - SBP goal 100-160  - vascular recs  - needs repeat imaging from abdomen to mid-thigh per vascular, CTA; family refused CTA  - amlodipine 10  - labetalol 100q8h    PULM:  hx of COVID 2 years ago and 2 weeks ago  - ETT to vent  - VAP bundle  - wean to extubate as tolerated  - repeat abg    RENAL:  - Fluids: IVL  - daily IOs  - Na 145-155    GI:  - Diet: NGT  - PPI while intubated  - Bowel regimen: miralax, senna  - mag citrate    ENDO: Type 2 DM  - FS goal 120-180    HEME/ONC:  - SCDs  - Chemoppx: SQL    ID:  - monitor for fevers      Patient is at high risk of neurologic deterioration/death due to: expansion of the hematoma, mechanical ventilation

## 2022-01-21 NOTE — PROGRESS NOTE ADULT - SUBJECTIVE AND OBJECTIVE BOX
NSCU Progress Note      24 Hour Events/Subjective:  - tolerated CPAP for >6 hours yesterday, overnight, hypercapnic and began to tire out and placed back on prvc      REVIEW OF SYSTEMS:  - negative except as above    VITALS:   - Reviewed      IMAGING/DATA:   - Reviewed      PHYSICAL EXAM:    General: intubated  CVS: RRR  Pulm: CTAB  GI: Soft, NTND  Extremities: No LE Edema  Neuro: intubated, EO to voice, intermittently FC on R, LUE AG, LLE spont

## 2022-01-21 NOTE — CHART NOTE - NSCHARTNOTEFT_GEN_A_CORE
Pt w/ feet wounds.  Pt seen by podiatry. d/w team will defer to them. remain available as requested.

## 2022-01-21 NOTE — PROGRESS NOTE ADULT - SUBJECTIVE AND OBJECTIVE BOX
HPI:  81y m W/ PMHx HTN presenting to the ED from [facility] as a transfer for R frontal parenchymal hemorrhage on CT after fall at home. Patient is poor historian but states that he was diagnosed with COVID 2 weeks ago and has been feeling "tired" at home, states he felt dizzy earlier today but unable to state what time he fell or further elucidate. Patient denies AC use. Per EMS was also recently diagnosed with a AAA but has been unable to undergo surgical repair due to recent dx of COVID. Patient still endorsing he feels "tired", denies current headache, vision changes, dizziness, chest pain, nausea, back pain, focal numbness/tingling. Endorses weakness to LLE 2/2 pain. (15 Mono 2022 15:50)  Respiratory failure, hypoxia, hypercapnia   Deteriorated LOC GCS 7  Intubated to protect airways 1/19    EVENTS:   No acute events overnight.    VITALS:  T(C): , Max: 37.2 (01-21-22 @ 11:00)  HR:  (65 - 79)  BP:  (100/55 - 154/62)  ABP: --  RR:  (16 - 25)  SpO2:  (92% - 100%)  Wt(kg): --  Device: Avea, Mode: CPAP with PS, FiO2: 30, PEEP: 5, PS: 10, MAP: 9, PIP: 16    01-20-22 @ 07:01  -  01-21-22 @ 07:00  --------------------------------------------------------  IN: 1479.8 mL / OUT: 1525 mL / NET: -45.2 mL    01-21-22 @ 07:01  -  01-21-22 @ 18:40  --------------------------------------------------------  IN: 475 mL / OUT: 700 mL / NET: -225 mL      LABS:  Na: 143 (01-20 @ 20:56), 147 (01-19 @ 20:02), 151 (01-19 @ 09:28), 147 (01-19 @ 02:55), 143 (01-18 @ 20:34)  K: 3.8 (01-20 @ 20:56), 4.0 (01-19 @ 20:02), 3.9 (01-19 @ 09:28), 4.1 (01-19 @ 02:55), 3.8 (01-18 @ 20:34)  Cl: 106 (01-20 @ 20:56), 109 (01-19 @ 20:02), 116 (01-19 @ 09:28), 110 (01-19 @ 02:55), 108 (01-18 @ 20:34)  CO2: 26 (01-20 @ 20:56), 25 (01-19 @ 20:02), 22 (01-19 @ 09:28), 23 (01-19 @ 02:55), 23 (01-18 @ 20:34)  BUN: 23 (01-20 @ 20:56), 19 (01-19 @ 20:02), 18 (01-19 @ 09:28), 16 (01-19 @ 02:55), 13 (01-18 @ 20:34)  Cr: 0.83 (01-20 @ 20:56), 0.86 (01-19 @ 20:02), 0.83 (01-19 @ 09:28), 0.84 (01-19 @ 02:55), 0.71 (01-18 @ 20:34)  Glu: 220(01-20 @ 20:56), 239(01-19 @ 20:02), 196(01-19 @ 09:28), 181(01-19 @ 02:55), 196(01-18 @ 20:34)    Hgb: 11.8 (01-20 @ 20:56), 13.1 (01-19 @ 20:02), 13.6 (01-18 @ 20:34)  Hct: 36.8 (01-20 @ 20:56), 40.5 (01-19 @ 20:02), 41.3 (01-18 @ 20:34)  WBC: 6.57 (01-20 @ 20:56), 8.97 (01-19 @ 20:02), 6.67 (01-18 @ 20:34)  Plt: 141 (01-20 @ 20:56), 156 (01-19 @ 20:02), 175 (01-18 @ 20:34)    INR:   PTT:     IMAGING:   Recent imaging studies were reviewed.    MEDICATIONS:  acetaminophen    Suspension .. 650 milliGRAM(s) Oral every 6 hours PRN  amLODIPine   Tablet 10 milliGRAM(s) Oral daily  bisacodyl Suppository 10 milliGRAM(s) Rectal daily  chlorhexidine 0.12% Liquid 15 milliLiter(s) Oral Mucosa every 12 hours  chlorhexidine 4% Liquid 1 Application(s) Topical <User Schedule>  dextrose 5%. 1000 milliLiter(s) IV Continuous <Continuous>  dextrose 50% Injectable 25 Gram(s) IV Push once  dextrose 50% Injectable 12.5 Gram(s) IV Push once  dextrose 50% Injectable 25 Gram(s) IV Push once  enoxaparin Injectable 40 milliGRAM(s) SubCutaneous every 12 hours  insulin lispro (ADMELOG) corrective regimen sliding scale   SubCutaneous every 6 hours  insulin NPH human recombinant 11 Unit(s) SubCutaneous every 6 hours  labetalol 100 milliGRAM(s) Oral every 8 hours  lacosamide Solution 100 milliGRAM(s) Oral two times a day  pantoprazole  Injectable 40 milliGRAM(s) IV Push daily  polyethylene glycol 3350 17 Gram(s) Oral every 12 hours  senna 2 Tablet(s) Oral at bedtime  sodium chloride 3%  Inhalation 4 milliLiter(s) Inhalation every 12 hours  thiamine 100 milliGRAM(s) Oral daily    PHYSICAL EXAM:    General: intubated  CVS: RRR  Pulm: GAEB  GI: Soft, Distended  Extremities: No LE Edema  Neuro: GCS E3M5Vt ; Pupils 2 mm reactive bilateral, right cataract, EO to voice, right UE antigravity, right lower antigravity left UE is antigravity but WEAKER THAN RIGHT, Left LE spontaneous    HPI:  81y m W/ PMHx HTN presenting to the ED from [facility] as a transfer for R frontal parenchymal hemorrhage on CT after fall at home. Patient is poor historian but states that he was diagnosed with COVID 2 weeks ago and has been feeling "tired" at home, states he felt dizzy earlier today but unable to state what time he fell or further elucidate. Patient denies AC use. Per EMS was also recently diagnosed with a AAA but has been unable to undergo surgical repair due to recent dx of COVID. Patient still endorsing he feels "tired", denies current headache, vision changes, dizziness, chest pain, nausea, back pain, focal numbness/tingling. Endorses weakness to LLE 2/2 pain. (15 Mono 2022 15:50)  Respiratory failure, hypoxia, hypercapnia   Deteriorated LOC GCS 7  Intubated to protect airways 1/19    EVENTS:   No acute events    VITALS:  T(C): , Max: 37.2 (01-21-22 @ 11:00)  HR:  (65 - 79)  BP:  (100/55 - 154/62)  ABP: --  RR:  (16 - 25)  SpO2:  (92% - 100%)  Wt(kg): --  Device: Avea, Mode: CPAP with PS, FiO2: 30, PEEP: 5, PS: 10, MAP: 9, PIP: 16    01-20-22 @ 07:01  -  01-21-22 @ 07:00  --------------------------------------------------------  IN: 1479.8 mL / OUT: 1525 mL / NET: -45.2 mL    01-21-22 @ 07:01  -  01-21-22 @ 18:40  --------------------------------------------------------  IN: 475 mL / OUT: 700 mL / NET: -225 mL      LABS:  Na: 143 (01-20 @ 20:56), 147 (01-19 @ 20:02), 151 (01-19 @ 09:28), 147 (01-19 @ 02:55), 143 (01-18 @ 20:34)  K: 3.8 (01-20 @ 20:56), 4.0 (01-19 @ 20:02), 3.9 (01-19 @ 09:28), 4.1 (01-19 @ 02:55), 3.8 (01-18 @ 20:34)  Cl: 106 (01-20 @ 20:56), 109 (01-19 @ 20:02), 116 (01-19 @ 09:28), 110 (01-19 @ 02:55), 108 (01-18 @ 20:34)  CO2: 26 (01-20 @ 20:56), 25 (01-19 @ 20:02), 22 (01-19 @ 09:28), 23 (01-19 @ 02:55), 23 (01-18 @ 20:34)  BUN: 23 (01-20 @ 20:56), 19 (01-19 @ 20:02), 18 (01-19 @ 09:28), 16 (01-19 @ 02:55), 13 (01-18 @ 20:34)  Cr: 0.83 (01-20 @ 20:56), 0.86 (01-19 @ 20:02), 0.83 (01-19 @ 09:28), 0.84 (01-19 @ 02:55), 0.71 (01-18 @ 20:34)  Glu: 220(01-20 @ 20:56), 239(01-19 @ 20:02), 196(01-19 @ 09:28), 181(01-19 @ 02:55), 196(01-18 @ 20:34)    Hgb: 11.8 (01-20 @ 20:56), 13.1 (01-19 @ 20:02), 13.6 (01-18 @ 20:34)  Hct: 36.8 (01-20 @ 20:56), 40.5 (01-19 @ 20:02), 41.3 (01-18 @ 20:34)  WBC: 6.57 (01-20 @ 20:56), 8.97 (01-19 @ 20:02), 6.67 (01-18 @ 20:34)  Plt: 141 (01-20 @ 20:56), 156 (01-19 @ 20:02), 175 (01-18 @ 20:34)    INR:   PTT:     IMAGING:   Recent imaging studies were reviewed.    MEDICATIONS:  acetaminophen    Suspension .. 650 milliGRAM(s) Oral every 6 hours PRN  amLODIPine   Tablet 10 milliGRAM(s) Oral daily  bisacodyl Suppository 10 milliGRAM(s) Rectal daily  chlorhexidine 0.12% Liquid 15 milliLiter(s) Oral Mucosa every 12 hours  chlorhexidine 4% Liquid 1 Application(s) Topical <User Schedule>  dextrose 5%. 1000 milliLiter(s) IV Continuous <Continuous>  dextrose 50% Injectable 25 Gram(s) IV Push once  dextrose 50% Injectable 12.5 Gram(s) IV Push once  dextrose 50% Injectable 25 Gram(s) IV Push once  enoxaparin Injectable 40 milliGRAM(s) SubCutaneous every 12 hours  insulin lispro (ADMELOG) corrective regimen sliding scale   SubCutaneous every 6 hours  insulin NPH human recombinant 11 Unit(s) SubCutaneous every 6 hours  labetalol 100 milliGRAM(s) Oral every 8 hours  lacosamide Solution 100 milliGRAM(s) Oral two times a day  pantoprazole  Injectable 40 milliGRAM(s) IV Push daily  polyethylene glycol 3350 17 Gram(s) Oral every 12 hours  senna 2 Tablet(s) Oral at bedtime  sodium chloride 3%  Inhalation 4 milliLiter(s) Inhalation every 12 hours  thiamine 100 milliGRAM(s) Oral daily    PHYSICAL EXAM:    General: intubated  CVS: RRR  Pulm: GAEB  GI: Soft, Distended  Extremities: No LE Edema  Neuro: GCS E3M6Vt ; Pupils 2 mm reactive bilateral, right cataract, EO to voice, follows commands, right UE antigravity, right lower antigravity left UE is antigravity but WEAKER THAN RIGHT, Left LE spontaneous

## 2022-01-21 NOTE — PROGRESS NOTE ADULT - SUBJECTIVE AND OBJECTIVE BOX
DATE OF SERVICE: 01-21-22 @ 07:07    Subjective: Patient seen and examined. No new events except as noted.     SUBJECTIVE/ROS:    ROS is limited as pt currently sedated on ventilator.     MEDICATIONS:  MEDICATIONS  (STANDING):  amLODIPine   Tablet 10 milliGRAM(s) Oral daily  chlorhexidine 0.12% Liquid 15 milliLiter(s) Oral Mucosa every 12 hours  chlorhexidine 4% Liquid 1 Application(s) Topical <User Schedule>  dexMEDEtomidine Infusion 0.3 MICROgram(s)/kG/Hr (10.5 mL/Hr) IV Continuous <Continuous>  dextrose 5%. 1000 milliLiter(s) (50 mL/Hr) IV Continuous <Continuous>  dextrose 5%. 1000 milliLiter(s) (100 mL/Hr) IV Continuous <Continuous>  dextrose 50% Injectable 25 Gram(s) IV Push once  dextrose 50% Injectable 12.5 Gram(s) IV Push once  dextrose 50% Injectable 25 Gram(s) IV Push once  enoxaparin Injectable 40 milliGRAM(s) SubCutaneous every 12 hours  insulin lispro (ADMELOG) corrective regimen sliding scale   SubCutaneous every 6 hours  insulin NPH human recombinant 5 Unit(s) SubCutaneous every 6 hours  labetalol 100 milliGRAM(s) Oral every 8 hours  lacosamide Solution 100 milliGRAM(s) Oral two times a day  pantoprazole  Injectable 40 milliGRAM(s) IV Push daily  polyethylene glycol 3350 17 Gram(s) Oral daily  senna 2 Tablet(s) Oral at bedtime  sodium chloride 3%  Inhalation 4 milliLiter(s) Inhalation every 12 hours  thiamine 100 milliGRAM(s) Oral daily      PHYSICAL EXAM:  T(C): 37 (01-21-22 @ 03:00), Max: 37.7 (01-20-22 @ 08:00)  HR: 74 (01-21-22 @ 06:00) (65 - 79)  BP: 100/55 (01-21-22 @ 06:00) (100/55 - 155/64)  RR: 18 (01-21-22 @ 06:00) (16 - 25)  SpO2: 96% (01-21-22 @ 06:00) (96% - 100%)  Wt(kg): --  I&O's Summary    20 Jan 2022 07:01  -  21 Jan 2022 07:00  --------------------------------------------------------  IN: 1479.8 mL / OUT: 1525 mL / NET: -45.2 mL            Appearance: on vent 	  Cardiovascular: Normal S1 S2,    Murmur:   Neck: JVP limited to be evaluated   Respiratory: Lungs few rhonchi   Gastrointestinal:  Soft  Skin: normal   Neuro: limited as pt on vent      LABS/DATA:    CARDIAC MARKERS:                                11.8   6.57  )-----------( 141      ( 20 Jan 2022 20:56 )             36.8     01-20    143  |  106  |  23  ----------------------------<  220<H>  3.8   |  26  |  0.83    Ca    9.4      20 Jan 2022 20:56  Phos  2.3     01-20  Mg     2.3     01-20      proBNP:   Lipid Profile:   HgA1c:   TSH:     TELE:  EKG:

## 2022-01-21 NOTE — PROGRESS NOTE ADULT - SUBJECTIVE AND OBJECTIVE BOX
Patient seen and examined at bedside.    --Anticoagulation--  enoxaparin Injectable 40 milliGRAM(s) SubCutaneous every 12 hours    T(C): 37.1 (01-20-22 @ 23:00), Max: 37.7 (01-20-22 @ 08:00)  HR: 74 (01-20-22 @ 23:00) (65 - 81)  BP: 129/73 (01-20-22 @ 23:00) (103/40 - 157/73)  RR: 20 (01-20-22 @ 23:00) (16 - 23)  SpO2: 100% (01-20-22 @ 23:00) (97% - 100%)  Wt(kg): --    Exam:    Intubated, EOV, FC x 4 R>L

## 2022-01-21 NOTE — CONSULT NOTE ADULT - ASSESSMENT
80 y/o male pt with bilat foot wounds  - pt seen and evaluated  - no signs of infection noted at this time  - rec betadine to wounds bilat and leave open to air   - rec z flow boots at all times  - will monitor during this admission periodically, reconsult sooner as necessary

## 2022-01-21 NOTE — CONSULT NOTE ADULT - SUBJECTIVE AND OBJECTIVE BOX
Patient is a 81y old  Male who presents with a chief complaint of R ICH (21 Jan 2022 18:39)      HPI:  81y m W/ PMHx HTN presenting to the ED from [facility] as a transfer for R frontal parenchymal hemorrhage on CT after fall at home. Patient is poor historian but states that he was diagnosed with COVID 2 weeks ago and has been feeling "tired" at home, states he felt dizzy earlier today but unable to state what time he fell or further elucidate. Patient denies AC use. Per EMS was also recently diagnosed with a AAA but has been unable to undergo surgical repair due to recent dx of COVID. Patient still endorsing he feels "tired", denies current headache, vision changes, dizziness, chest pain, nausea, back pain, focal numbness/tingling. Endorses weakness to LLE 2/2 pain. (15 Mono 2022 15:50)    Podiatry consulted for bilat foot wounds.    PAST MEDICAL & SURGICAL HISTORY:      MEDICATIONS  (STANDING):  acetylcysteine 20%  Inhalation 4 milliLiter(s) Inhalation every 6 hours  amLODIPine   Tablet 10 milliGRAM(s) Oral daily  bisacodyl Suppository 10 milliGRAM(s) Rectal daily  chlorhexidine 0.12% Liquid 15 milliLiter(s) Oral Mucosa every 12 hours  chlorhexidine 4% Liquid 1 Application(s) Topical <User Schedule>  dexMEDEtomidine Infusion 0.3 MICROgram(s)/kG/Hr (10.5 mL/Hr) IV Continuous <Continuous>  dextrose 5%. 1000 milliLiter(s) (50 mL/Hr) IV Continuous <Continuous>  dextrose 50% Injectable 25 Gram(s) IV Push once  dextrose 50% Injectable 12.5 Gram(s) IV Push once  dextrose 50% Injectable 25 Gram(s) IV Push once  enoxaparin Injectable 40 milliGRAM(s) SubCutaneous every 12 hours  insulin lispro (ADMELOG) corrective regimen sliding scale   SubCutaneous every 6 hours  insulin NPH human recombinant 11 Unit(s) SubCutaneous every 6 hours  labetalol 100 milliGRAM(s) Oral every 8 hours  lacosamide Solution 100 milliGRAM(s) Oral two times a day  pantoprazole  Injectable 40 milliGRAM(s) IV Push daily  polyethylene glycol 3350 17 Gram(s) Oral every 12 hours  senna 2 Tablet(s) Oral at bedtime  sodium chloride 3%  Inhalation 4 milliLiter(s) Inhalation every 12 hours  thiamine 100 milliGRAM(s) Oral daily    MEDICATIONS  (PRN):  acetaminophen    Suspension .. 650 milliGRAM(s) Oral every 6 hours PRN Temp greater or equal to 38C (100.4F), Mild Pain (1 - 3)      Allergies    No Known Allergies    Intolerances        VITALS:    Vital Signs Last 24 Hrs  T(C): 37.1 (21 Jan 2022 23:00), Max: 37.2 (21 Jan 2022 11:00)  T(F): 98.7 (21 Jan 2022 23:00), Max: 98.9 (21 Jan 2022 11:00)  HR: 70 (22 Jan 2022 02:00) (65 - 79)  BP: 156/73 (22 Jan 2022 02:00) (100/55 - 179/80)  BP(mean): 98 (22 Jan 2022 02:00) (70 - 150)  RR: 18 (22 Jan 2022 02:00) (14 - 25)  SpO2: 98% (22 Jan 2022 02:00) (92% - 100%)    LABS:                          11.1   5.73  )-----------( 154      ( 21 Jan 2022 22:28 )             34.8       01-21    144  |  106  |  28<H>  ----------------------------<  202<H>  3.8   |  27  |  0.89    Ca    9.2      21 Jan 2022 22:28  Phos  2.1     01-21  Mg     2.7     01-21        CAPILLARY BLOOD GLUCOSE      POCT Blood Glucose.: 177 mg/dL (22 Jan 2022 00:42)  POCT Blood Glucose.: 203 mg/dL (21 Jan 2022 17:56)  POCT Blood Glucose.: 220 mg/dL (21 Jan 2022 16:56)  POCT Blood Glucose.: 160 mg/dL (21 Jan 2022 11:05)  POCT Blood Glucose.: 178 mg/dL (21 Jan 2022 05:56)          LOWER EXTREMITY PHYSICAL EXAM:    Vasular: DP/PT 2/4, B/L, CFT <3 seconds B/L, Temperature gradient WNL, B/L.   Neuro: unable to assess  Musculoskeletal/Ortho: contracted hammertoes 2-5 bilat  Skin: bilat foot partial thickness wounds secondary to abrasions toes 2/3/4 on left and 2nd toe on right with no signs of infection

## 2022-01-21 NOTE — PROGRESS NOTE ADULT - ATTENDING COMMENTS
Pt seen on 1/21, had been intubated for low oxygen but CT scan remained unchanged and no evidence of brainstem compression.  Pt was able to follow commands on right and some left spontaneous movement in leg.  Wean as tolerated.  continue monitoring.

## 2022-01-21 NOTE — PROGRESS NOTE ADULT - ASSESSMENT
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; CTA negative  on 1/17 stopped following commands, LUE twitching, given 2mg ativan followed by ?post-ictal state then returned to baseline, no seizure on eeg; loaded with 1g keppra  - neuro checks q2h  - Vimpat 100mg  q12  - DC Keppra  - Cerebral Edema: off HTS  - Activity: PT/OT as tolerated  - Thiamine    CVS:  Hx of thoracic aneurysm repair 7 years ago ([place]), also with 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  TTE EF 70%, no WMA  - SBP goal 100-160  - vascular recs  - needs repeat imaging from abdomen to mid-thigh per vascular, CTA; family refused CTA  - amlodipine 10  - labetalol 100q8h    PULM:  hx of COVID 2 years ago and 2 weeks ago  - ETT to vent  - VAP bundle  - wean to extubate as tolerated  - repeat abg    RENAL:  - Fluids: IVL  - daily IOs  - Na 145-155    GI:  - Diet: NGT  - PPI while intubated  - Bowel regimen: miralax, senna  - mag citrate    ENDO: Type 2 DM  - FS goal 120-180    HEME/ONC:  - SCDs  - Chemoppx: SQL    ID:  - monitor for fevers      Patient is at high risk of neurologic deterioration/death due to: expansion of the hematoma, mechanical ventilation     81M hx of HTN, not on AC/AP, txer from [place] after mechanical fall, CTH with R frontal IPH w/ mass effect, minimal MLS, ICH score 2    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; CTA negative  on 1/17 stopped following commands, LUE twitching, given 2mg ativan followed by ?post-ictal state then returned to baseline, no seizure on eeg; loaded with 1g keppra  - neuro checks q2h  - Vimpat 100mg  q12  - Cerebral Edema: off HTS  - Activity: PT/OT as tolerated  - Thiamine  -Precedex for synchrony with te ventilation   Fentanyl PRN    CVS:  Hx of thoracic aneurysm repair 7 years ago ([place]), also with 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  TTE EF 70%, no WMA  - SBP goal 100-160  - vascular recs  - needs repeat imaging from abdomen to mid-thigh per vascular, CTA; family refused CTA  - amlodipine 10  - labetalol 100q8h    PULM:  hx of COVID 2 years ago and 2 weeks ago  - ETT to vent  - VAP bundle  - wean to extubate as tolerated  - CPAP as tolerated     RENAL:  - Fluids: IVL  - daily IOs  - Na 145-155    GI:  - Diet: NGT, keep NPO at 4 AM for possible extubation   - PPI while intubated  - Bowel regimen: miralax, senna  - mag citrate    ENDO: Type 2 DM  - FS goal 120-180  Hyperglycemic  ISS   NPH 11 unit Q6  Observe    HEME/ONC:  - SCDs  - Chemoppx: Lovenox 40    ID:  - monitor for fevers      Patient is at high risk of neurologic deterioration/death due to: expansion of the hematoma, mechanical ventilation

## 2022-01-22 LAB
ANION GAP SERPL CALC-SCNC: 14 MMOL/L — SIGNIFICANT CHANGE UP (ref 5–17)
BUN SERPL-MCNC: 25 MG/DL — HIGH (ref 7–23)
CALCIUM SERPL-MCNC: 9.9 MG/DL — SIGNIFICANT CHANGE UP (ref 8.4–10.5)
CHLORIDE SERPL-SCNC: 104 MMOL/L — SIGNIFICANT CHANGE UP (ref 96–108)
CO2 SERPL-SCNC: 28 MMOL/L — SIGNIFICANT CHANGE UP (ref 22–31)
CREAT SERPL-MCNC: 0.92 MG/DL — SIGNIFICANT CHANGE UP (ref 0.5–1.3)
GAS PNL BLDA: SIGNIFICANT CHANGE UP
GAS PNL BLDA: SIGNIFICANT CHANGE UP
GLUCOSE BLDC GLUCOMTR-MCNC: 159 MG/DL — HIGH (ref 70–99)
GLUCOSE BLDC GLUCOMTR-MCNC: 176 MG/DL — HIGH (ref 70–99)
GLUCOSE BLDC GLUCOMTR-MCNC: 177 MG/DL — HIGH (ref 70–99)
GLUCOSE BLDC GLUCOMTR-MCNC: 225 MG/DL — HIGH (ref 70–99)
GLUCOSE SERPL-MCNC: 167 MG/DL — HIGH (ref 70–99)
HCT VFR BLD CALC: 40 % — SIGNIFICANT CHANGE UP (ref 39–50)
HGB BLD-MCNC: 12.9 G/DL — LOW (ref 13–17)
MAGNESIUM SERPL-MCNC: 2.7 MG/DL — HIGH (ref 1.6–2.6)
MCHC RBC-ENTMCNC: 30.5 PG — SIGNIFICANT CHANGE UP (ref 27–34)
MCHC RBC-ENTMCNC: 32.3 GM/DL — SIGNIFICANT CHANGE UP (ref 32–36)
MCV RBC AUTO: 94.6 FL — SIGNIFICANT CHANGE UP (ref 80–100)
NRBC # BLD: 0 /100 WBCS — SIGNIFICANT CHANGE UP (ref 0–0)
PHOSPHATE SERPL-MCNC: 3.6 MG/DL — SIGNIFICANT CHANGE UP (ref 2.5–4.5)
PLATELET # BLD AUTO: 194 K/UL — SIGNIFICANT CHANGE UP (ref 150–400)
POTASSIUM SERPL-MCNC: 4.2 MMOL/L — SIGNIFICANT CHANGE UP (ref 3.5–5.3)
POTASSIUM SERPL-SCNC: 4.2 MMOL/L — SIGNIFICANT CHANGE UP (ref 3.5–5.3)
RBC # BLD: 4.23 M/UL — SIGNIFICANT CHANGE UP (ref 4.2–5.8)
RBC # FLD: 13.5 % — SIGNIFICANT CHANGE UP (ref 10.3–14.5)
SODIUM SERPL-SCNC: 146 MMOL/L — HIGH (ref 135–145)
WBC # BLD: 5.59 K/UL — SIGNIFICANT CHANGE UP (ref 3.8–10.5)
WBC # FLD AUTO: 5.59 K/UL — SIGNIFICANT CHANGE UP (ref 3.8–10.5)

## 2022-01-22 PROCEDURE — 71045 X-RAY EXAM CHEST 1 VIEW: CPT | Mod: 26

## 2022-01-22 PROCEDURE — 99291 CRITICAL CARE FIRST HOUR: CPT

## 2022-01-22 PROCEDURE — 93010 ELECTROCARDIOGRAM REPORT: CPT

## 2022-01-22 RX ORDER — HUMAN INSULIN 100 [IU]/ML
13 INJECTION, SUSPENSION SUBCUTANEOUS EVERY 6 HOURS
Refills: 0 | Status: DISCONTINUED | OUTPATIENT
Start: 2022-01-22 | End: 2022-01-29

## 2022-01-22 RX ORDER — SODIUM CHLORIDE 9 MG/ML
4 INJECTION INTRAMUSCULAR; INTRAVENOUS; SUBCUTANEOUS EVERY 6 HOURS
Refills: 0 | Status: DISCONTINUED | OUTPATIENT
Start: 2022-01-22 | End: 2022-01-29

## 2022-01-22 RX ORDER — POTASSIUM PHOSPHATE, MONOBASIC POTASSIUM PHOSPHATE, DIBASIC 236; 224 MG/ML; MG/ML
15 INJECTION, SOLUTION INTRAVENOUS ONCE
Refills: 0 | Status: COMPLETED | OUTPATIENT
Start: 2022-01-22 | End: 2022-01-22

## 2022-01-22 RX ORDER — FENTANYL CITRATE 50 UG/ML
25 INJECTION INTRAVENOUS ONCE
Refills: 0 | Status: DISCONTINUED | OUTPATIENT
Start: 2022-01-22 | End: 2022-01-22

## 2022-01-22 RX ORDER — SODIUM CHLORIDE 9 MG/ML
500 INJECTION INTRAMUSCULAR; INTRAVENOUS; SUBCUTANEOUS ONCE
Refills: 0 | Status: COMPLETED | OUTPATIENT
Start: 2022-01-22 | End: 2022-01-22

## 2022-01-22 RX ORDER — HYDRALAZINE HCL 50 MG
5 TABLET ORAL ONCE
Refills: 0 | Status: COMPLETED | OUTPATIENT
Start: 2022-01-22 | End: 2022-01-22

## 2022-01-22 RX ORDER — POTASSIUM CHLORIDE 20 MEQ
40 PACKET (EA) ORAL ONCE
Refills: 0 | Status: COMPLETED | OUTPATIENT
Start: 2022-01-22 | End: 2022-01-22

## 2022-01-22 RX ORDER — DEXTROSE 50 % IN WATER 50 %
15 SYRINGE (ML) INTRAVENOUS ONCE
Refills: 0 | Status: DISCONTINUED | OUTPATIENT
Start: 2022-01-22 | End: 2022-01-29

## 2022-01-22 RX ORDER — ACETYLCYSTEINE 200 MG/ML
4 VIAL (ML) MISCELLANEOUS EVERY 6 HOURS
Refills: 0 | Status: DISCONTINUED | OUTPATIENT
Start: 2022-01-22 | End: 2022-01-29

## 2022-01-22 RX ORDER — IPRATROPIUM/ALBUTEROL SULFATE 18-103MCG
3 AEROSOL WITH ADAPTER (GRAM) INHALATION EVERY 6 HOURS
Refills: 0 | Status: DISCONTINUED | OUTPATIENT
Start: 2022-01-22 | End: 2022-01-22

## 2022-01-22 RX ORDER — IPRATROPIUM/ALBUTEROL SULFATE 18-103MCG
3 AEROSOL WITH ADAPTER (GRAM) INHALATION EVERY 6 HOURS
Refills: 0 | Status: DISCONTINUED | OUTPATIENT
Start: 2022-01-22 | End: 2022-01-29

## 2022-01-22 RX ORDER — GLUCAGON INJECTION, SOLUTION 0.5 MG/.1ML
1 INJECTION, SOLUTION SUBCUTANEOUS ONCE
Refills: 0 | Status: DISCONTINUED | OUTPATIENT
Start: 2022-01-22 | End: 2022-01-29

## 2022-01-22 RX ADMIN — Medication 2: at 00:46

## 2022-01-22 RX ADMIN — Medication 4: at 05:07

## 2022-01-22 RX ADMIN — Medication 5 MILLIGRAM(S): at 01:19

## 2022-01-22 RX ADMIN — Medication 2: at 12:25

## 2022-01-22 RX ADMIN — Medication 3 MILLILITER(S): at 11:57

## 2022-01-22 RX ADMIN — Medication 4 MILLILITER(S): at 23:31

## 2022-01-22 RX ADMIN — CHLORHEXIDINE GLUCONATE 1 APPLICATION(S): 213 SOLUTION TOPICAL at 21:31

## 2022-01-22 RX ADMIN — Medication 2: at 17:26

## 2022-01-22 RX ADMIN — SODIUM CHLORIDE 4 MILLILITER(S): 9 INJECTION INTRAMUSCULAR; INTRAVENOUS; SUBCUTANEOUS at 23:31

## 2022-01-22 RX ADMIN — HUMAN INSULIN 11 UNIT(S): 100 INJECTION, SUSPENSION SUBCUTANEOUS at 00:45

## 2022-01-22 RX ADMIN — CHLORHEXIDINE GLUCONATE 15 MILLILITER(S): 213 SOLUTION TOPICAL at 17:33

## 2022-01-22 RX ADMIN — Medication 100 MILLIGRAM(S): at 21:31

## 2022-01-22 RX ADMIN — Medication 3 MILLILITER(S): at 23:31

## 2022-01-22 RX ADMIN — SODIUM CHLORIDE 4 MILLILITER(S): 9 INJECTION INTRAMUSCULAR; INTRAVENOUS; SUBCUTANEOUS at 18:06

## 2022-01-22 RX ADMIN — Medication 100 MILLIGRAM(S): at 05:07

## 2022-01-22 RX ADMIN — Medication 4 MILLILITER(S): at 11:57

## 2022-01-22 RX ADMIN — HUMAN INSULIN 11 UNIT(S): 100 INJECTION, SUSPENSION SUBCUTANEOUS at 05:07

## 2022-01-22 RX ADMIN — Medication 3 MILLILITER(S): at 05:57

## 2022-01-22 RX ADMIN — HUMAN INSULIN 13 UNIT(S): 100 INJECTION, SUSPENSION SUBCUTANEOUS at 12:25

## 2022-01-22 RX ADMIN — DEXMEDETOMIDINE HYDROCHLORIDE IN 0.9% SODIUM CHLORIDE 10.5 MICROGRAM(S)/KG/HR: 4 INJECTION INTRAVENOUS at 01:18

## 2022-01-22 RX ADMIN — Medication 10 MILLIGRAM(S): at 12:25

## 2022-01-22 RX ADMIN — Medication 3 MILLILITER(S): at 18:05

## 2022-01-22 RX ADMIN — ENOXAPARIN SODIUM 40 MILLIGRAM(S): 100 INJECTION SUBCUTANEOUS at 05:06

## 2022-01-22 RX ADMIN — AMLODIPINE BESYLATE 10 MILLIGRAM(S): 2.5 TABLET ORAL at 05:06

## 2022-01-22 RX ADMIN — Medication 100 MILLIGRAM(S): at 12:24

## 2022-01-22 RX ADMIN — Medication 100 MILLIGRAM(S): at 13:25

## 2022-01-22 RX ADMIN — Medication 4 MILLILITER(S): at 18:06

## 2022-01-22 RX ADMIN — Medication 40 MILLIEQUIVALENT(S): at 01:19

## 2022-01-22 RX ADMIN — FENTANYL CITRATE 25 MICROGRAM(S): 50 INJECTION INTRAVENOUS at 00:15

## 2022-01-22 RX ADMIN — LACOSAMIDE 100 MILLIGRAM(S): 50 TABLET ORAL at 05:06

## 2022-01-22 RX ADMIN — ENOXAPARIN SODIUM 40 MILLIGRAM(S): 100 INJECTION SUBCUTANEOUS at 17:25

## 2022-01-22 RX ADMIN — PANTOPRAZOLE SODIUM 40 MILLIGRAM(S): 20 TABLET, DELAYED RELEASE ORAL at 12:24

## 2022-01-22 RX ADMIN — LACOSAMIDE 100 MILLIGRAM(S): 50 TABLET ORAL at 17:25

## 2022-01-22 RX ADMIN — SODIUM CHLORIDE 500 MILLILITER(S): 9 INJECTION INTRAMUSCULAR; INTRAVENOUS; SUBCUTANEOUS at 21:31

## 2022-01-22 RX ADMIN — CHLORHEXIDINE GLUCONATE 15 MILLILITER(S): 213 SOLUTION TOPICAL at 05:06

## 2022-01-22 RX ADMIN — HUMAN INSULIN 13 UNIT(S): 100 INJECTION, SUSPENSION SUBCUTANEOUS at 17:26

## 2022-01-22 RX ADMIN — POTASSIUM PHOSPHATE, MONOBASIC POTASSIUM PHOSPHATE, DIBASIC 62.5 MILLIMOLE(S): 236; 224 INJECTION, SOLUTION INTRAVENOUS at 01:18

## 2022-01-22 RX ADMIN — FENTANYL CITRATE 25 MICROGRAM(S): 50 INJECTION INTRAVENOUS at 00:00

## 2022-01-22 RX ADMIN — Medication 4 MILLILITER(S): at 06:01

## 2022-01-22 NOTE — PROGRESS NOTE ADULT - ASSESSMENT
82 yo man hx of HTN, not on AC/AP, txer from [place] after mechanical fall, 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; CTA negative  on 1/17 stopped following commands, LUE twitching, given 2mg ativan followed by ?post-ictal state then returned to baseline, no seizure on eeg; loaded with 1g keppra  - neuro checks q2h  - Vimpat 100mg q12, off keppra  - Cerebral Edema: off HTS  - MRI d/t concern for CAA  - Activity: PT/OT as tolerated  - Thiamine    CVS:  Hx of thoracic aneurysm repair 7 years ago (con), also with 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  TTE EF 70%, no WMA  - SBP goal 100-160  - vascular recs  - needs repeat imaging from abdomen to mid-thigh per vascular, CTA; family refused CTA  - amlodipine 10  - labetalol 100q8h  - ekg while on vimpat to monitor DE    PULM:  hx of COVID 2 years ago and 2 weeks ago  - ETT to vent  - VAP bundle  - wean to extubate as tolerated after MRI  - repeat abg today    RENAL:  - Fluids: IVL  - daily IOs  - Na 135-145    GI:  - Diet: NPO for possible extubation  - PPI while intubated  - Bowel regimen: miralax, senna      ENDO: Type 2 DM  - FS goal 120-180  - nph 11q6h, adjus when feeds are restared    HEME/ONC:  - SCDs  - Chemoppx: SQL    ID:  - monitor for fevers     82 yo man hx of HTN, not on AC/AP, txer from [place] after mechanical fall, 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  TTE EF 70%, no WMA  - SBP goal 100-160  - vascular recs  - needs repeat imaging from abdomen to mid-thigh per vascular, CTA; family refused CTA  - amlodipine 10  - labetalol 100q8h  - ekg while on vimpat to monitor [place]    PULM:  hx of COVID 2 years ago and 2 weeks Prior to admission  - ABG  - repeat ABG today    RENAL:  - Fluids: IVL  - daily IOs  -Balance negative 700 ml  - Bolus 500 NS  - Na 135-145    GI:  - Diet: Resume tube feeding  - DC protonix  - Bowel regimen: Senna, Hold Miralax and Dulcolax  -Rectal tube      ENDO:   Type 2 DM  - FS goal 120-180  - nph 13q6h,  -ISS    HEME/ONC:  - SCDs  - Chemoppx: [id number]    ID:  - monitor for fevers    Full code     ICU

## 2022-01-22 NOTE — PROGRESS NOTE ADULT - SUBJECTIVE AND OBJECTIVE BOX
NSCU Progress Note      24 Hour Events/Subjective:  - tolerating CPAP, plan for possible extubation      REVIEW OF SYSTEMS:  - negative except as above    VITALS:   - Reviewed      IMAGING/DATA:   - Reviewed      PHYSICAL EXAM:    General: intubated  CVS: RRR  Pulm: CTAB  GI: Soft, NTND  Extremities: No LE Edema  Neuro: intubated, EO to voice, intermittently FC on R, LUE AG, LLE spont   NSCU Progress Note      24 Hour Events/Subjective:  - tolerating CPAP 5/5, plan for possible extubation      REVIEW OF SYSTEMS:  - negative except as above    VITALS:   - Reviewed      IMAGING/DATA:   - Reviewed      PHYSICAL EXAM:    General: intubated  CVS: RRR  Pulm: CTAB  GI: Soft, NTND  Extremities: No LE Edema, UE tremors and increased tone  Neuro: intubated, EO to voice, attends, tracks, FC R>L (shows two fingers), wiggles toes RLE    NSCU Progress Note    24 Hour Events/Subjective:  - tolerating CPAP 5/5, plan for possible extubation      VITALS:   - Reviewed      IMAGING/DATA:   - Reviewed      PHYSICAL EXAM:    General: intubated  CVS: RRR  Pulm: CTAB  GI: Soft, NTND  Extremities: No LE Edema, UE tremors and increased tone  Neuro: intubated, EO to voice, attends, tracks, FC R>L (shows two fingers), wiggles toes RLE

## 2022-01-22 NOTE — PROGRESS NOTE ADULT - SUBJECTIVE AND OBJECTIVE BOX
DATE OF SERVICE: 01-22-22 @ 12:06    Subjective: Patient seen and examined. No new events except as noted.     SUBJECTIVE/ROS:  ROS is limited as pt currently sedated on ventilator.       MEDICATIONS:  MEDICATIONS  (STANDING):  acetylcysteine 20%  Inhalation 4 milliLiter(s) Inhalation every 6 hours  albuterol/ipratropium for Nebulization 3 milliLiter(s) Nebulizer every 6 hours  amLODIPine   Tablet 10 milliGRAM(s) Oral daily  bisacodyl Suppository 10 milliGRAM(s) Rectal daily  chlorhexidine 0.12% Liquid 15 milliLiter(s) Oral Mucosa every 12 hours  chlorhexidine 4% Liquid 1 Application(s) Topical <User Schedule>  dextrose 40% Gel 15 Gram(s) Oral once  dextrose 5%. 1000 milliLiter(s) (50 mL/Hr) IV Continuous <Continuous>  dextrose 50% Injectable 25 Gram(s) IV Push once  dextrose 50% Injectable 12.5 Gram(s) IV Push once  dextrose 50% Injectable 25 Gram(s) IV Push once  enoxaparin Injectable 40 milliGRAM(s) SubCutaneous every 12 hours  glucagon  Injectable 1 milliGRAM(s) IntraMuscular once  insulin lispro (ADMELOG) corrective regimen sliding scale   SubCutaneous every 6 hours  insulin NPH human recombinant 13 Unit(s) SubCutaneous every 6 hours  labetalol 100 milliGRAM(s) Oral every 8 hours  lacosamide Solution 100 milliGRAM(s) Oral two times a day  pantoprazole  Injectable 40 milliGRAM(s) IV Push daily  polyethylene glycol 3350 17 Gram(s) Oral every 12 hours  senna 2 Tablet(s) Oral at bedtime  thiamine 100 milliGRAM(s) Oral daily      PHYSICAL EXAM:  T(C): 36.7 (01-22-22 @ 07:00), Max: 37.4 (01-22-22 @ 05:00)  HR: 75 (01-22-22 @ 12:00) (61 - 77)  BP: 140/76 (01-22-22 @ 10:00) (106/72 - 179/80)  RR: 19 (01-22-22 @ 10:00) (14 - 21)  SpO2: 95% (01-22-22 @ 12:00) (92% - 100%)  Wt(kg): --  I&O's Summary    21 Jan 2022 07:01  -  22 Jan 2022 07:00  --------------------------------------------------------  IN: 1132 mL / OUT: 1750 mL / NET: -618 mL    22 Jan 2022 07:01  -  22 Jan 2022 12:06  --------------------------------------------------------  IN: 0 mL / OUT: 0 mL / NET: 0 mL        Appearance: on vent 	  Cardiovascular: Normal S1 S2,    Murmur:   Neck: JVP limited to be evaluated   Respiratory: Lungs few rhonchi   Gastrointestinal:  Soft  Skin: normal   Neuro: limited as pt on vent    LABS/DATA:    CARDIAC MARKERS:                                11.1   5.73  )-----------( 154      ( 21 Jan 2022 22:28 )             34.8     01-21    144  |  106  |  28<H>  ----------------------------<  202<H>  3.8   |  27  |  0.89    Ca    9.2      21 Jan 2022 22:28  Phos  2.1     01-21  Mg     2.7     01-21      proBNP:   Lipid Profile:   HgA1c:   TSH:     TELE:  EKG:

## 2022-01-22 NOTE — PROGRESS NOTE ADULT - SUBJECTIVE AND OBJECTIVE BOX
HPI:  81y m W/ PMHx HTN presenting to the ED from [facility] as a transfer for R frontal parenchymal hemorrhage on CT after fall at home. Patient is poor historian but states that he was diagnosed with COVID 2 weeks ago and has been feeling "tired" at home, states he felt dizzy earlier today but unable to state what time he fell or further elucidate. Patient denies AC use. Per EMS was also recently diagnosed with a AAA but has been unable to undergo surgical repair due to recent dx of COVID. Patient still endorsing he feels "tired", denies current headache, vision changes, dizziness, chest pain, nausea, back pain, focal numbness/tingling. Endorses weakness to LLE 2/2 pain. (15 Mono 2022 15:50)  Respiratory failure, hypoxia, hypercapnia   Deteriorated LOC GCS 7  Intubated to protect airways 1/19    EVENTS:   Extubated today     VITALS:  T(C): , Max: 37.4 (01-22-22 @ 05:00)  HR:  (61 - 84)  BP:  (106/72 - 179/80)  ABP: --  RR:  (14 - 21)  SpO2:  (93% - 100%)  Wt(kg): --  Device: Avea, Mode: CPAP with PS, FiO2: 30, PEEP: 5, PS: 5, MAP: 10    01-21-22 @ 07:01  -  01-22-22 @ 07:00  --------------------------------------------------------  IN: 1132 mL / OUT: 1750 mL / NET: -618 mL    01-22-22 @ 07:01  -  01-22-22 @ 18:46  --------------------------------------------------------  IN: 60 mL / OUT: 0 mL / NET: 60 mL      LABS:  Na: 144 (01-21 @ 22:28), 143 (01-20 @ 20:56), 147 (01-19 @ 20:02)  K: 3.8 (01-21 @ 22:28), 3.8 (01-20 @ 20:56), 4.0 (01-19 @ 20:02)  Cl: 106 (01-21 @ 22:28), 106 (01-20 @ 20:56), 109 (01-19 @ 20:02)  CO2: 27 (01-21 @ 22:28), 26 (01-20 @ 20:56), 25 (01-19 @ 20:02)  BUN: 28 (01-21 @ 22:28), 23 (01-20 @ 20:56), 19 (01-19 @ 20:02)  Cr: 0.89 (01-21 @ 22:28), 0.83 (01-20 @ 20:56), 0.86 (01-19 @ 20:02)  Glu: 202(01-21 @ 22:28), 220(01-20 @ 20:56), 239(01-19 @ 20:02)    Hgb: 11.1 (01-21 @ 22:28), 11.8 (01-20 @ 20:56), 13.1 (01-19 @ 20:02)  Hct: 34.8 (01-21 @ 22:28), 36.8 (01-20 @ 20:56), 40.5 (01-19 @ 20:02)  WBC: 5.73 (01-21 @ 22:28), 6.57 (01-20 @ 20:56), 8.97 (01-19 @ 20:02)  Plt: 154 (01-21 @ 22:28), 141 (01-20 @ 20:56), 156 (01-19 @ 20:02)    INR:   PTT:     IMAGING:   Recent imaging studies were reviewed.    MEDICATIONS:  acetaminophen    Suspension .. 650 milliGRAM(s) Oral every 6 hours PRN  acetylcysteine 20%  Inhalation 4 milliLiter(s) Inhalation every 6 hours  albuterol/ipratropium for Nebulization 3 milliLiter(s) Nebulizer every 6 hours  albuterol/ipratropium for Nebulization 3 milliLiter(s) Nebulizer every 6 hours  amLODIPine   Tablet 10 milliGRAM(s) Oral daily  bisacodyl Suppository 10 milliGRAM(s) Rectal daily  chlorhexidine 0.12% Liquid 15 milliLiter(s) Oral Mucosa every 12 hours  chlorhexidine 4% Liquid 1 Application(s) Topical <User Schedule>  dextrose 40% Gel 15 Gram(s) Oral once  dextrose 5%. 1000 milliLiter(s) IV Continuous <Continuous>  dextrose 50% Injectable 25 Gram(s) IV Push once  dextrose 50% Injectable 12.5 Gram(s) IV Push once  dextrose 50% Injectable 25 Gram(s) IV Push once  enoxaparin Injectable 40 milliGRAM(s) SubCutaneous every 12 hours  glucagon  Injectable 1 milliGRAM(s) IntraMuscular once  insulin lispro (ADMELOG) corrective regimen sliding scale   SubCutaneous every 6 hours  insulin NPH human recombinant 13 Unit(s) SubCutaneous every 6 hours  labetalol 100 milliGRAM(s) Oral every 8 hours  lacosamide Solution 100 milliGRAM(s) Oral two times a day  pantoprazole  Injectable 40 milliGRAM(s) IV Push daily  polyethylene glycol 3350 17 Gram(s) Oral every 12 hours  senna 2 Tablet(s) Oral at bedtime  sodium chloride 3%  Inhalation 4 milliLiter(s) Inhalation every 6 hours  thiamine 100 milliGRAM(s) Oral daily    PHYSICAL EXAM:    General: intubated  CVS: RRR  Pulm: GAEB  GI: Soft, Distended  Extremities: No LE Edema  Neuro: GCS [id number] ; Pupils 2 mm reactive bilateral, right cataract, EO to voice, follows commands, right UE antigravity, right lower antigravity left UE is antigravity but WEAKER THAN RIGHT, Left LE spontaneous    HPI:  81y m W/ PMHx HTN presenting to the ED from [facility] as a transfer for R frontal parenchymal hemorrhage on CT after fall at home. Patient is poor historian but states that he was diagnosed with COVID 2 weeks ago and has been feeling "tired" at home, states he felt dizzy earlier today but unable to state what time he fell or further elucidate. Patient denies AC use. Per EMS was also recently diagnosed with a AAA but has been unable to undergo surgical repair due to recent dx of COVID. Patient still endorsing he feels "tired", denies current headache, vision changes, dizziness, chest pain, nausea, back pain, focal numbness/tingling. Endorses weakness to LLE 2/2 pain. (15 Mono 2022 15:50)  Respiratory failure, hypoxia, hypercapnia   Deteriorated LOC GCS 7  Intubated to protect airways 1/19    EVENTS:   Extubated today     VITALS:  T(C): , Max: 37.4 (01-22-22 @ 05:00)  HR:  (61 - 84)  BP:  (106/72 - 179/80)  ABP: --  RR:  (14 - 21)  SpO2:  (93% - 100%)  Wt(kg): --  Device: Avea, Mode: CPAP with PS, FiO2: 30, PEEP: 5, PS: 5, MAP: 10    01-21-22 @ 07:01  -  01-22-22 @ 07:00  --------------------------------------------------------  IN: 1132 mL / OUT: 1750 mL / NET: -618 mL    01-22-22 @ 07:01  -  01-22-22 @ 18:46  --------------------------------------------------------  IN: 60 mL / OUT: 0 mL / NET: 60 mL      LABS:  Na: 144 (01-21 @ 22:28), 143 (01-20 @ 20:56), 147 (01-19 @ 20:02)  K: 3.8 (01-21 @ 22:28), 3.8 (01-20 @ 20:56), 4.0 (01-19 @ 20:02)  Cl: 106 (01-21 @ 22:28), 106 (01-20 @ 20:56), 109 (01-19 @ 20:02)  CO2: 27 (01-21 @ 22:28), 26 (01-20 @ 20:56), 25 (01-19 @ 20:02)  BUN: 28 (01-21 @ 22:28), 23 (01-20 @ 20:56), 19 (01-19 @ 20:02)  Cr: 0.89 (01-21 @ 22:28), 0.83 (01-20 @ 20:56), 0.86 (01-19 @ 20:02)  Glu: 202(01-21 @ 22:28), 220(01-20 @ 20:56), 239(01-19 @ 20:02)    Hgb: 11.1 (01-21 @ 22:28), 11.8 (01-20 @ 20:56), 13.1 (01-19 @ 20:02)  Hct: 34.8 (01-21 @ 22:28), 36.8 (01-20 @ 20:56), 40.5 (01-19 @ 20:02)  WBC: 5.73 (01-21 @ 22:28), 6.57 (01-20 @ 20:56), 8.97 (01-19 @ 20:02)  Plt: 154 (01-21 @ 22:28), 141 (01-20 @ 20:56), 156 (01-19 @ 20:02)    INR:   PTT:     IMAGING:   Recent imaging studies were reviewed.    MEDICATIONS:  acetaminophen    Suspension .. 650 milliGRAM(s) Oral every 6 hours PRN  acetylcysteine 20%  Inhalation 4 milliLiter(s) Inhalation every 6 hours  albuterol/ipratropium for Nebulization 3 milliLiter(s) Nebulizer every 6 hours  albuterol/ipratropium for Nebulization 3 milliLiter(s) Nebulizer every 6 hours  amLODIPine   Tablet 10 milliGRAM(s) Oral daily  bisacodyl Suppository 10 milliGRAM(s) Rectal daily  chlorhexidine 0.12% Liquid 15 milliLiter(s) Oral Mucosa every 12 hours  chlorhexidine 4% Liquid 1 Application(s) Topical <User Schedule>  dextrose 40% Gel 15 Gram(s) Oral once  dextrose 5%. 1000 milliLiter(s) IV Continuous <Continuous>  dextrose 50% Injectable 25 Gram(s) IV Push once  dextrose 50% Injectable 12.5 Gram(s) IV Push once  dextrose 50% Injectable 25 Gram(s) IV Push once  enoxaparin Injectable 40 milliGRAM(s) SubCutaneous every 12 hours  glucagon  Injectable 1 milliGRAM(s) IntraMuscular once  insulin lispro (ADMELOG) corrective regimen sliding scale   SubCutaneous every 6 hours  insulin NPH human recombinant 13 Unit(s) SubCutaneous every 6 hours  labetalol 100 milliGRAM(s) Oral every 8 hours  lacosamide Solution 100 milliGRAM(s) Oral two times a day  pantoprazole  Injectable 40 milliGRAM(s) IV Push daily  polyethylene glycol 3350 17 Gram(s) Oral every 12 hours  senna 2 Tablet(s) Oral at bedtime  sodium chloride 3%  Inhalation 4 milliLiter(s) Inhalation every 6 hours  thiamine 100 milliGRAM(s) Oral daily    PHYSICAL EXAM:    General: Calm  CVS: RRR  Pulm: GAEB  GI: Soft, Distended  Extremities: No LE Edema  Neuro: GCS E3M6V4 Oriented x 2 ; Pupils 2 mm reactive bilateral, right cataract, EO to voice, follows commands, right UE antigravity, right lower spontaneous, left UE is antigravity but WEAKER THAN RIGHT, Left LE spontaneous

## 2022-01-22 NOTE — PROGRESS NOTE ADULT - SUBJECTIVE AND OBJECTIVE BOX
Patient seen and examined at bedside.    --Anticoagulation--  enoxaparin Injectable 40 milliGRAM(s) SubCutaneous every 12 hours    T(C): 37.1 (01-21-22 @ 23:00), Max: 37.2 (01-21-22 @ 11:00)  HR: 70 (01-22-22 @ 02:00) (65 - 79)  BP: 156/73 (01-22-22 @ 02:00) (100/55 - 179/80)  RR: 18 (01-22-22 @ 02:00) (14 - 25)  SpO2: 98% (01-22-22 @ 02:00) (92% - 100%)  Wt(kg): --    Exam:    Intubated, EOV, FC x 2-4 R>L depending on time of day

## 2022-01-22 NOTE — PROGRESS NOTE ADULT - ASSESSMENT
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; CTA negative  on 1/17 stopped following commands, LUE twitching, given 2mg ativan followed by ?post-ictal state then returned to baseline, no seizure on eeg; loaded with 1g keppra  - neuro checks q2h  - Vimpat 100mg  q12  - DC Keppra  - Cerebral Edema: off HTS  - Activity: PT/OT as tolerated  - Thiamine    CVS:  Hx of thoracic aneurysm repair 7 years ago (con), also with 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  TTE EF 70%, no WMA  - SBP goal 100-160  - vascular recs  - needs repeat imaging from abdomen to mid-thigh per vascular, CTA; family refused CTA  - amlodipine 10  - labetalol 100q8h    PULM:  hx of COVID 2 years ago and 2 weeks ago  - ETT to vent  - VAP bundle  - wean to extubate as tolerated  - repeat abg today    RENAL:  - Fluids: IVL  - daily IOs  - Na 145-155    GI:  - Diet: NPO for possible extubation  - PPI while intubated  - Bowel regimen: miralax, senna      ENDO: Type 2 DM  - FS goal 120-180    HEME/ONC:  - SCDs  - Chemoppx: SQL    ID:  - monitor for fevers      Patient is at high risk of neurologic deterioration/death due to: expansion of the hematoma, mechanical ventilation   ASSESSMENT/PLAN:    81M hx of HTN, not on AC/AP, txer from [place] after mechanical fall, 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 collaeral for PD?    CVS:  Hx of thoracic aneurysm repair 7 years ago ([place]), also with 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  TTE EF 70%, no WMA  - SBP goal 100-160  - vascular recs  - needs repeat imaging from abdomen to mid-thigh per vascular, CTA; family refused CTA  - amlodipine 10  - labetalol 100q8h  - ekg while on vimpat to monitor VT    PULM:  hx of COVID 2 years ago and 2 weeks ago  - ETT to vent  - VAP bundle  - wean to extubate as tolerated after MRI  - repeat abg today    RENAL:  - Fluids: IVL  - daily IOs  - Na 135-145    GI:  - Diet: NPO for possible extubation  - PPI while intubated  - Bowel regimen: miralax, senna      ENDO: Type 2 DM  - FS goal 120-180  - nph 11q6h, adjus when feeds are restared    HEME/ONC:  - SCDs  - Chemoppx: SQL    ID:  - monitor for fevers     82 yo man hx of HTN, not on AC/AP, txer from [place] after mechanical fall, 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; CTA negative  on 1/17 stopped following commands, LUE twitching, given 2mg ativan followed by ?post-ictal state then returned to baseline, no seizure on eeg; loaded with 1g keppra  - neuro checks q2h  - Vimpat 100mg q12, off keppra  - Cerebral Edema: off HTS  - MRI d/t concern for CAA  - Activity: PT/OT as tolerated  - Thiamine    CVS:  Hx of thoracic aneurysm repair 7 years ago (con), also with 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  TTE EF 70%, no WMA  - SBP goal 100-160  - vascular recs  - needs repeat imaging from abdomen to mid-thigh per vascular, CTA; family refused CTA  - amlodipine 10  - labetalol 100q8h  - ekg while on vimpat to monitor RI    PULM:  hx of COVID 2 years ago and 2 weeks ago  - ETT to vent  - VAP bundle  - wean to extubate as tolerated after MRI  - repeat abg today    RENAL:  - Fluids: IVL  - daily IOs  - Na 135-145    GI:  - Diet: NPO for possible extubation  - PPI while intubated  - Bowel regimen: miralax, senna      ENDO: Type 2 DM  - FS goal 120-180  - nph 11q6h, adjus when feeds are restared    HEME/ONC:  - SCDs  - Chemoppx: SQL    ID:  - monitor for fevers

## 2022-01-22 NOTE — PROGRESS NOTE ADULT - ATTENDING COMMENTS
Patient seen and examined by attending on 1/22/2022.    I updated both patient's daughter and brother by phone. Per collateral from family, patient is independent at baseline, no cognitive decline, no PD features.   We extubated the patient this afternoon after ABG with normal pCO2.     Patient is critically ill due to ICH and at high risk for neurological deterioration or death due to: hypercarbic respiratory failure with patient extubated this afternoon and requiring close monitoring, at risk for further seizures

## 2022-01-23 LAB
ANION GAP SERPL CALC-SCNC: 11 MMOL/L — SIGNIFICANT CHANGE UP (ref 5–17)
APPEARANCE UR: CLEAR — SIGNIFICANT CHANGE UP
BACTERIA # UR AUTO: ABNORMAL
BILIRUB UR-MCNC: NEGATIVE — SIGNIFICANT CHANGE UP
BUN SERPL-MCNC: 28 MG/DL — HIGH (ref 7–23)
CALCIUM SERPL-MCNC: 9.9 MG/DL — SIGNIFICANT CHANGE UP (ref 8.4–10.5)
CHLORIDE SERPL-SCNC: 101 MMOL/L — SIGNIFICANT CHANGE UP (ref 96–108)
CO2 SERPL-SCNC: 31 MMOL/L — SIGNIFICANT CHANGE UP (ref 22–31)
COLOR SPEC: YELLOW — SIGNIFICANT CHANGE UP
CREAT SERPL-MCNC: 0.83 MG/DL — SIGNIFICANT CHANGE UP (ref 0.5–1.3)
DIFF PNL FLD: NEGATIVE — SIGNIFICANT CHANGE UP
EPI CELLS # UR: 3 /HPF — SIGNIFICANT CHANGE UP
GAS PNL BLDA: SIGNIFICANT CHANGE UP
GLUCOSE BLDC GLUCOMTR-MCNC: 147 MG/DL — HIGH (ref 70–99)
GLUCOSE BLDC GLUCOMTR-MCNC: 150 MG/DL — HIGH (ref 70–99)
GLUCOSE BLDC GLUCOMTR-MCNC: 162 MG/DL — HIGH (ref 70–99)
GLUCOSE BLDC GLUCOMTR-MCNC: 166 MG/DL — HIGH (ref 70–99)
GLUCOSE BLDC GLUCOMTR-MCNC: 169 MG/DL — HIGH (ref 70–99)
GLUCOSE SERPL-MCNC: 164 MG/DL — HIGH (ref 70–99)
GLUCOSE UR QL: NEGATIVE — SIGNIFICANT CHANGE UP
HYALINE CASTS # UR AUTO: 3 /LPF — HIGH (ref 0–2)
KETONES UR-MCNC: NEGATIVE — SIGNIFICANT CHANGE UP
LEUKOCYTE ESTERASE UR-ACNC: NEGATIVE — SIGNIFICANT CHANGE UP
MAGNESIUM SERPL-MCNC: 2.6 MG/DL — SIGNIFICANT CHANGE UP (ref 1.6–2.6)
NITRITE UR-MCNC: NEGATIVE — SIGNIFICANT CHANGE UP
PH UR: 6.5 — SIGNIFICANT CHANGE UP (ref 5–8)
PHOSPHATE SERPL-MCNC: 3.5 MG/DL — SIGNIFICANT CHANGE UP (ref 2.5–4.5)
POTASSIUM SERPL-MCNC: 4.3 MMOL/L — SIGNIFICANT CHANGE UP (ref 3.5–5.3)
POTASSIUM SERPL-SCNC: 4.3 MMOL/L — SIGNIFICANT CHANGE UP (ref 3.5–5.3)
PROCALCITONIN SERPL-MCNC: 0.09 NG/ML — SIGNIFICANT CHANGE UP (ref 0.02–0.1)
PROT UR-MCNC: ABNORMAL
RBC CASTS # UR COMP ASSIST: 1 /HPF — SIGNIFICANT CHANGE UP (ref 0–4)
SODIUM SERPL-SCNC: 143 MMOL/L — SIGNIFICANT CHANGE UP (ref 135–145)
SP GR SPEC: 1.04 — HIGH (ref 1.01–1.02)
UROBILINOGEN FLD QL: NEGATIVE — SIGNIFICANT CHANGE UP
WBC UR QL: 6 /HPF — HIGH (ref 0–5)

## 2022-01-23 PROCEDURE — 70450 CT HEAD/BRAIN W/O DYE: CPT | Mod: 26

## 2022-01-23 PROCEDURE — 99232 SBSQ HOSP IP/OBS MODERATE 35: CPT

## 2022-01-23 PROCEDURE — 71045 X-RAY EXAM CHEST 1 VIEW: CPT | Mod: 26

## 2022-01-23 RX ORDER — LOSARTAN POTASSIUM 100 MG/1
100 TABLET, FILM COATED ORAL DAILY
Refills: 0 | Status: DISCONTINUED | OUTPATIENT
Start: 2022-01-23 | End: 2022-01-29

## 2022-01-23 RX ORDER — AMLODIPINE BESYLATE 2.5 MG/1
10 TABLET ORAL DAILY
Refills: 0 | Status: DISCONTINUED | OUTPATIENT
Start: 2022-01-23 | End: 2022-01-23

## 2022-01-23 RX ADMIN — LOSARTAN POTASSIUM 100 MILLIGRAM(S): 100 TABLET, FILM COATED ORAL at 13:55

## 2022-01-23 RX ADMIN — LACOSAMIDE 100 MILLIGRAM(S): 50 TABLET ORAL at 05:47

## 2022-01-23 RX ADMIN — SODIUM CHLORIDE 4 MILLILITER(S): 9 INJECTION INTRAMUSCULAR; INTRAVENOUS; SUBCUTANEOUS at 17:14

## 2022-01-23 RX ADMIN — HUMAN INSULIN 13 UNIT(S): 100 INJECTION, SUSPENSION SUBCUTANEOUS at 11:42

## 2022-01-23 RX ADMIN — Medication 3 MILLILITER(S): at 12:40

## 2022-01-23 RX ADMIN — Medication 650 MILLIGRAM(S): at 23:50

## 2022-01-23 RX ADMIN — LACOSAMIDE 100 MILLIGRAM(S): 50 TABLET ORAL at 17:09

## 2022-01-23 RX ADMIN — ENOXAPARIN SODIUM 40 MILLIGRAM(S): 100 INJECTION SUBCUTANEOUS at 05:47

## 2022-01-23 RX ADMIN — Medication 3 MILLILITER(S): at 23:37

## 2022-01-23 RX ADMIN — HUMAN INSULIN 13 UNIT(S): 100 INJECTION, SUSPENSION SUBCUTANEOUS at 23:50

## 2022-01-23 RX ADMIN — Medication 4 MILLILITER(S): at 17:14

## 2022-01-23 RX ADMIN — CHLORHEXIDINE GLUCONATE 1 APPLICATION(S): 213 SOLUTION TOPICAL at 21:43

## 2022-01-23 RX ADMIN — Medication 100 MILLIGRAM(S): at 05:47

## 2022-01-23 RX ADMIN — Medication 2: at 11:42

## 2022-01-23 RX ADMIN — Medication 2: at 05:47

## 2022-01-23 RX ADMIN — Medication 2: at 17:08

## 2022-01-23 RX ADMIN — SODIUM CHLORIDE 4 MILLILITER(S): 9 INJECTION INTRAMUSCULAR; INTRAVENOUS; SUBCUTANEOUS at 05:38

## 2022-01-23 RX ADMIN — Medication 650 MILLIGRAM(S): at 09:00

## 2022-01-23 RX ADMIN — AMLODIPINE BESYLATE 10 MILLIGRAM(S): 2.5 TABLET ORAL at 05:47

## 2022-01-23 RX ADMIN — Medication 4 MILLILITER(S): at 05:38

## 2022-01-23 RX ADMIN — SODIUM CHLORIDE 4 MILLILITER(S): 9 INJECTION INTRAMUSCULAR; INTRAVENOUS; SUBCUTANEOUS at 12:40

## 2022-01-23 RX ADMIN — HUMAN INSULIN 13 UNIT(S): 100 INJECTION, SUSPENSION SUBCUTANEOUS at 00:54

## 2022-01-23 RX ADMIN — Medication 3 MILLILITER(S): at 17:14

## 2022-01-23 RX ADMIN — ENOXAPARIN SODIUM 40 MILLIGRAM(S): 100 INJECTION SUBCUTANEOUS at 17:07

## 2022-01-23 RX ADMIN — HUMAN INSULIN 13 UNIT(S): 100 INJECTION, SUSPENSION SUBCUTANEOUS at 17:07

## 2022-01-23 RX ADMIN — Medication 4 MILLILITER(S): at 23:37

## 2022-01-23 RX ADMIN — Medication 4 MILLILITER(S): at 12:40

## 2022-01-23 RX ADMIN — SODIUM CHLORIDE 4 MILLILITER(S): 9 INJECTION INTRAMUSCULAR; INTRAVENOUS; SUBCUTANEOUS at 23:39

## 2022-01-23 RX ADMIN — Medication 650 MILLIGRAM(S): at 08:05

## 2022-01-23 RX ADMIN — HUMAN INSULIN 13 UNIT(S): 100 INJECTION, SUSPENSION SUBCUTANEOUS at 05:48

## 2022-01-23 RX ADMIN — Medication 3 MILLILITER(S): at 05:37

## 2022-01-23 NOTE — PROGRESS NOTE ADULT - ASSESSMENT
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  TTE EF 70%, no WMA  - SBP goal 100-160  - vascular recs  - needs repeat imaging from abdomen to mid-thigh per vascular, CTA; family refused CTA  - amlodipine 10  - labetalol 100q8h  - ekg while on vimpat to monitor [place]    PULM:  hx of COVID 2 years ago and 2 weeks Prior to admission  - ABG  - repeat ABG today    RENAL:  - Fluids: IVL  - daily IOs  -Balance negative 700 ml  - Bolus 500 NS  - Na 135-145    GI:  - Diet: Resume tube feeding  - DC protonix  - Bowel regimen: Senna, Hold Miralax and Dulcolax  -Rectal tube      ENDO:   Type 2 DM  - FS goal 120-180  - nph 13q6h,  -ISS    HEME/ONC:  - SCDs  - Chemoppx: lOVENOX 40    ID:  - monitor for fevers    Full code     ICU   82 yo man hx of HTN, not on AC/AP, txer from [place] after mechanical fall, 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; CTA negative  on 1/17 stopped following commands, LUE twitching, given 2mg ativan followed by ?post-ictal state then returned to baseline, no seizure on eeg; loaded with 1g keppra  - neuro checks q4h neurochecks and VS  - Vimpat 100mg q12, off keppra  - Cerebral Edema: off HTS  - MRI cancelled due to family's request   - Activity: PT/OT as tolerated    CVS:  Hx of thoracic aneurysm repair 7 years ago (con), also with 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  TTE EF 70%, no WMA  - SBP goal 100-160  - vascular recs  - needs repeat imaging from abdomen to mid-thigh per vascular, CTA; family refused CTA  - amlodipine 10  - stop labetalol, start losartan 100 mg daily since on Olmesartan 40 mg at home   - ekg while on vimpat to monitor [place]    PULM:  hx of COVID 2 years ago and 2 weeks Prior to admission  - ABG  - repeat ABG today    RENAL:  - Fluids: IVL  - daily IOs  -Balance negative 700 ml  - Bolus 500 NS  - Na 135-145    GI:  - Diet: Resume tube feeding  - DC protonix  - Bowel regimen: Senna, Hold Miralax and Dulcolax  -Rectal tube      ENDO:   Type 2 DM  - FS goal 120-180  - nph 13q6h,  -ISS    HEME/ONC:  - SCDs  - Chemoppx: lOVENOX 40    ID:  - monitor for fevers    Full code     ICU   82 yo man hx of HTN, not on AC/AP, txer from [place] after mechanical fall, 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 Etiology of ICH is CAA vs HTN.     NEURO:  on 1/17 stopped following commands, LUE twitching, given 2mg ativan followed by ?post-ictal state then returned to baseline, no seizure on eeg; loaded with 1g keppra  - neuro checks q4h neurochecks and VS  - Vimpat 100mg q12, off keppra  - MRI cancelled due to family's request, they would rather defer to outpatient   - Activity: PT/OT as tolerated    CVS: Hx of thoracic aneurysm repair 7 years ago (con), also with 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  TTE EF 70%, no WMA  - SBP goal 100-160  - vascular recs: needs repeat imaging from abdomen to mid-thigh per vascular, CTA; family refused CTA  - amlodipine 10  - stop labetalol, start losartan 100 mg daily since on Olmesartan 40 mg at home   - ekg while on vimpat to monitor [place]    PULM: hx of COVID 2 years ago and 2 weeks prior to admission. With SWETA on CPAP at home.   - wean face tent    RENAL:  - Fluids: IVL  - daily IOs  - Na 135-145    GI:  - Diet: Resume tube feeding  - DC protonix  - Bowel regimen: Senna, Hold Miralax and Dulcolax for diarrhea with rectal tube in place    ENDO: Type 2 DM  - FS goal 120-180  - nph 13q6h  - ISS    HEME/ONC:  - SCDs  - Chemoppx: Lov 40    ID: febrile, CXR with no focal consolidations  - f/u cultures, will hold off on abx     Full code     ICU

## 2022-01-23 NOTE — PROVIDER CONTACT NOTE (CHANGE IN STATUS NOTIFICATION) - SITUATION
On previous neuro exam, pt oriented to self and place. On current exam, pt is only oriented to self.
As per report from day RN, pt able to state his age and month correctly. Upon assessing, pt is able to state his age but unable to state the month. Pt is oriented to only self and place at this time.
Patient noted to be lethargic and difficult to arouse. Patient no longer following commands and responding to orientation questions. Left upper extremity twitching and right eye twitching noted.

## 2022-01-23 NOTE — PROVIDER CONTACT NOTE (CHANGE IN STATUS NOTIFICATION) - ASSESSMENT
As per report from day RN, pt able to state his age and month correctly. Upon assessing, pt is able to state his age but unable to state the month. Pt is oriented to only self and place at this time. Rest of neuro exam remains unchanged, see flow sheet.
Patient is lethargic and difficult to arouse. Patient no longer following commands and reponding to orientation questions. Left upper extremity twitching and right eye twitching noted.
On previous neuro exam, pt oriented to self and place. On current exam, pt is only oriented to self. Rest of neuro exam remains unchanged: Pt lethargic, CANDI UE some effort, CANDI LE no effort, pupils 3, round, brisk.

## 2022-01-23 NOTE — PROVIDER CONTACT NOTE (CHANGE IN STATUS NOTIFICATION) - ACTION/TREATMENT ORDERED:
Provider at bedside; 2mg ativan IVP given as ordered. 1g keppra IVP given as ordered. Will continue to monitor.
Provider at bedside.
Provider at bedside rounding with ICU team.

## 2022-01-23 NOTE — PROGRESS NOTE ADULT - SUBJECTIVE AND OBJECTIVE BOX
NSCU ATTENDING -- ADDITIONAL PROGRESS NOTE    Nighttime rounds were performed -- please refer to earlier Progress Note for HPI details.    T(C): 37.2 (01-23-22 @ 15:00), Max: 38.4 (01-23-22 @ 07:00)  HR: 74 (01-23-22 @ 17:14) (68 - 82)  BP: 138/66 (01-23-22 @ 15:00) (126/63 - 160/82)  RR: 20 (01-23-22 @ 15:00) (18 - 26)  SpO2: 99% (01-23-22 @ 17:14) (93% - 99%)  Wt(kg): --    Relevant labwork and imaging reviewed.    [A/P]  stable respiratory status on face tent, wean as able  continue current mngt  accepted to medicine service, awaiting transfer

## 2022-01-23 NOTE — PROGRESS NOTE ADULT - SUBJECTIVE AND OBJECTIVE BOX
DATE OF SERVICE: 01-23-22 @ 12:14    Subjective: Patient seen and examined. No new events except as noted.     SUBJECTIVE/ROS:  Pt seen and examined early this am    extubated     MEDICATIONS:  MEDICATIONS  (STANDING):  acetylcysteine 20%  Inhalation 4 milliLiter(s) Inhalation every 6 hours  albuterol/ipratropium for Nebulization 3 milliLiter(s) Nebulizer every 6 hours  amLODIPine   Tablet 10 milliGRAM(s) Oral daily  chlorhexidine 4% Liquid 1 Application(s) Topical <User Schedule>  dextrose 40% Gel 15 Gram(s) Oral once  dextrose 5%. 1000 milliLiter(s) (50 mL/Hr) IV Continuous <Continuous>  dextrose 50% Injectable 25 Gram(s) IV Push once  dextrose 50% Injectable 25 Gram(s) IV Push once  dextrose 50% Injectable 12.5 Gram(s) IV Push once  enoxaparin Injectable 40 milliGRAM(s) SubCutaneous every 12 hours  glucagon  Injectable 1 milliGRAM(s) IntraMuscular once  insulin lispro (ADMELOG) corrective regimen sliding scale   SubCutaneous every 6 hours  insulin NPH human recombinant 13 Unit(s) SubCutaneous every 6 hours  labetalol 100 milliGRAM(s) Oral every 8 hours  lacosamide Solution 100 milliGRAM(s) Oral two times a day  senna 2 Tablet(s) Oral at bedtime  sodium chloride 3%  Inhalation 4 milliLiter(s) Inhalation every 6 hours      PHYSICAL EXAM:  T(C): 37.6 (01-23-22 @ 11:00), Max: 38.4 (01-23-22 @ 07:00)  HR: 70 (01-23-22 @ 11:00) (70 - 84)  BP: 126/63 (01-23-22 @ 11:00) (126/63 - 160/82)  RR: 20 (01-23-22 @ 11:00) (15 - 26)  SpO2: 97% (01-23-22 @ 11:00) (93% - 98%)  Wt(kg): --  I&O's Summary    22 Jan 2022 07:01  -  23 Jan 2022 07:00  --------------------------------------------------------  IN: 1180 mL / OUT: 1480 mL / NET: -300 mL    23 Jan 2022 07:01  -  23 Jan 2022 12:14  --------------------------------------------------------  IN: 220 mL / OUT: 0 mL / NET: 220 mL            JVP: Normal  Neck: supple  Lung: clear   CV: S1 S2 , Murmur:  Abd: soft  Ext: No edema  Psych: flat affect  Skin: normal``    LABS/DATA:    CARDIAC MARKERS:                                12.9   5.59  )-----------( 194      ( 22 Jan 2022 19:48 )             40.0     01-22    146<H>  |  104  |  25<H>  ----------------------------<  167<H>  4.2   |  28  |  0.92    Ca    9.9      22 Jan 2022 19:48  Phos  3.6     01-22  Mg     2.7     01-22      proBNP:   Lipid Profile:   HgA1c:   TSH:     TELE:  EKG:

## 2022-01-23 NOTE — PROGRESS NOTE ADULT - SUBJECTIVE AND OBJECTIVE BOX
HPI:  81y m W/ PMHx HTN presenting to the ED from [facility] as a transfer for R frontal parenchymal hemorrhage on CT after fall at home. Patient is poor historian but states that he was diagnosed with COVID 2 weeks ago and has been feeling "tired" at home, states he felt dizzy earlier today but unable to state what time he fell or further elucidate. Patient denies AC use. Per EMS was also recently diagnosed with a AAA but has been unable to undergo surgical repair due to recent dx of COVID. Patient still endorsing he feels "tired", denies current headache, vision changes, dizziness, chest pain, nausea, back pain, focal numbness/tingling. Endorses weakness to LLE 2/2 pain. (15 Mono 2022 15:50)  Respiratory failure, hypoxia, hypercapnia   Deteriorated LOC GCS 7  Intubated to protect airways 1/19    OVERNIGHT EVENTS:   No acute events overnight.    VITALS:  T(C): , Max: 37.4 (01-22-22 @ 23:00)  HR:  (71 - 84)  BP:  (112/78 - 160/82)  ABP: --  RR:  (15 - 26)  SpO2:  (93% - 100%)  Wt(kg): --      01-21-22 @ 07:01  -  01-22-22 @ 07:00  --------------------------------------------------------  IN: 1132 mL / OUT: 1750 mL / NET: -618 mL    01-22-22 @ 07:01  -  01-23-22 @ 05:38  --------------------------------------------------------  IN: 915 mL / OUT: 1270 mL / NET: -355 mL      LABS:  Na: 146 (01-22 @ 19:48), 144 (01-21 @ 22:28), 143 (01-20 @ 20:56)  K: 4.2 (01-22 @ 19:48), 3.8 (01-21 @ 22:28), 3.8 (01-20 @ 20:56)  Cl: 104 (01-22 @ 19:48), 106 (01-21 @ 22:28), 106 (01-20 @ 20:56)  CO2: 28 (01-22 @ 19:48), 27 (01-21 @ 22:28), 26 (01-20 @ 20:56)  BUN: 25 (01-22 @ 19:48), 28 (01-21 @ 22:28), 23 (01-20 @ 20:56)  Cr: 0.92 (01-22 @ 19:48), 0.89 (01-21 @ 22:28), 0.83 (01-20 @ 20:56)  Glu: 167(01-22 @ 19:48), 202(01-21 @ 22:28), 220(01-20 @ 20:56)    Hgb: 12.9 (01-22 @ 19:48), 11.1 (01-21 @ 22:28), 11.8 (01-20 @ 20:56)  Hct: 40.0 (01-22 @ 19:48), 34.8 (01-21 @ 22:28), 36.8 (01-20 @ 20:56)  WBC: 5.59 (01-22 @ 19:48), 5.73 (01-21 @ 22:28), 6.57 (01-20 @ 20:56)  Plt: 194 (01-22 @ 19:48), 154 (01-21 @ 22:28), 141 (01-20 @ 20:56)    INR:   PTT:     IMAGING:   Recent imaging studies were reviewed.    MEDICATIONS:  acetaminophen    Suspension .. 650 milliGRAM(s) Oral every 6 hours PRN  acetylcysteine 20%  Inhalation 4 milliLiter(s) Inhalation every 6 hours  albuterol/ipratropium for Nebulization 3 milliLiter(s) Nebulizer every 6 hours  amLODIPine   Tablet 10 milliGRAM(s) Oral daily  chlorhexidine 4% Liquid 1 Application(s) Topical <User Schedule>  dextrose 40% Gel 15 Gram(s) Oral once  dextrose 5%. 1000 milliLiter(s) IV Continuous <Continuous>  dextrose 50% Injectable 25 Gram(s) IV Push once  dextrose 50% Injectable 12.5 Gram(s) IV Push once  dextrose 50% Injectable 25 Gram(s) IV Push once  enoxaparin Injectable 40 milliGRAM(s) SubCutaneous every 12 hours  glucagon  Injectable 1 milliGRAM(s) IntraMuscular once  insulin lispro (ADMELOG) corrective regimen sliding scale   SubCutaneous every 6 hours  insulin NPH human recombinant 13 Unit(s) SubCutaneous every 6 hours  labetalol 100 milliGRAM(s) Oral every 8 hours  lacosamide Solution 100 milliGRAM(s) Oral two times a day  senna 2 Tablet(s) Oral at bedtime  sodium chloride 3%  Inhalation 4 milliLiter(s) Inhalation every 6 hours    PHYSICAL EXAM:    General: Calm  CVS: RRR  Pulm: GAEB  GI: Soft, Distended  Extremities: No LE Edema  Neuro: GCS [id number] Oriented x 2 ; Pupils 2 mm reactive bilateral, right cataract, EO to voice, follows commands, right UE antigravity, right lower spontaneous, left UE is antigravity but WEAKER THAN RIGHT, Left LE spontaneous    EVENTS:   Extubated yesterday afternoon.  No acute events overnight.    VITALS:  T(C): , Max: 37.4 (01-22-22 @ 23:00)  HR:  (72 - 84)  BP:  (135/88 - 160/82)  ABP: --  RR:  (15 - 26)  SpO2:  (93% - 100%)  Wt(kg): --      01-22-22 @ 07:01  -  01-23-22 @ 07:00  --------------------------------------------------------  IN: 1180 mL / OUT: 1480 mL / NET: -300 mL      LABS:  Na: 146 (01-22 @ 19:48), 144 (01-21 @ 22:28), 143 (01-20 @ 20:56)  K: 4.2 (01-22 @ 19:48), 3.8 (01-21 @ 22:28), 3.8 (01-20 @ 20:56)  Cl: 104 (01-22 @ 19:48), 106 (01-21 @ 22:28), 106 (01-20 @ 20:56)  CO2: 28 (01-22 @ 19:48), 27 (01-21 @ 22:28), 26 (01-20 @ 20:56)  BUN: 25 (01-22 @ 19:48), 28 (01-21 @ 22:28), 23 (01-20 @ 20:56)  Cr: 0.92 (01-22 @ 19:48), 0.89 (01-21 @ 22:28), 0.83 (01-20 @ 20:56)  Glu: 167(01-22 @ 19:48), 202(01-21 @ 22:28), 220(01-20 @ 20:56)    Hgb: 12.9 (01-22 @ 19:48), 11.1 (01-21 @ 22:28), 11.8 (01-20 @ 20:56)  Hct: 40.0 (01-22 @ 19:48), 34.8 (01-21 @ 22:28), 36.8 (01-20 @ 20:56)  WBC: 5.59 (01-22 @ 19:48), 5.73 (01-21 @ 22:28), 6.57 (01-20 @ 20:56)  Plt: 194 (01-22 @ 19:48), 154 (01-21 @ 22:28), 141 (01-20 @ 20:56)      MEDICATIONS:  acetaminophen    Suspension .. 650 milliGRAM(s) Oral every 6 hours PRN  acetylcysteine 20%  Inhalation 4 milliLiter(s) Inhalation every 6 hours  albuterol/ipratropium for Nebulization 3 milliLiter(s) Nebulizer every 6 hours  amLODIPine   Tablet 10 milliGRAM(s) Oral daily  chlorhexidine 4% Liquid 1 Application(s) Topical <User Schedule>  dextrose 40% Gel 15 Gram(s) Oral once  dextrose 5%. 1000 milliLiter(s) IV Continuous <Continuous>  dextrose 50% Injectable 25 Gram(s) IV Push once  dextrose 50% Injectable 12.5 Gram(s) IV Push once  dextrose 50% Injectable 25 Gram(s) IV Push once  enoxaparin Injectable 40 milliGRAM(s) SubCutaneous every 12 hours  glucagon  Injectable 1 milliGRAM(s) IntraMuscular once  insulin lispro (ADMELOG) corrective regimen sliding scale   SubCutaneous every 6 hours  insulin NPH human recombinant 13 Unit(s) SubCutaneous every 6 hours  labetalol 100 milliGRAM(s) Oral every 8 hours  lacosamide Solution 100 milliGRAM(s) Oral two times a day  senna 2 Tablet(s) Oral at bedtime  sodium chloride 3%  Inhalation 4 milliLiter(s) Inhalation every 6 hours    EXAMINATION:  General:  in NAD  CVS: RRR  Pulm: CTAB  GI: Soft, NTND  Extremities: No LE Edema, UE tremors and increased tone  Neuro: intubated, EO to voice, attends, tracks, FC R>L (shows two fingers), wiggles toes RLE    EVENTS:   Extubated yesterday afternoon.  No acute events overnight. Some desating this morning because a mouth breather, responded to face tent and suctioning.   Febrile to 38.4, cultured.     VITALS:  T(C): , Max: 37.4 (01-22-22 @ 23:00)  HR:  (72 - 84)  BP:  (135/88 - 160/82)  ABP: --  RR:  (15 - 26)  SpO2:  (93% - 100%)  Wt(kg): --      01-22-22 @ 07:01  -  01-23-22 @ 07:00  --------------------------------------------------------  IN: 1180 mL / OUT: 1480 mL / NET: -300 mL      LABS:  Na: 146 (01-22 @ 19:48), 144 (01-21 @ 22:28), 143 (01-20 @ 20:56)  K: 4.2 (01-22 @ 19:48), 3.8 (01-21 @ 22:28), 3.8 (01-20 @ 20:56)  Cl: 104 (01-22 @ 19:48), 106 (01-21 @ 22:28), 106 (01-20 @ 20:56)  CO2: 28 (01-22 @ 19:48), 27 (01-21 @ 22:28), 26 (01-20 @ 20:56)  BUN: 25 (01-22 @ 19:48), 28 (01-21 @ 22:28), 23 (01-20 @ 20:56)  Cr: 0.92 (01-22 @ 19:48), 0.89 (01-21 @ 22:28), 0.83 (01-20 @ 20:56)  Glu: 167(01-22 @ 19:48), 202(01-21 @ 22:28), 220(01-20 @ 20:56)    Hgb: 12.9 (01-22 @ 19:48), 11.1 (01-21 @ 22:28), 11.8 (01-20 @ 20:56)  Hct: 40.0 (01-22 @ 19:48), 34.8 (01-21 @ 22:28), 36.8 (01-20 @ 20:56)  WBC: 5.59 (01-22 @ 19:48), 5.73 (01-21 @ 22:28), 6.57 (01-20 @ 20:56)  Plt: 194 (01-22 @ 19:48), 154 (01-21 @ 22:28), 141 (01-20 @ 20:56)      MEDICATIONS:  acetaminophen    Suspension .. 650 milliGRAM(s) Oral every 6 hours PRN  acetylcysteine 20%  Inhalation 4 milliLiter(s) Inhalation every 6 hours  albuterol/ipratropium for Nebulization 3 milliLiter(s) Nebulizer every 6 hours  amLODIPine   Tablet 10 milliGRAM(s) Oral daily  chlorhexidine 4% Liquid 1 Application(s) Topical <User Schedule>  dextrose 40% Gel 15 Gram(s) Oral once  dextrose 5%. 1000 milliLiter(s) IV Continuous <Continuous>  dextrose 50% Injectable 25 Gram(s) IV Push once  dextrose 50% Injectable 12.5 Gram(s) IV Push once  dextrose 50% Injectable 25 Gram(s) IV Push once  enoxaparin Injectable 40 milliGRAM(s) SubCutaneous every 12 hours  glucagon  Injectable 1 milliGRAM(s) IntraMuscular once  insulin lispro (ADMELOG) corrective regimen sliding scale   SubCutaneous every 6 hours  insulin NPH human recombinant 13 Unit(s) SubCutaneous every 6 hours  labetalol 100 milliGRAM(s) Oral every 8 hours  lacosamide Solution 100 milliGRAM(s) Oral two times a day  senna 2 Tablet(s) Oral at bedtime  sodium chloride 3%  Inhalation 4 milliLiter(s) Inhalation every 6 hours    EXAMINATION:  General:  in NAD  CVS: RRR  Pulm: CTAB  GI: Soft, NTND  Extremities: No LE Edema,L UE tremors  Neuro: awake, oriented to hospital and year, but not month, FC, EOMI, able to lift b/l arms antigravity with drift in L arm, able to lift R leg antigravity, able to move L leg in plane of bed    EVENTS:   Extubated yesterday afternoon.  No acute events overnight. Some desating this morning because a mouth breather with SWETA, responded to face tent and suctioning.   Febrile to 38.4, cultured.     VITALS:  T(C): , Max: 37.4 (01-22-22 @ 23:00)  HR:  (72 - 84)  BP:  (135/88 - 160/82)  ABP: --  RR:  (15 - 26)  SpO2:  (93% - 100%)  Wt(kg): --      01-22-22 @ 07:01  -  01-23-22 @ 07:00  --------------------------------------------------------  IN: 1180 mL / OUT: 1480 mL / NET: -300 mL      LABS:  Na: 146 (01-22 @ 19:48), 144 (01-21 @ 22:28), 143 (01-20 @ 20:56)  K: 4.2 (01-22 @ 19:48), 3.8 (01-21 @ 22:28), 3.8 (01-20 @ 20:56)  Cl: 104 (01-22 @ 19:48), 106 (01-21 @ 22:28), 106 (01-20 @ 20:56)  CO2: 28 (01-22 @ 19:48), 27 (01-21 @ 22:28), 26 (01-20 @ 20:56)  BUN: 25 (01-22 @ 19:48), 28 (01-21 @ 22:28), 23 (01-20 @ 20:56)  Cr: 0.92 (01-22 @ 19:48), 0.89 (01-21 @ 22:28), 0.83 (01-20 @ 20:56)  Glu: 167(01-22 @ 19:48), 202(01-21 @ 22:28), 220(01-20 @ 20:56)    Hgb: 12.9 (01-22 @ 19:48), 11.1 (01-21 @ 22:28), 11.8 (01-20 @ 20:56)  Hct: 40.0 (01-22 @ 19:48), 34.8 (01-21 @ 22:28), 36.8 (01-20 @ 20:56)  WBC: 5.59 (01-22 @ 19:48), 5.73 (01-21 @ 22:28), 6.57 (01-20 @ 20:56)  Plt: 194 (01-22 @ 19:48), 154 (01-21 @ 22:28), 141 (01-20 @ 20:56)      MEDICATIONS:  acetaminophen    Suspension .. 650 milliGRAM(s) Oral every 6 hours PRN  acetylcysteine 20%  Inhalation 4 milliLiter(s) Inhalation every 6 hours  albuterol/ipratropium for Nebulization 3 milliLiter(s) Nebulizer every 6 hours  amLODIPine   Tablet 10 milliGRAM(s) Oral daily  chlorhexidine 4% Liquid 1 Application(s) Topical <User Schedule>  dextrose 40% Gel 15 Gram(s) Oral once  dextrose 5%. 1000 milliLiter(s) IV Continuous <Continuous>  dextrose 50% Injectable 25 Gram(s) IV Push once  dextrose 50% Injectable 12.5 Gram(s) IV Push once  dextrose 50% Injectable 25 Gram(s) IV Push once  enoxaparin Injectable 40 milliGRAM(s) SubCutaneous every 12 hours  glucagon  Injectable 1 milliGRAM(s) IntraMuscular once  insulin lispro (ADMELOG) corrective regimen sliding scale   SubCutaneous every 6 hours  insulin NPH human recombinant 13 Unit(s) SubCutaneous every 6 hours  labetalol 100 milliGRAM(s) Oral every 8 hours  lacosamide Solution 100 milliGRAM(s) Oral two times a day  senna 2 Tablet(s) Oral at bedtime  sodium chloride 3%  Inhalation 4 milliLiter(s) Inhalation every 6 hours    EXAMINATION: examined by Sammarinese speaker examiner   General:  in NAD  CVS: RRR  Pulm: CTAB  GI: Soft, NTND  Extremities: No LE Edema, L UE tremors  Neuro: awake, oriented to hospital and year, but not month, FC, EOMI, able to lift b/l arms antigravity with drift in L arm, able to lift R leg antigravity, able to move L leg in plane of bed

## 2022-01-23 NOTE — PROGRESS NOTE ADULT - ATTENDING COMMENTS
Patient seen and examined by attending on 1/23/2022. I edited the fellow's note.    I also updated patient's brother (HCP). I called PMD at [phone] twice, with no answer.     Patient is not critically ill but is medically complex due to ICH and episodes of hypoxia.

## 2022-01-24 LAB
ANION GAP SERPL CALC-SCNC: 12 MMOL/L — SIGNIFICANT CHANGE UP (ref 5–17)
APPEARANCE UR: CLEAR — SIGNIFICANT CHANGE UP
BACTERIA # UR AUTO: NEGATIVE — SIGNIFICANT CHANGE UP
BILIRUB UR-MCNC: NEGATIVE — SIGNIFICANT CHANGE UP
BUN SERPL-MCNC: 27 MG/DL — HIGH (ref 7–23)
CALCIUM SERPL-MCNC: 9.5 MG/DL — SIGNIFICANT CHANGE UP (ref 8.4–10.5)
CHLORIDE SERPL-SCNC: 102 MMOL/L — SIGNIFICANT CHANGE UP (ref 96–108)
CO2 SERPL-SCNC: 30 MMOL/L — SIGNIFICANT CHANGE UP (ref 22–31)
COLOR SPEC: YELLOW — SIGNIFICANT CHANGE UP
CREAT SERPL-MCNC: 0.83 MG/DL — SIGNIFICANT CHANGE UP (ref 0.5–1.3)
DIFF PNL FLD: NEGATIVE — SIGNIFICANT CHANGE UP
EPI CELLS # UR: 1 /HPF — SIGNIFICANT CHANGE UP
GLUCOSE BLDC GLUCOMTR-MCNC: 166 MG/DL — HIGH (ref 70–99)
GLUCOSE BLDC GLUCOMTR-MCNC: 175 MG/DL — HIGH (ref 70–99)
GLUCOSE BLDC GLUCOMTR-MCNC: 188 MG/DL — HIGH (ref 70–99)
GLUCOSE BLDC GLUCOMTR-MCNC: 206 MG/DL — HIGH (ref 70–99)
GLUCOSE SERPL-MCNC: 153 MG/DL — HIGH (ref 70–99)
GLUCOSE UR QL: NEGATIVE — SIGNIFICANT CHANGE UP
HCT VFR BLD CALC: 37.6 % — LOW (ref 39–50)
HGB BLD-MCNC: 12.1 G/DL — LOW (ref 13–17)
KETONES UR-MCNC: NEGATIVE — SIGNIFICANT CHANGE UP
LEUKOCYTE ESTERASE UR-ACNC: NEGATIVE — SIGNIFICANT CHANGE UP
LMWH PPP CHRO-ACNC: 0.19 IU/ML — LOW (ref 0.5–1.1)
MAGNESIUM SERPL-MCNC: 2.5 MG/DL — SIGNIFICANT CHANGE UP (ref 1.6–2.6)
MCHC RBC-ENTMCNC: 30.6 PG — SIGNIFICANT CHANGE UP (ref 27–34)
MCHC RBC-ENTMCNC: 32.2 GM/DL — SIGNIFICANT CHANGE UP (ref 32–36)
MCV RBC AUTO: 95.2 FL — SIGNIFICANT CHANGE UP (ref 80–100)
NITRITE UR-MCNC: NEGATIVE — SIGNIFICANT CHANGE UP
NRBC # BLD: 0 /100 WBCS — SIGNIFICANT CHANGE UP (ref 0–0)
PH UR: 6.5 — SIGNIFICANT CHANGE UP (ref 5–8)
PHOSPHATE SERPL-MCNC: 3.3 MG/DL — SIGNIFICANT CHANGE UP (ref 2.5–4.5)
PLATELET # BLD AUTO: 173 K/UL — SIGNIFICANT CHANGE UP (ref 150–400)
POTASSIUM SERPL-MCNC: 4 MMOL/L — SIGNIFICANT CHANGE UP (ref 3.5–5.3)
POTASSIUM SERPL-SCNC: 4 MMOL/L — SIGNIFICANT CHANGE UP (ref 3.5–5.3)
PROT UR-MCNC: 100 — SIGNIFICANT CHANGE UP
RBC # BLD: 3.95 M/UL — LOW (ref 4.2–5.8)
RBC # FLD: 13.2 % — SIGNIFICANT CHANGE UP (ref 10.3–14.5)
RBC CASTS # UR COMP ASSIST: 0 /HPF — SIGNIFICANT CHANGE UP (ref 0–4)
SARS-COV-2 RNA SPEC QL NAA+PROBE: SIGNIFICANT CHANGE UP
SODIUM SERPL-SCNC: 144 MMOL/L — SIGNIFICANT CHANGE UP (ref 135–145)
SP GR SPEC: 1.02 — SIGNIFICANT CHANGE UP (ref 1.01–1.02)
UROBILINOGEN FLD QL: NEGATIVE — SIGNIFICANT CHANGE UP
WBC # BLD: 6.78 K/UL — SIGNIFICANT CHANGE UP (ref 3.8–10.5)
WBC # FLD AUTO: 6.78 K/UL — SIGNIFICANT CHANGE UP (ref 3.8–10.5)
WBC UR QL: 0 /HPF — SIGNIFICANT CHANGE UP (ref 0–5)

## 2022-01-24 PROCEDURE — 99233 SBSQ HOSP IP/OBS HIGH 50: CPT

## 2022-01-24 RX ADMIN — Medication 4: at 06:10

## 2022-01-24 RX ADMIN — CHLORHEXIDINE GLUCONATE 1 APPLICATION(S): 213 SOLUTION TOPICAL at 21:42

## 2022-01-24 RX ADMIN — Medication 650 MILLIGRAM(S): at 15:04

## 2022-01-24 RX ADMIN — SODIUM CHLORIDE 4 MILLILITER(S): 9 INJECTION INTRAMUSCULAR; INTRAVENOUS; SUBCUTANEOUS at 05:38

## 2022-01-24 RX ADMIN — Medication 3 MILLILITER(S): at 17:04

## 2022-01-24 RX ADMIN — Medication 4 MILLILITER(S): at 17:04

## 2022-01-24 RX ADMIN — Medication 2: at 23:52

## 2022-01-24 RX ADMIN — Medication 4 MILLILITER(S): at 11:07

## 2022-01-24 RX ADMIN — LOSARTAN POTASSIUM 100 MILLIGRAM(S): 100 TABLET, FILM COATED ORAL at 05:32

## 2022-01-24 RX ADMIN — Medication 650 MILLIGRAM(S): at 01:00

## 2022-01-24 RX ADMIN — SODIUM CHLORIDE 4 MILLILITER(S): 9 INJECTION INTRAMUSCULAR; INTRAVENOUS; SUBCUTANEOUS at 11:08

## 2022-01-24 RX ADMIN — Medication 4 MILLILITER(S): at 05:37

## 2022-01-24 RX ADMIN — HUMAN INSULIN 13 UNIT(S): 100 INJECTION, SUSPENSION SUBCUTANEOUS at 11:14

## 2022-01-24 RX ADMIN — LACOSAMIDE 100 MILLIGRAM(S): 50 TABLET ORAL at 05:32

## 2022-01-24 RX ADMIN — HUMAN INSULIN 13 UNIT(S): 100 INJECTION, SUSPENSION SUBCUTANEOUS at 17:04

## 2022-01-24 RX ADMIN — ENOXAPARIN SODIUM 40 MILLIGRAM(S): 100 INJECTION SUBCUTANEOUS at 05:32

## 2022-01-24 RX ADMIN — ENOXAPARIN SODIUM 40 MILLIGRAM(S): 100 INJECTION SUBCUTANEOUS at 17:06

## 2022-01-24 RX ADMIN — HUMAN INSULIN 13 UNIT(S): 100 INJECTION, SUSPENSION SUBCUTANEOUS at 06:11

## 2022-01-24 RX ADMIN — Medication 4 MILLILITER(S): at 23:56

## 2022-01-24 RX ADMIN — Medication 3 MILLILITER(S): at 23:57

## 2022-01-24 RX ADMIN — Medication 2: at 17:05

## 2022-01-24 RX ADMIN — Medication 3 MILLILITER(S): at 05:37

## 2022-01-24 RX ADMIN — Medication 650 MILLIGRAM(S): at 16:24

## 2022-01-24 RX ADMIN — HUMAN INSULIN 13 UNIT(S): 100 INJECTION, SUSPENSION SUBCUTANEOUS at 23:51

## 2022-01-24 RX ADMIN — Medication 2: at 11:14

## 2022-01-24 RX ADMIN — AMLODIPINE BESYLATE 10 MILLIGRAM(S): 2.5 TABLET ORAL at 05:32

## 2022-01-24 RX ADMIN — LACOSAMIDE 100 MILLIGRAM(S): 50 TABLET ORAL at 17:06

## 2022-01-24 RX ADMIN — SODIUM CHLORIDE 4 MILLILITER(S): 9 INJECTION INTRAMUSCULAR; INTRAVENOUS; SUBCUTANEOUS at 17:04

## 2022-01-24 RX ADMIN — Medication 3 MILLILITER(S): at 11:08

## 2022-01-24 NOTE — PROGRESS NOTE ADULT - ASSESSMENT
82 yo man hx of HTN, not on AC/AP, txer from [place] after mechanical fall, 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; CTA negative  on 1/17 stopped following commands, LUE twitching, given 2mg ativan followed by ?post-ictal state then returned to baseline, no seizure on eeg; loaded with 1g keppra  - neuro checks q4h  - Vimpat 100mg q12,  - Cerebral Edema: off HTS  - MRI d/t concern for CAA  - Activity: PT/OT as tolerated  - Thiamine    CVS:  Hx of thoracic aneurysm repair 7 years ago (con), also with 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  TTE EF 70%, no WMA  - SBP goal 100-160  - vascular recs  - needs repeat imaging from abdomen to mid-thigh per vascular, CTA; family refused CTA  - amlodipine 10  - losartan 100qd  - ekg while on vimpat to monitor [place]    PULM:  hx of COVID 2 years ago and 2 weeks ago  - face tent, wean to ra as tolerated  - aspiration precautions     RENAL:  - Fluids: IVL  - daily IOs  - Na 135-145    GI:  - Diet: TF  - Bowel regimen: miralax, senna      ENDO: Type 2 DM  - FS goal 120-180  - nph 13q6h    HEME/ONC:  - SCDs  - Chemoppx: SQL    ID:  - monitor for fevers     80 yo man nonsurgical R frontal IPH, pending txer to medicine    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  TTE EF 70%, no WMA  - SBP goal 100-160  - vascular recs  - needs repeat imaging from abdomen to mid-thigh per vascular, CTA; family refused CTA  - amlodipine 10  - losartan 100qd  - ekg while on vimpat to monitor [place]    PULM:  hx of COVID 2 years ago and 2 weeks ago  - face tent, wean to ra as tolerated  - aspiration precautions     RENAL:  - Fluids: IVL  - daily IOs  - Na 135-145    GI:  - Diet: TF, speech swallow eval  - Bowel regimen: miralax, senna  - rectal tube remove today      ENDO: Type 2 DM  - FS goal 120-180  - nph 13q6h    HEME/ONC:  - SCDs  - Chemoppx: SQL    ID:  - monitor for fevers      Dispo: medicine

## 2022-01-24 NOTE — PROGRESS NOTE ADULT - THIS PATIENT HAS THE FOLLOWING CONDITION(S)/DIAGNOSES ON THIS ADMISSION:
Cerebral Edema
Encephalopathy/Cerebral Edema/Brain Compression / Herniation
None
Cerebral Edema
None
Cerebral Edema
None
Encephalopathy
Encephalopathy/Cerebral Edema
ICH
Encephalopathy
Encephalopathy/Brain Compression / Herniation
Acute Respiratory Failure

## 2022-01-24 NOTE — PROGRESS NOTE ADULT - SUBJECTIVE AND OBJECTIVE BOX
Patient seen and examined    T(C): 37.3 (01-24-22 @ 16:00), Max: 38.4 (01-23-22 @ 23:50)  HR: 77 (01-24-22 @ 17:00) (75 - 83)  BP: 136/75 (01-24-22 @ 15:00) (118/73 - 152/70)  RR: 20 (01-24-22 @ 15:00) (18 - 22)  SpO2: 96% (01-24-22 @ 17:00) (94% - 99%)  01-23-22 @ 07:01  -  01-24-22 @ 07:00  --------------------------------------------------------  IN: 1415 mL / OUT: 1265 mL / NET: 150 mL    01-24-22 @ 07:01  -  01-24-22 @ 18:44  --------------------------------------------------------  IN: 685 mL / OUT: 1010 mL / NET: -325 mL    acetaminophen    Suspension .. 650 milliGRAM(s) Oral every 6 hours PRN  acetylcysteine 20%  Inhalation 4 milliLiter(s) Inhalation every 6 hours  albuterol/ipratropium for Nebulization 3 milliLiter(s) Nebulizer every 6 hours  amLODIPine   Tablet 10 milliGRAM(s) Oral daily  chlorhexidine 4% Liquid 1 Application(s) Topical <User Schedule>  dextrose 40% Gel 15 Gram(s) Oral once  dextrose 5%. 1000 milliLiter(s) IV Continuous <Continuous>  dextrose 50% Injectable 25 Gram(s) IV Push once  dextrose 50% Injectable 12.5 Gram(s) IV Push once  dextrose 50% Injectable 25 Gram(s) IV Push once  enoxaparin Injectable 40 milliGRAM(s) SubCutaneous every 12 hours  glucagon  Injectable 1 milliGRAM(s) IntraMuscular once  insulin lispro (ADMELOG) corrective regimen sliding scale   SubCutaneous every 6 hours  insulin NPH human recombinant 13 Unit(s) SubCutaneous every 6 hours  lacosamide Solution 100 milliGRAM(s) Oral two times a day  losartan 100 milliGRAM(s) Oral daily  senna 2 Tablet(s) Oral at bedtime  sodium chloride 3%  Inhalation 4 milliLiter(s) Inhalation every 6 hours        Exam stable    labs and imaging reviewed   UTI start ceftriaxone , send urine cx     Continue same management     [facility] attending  Patient seen and examined    T(C): 37.3 (01-24-22 @ 16:00), Max: 38.4 (01-23-22 @ 23:50)  HR: 77 (01-24-22 @ 17:00) (75 - 83)  BP: 136/75 (01-24-22 @ 15:00) (118/73 - 152/70)  RR: 20 (01-24-22 @ 15:00) (18 - 22)  SpO2: 96% (01-24-22 @ 17:00) (94% - 99%)  01-23-22 @ 07:01  -  01-24-22 @ 07:00  --------------------------------------------------------  IN: 1415 mL / OUT: 1265 mL / NET: 150 mL    01-24-22 @ 07:01  -  01-24-22 @ 18:44  --------------------------------------------------------  IN: 685 mL / OUT: 1010 mL / NET: -325 mL    acetaminophen    Suspension .. 650 milliGRAM(s) Oral every 6 hours PRN  acetylcysteine 20%  Inhalation 4 milliLiter(s) Inhalation every 6 hours  albuterol/ipratropium for Nebulization 3 milliLiter(s) Nebulizer every 6 hours  amLODIPine   Tablet 10 milliGRAM(s) Oral daily  chlorhexidine 4% Liquid 1 Application(s) Topical <User Schedule>  dextrose 40% Gel 15 Gram(s) Oral once  dextrose 5%. 1000 milliLiter(s) IV Continuous <Continuous>  dextrose 50% Injectable 25 Gram(s) IV Push once  dextrose 50% Injectable 12.5 Gram(s) IV Push once  dextrose 50% Injectable 25 Gram(s) IV Push once  enoxaparin Injectable 40 milliGRAM(s) SubCutaneous every 12 hours  glucagon  Injectable 1 milliGRAM(s) IntraMuscular once  insulin lispro (ADMELOG) corrective regimen sliding scale   SubCutaneous every 6 hours  insulin NPH human recombinant 13 Unit(s) SubCutaneous every 6 hours  lacosamide Solution 100 milliGRAM(s) Oral two times a day  losartan 100 milliGRAM(s) Oral daily  senna 2 Tablet(s) Oral at bedtime  sodium chloride 3%  Inhalation 4 milliLiter(s) Inhalation every 6 hours        Exam stable    labs and imaging reviewed   Will send another UA and urine culture he is febrile, has bacteria in the urine     Continue same management     [facility] attending

## 2022-01-24 NOTE — PROGRESS NOTE ADULT - SUBJECTIVE AND OBJECTIVE BOX
DATE OF SERVICE: 01-24-22 @ 10:16    Subjective: Patient seen and examined. No new events except as noted.     SUBJECTIVE/ROS:    ROS limited     MEDICATIONS:  MEDICATIONS  (STANDING):  acetylcysteine 20%  Inhalation 4 milliLiter(s) Inhalation every 6 hours  albuterol/ipratropium for Nebulization 3 milliLiter(s) Nebulizer every 6 hours  amLODIPine   Tablet 10 milliGRAM(s) Oral daily  chlorhexidine 4% Liquid 1 Application(s) Topical <User Schedule>  dextrose 40% Gel 15 Gram(s) Oral once  dextrose 5%. 1000 milliLiter(s) (50 mL/Hr) IV Continuous <Continuous>  dextrose 50% Injectable 25 Gram(s) IV Push once  dextrose 50% Injectable 12.5 Gram(s) IV Push once  dextrose 50% Injectable 25 Gram(s) IV Push once  enoxaparin Injectable 40 milliGRAM(s) SubCutaneous every 12 hours  glucagon  Injectable 1 milliGRAM(s) IntraMuscular once  insulin lispro (ADMELOG) corrective regimen sliding scale   SubCutaneous every 6 hours  insulin NPH human recombinant 13 Unit(s) SubCutaneous every 6 hours  lacosamide Solution 100 milliGRAM(s) Oral two times a day  losartan 100 milliGRAM(s) Oral daily  senna 2 Tablet(s) Oral at bedtime  sodium chloride 3%  Inhalation 4 milliLiter(s) Inhalation every 6 hours      PHYSICAL EXAM:  T(C): 37.8 (01-24-22 @ 07:00), Max: 38.4 (01-23-22 @ 23:50)  HR: 81 (01-24-22 @ 07:00) (68 - 81)  BP: 140/75 (01-24-22 @ 07:00) (126/63 - 152/70)  RR: 20 (01-24-22 @ 07:00) (18 - 22)  SpO2: 99% (01-24-22 @ 07:00) (95% - 99%)  Wt(kg): --  I&O's Summary    23 Jan 2022 07:01  -  24 Jan 2022 07:00  --------------------------------------------------------  IN: 1415 mL / OUT: 1265 mL / NET: 150 mL    24 Jan 2022 07:01  -  24 Jan 2022 10:16  --------------------------------------------------------  IN: 165 mL / OUT: 0 mL / NET: 165 mL            JVP: Normal  Neck: supple  Lung: clear   CV: S1 S2 , Murmur:  Abd: soft  Ext: No edema  Psych: flat affect  Skin: normal``    LABS/DATA:    CARDIAC MARKERS:                                12.9   5.59  )-----------( 194      ( 22 Jan 2022 19:48 )             40.0     01-23    143  |  101  |  28<H>  ----------------------------<  164<H>  4.3   |  31  |  0.83    Ca    9.9      23 Jan 2022 22:20  Phos  3.5     01-23  Mg     2.6     01-23      proBNP:   Lipid Profile:   HgA1c:   TSH:     TELE:  EKG:

## 2022-01-24 NOTE — PROGRESS NOTE ADULT - SUBJECTIVE AND OBJECTIVE BOX
NSCU Progress Note    24 Hour Events/Subjective:  - on face stent  - pending txer to medicine  - [facility] stable    VITALS:   - Reviewed      IMAGING/DATA:   - Reviewed      PHYSICAL EXAM:    General: intubated  CVS: RRR  Pulm: CTAB  GI: Soft, NTND  Extremities: No LE Edema, UE tremors and increased tone  Neuro: EO to voice, Ox2 (self, place) attends, tracks, FC R>L (shows two fingers), wiggles toes RLE    NSCU Progress Note    24 Hour Events/Subjective:  - on face stent  - pending txer to medicine  - [facility] stable    VITALS:   - Reviewed      IMAGING/DATA:   - Reviewed      PHYSICAL EXAM:    General: on face tent  CVS: RRR  Pulm: CTAB  GI: Soft, NTND  Extremities: No LE Edema, UE tremors and increased tone  Neuro: EO to voice, Ox1 (self) attends, FC R>L (shows two fingers, wiggles fingers/toes), spont on lowers

## 2022-01-24 NOTE — PROGRESS NOTE ADULT - ATTENDING COMMENTS
Patient seen, examined and case d/w fellow.     British  Sabir # [id number]    Delirium precautions  Accepted to medicine service    Not critically ill but medically complex

## 2022-01-25 DIAGNOSIS — I10 ESSENTIAL (PRIMARY) HYPERTENSION: ICD-10-CM

## 2022-01-25 DIAGNOSIS — U07.1 COVID-19: ICD-10-CM

## 2022-01-25 DIAGNOSIS — E11.9 TYPE 2 DIABETES MELLITUS WITHOUT COMPLICATIONS: ICD-10-CM

## 2022-01-25 DIAGNOSIS — Z29.9 ENCOUNTER FOR PROPHYLACTIC MEASURES, UNSPECIFIED: ICD-10-CM

## 2022-01-25 DIAGNOSIS — I61.9 NONTRAUMATIC INTRACEREBRAL HEMORRHAGE, UNSPECIFIED: ICD-10-CM

## 2022-01-25 LAB
ANION GAP SERPL CALC-SCNC: 11 MMOL/L — SIGNIFICANT CHANGE UP (ref 5–17)
BASE EXCESS BLDV CALC-SCNC: 10.8 MMOL/L — HIGH (ref -2–2)
BUN SERPL-MCNC: 28 MG/DL — HIGH (ref 7–23)
CA-I SERPL-SCNC: 1.3 MMOL/L — SIGNIFICANT CHANGE UP (ref 1.15–1.33)
CALCIUM SERPL-MCNC: 9.8 MG/DL — SIGNIFICANT CHANGE UP (ref 8.4–10.5)
CHLORIDE BLDV-SCNC: 103 MMOL/L — SIGNIFICANT CHANGE UP (ref 96–108)
CHLORIDE SERPL-SCNC: 99 MMOL/L — SIGNIFICANT CHANGE UP (ref 96–108)
CO2 BLDV-SCNC: 38 MMOL/L — HIGH (ref 22–26)
CO2 SERPL-SCNC: 30 MMOL/L — SIGNIFICANT CHANGE UP (ref 22–31)
CREAT SERPL-MCNC: 0.83 MG/DL — SIGNIFICANT CHANGE UP (ref 0.5–1.3)
GAS PNL BLDV: 142 MMOL/L — SIGNIFICANT CHANGE UP (ref 136–145)
GAS PNL BLDV: SIGNIFICANT CHANGE UP
GAS PNL BLDV: SIGNIFICANT CHANGE UP
GLUCOSE BLDC GLUCOMTR-MCNC: 158 MG/DL — HIGH (ref 70–99)
GLUCOSE BLDC GLUCOMTR-MCNC: 163 MG/DL — HIGH (ref 70–99)
GLUCOSE BLDC GLUCOMTR-MCNC: 163 MG/DL — HIGH (ref 70–99)
GLUCOSE BLDC GLUCOMTR-MCNC: 168 MG/DL — HIGH (ref 70–99)
GLUCOSE BLDV-MCNC: 173 MG/DL — HIGH (ref 70–99)
GLUCOSE SERPL-MCNC: 168 MG/DL — HIGH (ref 70–99)
HCO3 BLDV-SCNC: 36 MMOL/L — HIGH (ref 22–29)
HCT VFR BLD CALC: 37.5 % — LOW (ref 39–50)
HCT VFR BLDA CALC: 37 % — LOW (ref 39–51)
HGB BLD CALC-MCNC: 12.2 G/DL — LOW (ref 12.6–17.4)
HGB BLD-MCNC: 12 G/DL — LOW (ref 13–17)
LACTATE BLDV-MCNC: 1.1 MMOL/L — SIGNIFICANT CHANGE UP (ref 0.7–2)
MAGNESIUM SERPL-MCNC: 2.5 MG/DL — SIGNIFICANT CHANGE UP (ref 1.6–2.6)
MCHC RBC-ENTMCNC: 30.5 PG — SIGNIFICANT CHANGE UP (ref 27–34)
MCHC RBC-ENTMCNC: 32 GM/DL — SIGNIFICANT CHANGE UP (ref 32–36)
MCV RBC AUTO: 95.4 FL — SIGNIFICANT CHANGE UP (ref 80–100)
NRBC # BLD: 0 /100 WBCS — SIGNIFICANT CHANGE UP (ref 0–0)
PCO2 BLDV: 51 MMHG — SIGNIFICANT CHANGE UP (ref 42–55)
PH BLDV: 7.46 — HIGH (ref 7.32–7.43)
PHOSPHATE SERPL-MCNC: 3.4 MG/DL — SIGNIFICANT CHANGE UP (ref 2.5–4.5)
PLATELET # BLD AUTO: 190 K/UL — SIGNIFICANT CHANGE UP (ref 150–400)
PO2 BLDV: 93 MMHG — HIGH (ref 25–45)
POTASSIUM BLDV-SCNC: 4.1 MMOL/L — SIGNIFICANT CHANGE UP (ref 3.5–5.1)
POTASSIUM SERPL-MCNC: 4 MMOL/L — SIGNIFICANT CHANGE UP (ref 3.5–5.3)
POTASSIUM SERPL-SCNC: 4 MMOL/L — SIGNIFICANT CHANGE UP (ref 3.5–5.3)
RAPID RVP RESULT: SIGNIFICANT CHANGE UP
RBC # BLD: 3.93 M/UL — LOW (ref 4.2–5.8)
RBC # FLD: 13.2 % — SIGNIFICANT CHANGE UP (ref 10.3–14.5)
SAO2 % BLDV: 98.2 % — HIGH (ref 67–88)
SARS-COV-2 RNA SPEC QL NAA+PROBE: SIGNIFICANT CHANGE UP
SODIUM SERPL-SCNC: 140 MMOL/L — SIGNIFICANT CHANGE UP (ref 135–145)
WBC # BLD: 7.76 K/UL — SIGNIFICANT CHANGE UP (ref 3.8–10.5)
WBC # FLD AUTO: 7.76 K/UL — SIGNIFICANT CHANGE UP (ref 3.8–10.5)

## 2022-01-25 PROCEDURE — 71045 X-RAY EXAM CHEST 1 VIEW: CPT | Mod: 26

## 2022-01-25 PROCEDURE — 99233 SBSQ HOSP IP/OBS HIGH 50: CPT | Mod: GC

## 2022-01-25 RX ORDER — CEFEPIME 1 G/1
INJECTION, POWDER, FOR SOLUTION INTRAMUSCULAR; INTRAVENOUS
Refills: 0 | Status: DISCONTINUED | OUTPATIENT
Start: 2022-01-25 | End: 2022-01-29

## 2022-01-25 RX ORDER — CEFEPIME 1 G/1
1000 INJECTION, POWDER, FOR SOLUTION INTRAMUSCULAR; INTRAVENOUS ONCE
Refills: 0 | Status: COMPLETED | OUTPATIENT
Start: 2022-01-25 | End: 2022-01-25

## 2022-01-25 RX ORDER — CEFEPIME 1 G/1
1000 INJECTION, POWDER, FOR SOLUTION INTRAMUSCULAR; INTRAVENOUS EVERY 8 HOURS
Refills: 0 | Status: DISCONTINUED | OUTPATIENT
Start: 2022-01-25 | End: 2022-01-29

## 2022-01-25 RX ADMIN — SODIUM CHLORIDE 4 MILLILITER(S): 9 INJECTION INTRAMUSCULAR; INTRAVENOUS; SUBCUTANEOUS at 18:15

## 2022-01-25 RX ADMIN — Medication 650 MILLIGRAM(S): at 12:01

## 2022-01-25 RX ADMIN — HUMAN INSULIN 13 UNIT(S): 100 INJECTION, SUSPENSION SUBCUTANEOUS at 23:54

## 2022-01-25 RX ADMIN — Medication 4 MILLILITER(S): at 18:14

## 2022-01-25 RX ADMIN — Medication 650 MILLIGRAM(S): at 12:34

## 2022-01-25 RX ADMIN — SENNA PLUS 2 TABLET(S): 8.6 TABLET ORAL at 22:29

## 2022-01-25 RX ADMIN — Medication 4 MILLILITER(S): at 23:55

## 2022-01-25 RX ADMIN — Medication 3 MILLILITER(S): at 12:07

## 2022-01-25 RX ADMIN — SODIUM CHLORIDE 4 MILLILITER(S): 9 INJECTION INTRAMUSCULAR; INTRAVENOUS; SUBCUTANEOUS at 23:55

## 2022-01-25 RX ADMIN — Medication 2: at 06:41

## 2022-01-25 RX ADMIN — Medication 3 MILLILITER(S): at 06:40

## 2022-01-25 RX ADMIN — Medication 4 MILLILITER(S): at 05:21

## 2022-01-25 RX ADMIN — Medication 3 MILLILITER(S): at 23:55

## 2022-01-25 RX ADMIN — LACOSAMIDE 100 MILLIGRAM(S): 50 TABLET ORAL at 05:20

## 2022-01-25 RX ADMIN — SODIUM CHLORIDE 4 MILLILITER(S): 9 INJECTION INTRAMUSCULAR; INTRAVENOUS; SUBCUTANEOUS at 12:07

## 2022-01-25 RX ADMIN — Medication 3 MILLILITER(S): at 18:14

## 2022-01-25 RX ADMIN — HUMAN INSULIN 13 UNIT(S): 100 INJECTION, SUSPENSION SUBCUTANEOUS at 12:22

## 2022-01-25 RX ADMIN — Medication 4 MILLILITER(S): at 12:06

## 2022-01-25 RX ADMIN — CEFEPIME 100 MILLIGRAM(S): 1 INJECTION, POWDER, FOR SOLUTION INTRAMUSCULAR; INTRAVENOUS at 22:40

## 2022-01-25 RX ADMIN — LOSARTAN POTASSIUM 100 MILLIGRAM(S): 100 TABLET, FILM COATED ORAL at 05:20

## 2022-01-25 RX ADMIN — Medication 2: at 12:09

## 2022-01-25 RX ADMIN — Medication 2: at 23:53

## 2022-01-25 RX ADMIN — ENOXAPARIN SODIUM 40 MILLIGRAM(S): 100 INJECTION SUBCUTANEOUS at 18:15

## 2022-01-25 RX ADMIN — AMLODIPINE BESYLATE 10 MILLIGRAM(S): 2.5 TABLET ORAL at 05:20

## 2022-01-25 RX ADMIN — CEFEPIME 100 MILLIGRAM(S): 1 INJECTION, POWDER, FOR SOLUTION INTRAMUSCULAR; INTRAVENOUS at 18:13

## 2022-01-25 RX ADMIN — HUMAN INSULIN 13 UNIT(S): 100 INJECTION, SUSPENSION SUBCUTANEOUS at 18:14

## 2022-01-25 RX ADMIN — HUMAN INSULIN 13 UNIT(S): 100 INJECTION, SUSPENSION SUBCUTANEOUS at 06:40

## 2022-01-25 RX ADMIN — LACOSAMIDE 100 MILLIGRAM(S): 50 TABLET ORAL at 18:13

## 2022-01-25 RX ADMIN — SODIUM CHLORIDE 4 MILLILITER(S): 9 INJECTION INTRAMUSCULAR; INTRAVENOUS; SUBCUTANEOUS at 06:40

## 2022-01-25 RX ADMIN — ENOXAPARIN SODIUM 40 MILLIGRAM(S): 100 INJECTION SUBCUTANEOUS at 05:21

## 2022-01-25 RX ADMIN — Medication 2: at 18:13

## 2022-01-25 NOTE — PROGRESS NOTE ADULT - PROBLEM SELECTOR PLAN 2
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

## 2022-01-25 NOTE — PROGRESS NOTE ADULT - SUBJECTIVE AND OBJECTIVE BOX
PROGRESS NOTE:     Patient is a 81y old  Male who presents with a chief complaint of R ICH (2022 18:43)      SUBJECTIVE/OVERNIGHT EVENTS:     ADDITIONAL REVIEW OF SYSTEMS:    MEDICATIONS  (STANDING):  acetylcysteine 20%  Inhalation 4 milliLiter(s) Inhalation every 6 hours  albuterol/ipratropium for Nebulization 3 milliLiter(s) Nebulizer every 6 hours  amLODIPine   Tablet 10 milliGRAM(s) Oral daily  dextrose 40% Gel 15 Gram(s) Oral once  dextrose 5%. 1000 milliLiter(s) (50 mL/Hr) IV Continuous <Continuous>  dextrose 50% Injectable 25 Gram(s) IV Push once  dextrose 50% Injectable 12.5 Gram(s) IV Push once  dextrose 50% Injectable 25 Gram(s) IV Push once  enoxaparin Injectable 40 milliGRAM(s) SubCutaneous every 12 hours  glucagon  Injectable 1 milliGRAM(s) IntraMuscular once  insulin lispro (ADMELOG) corrective regimen sliding scale   SubCutaneous every 6 hours  insulin NPH human recombinant 13 Unit(s) SubCutaneous every 6 hours  lacosamide Solution 100 milliGRAM(s) Oral two times a day  losartan 100 milliGRAM(s) Oral daily  senna 2 Tablet(s) Oral at bedtime  sodium chloride 3%  Inhalation 4 milliLiter(s) Inhalation every 6 hours    MEDICATIONS  (PRN):  acetaminophen    Suspension .. 650 milliGRAM(s) Oral every 6 hours PRN Temp greater or equal to 38C (100.4F), Mild Pain (1 - 3)      CAPILLARY BLOOD GLUCOSE      POCT Blood Glucose.: 163 mg/dL (2022 06:36)  POCT Blood Glucose.: 175 mg/dL (2022 23:49)  POCT Blood Glucose.: 166 mg/dL (2022 17:02)  POCT Blood Glucose.: 188 mg/dL (2022 11:05)    I&O's Summary    2022 07:01  -  2022 07:00  --------------------------------------------------------  IN: 1010 mL / OUT: 1410 mL / NET: -400 mL        PHYSICAL EXAM:  Vital Signs Last 24 Hrs  T(C): 36.5 (2022 05:00), Max: 38.2 (2022 15:00)  T(F): 97.7 (2022 05:00), Max: 100.8 (2022 15:00)  HR: 80 (2022 05:00) (73 - 83)  BP: 155/87 (2022 05:00) (118/73 - 155/87)  BP(mean): 96 (2022 23:00) (87 - 96)  RR: 20 (2022 05:00) (20 - 22)  SpO2: 93% (2022 05:00) (93% - 99%)    CONSTITUTIONAL: NAD, well-developed  HEENT: NC/AT, EOMI  RESPIRATORY: No increased work of breathing, CTAB, no wheezes or crackles appreciated  CARDIOVASCULAR: RRR, S1 and S2 present, no m/r/g  ABDOMEN: soft, NT, ND, bowel sounds present  EXTREMITIES: No LE edema  MUSCULOSKELETAL: no joint swelling or tenderness to palpation  NEURO: A&Ox3, moving all extremities    LABS:                        12.1   6.78  )-----------( 173      ( 2022 21:31 )             37.6     01-24    144  |  102  |  27<H>  ----------------------------<  153<H>  4.0   |  30  |  0.83    Ca    9.5      2022 21:31  Phos  3.3     -24  Mg     2.5     -24            Urinalysis Basic - ( 2022 21:31 )    Color: Yellow / Appearance: Clear / S.023 / pH: x  Gluc: x / Ketone: Negative  / Bili: Negative / Urobili: Negative   Blood: x / Protein: 100 / Nitrite: Negative   Leuk Esterase: Negative / RBC: 0 /hpf / WBC 0 /HPF   Sq Epi: x / Non Sq Epi: 1 /hpf / Bacteria: Negative        Culture - Blood (collected 2022 14:17)  Source: .Blood Blood  Preliminary Report (2022 15:01):    No growth to date.    Culture - Blood (collected 2022 14:17)  Source: .Blood Blood  Preliminary Report (2022 15:01):    No growth to date.        RADIOLOGY & ADDITIONAL TESTS:    Results Reviewed:   Imaging Personally Reviewed:  Electrocardiogram Personally Reviewed:    COORDINATION OF CARE:  Care Discussed with Consultants/Other Providers [Y/N]:  Prior or Outpatient Records Reviewed [Y/N]:   Micah Hansen, PGY-1  Internal Medicine  [phone] / [phone] or [email or web address] Teams    HPI  81y m W/ PMHx HTN presenting to the ED from [facility] as a transfer for R frontal parenchymal hemorrhage on CT after fall at home. Patient is poor historian but states that he was diagnosed with COVID 2 weeks ago and has been feeling "tired" at home, states he felt dizzy earlier today but unable to state what time he fell or further elucidate. Patient denies AC use. Per EMS was also recently diagnosed with a AAA but has been unable to undergo surgical repair due to recent dx of COVID. Patient still endorsing he feels "tired", denies current headache, vision changes, dizziness, chest pain, nausea, back pain, focal numbness/tingling. Endorses weakness to LLE 2/2 pain.     PROGRESS NOTE:     Patient is a 81y old  Male who presents with a chief complaint of R ICH (2022 18:43)      SUBJECTIVE/OVERNIGHT EVENTS:     ADDITIONAL REVIEW OF SYSTEMS:    MEDICATIONS  (STANDING):  acetylcysteine 20%  Inhalation 4 milliLiter(s) Inhalation every 6 hours  albuterol/ipratropium for Nebulization 3 milliLiter(s) Nebulizer every 6 hours  amLODIPine   Tablet 10 milliGRAM(s) Oral daily  dextrose 40% Gel 15 Gram(s) Oral once  dextrose 5%. 1000 milliLiter(s) (50 mL/Hr) IV Continuous <Continuous>  dextrose 50% Injectable 25 Gram(s) IV Push once  dextrose 50% Injectable 12.5 Gram(s) IV Push once  dextrose 50% Injectable 25 Gram(s) IV Push once  enoxaparin Injectable 40 milliGRAM(s) SubCutaneous every 12 hours  glucagon  Injectable 1 milliGRAM(s) IntraMuscular once  insulin lispro (ADMELOG) corrective regimen sliding scale   SubCutaneous every 6 hours  insulin NPH human recombinant 13 Unit(s) SubCutaneous every 6 hours  lacosamide Solution 100 milliGRAM(s) Oral two times a day  losartan 100 milliGRAM(s) Oral daily  senna 2 Tablet(s) Oral at bedtime  sodium chloride 3%  Inhalation 4 milliLiter(s) Inhalation every 6 hours    MEDICATIONS  (PRN):  acetaminophen    Suspension .. 650 milliGRAM(s) Oral every 6 hours PRN Temp greater or equal to 38C (100.4F), Mild Pain (1 - 3)      CAPILLARY BLOOD GLUCOSE      POCT Blood Glucose.: 163 mg/dL (2022 06:36)  POCT Blood Glucose.: 175 mg/dL (2022 23:49)  POCT Blood Glucose.: 166 mg/dL (2022 17:02)  POCT Blood Glucose.: 188 mg/dL (2022 11:05)    I&O's Summary    2022 07:01  -  2022 07:00  --------------------------------------------------------  IN: 1010 mL / OUT: 1410 mL / NET: -400 mL        PHYSICAL EXAM:  Vital Signs Last 24 Hrs  T(C): 36.5 (2022 05:00), Max: 38.2 (2022 15:00)  T(F): 97.7 (2022 05:00), Max: 100.8 (2022 15:00)  HR: 80 (2022 05:00) (73 - 83)  BP: 155/87 (2022 05:00) (118/73 - 155/87)  BP(mean): 96 (2022 23:00) (87 - 96)  RR: 20 (2022 05:00) (20 - 22)  SpO2: 93% (2022 05:00) (93% - 99%)    CONSTITUTIONAL: NAD, in bed lethargic with face tent on  HEENT: NC/AT, EOMI  RESPIRATORY: No increased work of breathing, CTAB, no wheezes or crackles appreciated  CARDIOVASCULAR: RRR, S1 and S2 present, no m/r/g  ABDOMEN: soft, NT, ND, bowel sounds present  EXTREMITIES: No LE edema, myoclonus present in upper extremities  MUSCULOSKELETAL: no joint swelling or tenderness to palpation  NEURO: A&Ox0, too lethargic to participate in exam    LABS:                        12.1   6.78  )-----------( 173      ( 2022 21:31 )             37.6     01-24    144  |  102  |  27<H>  ----------------------------<  153<H>  4.0   |  30  |  0.83    Ca    9.5      2022 21:31  Phos  3.3       Mg     2.5                 Urinalysis Basic - ( 2022 21:31 )    Color: Yellow / Appearance: Clear / S.023 / pH: x  Gluc: x / Ketone: Negative  / Bili: Negative / Urobili: Negative   Blood: x / Protein: 100 / Nitrite: Negative   Leuk Esterase: Negative / RBC: 0 /hpf / WBC 0 /HPF   Sq Epi: x / Non Sq Epi: 1 /hpf / Bacteria: Negative        Culture - Blood (collected 2022 14:17)  Source: .Blood Blood  Preliminary Report (2022 15:01):    No growth to date.    Culture - Blood (collected 2022 14:17)  Source: .Blood Blood  Preliminary Report (2022 15:01):    No growth to date.        RADIOLOGY & ADDITIONAL TESTS:    Results Reviewed:   Imaging Personally Reviewed:  Electrocardiogram Personally Reviewed:    COORDINATION OF CARE:  Care Discussed with Consultants/Other Providers [Y/N]:  Prior or Outpatient Records Reviewed [Y/N]:   Micah Hansen, PGY-1  Internal Medicine  [phone] / [phone] or [employer] Teams    HPI  81y m W/ PMHx HTN presenting to the ED from [facility] as a transfer for R frontal parenchymal hemorrhage on CT after fall at home. Patient is poor historian but states that he was diagnosed with COVID 2 weeks ago and has been feeling "tired" at home, states he felt dizzy earlier today but unable to state what time he fell or further elucidate. Patient denies AC use. Per EMS was also recently diagnosed with a AAA but has been unable to undergo surgical repair due to recent dx of COVID. Patient still endorsing he feels "tired", denies current headache, vision changes, dizziness, chest pain, nausea, back pain, focal numbness/tingling. Endorses weakness to LLE 2/2 pain.     PROGRESS NOTE:     Patient is a 81y old  Male who presents with a chief complaint of R ICH (2022 18:43)    SUBJECTIVE/OVERNIGHT EVENTS:   Patient was seen and examined at bedside this morning. He was in bed, sleeping comfortably with the face tent on. He did not participate in the encounter 2/2 lethargy. Still in upper extremity restraints.     ADDITIONAL REVIEW OF SYSTEMS:    MEDICATIONS  (STANDING):  acetylcysteine 20%  Inhalation 4 milliLiter(s) Inhalation every 6 hours  albuterol/ipratropium for Nebulization 3 milliLiter(s) Nebulizer every 6 hours  amLODIPine   Tablet 10 milliGRAM(s) Oral daily  dextrose 40% Gel 15 Gram(s) Oral once  dextrose 5%. 1000 milliLiter(s) (50 mL/Hr) IV Continuous <Continuous>  dextrose 50% Injectable 25 Gram(s) IV Push once  dextrose 50% Injectable 12.5 Gram(s) IV Push once  dextrose 50% Injectable 25 Gram(s) IV Push once  enoxaparin Injectable 40 milliGRAM(s) SubCutaneous every 12 hours  glucagon  Injectable 1 milliGRAM(s) IntraMuscular once  insulin lispro (ADMELOG) corrective regimen sliding scale   SubCutaneous every 6 hours  insulin NPH human recombinant 13 Unit(s) SubCutaneous every 6 hours  lacosamide Solution 100 milliGRAM(s) Oral two times a day  losartan 100 milliGRAM(s) Oral daily  senna 2 Tablet(s) Oral at bedtime  sodium chloride 3%  Inhalation 4 milliLiter(s) Inhalation every 6 hours    MEDICATIONS  (PRN):  acetaminophen    Suspension .. 650 milliGRAM(s) Oral every 6 hours PRN Temp greater or equal to 38C (100.4F), Mild Pain (1 - 3)      CAPILLARY BLOOD GLUCOSE      POCT Blood Glucose.: 163 mg/dL (2022 06:36)  POCT Blood Glucose.: 175 mg/dL (2022 23:49)  POCT Blood Glucose.: 166 mg/dL (2022 17:02)  POCT Blood Glucose.: 188 mg/dL (2022 11:05)    I&O's Summary    2022 07:01  -  2022 07:00  --------------------------------------------------------  IN: 1010 mL / OUT: 1410 mL / NET: -400 mL        PHYSICAL EXAM:  Vital Signs Last 24 Hrs  T(C): 36.5 (2022 05:00), Max: 38.2 (2022 15:00)  T(F): 97.7 (2022 05:00), Max: 100.8 (2022 15:00)  HR: 80 (2022 05:00) (73 - 83)  BP: 155/87 (2022 05:00) (118/73 - 155/87)  BP(mean): 96 (2022 23:00) (87 - 96)  RR: 20 (2022 05:00) (20 - 22)  SpO2: 93% (2022 05:00) (93% - 99%)    CONSTITUTIONAL: NAD, in bed lethargic with face tent on  HEENT: NC/AT, EOMI  RESPIRATORY: No increased work of breathing, CTAB, no wheezes or crackles appreciated  CARDIOVASCULAR: RRR, S1 and S2 present, no m/r/g  ABDOMEN: soft, NT, ND, bowel sounds present  EXTREMITIES: No LE edema, myoclonus present in upper extremities. in upper extremity restraints  MUSCULOSKELETAL: no joint swelling or tenderness to palpation  NEURO: A&Ox0, too lethargic to participate in exam    LABS:                        12.1   6.78  )-----------( 173      ( 2022 21:31 )             37.6         144  |  102  |  27<H>  ----------------------------<  153<H>  4.0   |  30  |  0.83    Ca    9.5      2022 21:31  Phos  3.3       Mg     2.5                 Urinalysis Basic - ( 2022 21:31 )    Color: Yellow / Appearance: Clear / S.023 / pH: x  Gluc: x / Ketone: Negative  / Bili: Negative / Urobili: Negative   Blood: x / Protein: 100 / Nitrite: Negative   Leuk Esterase: Negative / RBC: 0 /hpf / WBC 0 /HPF   Sq Epi: x / Non Sq Epi: 1 /hpf / Bacteria: Negative        Culture - Blood (collected 2022 14:17)  Source: .Blood Blood  Preliminary Report (2022 15:01):    No growth to date.    Culture - Blood (collected 2022 14:17)  Source: .Blood Blood  Preliminary Report (2022 15:01):    No growth to date.        RADIOLOGY & ADDITIONAL TESTS:    Results Reviewed:   Imaging Personally Reviewed:  Electrocardiogram Personally Reviewed:    COORDINATION OF CARE:  Care Discussed with Consultants/Other Providers [Y/N]:  Prior or Outpatient Records Reviewed [Y/N]:

## 2022-01-25 NOTE — PROGRESS NOTE ADULT - SUBJECTIVE AND OBJECTIVE BOX
DATE OF SERVICE: 01-25-22 @ 09:25    Subjective: Patient seen and examined. No new events except as noted.     SUBJECTIVE/ROS:  ROS limited     MEDICATIONS:  MEDICATIONS  (STANDING):  acetylcysteine 20%  Inhalation 4 milliLiter(s) Inhalation every 6 hours  albuterol/ipratropium for Nebulization 3 milliLiter(s) Nebulizer every 6 hours  amLODIPine   Tablet 10 milliGRAM(s) Oral daily  dextrose 40% Gel 15 Gram(s) Oral once  dextrose 5%. 1000 milliLiter(s) (50 mL/Hr) IV Continuous <Continuous>  dextrose 50% Injectable 25 Gram(s) IV Push once  dextrose 50% Injectable 12.5 Gram(s) IV Push once  dextrose 50% Injectable 25 Gram(s) IV Push once  enoxaparin Injectable 40 milliGRAM(s) SubCutaneous every 12 hours  glucagon  Injectable 1 milliGRAM(s) IntraMuscular once  insulin lispro (ADMELOG) corrective regimen sliding scale   SubCutaneous every 6 hours  insulin NPH human recombinant 13 Unit(s) SubCutaneous every 6 hours  lacosamide Solution 100 milliGRAM(s) Oral two times a day  losartan 100 milliGRAM(s) Oral daily  senna 2 Tablet(s) Oral at bedtime  sodium chloride 3%  Inhalation 4 milliLiter(s) Inhalation every 6 hours      PHYSICAL EXAM:  T(C): 36.5 (01-25-22 @ 05:00), Max: 38.2 (01-24-22 @ 15:00)  HR: 80 (01-25-22 @ 05:00) (73 - 83)  BP: 155/87 (01-25-22 @ 05:00) (118/73 - 155/87)  RR: 20 (01-25-22 @ 05:00) (20 - 22)  SpO2: 93% (01-25-22 @ 05:00) (93% - 99%)  Wt(kg): --  I&O's Summary    24 Jan 2022 07:01  -  25 Jan 2022 07:00  --------------------------------------------------------  IN: 1010 mL / OUT: 1410 mL / NET: -400 mL            JVP: Normal  Neck: supple  Lung: clear   CV: S1 S2 , Murmur:  Abd: soft  Ext: No edema  neuro: Awake / alert  Psych: flat affect  Skin: normal``    LABS/DATA:    CARDIAC MARKERS:                                12.0   7.76  )-----------( 190      ( 25 Jan 2022 07:21 )             37.5     01-25    140  |  99  |  28<H>  ----------------------------<  168<H>  4.0   |  30  |  0.83    Ca    9.8      25 Jan 2022 07:21  Phos  3.4     01-25  Mg     2.5     01-25      proBNP:   Lipid Profile:   HgA1c:   TSH:     TELE:  EKG:

## 2022-01-25 NOTE — CHART NOTE - NSCHARTNOTEFT_GEN_A_CORE
MAR Accept Note  Transfer to:  [facility]   Accepting Attending Physician:    Assigned Room:      Patient seen and examined.   Labs and data reviewed.   No findings precluding transfer of service.       HPI/MICU COURSE:   Please refer to MICU transfer note for full details. 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  - Activity: PT/OT as tolerated  - Thiamine    CVS:  Hx of thoracic aneurysm repair 7 years ago (cno), also with 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  TTE EF 70%, no WMA  - SBP goal 100-160  - vascular recs  - needs repeat imaging from abdomen to mid-thigh per vascular, CTA; family refused CTA  - amlodipine 10  - losartan 100qd  - ekg while on vimpat to monitor [place]    PULM:  hx of COVID 2 years ago and 2 weeks ago  - face tent, wean to ra as tolerated  - aspiration precautions     RENAL:  - Fluids: IVL  - daily IOs  - Na 135-145    GI:  - Diet: TF, speech swallow eval  - Bowel regimen: miralax, senna  - rectal tube remove today      ENDO: Type 2 DM  - FS goal 120-180  - nph 13q6h    HEME/ONC:  - SCDs  - Chemoppx: SQL    ID:  - monitor for fevers      Dispo: medicine.    Leroy Dill MD  Internal Medicine PGY-3

## 2022-01-25 NOTE — PROGRESS NOTE ADULT - SUBJECTIVE AND OBJECTIVE BOX
Neurology Consult    Reason for Consult: Patient is a 81y old  Male who presents with a chief complaint of R ICH (2022 09:25)      HPI:  81y m W/  HTN presenting to the ED from [facility] as a transfer for R frontal parenchymal hemorrhage on CT after fall at home. Patient is poor historian but states that he was diagnosed with COVID 2 weeks ago and has been feeling "tired" at home, states he felt dizzy earlier today but unable to state what time he fell or further elucidate. Patient denies AC use. Per EMS was also recently diagnosed with a AAA but has been unable to undergo surgical repair due to recent dx of COVID. Patient still endorsing he feels "tired", denies current headache, vision changes, dizziness, chest pain, nausea, back pain, focal numbness/tingling. Endorses weakness to LLE 2/2 pain. (15 Mono 2022 15:50)       PAST MEDICAL & SURGICAL HISTORY:      Allergies: Allergies    No Known Allergies    Intolerances        Social History: Denies toxic habits including tobacco, ETOH or illicit drugs.    Family History: FAMILY HISTORY:  . No family history of strokes    Medications: MEDICATIONS  (STANDING):  acetylcysteine 20%  Inhalation 4 milliLiter(s) Inhalation every 6 hours  albuterol/ipratropium for Nebulization 3 milliLiter(s) Nebulizer every 6 hours  amLODIPine   Tablet 10 milliGRAM(s) Oral daily  cefepime   IVPB      cefepime   IVPB 1000 milliGRAM(s) IV Intermittent every 8 hours  dextrose 40% Gel 15 Gram(s) Oral once  dextrose 5%. 1000 milliLiter(s) (50 mL/Hr) IV Continuous <Continuous>  dextrose 50% Injectable 25 Gram(s) IV Push once  dextrose 50% Injectable 12.5 Gram(s) IV Push once  dextrose 50% Injectable 25 Gram(s) IV Push once  enoxaparin Injectable 40 milliGRAM(s) SubCutaneous every 12 hours  glucagon  Injectable 1 milliGRAM(s) IntraMuscular once  insulin lispro (ADMELOG) corrective regimen sliding scale   SubCutaneous every 6 hours  insulin NPH human recombinant 13 Unit(s) SubCutaneous every 6 hours  lacosamide Solution 100 milliGRAM(s) Oral two times a day  losartan 100 milliGRAM(s) Oral daily  senna 2 Tablet(s) Oral at bedtime  sodium chloride 3%  Inhalation 4 milliLiter(s) Inhalation every 6 hours    MEDICATIONS  (PRN):  acetaminophen    Suspension .. 650 milliGRAM(s) Oral every 6 hours PRN Temp greater or equal to 38C (100.4F), Mild Pain (1 - 3)      Review of Systems:    unable     Vitals:  Vital Signs Last 24 Hrs  T(C): 37.2 (2022 13:18), Max: 38.6 (2022 10:00)  T(F): 99 (2022 13:18), Max: 101.4 (2022 10:00)  HR: 80 (2022 17:44) (77 - 84)  BP: 131/87 (2022 14:03) (131/87 - 155/87)  BP(mean): 96 (2022 23:00) (96 - 96)  RR: 21 (2022 17:44) (18 - 22)  SpO2: 98% (2022 17:44) (93% - 99%)    General Exam:   General Appearance: Appropriately dressed and in no acute distress       Head: Normocephalic, atraumatic and no dysmorphic features  Ear, Nose, and Throat: Moist mucous membranes +NGT   CVS: S1S2+  Resp: No SOB, no wheeze or rhonchi  GI: soft NT/ND  Extremities: No edema or cyanosis  Skin: No bruises or rashes     Neurological Exam:  Mental Status: Awake, alert and oriented x 1-2.  Able to follow simple verbal commands intermittently. Able to name and repeat.+ dysarthria   Cranial Nerves: PERRL, EOMI, VFFC, sensation V1-V3 intact, L f acial asymmetry, equal elevation of palate, scm/trap 5/5, tongue is midline on protrusion. no obvious papilledema on fundoscopic exam. hearing is grossly intact.   Motor: moving R full strenght. LUE 3/5/ LLE 1-2/5  Sensation: withdraws R>L  Reflexes: 1+ throughout at biceps, brachioradialis, triceps, patellars and ankles bilaterally and equal. No clonus. R toe and L toe were both downgoing.  Coordination: unablke   Gait: unable     Data/Labs/Imaging which I personally reviewed.     Labs:     CBC Full  -  ( 2022 07:21 )  WBC Count : 7.76 K/uL  RBC Count : 3.93 M/uL  Hemoglobin : 12.0 g/dL  Hematocrit : 37.5 %  Platelet Count - Automated : 190 K/uL  Mean Cell Volume : 95.4 fl  Mean Cell Hemoglobin : 30.5 pg  Mean Cell Hemoglobin Concentration : 32.0 gm/dL  Auto Neutrophil # : x  Auto Lymphocyte # : x  Auto Monocyte # : x  Auto Eosinophil # : x  Auto Basophil # : x  Auto Neutrophil % : x  Auto Lymphocyte % : x  Auto Monocyte % : x  Auto Eosinophil % : x  Auto Basophil % : x        140  |  99  |  28<H>  ----------------------------<  168<H>  4.0   |  30  |  0.83    Ca    9.8      2022 07:21  Phos  3.4       Mg     2.5               Urinalysis Basic - ( 2022 21:31 )    Color: Yellow / Appearance: Clear / S.023 / pH: x  Gluc: x / Ketone: Negative  / Bili: Negative / Urobili: Negative   Blood: x / Protein: 100 / Nitrite: Negative   Leuk Esterase: Negative / RBC: 0 /hpf / WBC 0 /HPF   Sq Epi: x / Non Sq Epi: 1 /hpf / Bacteria: Negative    < from: CT Angio Head w/ IV Cont (01.15.22 @ 17:16) >    ACC: [id number] EXAM:  CT ANGIO NECK (W)[facility]                        ACC: [id number] EXAM:  CT ANGIO BRAIN (W)[facility]                          PROCEDURE DATE:  01/15/2022          INTERPRETATION:  CT ANGIOGRAPHY OF THE NECK AND BRAIN.    CLINICAL INFORMATION:81 years Male ICH    TECHNIQUE:  CT angiography of the neck and brain was performed during the dynamic   administration of intravenous contrast.  MIP reconstructions were   performed and reviewed. Multiplanar reformations were obtained.  Contrastadministered 70 cc Omnipaque 350, contrast discarded 30 cc    FINDINGS:  NECK  RIGHT CAROTID CIRCULATION:  Evaluation of the right carotid circulation demonstrates no traumatic   occlusion, dissection, or pseudoaneurysm.    LEFT CAROTID CIRCULATION:  Evaluation of the left carotid circulation demonstrates no traumatic   occlusion, dissection, or pseudoaneurysm.    VERTEBRAL ARTERIES:  Evaluation of the vertebral arteries reveals no evidence of a vertebral   artery occlusion or dissection.    BRAIN  ANTERIOR CIRCULATION:  Distal internal carotid arteries are patent. Calcification involves the   cavernous segments with mild degrees of narrowing  Anterior cerebral arteries are patent. A1 segment of the left anterior   cerebral artery is smaller than the right, a normal anatomic variant  Middle cerebral arteries are patent.    POSTERIOR CIRCULATION:  Distal vertebral arteries are patent. Bilateral posterior inferior   cerebellar arteries are seen.  Basilar artery is patent. Proximal superior cerebellar arteries are patent  Posterior cerebral arteries are patent.    OTHER:  No evidence of abnormal enhancement associated with the moderate to large   right frontal parenchymal hemorrhage.  No puddling of contrast.  No aneurysm or arteriovenous malformation seen.    IMPRESSION:  *  NO TRAUMATIC OCCLUSION, DISSECTION, OR PSEUDOANEURYSM.  *  NO ABNORMAL ENHANCEMENT ASSOCIATED WITH THE MODERATE TO LARGE RIGHT   FRONTAL PARENCHYMAL HEMORRHAGE.  *  NO PUDDLING OF CONTRAST. NO ANEURYSM OR AVM SEEN.    --- End of Report ---            PAWAN ODELL MD; Attending Radiologist  This document has been electronically signed. Mono 15 2022  7:49PM    < end of copied text >  < from: CT Head No Cont (22 @ 09:30) >    ACC: [id number] EXAM:  CT BRAIN                          PROCEDURE DATE:  2022          INTERPRETATION:  CLINICAL INFORMATION: Intracranial hemorrhage, follow-up.    TECHNIQUE: Portable noncontrast axial CT images were acquired of the head.    COMPARISON STUDY: CT head 2022.    FINDINGS:  Large right frontal lobe parenchymal hematoma measures approximately 7.7   x 3.5 cm, previously 7.5 x 3.2 cm, with surrounding edema and mass   effect, without interval interval change compared to prior.   Redemonstrated small intraventricular hemorrhage, similar to prior.    Moderate cerebral volume loss with proportional prominence of the sulci   and ventricles. Nonspecific white matter hypoattenuation, likely related   to chronic microvascular changes.    Left frontal arachnoid cyst, unchanged.    Mucosal thickening of the left maxillary and bilateral ethmoid sinuses.   Partially visualized nasogastric tube in situ. Mastoid air cells are   clear.    Evidence of bilateral lens surgery.    Nodisplaced calvarial fracture.      IMPRESSION:  Stable exam.    --- End of Report ---          HARRIET WATSON MD; Resident Radiology  This document has been electronically signed.  CRUZ LÓPEZ MD; Attending Radiologist  This document has been electronically signed. 2022 10:58AM    < end of copied text >

## 2022-01-25 NOTE — PROGRESS NOTE ADULT - PROBLEM SELECTOR PLAN 1
A&Ox0. 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

## 2022-01-25 NOTE — PROGRESS NOTE ADULT - ASSESSMENT
81y m W/  HTN presenting to the ED from [facility] as a transfer for R frontal parenchymal hemorrhage on CT after fall at home.  CTH R hemisphere IPH  CTA H/N no aneurysm or AVM  repeat CTH stable   Impression: IPH of unclear etiology.  may be 2/2 HTN. need to r/o CAA or underlying mass   - now on cefepime for infection. monitor for seizure as it can lower seizure threshold and cause change in mental status   - SBP <160  - DVT ppx okay. hold AC/AP  - now on vimpat 100mg BID  - rEEG when able  - MRI brain w/ and w/o when able  - GI for PEG?, now with NGT  - no need for hypertonics  - cardio following. known thoracic aneurysm s/p repair.   - f/uj pulmo for resp status. on face tent  - telemetry  - PT/OT/SS/SLP, OOBC  - check FS, glucose control <180  - GI/DVT ppx  - Counseling on diet, exercise, and medication adherence was done  - Counseling on smoking cessation and alcohol consumption offered when appropriate.  - Pain assessed and judicious use of narcotics when appropriate was discussed.    - Stroke education given when appropriate.  - Importance of fall prevention discussed.   - Differential diagnosis and plan of care discussed with patient and/or family and primary team  - Thank you for allowing me to participate in the care of this patient. Call with questions.   Carlin Neal MD  Vascular Neurology  Office: [phone]

## 2022-01-25 NOTE — PROGRESS NOTE ADULT - ATTENDING COMMENTS
here with non op intracranial hemorrhage  Remains altered and now w/ fever  Obtain cultures and cxr  SLP, PT, OT

## 2022-01-25 NOTE — PROGRESS NOTE ADULT - PROBLEM SELECTOR PLAN 3
hx of COVID 2 years ago and 2 weeks ago  - face tent, wean to ra as tolerated  - aspiration precautions   - febrile last night 1/24 -- repeating w/u  - f/u BCx, RVP, CXR

## 2022-01-25 NOTE — PROGRESS NOTE ADULT - ASSESSMENT
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  TTE EF 70%, no WMA  - SBP goal 100-160  - vascular recs  - needs repeat imaging from abdomen to mid-thigh per vascular, CTA; family refused CTA  - amlodipine 10  - losartan 100qd  - ekg while on vimpat to monitor [place]    PULM:  hx of COVID 2 years ago and 2 weeks ago  - face tent, wean to ra as tolerated  - aspiration precautions     RENAL:  - Fluids: IVL  - daily IOs  - Na 135-145    GI:  - Diet: TF, speech swallow eval  - Bowel regimen: miralax, senna  - rectal tube remove today      ENDO: Type 2 DM  - FS goal 120-180  - nph 13q6h    HEME/ONC:  - SCDs  - Chemoppx: SQL    ID:  - monitor for fevers   81y m W/ PMHx HTN presenting to the ED from [facility] as a transfer for R frontal parenchymal hemorrhage on CT after fall at home, now on medicine floors from NSICU    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; CTA negative  on 1/17 stopped following commands, LUE twitching, given 2mg ativan followed by ?post-ictal state then returned to baseline, no seizure on eeg; loaded with 1g keppra  - neuro checks q4h  - Vimpat 100mg q12  - Cerebral Edema: off HTS  - MRI d/t concern for CAA  - Activity: PT/OT as tolerated  - Thiamine    CVS:  Hx of thoracic aneurysm repair 7 years ago ([place]), also with 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  TTE EF 70%, no WMA  - SBP goal 100-160  - vascular recs  - needs repeat imaging from abdomen to mid-thigh per vascular, CTA; family refused CTA  - amlodipine 10  - losartan 100qd  - ekg while on vimpat to monitor [place]    PULM:  hx of COVID 2 years ago and 2 weeks ago  - face tent, wean to ra as tolerated  - aspiration precautions     RENAL:  - Fluids: IVL  - daily IOs  - Na 135-145    GI:  - Diet: TF, speech swallow eval  - Bowel regimen: miralax, senna  - rectal tube remove today      ENDO: Type 2 DM  - FS goal 120-180  - nph 13q6h    HEME/ONC:  - SCDs  - Chemoppx: SQL    ID:  - monitor for fevers   81y m W/ PMHx HTN presenting to the ED from [facility] as a transfer for R frontal parenchymal hemorrhage on CT after fall at home, now on medicine floors from NSICU      CVS:  Hx of thoracic aneurysm repair 7 years ago (con), also with 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  TTE EF 70%, no WMA  - SBP goal 100-160  - vascular recs  - needs repeat imaging from abdomen to mid-thigh per vascular, CTA; family refused CTA  - amlodipine 10  - losartan 100qd  - ekg while on vimpat to monitor [place]    PULM:  hx of COVID 2 years ago and 2 weeks ago  - face tent, wean to ra as tolerated  - aspiration precautions     RENAL:  - Fluids: IVL  - daily IOs  - Na 135-145    GI:  - Diet: TF, speech swallow eval  - Bowel regimen: miralax, senna  - rectal tube remove today      ENDO: Type 2 DM  - FS goal 120-180  - nph 13q6h    HEME/ONC:  - SCDs  - Chemoppx: SQL    ID:  - monitor for fevers   81y m W/ PMHx HTN presenting to the ED from [facility] as a transfer for R frontal parenchymal hemorrhage on CT after fall at home, now on medicine floors from [facility]

## 2022-01-25 NOTE — CHART NOTE - NSCHARTNOTEFT_GEN_A_CORE
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; CTA negative  on 1/17 stopped following commands, LUE twitching, given 2mg ativan followed by ?post-ictal state then returned to baseline, no seizure on eeg; loaded with 1g keppra  - neuro checks q4h  - Vimpat 100mg q12  - Cerebral Edema: off HTS  - MRI d/t concern for CAA  - Activity: PT/OT as tolerated  - Thiamine    CVS:  Hx of thoracic aneurysm repair 7 years ago ([place]), also with 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  TTE EF 70%, no WMA  - SBP goal 100-160  - vascular recs  - needs repeat imaging from abdomen to mid-thigh per vascular, CTA; family refused CTA  - amlodipine 10  - losartan 100qd  - ekg while on vimpat to monitor [place]    PULM:  hx of COVID 2 years ago and 2 weeks ago  - face tent, wean to ra as tolerated  - aspiration precautions     RENAL:  - Fluids: IVL  - daily IOs  - Na 135-145    GI:  - Diet: TF, speech swallow eval  - Bowel regimen: miralax, senna  - rectal tube remove today      ENDO: Type 2 DM  - FS goal 120-180  - nph 13q6h    HEME/ONC:  - SCDs  - Chemoppx: SQL    ID:  - monitor for fevers      Dispo: medicine Transferred to Medicine 5 [facility] [address], spoke to MAR at [phone] [email or web address]    80 yo man nonsurgical R frontal IPH    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; CTA negative  on 1/17 stopped following commands, LUE twitching, given 2mg ativan followed by ?post-ictal state then returned to baseline, no seizure on eeg; loaded with 1g keppra  - neuro checks q4h  - Vimpat 100mg q12  - Cerebral Edema: off HTS  - MRI d/t concern for CAA  - Activity: PT/OT as tolerated  - Thiamine    CVS:  Hx of thoracic aneurysm repair 7 years ago ([place]), also with 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  TTE EF 70%, no WMA  - SBP goal 100-160  - vascular recs  - needs repeat imaging from abdomen to mid-thigh per vascular, CTA; family refused CTA  - amlodipine 10  - losartan 100qd  - ekg while on vimpat to monitor [place]    PULM:  hx of COVID 2 years ago and 2 weeks ago  - face tent, wean to ra as tolerated  - aspiration precautions     RENAL:  - Fluids: IVL  - daily IOs  - Na 135-145    GI:  - Diet: TF, speech swallow eval  - Bowel regimen: miralax, senna  - rectal tube remove today      ENDO: Type 2 DM  - FS goal 120-180  - nph 13q6h    HEME/ONC:  - SCDs  - Chemoppx: SQL    ID:  - monitor for fevers      Dispo: medicine

## 2022-01-25 NOTE — CHART NOTE - NSCHARTNOTEFT_GEN_A_CORE
Nutrition Follow Up Note  Patient seen for: nutrition follow-up     Hospital course as per chart: 81y Male with PMH of HTN who presented as a transfer from OSH for R frontal parenchymal hemorrhage after fall at home. Admitted 1/15. SLP recommended NPO . Chart reviewed, events noted. Intubated , extubated . Transferred from NSCU to floor this morning ().     Source: [] Patient       [x] EMR        [x] RN        [] Family at bedside       [] Other:    -If unable to interview patient: [] Trach/Vent/BiPAP  [x] Disoriented/confused/inappropriate to interview (lethargic, restless)    Diet Order:   Diet, NPO with Tube Feed:   Tube Feeding Modality: Nasogastric  Glucerna 1.5 Nico (GLUCERNA1.5RTH)  Total Volume for 24 Hours (mL): 1320  Continuous  Starting Tube Feed Rate {mL per Hour}: 20  Increase Tube Feed Rate by (mL): 10     Every 4 hours  Until Goal Tube Feed Rate (mL per Hour): 55  Tube Feed Duration (in Hours): 24  Tube Feed Start Time: 23:00 (22)    - Current EN order provides: 1980 kcal, 109 g protein, 1002 ml water; ~16 kcal/kg, 0.9 g/kg protein based on bed scale weight obtained by RD at initial assessment 125.2 kg ()  - Observed tube feeds of Glucerna 1.5 running at goal rate (55 ml/hr) at bedside. RN reports pt tolerating with no acute issues at this time.     GI: Previously with rectal tube, removed yesterday (). Fecal incontinence noted. Last BM today ().   Bowel Regimen? [x] Yes   [] No    Weights:   Daily Weight in k.6 ()   Bed scale weight obtained by RD at initial assessment: 125.2 kg ()  Will continue to monitor and trend weights as able.    Nutritionally Pertinent MEDICATIONS  (STANDING):  amLODIPine   Tablet  dextrose 40% Gel  dextrose 5%.  dextrose 50% Injectable  dextrose 50% Injectable  dextrose 50% Injectable  glucagon  Injectable  insulin lispro (ADMELOG) corrective regimen sliding scale  insulin NPH human recombinant  losartan  senna    Pertinent Labs:  @ 07:21: Na 140, BUN 28<H>, Cr 0.83, <H>, K+ 4.0, Phos 3.4, Mg 2.5, Alk Phos --, ALT/SGPT --, AST/SGOT --, HbA1c --   @ 21:31: Na 144, BUN 27<H>, Cr 0.83, <H>, K+ 4.0, Phos 3.3, Mg 2.5, Alk Phos --, ALT/SGPT --, AST/SGOT --, HbA1c --    A1C with Estimated Average Glucose Result: 6.7 % (22 @ 09:23)    Finger Sticks:  POCT Blood Glucose.: 163 mg/dL ( @ 06:36)  POCT Blood Glucose.: 175 mg/dL ( @ 23:49)  POCT Blood Glucose.: 166 mg/dL ( @ 17:02)    Skin per nursing documentation: +wounds, no pressure injuries  Edema per flowsheets: 1+ generalized, 2+ to irais. hands    Estimated Needs: no change since previous assessment  Estimated Energy Needs (based on weight obtained by RD at initial assessment 125.2 kg) (15-20 kcal/kg): 2297-9019 kcal/day  Estimated Protein Needs (based on IBW 86.1 kg) (1.2-1.4 g/kg): 103-121 g/day  Defer fluids to team    Previous Nutrition Diagnosis: Inadequate protein-energy intake  Nutrition Diagnosis is: [] ongoing  [x] resolved [] not applicable   Being addressed by enteral nutrition now at goal rate     New Nutrition Diagnosis: not applicable    Nutrition Interventions:     Education Provided:       [] Yes:  [x] No: inappropriate at this time, pt lethargic, restless, on enteral feeds       Recommendations:      1) Continue current enteral nutrition regimen: Glucerna 1.5 at 55 ml/hr x 24 hours. Meets >100% RDIs. Defer free water flushes to team.     Nutrition care plan to remain consistent with GOC  RD remains available to adjust TF regimen PRN     Monitoring and Evaluation: Monitor nutrition provision and tolerance, weight, labs, skin, GI status    RD remains available upon request and will follow up per protocol  [facility] MS RD CDN Pager #[phone]

## 2022-01-26 LAB
ANION GAP SERPL CALC-SCNC: 13 MMOL/L — SIGNIFICANT CHANGE UP (ref 5–17)
BUN SERPL-MCNC: 31 MG/DL — HIGH (ref 7–23)
CALCIUM SERPL-MCNC: 10.2 MG/DL — SIGNIFICANT CHANGE UP (ref 8.4–10.5)
CHLORIDE SERPL-SCNC: 102 MMOL/L — SIGNIFICANT CHANGE UP (ref 96–108)
CO2 SERPL-SCNC: 30 MMOL/L — SIGNIFICANT CHANGE UP (ref 22–31)
CREAT SERPL-MCNC: 0.82 MG/DL — SIGNIFICANT CHANGE UP (ref 0.5–1.3)
D DIMER BLD IA.RAPID-MCNC: 919 NG/ML DDU — HIGH
GLUCOSE BLDC GLUCOMTR-MCNC: 166 MG/DL — HIGH (ref 70–99)
GLUCOSE BLDC GLUCOMTR-MCNC: 172 MG/DL — HIGH (ref 70–99)
GLUCOSE BLDC GLUCOMTR-MCNC: 184 MG/DL — HIGH (ref 70–99)
GLUCOSE BLDC GLUCOMTR-MCNC: 190 MG/DL — HIGH (ref 70–99)
GLUCOSE SERPL-MCNC: 187 MG/DL — HIGH (ref 70–99)
HCT VFR BLD CALC: 38 % — LOW (ref 39–50)
HGB BLD-MCNC: 12.1 G/DL — LOW (ref 13–17)
MAGNESIUM SERPL-MCNC: 2.5 MG/DL — SIGNIFICANT CHANGE UP (ref 1.6–2.6)
MCHC RBC-ENTMCNC: 30.5 PG — SIGNIFICANT CHANGE UP (ref 27–34)
MCHC RBC-ENTMCNC: 31.8 GM/DL — LOW (ref 32–36)
MCV RBC AUTO: 95.7 FL — SIGNIFICANT CHANGE UP (ref 80–100)
NRBC # BLD: 0 /100 WBCS — SIGNIFICANT CHANGE UP (ref 0–0)
PHOSPHATE SERPL-MCNC: 3.7 MG/DL — SIGNIFICANT CHANGE UP (ref 2.5–4.5)
PLATELET # BLD AUTO: 189 K/UL — SIGNIFICANT CHANGE UP (ref 150–400)
POTASSIUM SERPL-MCNC: 4.3 MMOL/L — SIGNIFICANT CHANGE UP (ref 3.5–5.3)
POTASSIUM SERPL-SCNC: 4.3 MMOL/L — SIGNIFICANT CHANGE UP (ref 3.5–5.3)
RBC # BLD: 3.97 M/UL — LOW (ref 4.2–5.8)
RBC # FLD: 12.9 % — SIGNIFICANT CHANGE UP (ref 10.3–14.5)
SODIUM SERPL-SCNC: 145 MMOL/L — SIGNIFICANT CHANGE UP (ref 135–145)
WBC # BLD: 6.86 K/UL — SIGNIFICANT CHANGE UP (ref 3.8–10.5)
WBC # FLD AUTO: 6.86 K/UL — SIGNIFICANT CHANGE UP (ref 3.8–10.5)

## 2022-01-26 PROCEDURE — 99233 SBSQ HOSP IP/OBS HIGH 50: CPT | Mod: GC

## 2022-01-26 PROCEDURE — 71250 CT THORAX DX C-: CPT | Mod: 26

## 2022-01-26 RX ORDER — LACOSAMIDE 50 MG/1
100 TABLET ORAL
Refills: 0 | Status: DISCONTINUED | OUTPATIENT
Start: 2022-01-26 | End: 2022-01-29

## 2022-01-26 RX ADMIN — HUMAN INSULIN 13 UNIT(S): 100 INJECTION, SUSPENSION SUBCUTANEOUS at 17:41

## 2022-01-26 RX ADMIN — Medication 3 MILLILITER(S): at 12:48

## 2022-01-26 RX ADMIN — AMLODIPINE BESYLATE 10 MILLIGRAM(S): 2.5 TABLET ORAL at 06:34

## 2022-01-26 RX ADMIN — LACOSAMIDE 100 MILLIGRAM(S): 50 TABLET ORAL at 17:44

## 2022-01-26 RX ADMIN — LOSARTAN POTASSIUM 100 MILLIGRAM(S): 100 TABLET, FILM COATED ORAL at 06:32

## 2022-01-26 RX ADMIN — SODIUM CHLORIDE 4 MILLILITER(S): 9 INJECTION INTRAMUSCULAR; INTRAVENOUS; SUBCUTANEOUS at 12:48

## 2022-01-26 RX ADMIN — Medication 3 MILLILITER(S): at 06:31

## 2022-01-26 RX ADMIN — Medication 3 MILLILITER(S): at 17:43

## 2022-01-26 RX ADMIN — HUMAN INSULIN 13 UNIT(S): 100 INJECTION, SUSPENSION SUBCUTANEOUS at 06:33

## 2022-01-26 RX ADMIN — Medication 4 MILLILITER(S): at 06:34

## 2022-01-26 RX ADMIN — ENOXAPARIN SODIUM 40 MILLIGRAM(S): 100 INJECTION SUBCUTANEOUS at 06:32

## 2022-01-26 RX ADMIN — HUMAN INSULIN 13 UNIT(S): 100 INJECTION, SUSPENSION SUBCUTANEOUS at 12:47

## 2022-01-26 RX ADMIN — SENNA PLUS 2 TABLET(S): 8.6 TABLET ORAL at 22:03

## 2022-01-26 RX ADMIN — Medication 4 MILLILITER(S): at 17:42

## 2022-01-26 RX ADMIN — CEFEPIME 100 MILLIGRAM(S): 1 INJECTION, POWDER, FOR SOLUTION INTRAMUSCULAR; INTRAVENOUS at 13:59

## 2022-01-26 RX ADMIN — ENOXAPARIN SODIUM 40 MILLIGRAM(S): 100 INJECTION SUBCUTANEOUS at 17:43

## 2022-01-26 RX ADMIN — CEFEPIME 100 MILLIGRAM(S): 1 INJECTION, POWDER, FOR SOLUTION INTRAMUSCULAR; INTRAVENOUS at 06:34

## 2022-01-26 RX ADMIN — Medication 2: at 12:47

## 2022-01-26 RX ADMIN — LACOSAMIDE 100 MILLIGRAM(S): 50 TABLET ORAL at 06:32

## 2022-01-26 RX ADMIN — SODIUM CHLORIDE 4 MILLILITER(S): 9 INJECTION INTRAMUSCULAR; INTRAVENOUS; SUBCUTANEOUS at 17:43

## 2022-01-26 RX ADMIN — Medication 4 MILLILITER(S): at 12:49

## 2022-01-26 RX ADMIN — Medication 2: at 17:42

## 2022-01-26 RX ADMIN — Medication 2: at 06:33

## 2022-01-26 RX ADMIN — SODIUM CHLORIDE 4 MILLILITER(S): 9 INJECTION INTRAMUSCULAR; INTRAVENOUS; SUBCUTANEOUS at 06:33

## 2022-01-26 RX ADMIN — CEFEPIME 100 MILLIGRAM(S): 1 INJECTION, POWDER, FOR SOLUTION INTRAMUSCULAR; INTRAVENOUS at 22:03

## 2022-01-26 NOTE — PROGRESS NOTE ADULT - SUBJECTIVE AND OBJECTIVE BOX
Neurology Progress note    Brief HPI:  81y m W/  HTN presenting to the ED from [facility] as a transfer for R frontal parenchymal hemorrhage on CT after fall at home. Patient is poor historian but states that he was diagnosed with COVID 2 weeks ago and has been feeling "tired" at home, states he felt dizzy earlier today but unable to state what time he fell or further elucidate. Patient denies AC use. Per EMS was also recently diagnosed with a AAA but has been unable to undergo surgical repair due to recent dx of COVID. Patient still endorsing he feels "tired", denies current headache, vision changes, dizziness, chest pain, nausea, back pain, focal numbness/tingling. Endorses weakness to LLE 2/2 pain. (15 Mono 2022 15:50)    Medications:   MEDICATIONS  (STANDING):  acetylcysteine 20%  Inhalation 4 milliLiter(s) Inhalation every 6 hours  albuterol/ipratropium for Nebulization 3 milliLiter(s) Nebulizer every 6 hours  amLODIPine   Tablet 10 milliGRAM(s) Oral daily  cefepime   IVPB      cefepime   IVPB 1000 milliGRAM(s) IV Intermittent every 8 hours  dextrose 40% Gel 15 Gram(s) Oral once  dextrose 5%. 1000 milliLiter(s) (50 mL/Hr) IV Continuous <Continuous>  dextrose 50% Injectable 25 Gram(s) IV Push once  dextrose 50% Injectable 12.5 Gram(s) IV Push once  dextrose 50% Injectable 25 Gram(s) IV Push once  enoxaparin Injectable 40 milliGRAM(s) SubCutaneous every 12 hours  glucagon  Injectable 1 milliGRAM(s) IntraMuscular once  insulin lispro (ADMELOG) corrective regimen sliding scale   SubCutaneous every 6 hours  insulin NPH human recombinant 13 Unit(s) SubCutaneous every 6 hours  lacosamide Solution 100 milliGRAM(s) Oral two times a day  losartan 100 milliGRAM(s) Oral daily  senna 2 Tablet(s) Oral at bedtime  sodium chloride 3%  Inhalation 4 milliLiter(s) Inhalation every 6 hours    MEDICATIONS  (PRN):  acetaminophen    Suspension .. 650 milliGRAM(s) Oral every 6 hours PRN Temp greater or equal to 38C (100.4F), Mild Pain (1 - 3)       Vitals:  Vital Signs Last 24 Hrs  T(C): 37.9 (2022 14:58), Max: 37.9 (2022 14:58)  T(F): 100.2 (2022 14:58), Max: 100.2 (2022 14:58)  HR: 80 (2022 14:58) (74 - 80)  BP: 149/74 (2022 14:58) (141/89 - 151/92)  BP(mean): --  RR: 18 (2022 14:58) (18 - 24)  SpO2: 98% (2022 14:58) (95% - 98%)  General Exam:   General Appearance: Appropriately dressed and in no acute distress       Head: Normocephalic, atraumatic and no dysmorphic features  Ear, Nose, and Throat: Moist mucous membranes +NGT   CVS: S1S2+  Resp: No SOB, no wheeze or rhonchi  GI: soft NT/ND  Extremities: No edema or cyanosis  Skin: No bruises or rashes     Neurological Exam:  Mental Status: Awake, alert and oriented x 1.  Able to follow simple verbal commands intermittently. Able to name and repeat.+ dysarthria more lethargic  Cranial Nerves: PERRL, EOMI, VFFC, sensation V1-V3 intact, L f acial asymmetry, equal elevation of palate, scm/trap 5/5, tongue is midline on protrusion. no obvious papilledema on fundoscopic exam. hearing is grossly intact.   Motor: moving R full strenght. LUE 3/5/ LLE 1-2/5  Sensation: withdraws R>L  Reflexes: 1+ throughout at biceps, brachioradialis, triceps, patellars and ankles bilaterally and equal. No clonus. R toe and L toe were both downgoing.  Coordination: unablke   Gait: unable     Data/Labs/Imaging which I personally reviewed.     Labs:     CBC Full  -  ( 2022 07:07 )  WBC Count : 6.86 K/uL  RBC Count : 3.97 M/uL  Hemoglobin : 12.1 g/dL  Hematocrit : 38.0 %  Platelet Count - Automated : 189 K/uL  Mean Cell Volume : 95.7 fl  Mean Cell Hemoglobin : 30.5 pg  Mean Cell Hemoglobin Concentration : 31.8 gm/dL  Auto Neutrophil # : x  Auto Lymphocyte # : x  Auto Monocyte # : x  Auto Eosinophil # : x  Auto Basophil # : x  Auto Neutrophil % : x  Auto Lymphocyte % : x  Auto Monocyte % : x  Auto Eosinophil % : x  Auto Basophil % : x        145  |  102  |  31<H>  ----------------------------<  187<H>  4.3   |  30  |  0.82    Ca    10.2      2022 07:00  Phos  3.7       Mg     2.5                 Urinalysis Basic - ( 2022 21:31 )    Color: Yellow / Appearance: Clear / S.023 / pH: x  Gluc: x / Ketone: Negative  / Bili: Negative / Urobili: Negative   Blood: x / Protein: 100 / Nitrite: Negative   Leuk Esterase: Negative / RBC: 0 /hpf / WBC 0 /HPF   Sq Epi: x / Non Sq Epi: 1 /hpf / Bacteria: Negative    < from: CT Angio Head w/ IV Cont (01.15.22 @ 17:16) >    ACC: [id number] EXAM:  CT ANGIO NECK (W)AW IC                        ACC: [id number] EXAM:  CT ANGIO BRAIN (W)AW IC                          PROCEDURE DATE:  01/15/2022          INTERPRETATION:  CT ANGIOGRAPHY OF THE NECK AND BRAIN.    CLINICAL INFORMATION:81 years Male ICH    TECHNIQUE:  CT angiography of the neck and brain was performed during the dynamic   administration of intravenous contrast.  MIP reconstructions were   performed and reviewed. Multiplanar reformations were obtained.  Contrastadministered 70 cc Omnipaque 350, contrast discarded 30 cc    FINDINGS:  NECK  RIGHT CAROTID CIRCULATION:  Evaluation of the right carotid circulation demonstrates no traumatic   occlusion, dissection, or pseudoaneurysm.    LEFT CAROTID CIRCULATION:  Evaluation of the left carotid circulation demonstrates no traumatic   occlusion, dissection, or pseudoaneurysm.    VERTEBRAL ARTERIES:  Evaluation of the vertebral arteries reveals no evidence of a vertebral   artery occlusion or dissection.    BRAIN  ANTERIOR CIRCULATION:  Distal internal carotid arteries are patent. Calcification involves the   cavernous segments with mild degrees of narrowing  Anterior cerebral arteries are patent. A1 segment of the left anterior   cerebral artery is smaller than the right, a normal anatomic variant  Middle cerebral arteries are patent.    POSTERIOR CIRCULATION:  Distal vertebral arteries are patent. Bilateral posterior inferior   cerebellar arteries are seen.  Basilar artery is patent. Proximal superior cerebellar arteries are patent  Posterior cerebral arteries are patent.    OTHER:  No evidence of abnormal enhancement associated with the moderate to large   right frontal parenchymal hemorrhage.  No puddling of contrast.  No aneurysm or arteriovenous malformation seen.    IMPRESSION:  *  NO TRAUMATIC OCCLUSION, DISSECTION, OR PSEUDOANEURYSM.  *  NO ABNORMAL ENHANCEMENT ASSOCIATED WITH THE MODERATE TO LARGE RIGHT   FRONTAL PARENCHYMAL HEMORRHAGE.  *  NO PUDDLING OF CONTRAST. NO ANEURYSM OR AVM SEEN.    --- End of Report ---            PAWAN ODELL MD; Attending Radiologist  This document has been electronically signed. Mono 15 2022  7:49PM    < end of copied text >  < from: CT Head No Cont (22 @ 09:30) >    ACC: [id number] EXAM:  CT BRAIN                          PROCEDURE DATE:  2022          INTERPRETATION:  CLINICAL INFORMATION: Intracranial hemorrhage, follow-up.    TECHNIQUE: Portable noncontrast axial CT images were acquired of the head.    COMPARISON STUDY: CT head 2022.    FINDINGS:  Large right frontal lobe parenchymal hematoma measures approximately 7.7   x 3.5 cm, previously 7.5 x 3.2 cm, with surrounding edema and mass   effect, without interval interval change compared to prior.   Redemonstrated small intraventricular hemorrhage, similar to prior.    Moderate cerebral volume loss with proportional prominence of the sulci   and ventricles. Nonspecific white matter hypoattenuation, likely related   to chronic microvascular changes.    Left frontal arachnoid cyst, unchanged.    Mucosal thickening of the left maxillary and bilateral ethmoid sinuses.   Partially visualized nasogastric tube in situ. Mastoid air cells are   clear.    Evidence of bilateral lens surgery.    Nodisplaced calvarial fracture.      IMPRESSION:  Stable exam.    --- End of Report ---          HARRIET WATSON MD; Resident Radiology  This document has been electronically signed.  CRUZ LÓPEZ MD; Attending Radiologist  This document has been electronically signed. 2022 10:58AM    < end of copied text >

## 2022-01-26 NOTE — PROGRESS NOTE ADULT - ATTENDING COMMENTS
here with non op intracranial hemorrhage  Remains altered and now w/ fever  Started on cefepime empirically  Cultures pending. Obtain CT chest to r/o PNA  Palliative care consult. Poor prognosis.  SLP, PT, OT .

## 2022-01-26 NOTE — PROGRESS NOTE ADULT - SUBJECTIVE AND OBJECTIVE BOX
Micah Hansen, PGY-1  Internal Medicine  [phone] / [phone] or [employer]    HPI  81y m W/ PMHx HTN presenting to the ED from [facility] as a transfer for R frontal parenchymal hemorrhage on CT after fall at home. Patient is poor historian but states that he was diagnosed with COVID 2 weeks ago and has been feeling "tired" at home, states he felt dizzy earlier today but unable to state what time he fell or further elucidate. Patient denies AC use. Per EMS was also recently diagnosed with a AAA but has been unable to undergo surgical repair due to recent dx of COVID. Patient still endorsing he feels "tired", denies current headache, vision changes, dizziness, chest pain, nausea, back pain, focal numbness/tingling. Endorses weakness to LLE 2/2 pain.     PROGRESS NOTE:     Patient is a 81y old  Male who presents with a chief complaint of R ICH (2022 18:43)    SUBJECTIVE/OVERNIGHT EVENTS:       ADDITIONAL REVIEW OF SYSTEMS:    MEDICATIONS  (STANDING):  acetylcysteine 20%  Inhalation 4 milliLiter(s) Inhalation every 6 hours  albuterol/ipratropium for Nebulization 3 milliLiter(s) Nebulizer every 6 hours  amLODIPine   Tablet 10 milliGRAM(s) Oral daily  cefepime   IVPB      cefepime   IVPB 1000 milliGRAM(s) IV Intermittent every 8 hours  dextrose 40% Gel 15 Gram(s) Oral once  dextrose 5%. 1000 milliLiter(s) (50 mL/Hr) IV Continuous <Continuous>  dextrose 50% Injectable 25 Gram(s) IV Push once  dextrose 50% Injectable 12.5 Gram(s) IV Push once  dextrose 50% Injectable 25 Gram(s) IV Push once  enoxaparin Injectable 40 milliGRAM(s) SubCutaneous every 12 hours  glucagon  Injectable 1 milliGRAM(s) IntraMuscular once  insulin lispro (ADMELOG) corrective regimen sliding scale   SubCutaneous every 6 hours  insulin NPH human recombinant 13 Unit(s) SubCutaneous every 6 hours  lacosamide Solution 100 milliGRAM(s) Oral two times a day  losartan 100 milliGRAM(s) Oral daily  senna 2 Tablet(s) Oral at bedtime  sodium chloride 3%  Inhalation 4 milliLiter(s) Inhalation every 6 hours    MEDICATIONS  (PRN):  acetaminophen    Suspension .. 650 milliGRAM(s) Oral every 6 hours PRN Temp greater or equal to 38C (100.4F), Mild Pain (1 - 3)      CAPILLARY BLOOD GLUCOSE    POCT Blood Glucose.: 184 mg/dL (2022 06:18)  POCT Blood Glucose.: 168 mg/dL (2022 23:41)  POCT Blood Glucose.: 158 mg/dL (2022 17:49)  POCT Blood Glucose.: 163 mg/dL (2022 12:03)      I&O's Summary    2022 07:01  -  2022 07:00  --------------------------------------------------------  IN: 790 mL / OUT: 400 mL / NET: 390 mL        PHYSICAL EXAM:  Vital Signs Last 24 Hrs  T(C): 37.5 (2022 04:16), Max: 38.6 (2022 10:00)  T(F): 99.5 (2022 04:16), Max: 101.4 (2022 10:00)  HR: 78 (2022 04:16) (74 - 84)  BP: 151/92 (2022 04:16) (131/87 - 151/92)  RR: 20 (2022 04:16) (18 - 21)  SpO2: 96% (2022 04:16) (94% - 98%)    CONSTITUTIONAL: NAD, in bed lethargic with face tent on  HEENT: NC/AT, EOMI  RESPIRATORY: No increased work of breathing, CTAB, no wheezes or crackles appreciated  CARDIOVASCULAR: RRR, S1 and S2 present, no m/r/g  ABDOMEN: soft, NT, ND, bowel sounds present  EXTREMITIES: No LE edema, myoclonus present in upper extremities. in upper extremity restraints  MUSCULOSKELETAL: no joint swelling or tenderness to palpation  NEURO: A&Ox0, too lethargic to participate in exam    LABS:                        12.0   7.76  )-----------( 190      ( 2022 07:21 )             37.5     -    140  |  99  |  28<H>  ----------------------------<  168<H>  4.0   |  30  |  0.83    Ca    9.8      2022 07:21  Phos  3.4       Mg     2.5           Urinalysis Basic - ( 2022 21:31 )    Color: Yellow / Appearance: Clear / S.023 / pH: x  Gluc: x / Ketone: Negative  / Bili: Negative / Urobili: Negative   Blood: x / Protein: 100 / Nitrite: Negative   Leuk Esterase: Negative / RBC: 0 /hpf / WBC 0 /HPF   Sq Epi: x / Non Sq Epi: 1 /hpf / Bacteria: Negative        Culture - Blood (collected 2022 14:17)  Source: .Blood Blood  Preliminary Report (2022 15:01):    No growth to date.    Culture - Blood (collected 2022 14:17)  Source: .Blood Blood  Preliminary Report (2022 15:01):    No growth to date.      RADIOLOGY & ADDITIONAL TESTS:  CXR   IMPRESSION: Patchy airspace opacities in the left lung, may represent asymmetric pulmonary edema or atypical/viral pneumonia.    CT HEAD   FINDINGS:  Large right frontal lobe parenchymal hematoma measures approximately 7.7 x 3.5 cm, previously 7.5 x 3.2 cm, with surrounding edema and mass effect, without interval interval change compared to prior. Redemonstrated small intraventricular hemorrhage, similar to prior.    Moderate cerebral volume loss with proportional prominence of the sulci and ventricles. Nonspecific white matter hypoattenuation, likely related to chronic microvascular changes.    Left frontal arachnoid cyst, unchanged.    Mucosal thickening of the left maxillary and bilateral ethmoid sinuses. Partially visualized nasogastric tube in situ. Mastoid air cells are clear.    Evidence of bilateral lens surgery.    No displaced calvarial fracture.      IMPRESSION:  Stable exam.   Micah Hansen, PGY-1  Internal Medicine  [phone] or [employer]    HPI  81y m W/ PMHx HTN presenting to the ED from [facility] as a transfer for R frontal parenchymal hemorrhage on CT after fall at home. Patient is poor historian but states that he was diagnosed with COVID 2 weeks ago and has been feeling "tired" at home, states he felt dizzy earlier today but unable to state what time he fell or further elucidate. Patient denies AC use. Per EMS was also recently diagnosed with a AAA but has been unable to undergo surgical repair due to recent dx of COVID. Patient still endorsing he feels "tired", denies current headache, vision changes, dizziness, chest pain, nausea, back pain, focal numbness/tingling. Endorses weakness to LLE 2/2 pain.     PROGRESS NOTE:     Patient is a 81y old  Male who presents with a chief complaint of R ICH (2022 18:43)    SUBJECTIVE/OVERNIGHT EVENTS:   Patient was seen and examined at bedside this morning. He remains lethargic and unable to participate in the encounter. He remains with a face-tent. He was started on cefepime given fever to 101.8 yesterday and has been afebrile since. He had no other acute events overnight.     ADDITIONAL REVIEW OF SYSTEMS:    MEDICATIONS  (STANDING):  acetylcysteine 20%  Inhalation 4 milliLiter(s) Inhalation every 6 hours  albuterol/ipratropium for Nebulization 3 milliLiter(s) Nebulizer every 6 hours  amLODIPine   Tablet 10 milliGRAM(s) Oral daily  cefepime   IVPB      cefepime   IVPB 1000 milliGRAM(s) IV Intermittent every 8 hours  dextrose 40% Gel 15 Gram(s) Oral once  dextrose 5%. 1000 milliLiter(s) (50 mL/Hr) IV Continuous <Continuous>  dextrose 50% Injectable 25 Gram(s) IV Push once  dextrose 50% Injectable 12.5 Gram(s) IV Push once  dextrose 50% Injectable 25 Gram(s) IV Push once  enoxaparin Injectable 40 milliGRAM(s) SubCutaneous every 12 hours  glucagon  Injectable 1 milliGRAM(s) IntraMuscular once  insulin lispro (ADMELOG) corrective regimen sliding scale   SubCutaneous every 6 hours  insulin NPH human recombinant 13 Unit(s) SubCutaneous every 6 hours  lacosamide Solution 100 milliGRAM(s) Oral two times a day  losartan 100 milliGRAM(s) Oral daily  senna 2 Tablet(s) Oral at bedtime  sodium chloride 3%  Inhalation 4 milliLiter(s) Inhalation every 6 hours    MEDICATIONS  (PRN):  acetaminophen    Suspension .. 650 milliGRAM(s) Oral every 6 hours PRN Temp greater or equal to 38C (100.4F), Mild Pain (1 - 3)      CAPILLARY BLOOD GLUCOSE    POCT Blood Glucose.: 184 mg/dL (2022 06:18)  POCT Blood Glucose.: 168 mg/dL (2022 23:41)  POCT Blood Glucose.: 158 mg/dL (2022 17:49)  POCT Blood Glucose.: 163 mg/dL (2022 12:03)      I&O's Summary    2022 07:01  -  2022 07:00  --------------------------------------------------------  IN: 790 mL / OUT: 400 mL / NET: 390 mL        PHYSICAL EXAM:  Vital Signs Last 24 Hrs  T(C): 37.5 (2022 04:16), Max: 38.6 (2022 10:00)  T(F): 99.5 (2022 04:16), Max: 101.4 (2022 10:00)  HR: 78 (2022 04:16) (74 - 84)  BP: 151/92 (2022 04:16) (131/87 - 151/92)  RR: 20 (2022 04:16) (18 - 21)  SpO2: 96% (2022 04:16) (94% - 98%)    CONSTITUTIONAL: NAD, in bed lethargic with face tent on  HEENT: NC/AT, EOMI  RESPIRATORY: No increased work of breathing, CTAB, no wheezes or crackles appreciated  CARDIOVASCULAR: RRR, S1 and S2 present, no m/r/g  ABDOMEN: soft, NT, ND, bowel sounds present  EXTREMITIES: No LE edema, myoclonus present in upper extremities. in upper extremity restraints  MUSCULOSKELETAL: no joint swelling or tenderness to palpation  NEURO: A&Ox0, too lethargic to participate in exam    LABS:                        12.1   6.86  )-----------( 189      ( 2022 07:07 )             38.0         145  |  102  |  31<H>  ----------------------------<  187<H>  4.3   |  30  |  0.82    Ca    10.2      2022 07:00  Phos  3.7       Mg     2.5           Urinalysis Basic - ( 2022 21:31 )    Color: Yellow / Appearance: Clear / S.023 / pH: x  Gluc: x / Ketone: Negative  / Bili: Negative / Urobili: Negative   Blood: x / Protein: 100 / Nitrite: Negative   Leuk Esterase: Negative / RBC: 0 /hpf / WBC 0 /HPF   Sq Epi: x / Non Sq Epi: 1 /hpf / Bacteria: Negative      Culture - Blood (collected 2022 14:17)  Source: .Blood Blood  Preliminary Report (2022 15:01):    No growth to date.    Culture - Blood (collected 2022 14:17)  Source: .Blood Blood  Preliminary Report (2022 15:01):    No growth to date.      RADIOLOGY & ADDITIONAL TESTS:  CXR   IMPRESSION: Patchy airspace opacities in the left lung, may represent asymmetric pulmonary edema or atypical/viral pneumonia.    CT HEAD   FINDINGS:  Large right frontal lobe parenchymal hematoma measures approximately 7.7 x 3.5 cm, previously 7.5 x 3.2 cm, with surrounding edema and mass effect, without interval interval change compared to prior. Redemonstrated small intraventricular hemorrhage, similar to prior.    Moderate cerebral volume loss with proportional prominence of the sulci and ventricles. Nonspecific white matter hypoattenuation, likely related to chronic microvascular changes.    Left frontal arachnoid cyst, unchanged.    Mucosal thickening of the left maxillary and bilateral ethmoid sinuses. Partially visualized nasogastric tube in situ. Mastoid air cells are clear.    Evidence of bilateral lens surgery.    No displaced calvarial fracture.      IMPRESSION:  Stable exam.

## 2022-01-26 NOTE — PROGRESS NOTE ADULT - PROBLEM SELECTOR PLAN 2
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

## 2022-01-26 NOTE — PROGRESS NOTE ADULT - ASSESSMENT
81y m W/ PMHx HTN presenting to the ED from [facility] as a transfer for R frontal parenchymal hemorrhage on CT after fall at home, now on medicine floors from [facility]

## 2022-01-26 NOTE — PROGRESS NOTE ADULT - ASSESSMENT
81y m W/  HTN presenting to the ED from [facility] as a transfer for R frontal parenchymal hemorrhage on CT after fall at home.  CTH R hemisphere IPH  CTA H/N no aneurysm or AVM  repeat CTH stable   Impression: IPH of unclear etiology.  may be 2/2 HTN. need to r/o CAA or underlying mass   - f/u CT chest today   - now on cefepime for infection. monitor for seizure as it can lower seizure threshold and cause change in mental status   - SBP <160  - DVT ppx okay. hold AC/AP  - now on vimpat 100mg BID  - rEEG when able  - MRI brain w/ and w/o when able  - GI for PEG?, now with NGT  - no need for hypertonics  - cardio following. known thoracic aneurysm s/p repair.   - f/uj pulmo for resp status. on face tent  - telemetry  - PT/OT/SS/SLP, OOBC  - check FS, glucose control <180  - GI/DVT ppx  - Counseling on diet, exercise, and medication adherence was done  - Counseling on smoking cessation and alcohol consumption offered when appropriate.  - Pain assessed and judicious use of narcotics when appropriate was discussed.    - Stroke education given when appropriate.  - Importance of fall prevention discussed.   - Differential diagnosis and plan of care discussed with patient and/or family and primary team  - Thank you for allowing me to participate in the care of this patient. Call with questions.   Carlin Neal MD  Vascular Neurology  Office: [phone]

## 2022-01-26 NOTE — PROGRESS NOTE ADULT - PROBLEM SELECTOR PLAN 1
A&Ox0. 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; CTA negative  on 1/17 stopped following commands, LUE twitching, given 2mg ativan followed by ?post-ictal state then returned to baseline, no seizure on eeg; loaded with 1g keppra  - neuro checks q4h  - Vimpat 100mg q12  - Cerebral Edema: off HTS  - MRI d/t concern for CAA  - Activity: PT/OT as tolerated  - s/p Thiamine  - appreciating vascular neuro recs: rEEG and MRI w/wo when able

## 2022-01-27 LAB
ANION GAP SERPL CALC-SCNC: 11 MMOL/L — SIGNIFICANT CHANGE UP (ref 5–17)
BASOPHILS # BLD AUTO: 0.03 K/UL — SIGNIFICANT CHANGE UP (ref 0–0.2)
BASOPHILS NFR BLD AUTO: 0.4 % — SIGNIFICANT CHANGE UP (ref 0–2)
BUN SERPL-MCNC: 34 MG/DL — HIGH (ref 7–23)
CALCIUM SERPL-MCNC: 10.3 MG/DL — SIGNIFICANT CHANGE UP (ref 8.4–10.5)
CHLORIDE SERPL-SCNC: 104 MMOL/L — SIGNIFICANT CHANGE UP (ref 96–108)
CO2 SERPL-SCNC: 31 MMOL/L — SIGNIFICANT CHANGE UP (ref 22–31)
CREAT SERPL-MCNC: 0.86 MG/DL — SIGNIFICANT CHANGE UP (ref 0.5–1.3)
EOSINOPHIL # BLD AUTO: 0.26 K/UL — SIGNIFICANT CHANGE UP (ref 0–0.5)
EOSINOPHIL NFR BLD AUTO: 3.9 % — SIGNIFICANT CHANGE UP (ref 0–6)
GLUCOSE BLDC GLUCOMTR-MCNC: 188 MG/DL — HIGH (ref 70–99)
GLUCOSE BLDC GLUCOMTR-MCNC: 195 MG/DL — HIGH (ref 70–99)
GLUCOSE BLDC GLUCOMTR-MCNC: 195 MG/DL — HIGH (ref 70–99)
GLUCOSE SERPL-MCNC: 192 MG/DL — HIGH (ref 70–99)
HCT VFR BLD CALC: 38.5 % — LOW (ref 39–50)
HGB BLD-MCNC: 12.1 G/DL — LOW (ref 13–17)
IMM GRANULOCYTES NFR BLD AUTO: 1.6 % — HIGH (ref 0–1.5)
LYMPHOCYTES # BLD AUTO: 1.32 K/UL — SIGNIFICANT CHANGE UP (ref 1–3.3)
LYMPHOCYTES # BLD AUTO: 19.6 % — SIGNIFICANT CHANGE UP (ref 13–44)
MAGNESIUM SERPL-MCNC: 2.5 MG/DL — SIGNIFICANT CHANGE UP (ref 1.6–2.6)
MCHC RBC-ENTMCNC: 30.3 PG — SIGNIFICANT CHANGE UP (ref 27–34)
MCHC RBC-ENTMCNC: 31.4 GM/DL — LOW (ref 32–36)
MCV RBC AUTO: 96.3 FL — SIGNIFICANT CHANGE UP (ref 80–100)
MONOCYTES # BLD AUTO: 0.47 K/UL — SIGNIFICANT CHANGE UP (ref 0–0.9)
MONOCYTES NFR BLD AUTO: 7 % — SIGNIFICANT CHANGE UP (ref 2–14)
NEUTROPHILS # BLD AUTO: 4.54 K/UL — SIGNIFICANT CHANGE UP (ref 1.8–7.4)
NEUTROPHILS NFR BLD AUTO: 67.5 % — SIGNIFICANT CHANGE UP (ref 43–77)
NRBC # BLD: 0 /100 WBCS — SIGNIFICANT CHANGE UP (ref 0–0)
PHOSPHATE SERPL-MCNC: 3.7 MG/DL — SIGNIFICANT CHANGE UP (ref 2.5–4.5)
PLATELET # BLD AUTO: 215 K/UL — SIGNIFICANT CHANGE UP (ref 150–400)
POTASSIUM SERPL-MCNC: 4.2 MMOL/L — SIGNIFICANT CHANGE UP (ref 3.5–5.3)
POTASSIUM SERPL-SCNC: 4.2 MMOL/L — SIGNIFICANT CHANGE UP (ref 3.5–5.3)
RBC # BLD: 4 M/UL — LOW (ref 4.2–5.8)
RBC # FLD: 13 % — SIGNIFICANT CHANGE UP (ref 10.3–14.5)
SODIUM SERPL-SCNC: 146 MMOL/L — HIGH (ref 135–145)
WBC # BLD: 6.73 K/UL — SIGNIFICANT CHANGE UP (ref 3.8–10.5)
WBC # FLD AUTO: 6.73 K/UL — SIGNIFICANT CHANGE UP (ref 3.8–10.5)

## 2022-01-27 PROCEDURE — 70450 CT HEAD/BRAIN W/O DYE: CPT | Mod: 26

## 2022-01-27 PROCEDURE — 99233 SBSQ HOSP IP/OBS HIGH 50: CPT | Mod: GC

## 2022-01-27 RX ORDER — NYSTATIN CREAM 100000 [USP'U]/G
1 CREAM TOPICAL
Refills: 0 | Status: DISCONTINUED | OUTPATIENT
Start: 2022-01-27 | End: 2022-01-29

## 2022-01-27 RX ADMIN — Medication 4 MILLILITER(S): at 18:44

## 2022-01-27 RX ADMIN — AMLODIPINE BESYLATE 10 MILLIGRAM(S): 2.5 TABLET ORAL at 05:07

## 2022-01-27 RX ADMIN — SENNA PLUS 2 TABLET(S): 8.6 TABLET ORAL at 23:12

## 2022-01-27 RX ADMIN — HUMAN INSULIN 13 UNIT(S): 100 INJECTION, SUSPENSION SUBCUTANEOUS at 18:00

## 2022-01-27 RX ADMIN — Medication 4 MILLILITER(S): at 00:13

## 2022-01-27 RX ADMIN — CEFEPIME 100 MILLIGRAM(S): 1 INJECTION, POWDER, FOR SOLUTION INTRAMUSCULAR; INTRAVENOUS at 16:38

## 2022-01-27 RX ADMIN — Medication 2: at 00:04

## 2022-01-27 RX ADMIN — SODIUM CHLORIDE 4 MILLILITER(S): 9 INJECTION INTRAMUSCULAR; INTRAVENOUS; SUBCUTANEOUS at 00:05

## 2022-01-27 RX ADMIN — Medication 2: at 18:00

## 2022-01-27 RX ADMIN — Medication 4 MILLILITER(S): at 13:16

## 2022-01-27 RX ADMIN — ENOXAPARIN SODIUM 40 MILLIGRAM(S): 100 INJECTION SUBCUTANEOUS at 18:00

## 2022-01-27 RX ADMIN — Medication 2: at 12:35

## 2022-01-27 RX ADMIN — LACOSAMIDE 100 MILLIGRAM(S): 50 TABLET ORAL at 05:07

## 2022-01-27 RX ADMIN — Medication 3 MILLILITER(S): at 12:36

## 2022-01-27 RX ADMIN — Medication 3 MILLILITER(S): at 05:08

## 2022-01-27 RX ADMIN — Medication 3 MILLILITER(S): at 00:04

## 2022-01-27 RX ADMIN — Medication 3 MILLILITER(S): at 18:01

## 2022-01-27 RX ADMIN — SODIUM CHLORIDE 4 MILLILITER(S): 9 INJECTION INTRAMUSCULAR; INTRAVENOUS; SUBCUTANEOUS at 05:08

## 2022-01-27 RX ADMIN — HUMAN INSULIN 13 UNIT(S): 100 INJECTION, SUSPENSION SUBCUTANEOUS at 06:12

## 2022-01-27 RX ADMIN — HUMAN INSULIN 13 UNIT(S): 100 INJECTION, SUSPENSION SUBCUTANEOUS at 12:35

## 2022-01-27 RX ADMIN — CEFEPIME 100 MILLIGRAM(S): 1 INJECTION, POWDER, FOR SOLUTION INTRAMUSCULAR; INTRAVENOUS at 05:10

## 2022-01-27 RX ADMIN — Medication 4 MILLILITER(S): at 05:09

## 2022-01-27 RX ADMIN — Medication 2: at 06:13

## 2022-01-27 RX ADMIN — NYSTATIN CREAM 1 APPLICATION(S): 100000 CREAM TOPICAL at 18:01

## 2022-01-27 RX ADMIN — LOSARTAN POTASSIUM 100 MILLIGRAM(S): 100 TABLET, FILM COATED ORAL at 05:07

## 2022-01-27 RX ADMIN — CEFEPIME 100 MILLIGRAM(S): 1 INJECTION, POWDER, FOR SOLUTION INTRAMUSCULAR; INTRAVENOUS at 23:12

## 2022-01-27 RX ADMIN — HUMAN INSULIN 13 UNIT(S): 100 INJECTION, SUSPENSION SUBCUTANEOUS at 00:03

## 2022-01-27 RX ADMIN — SODIUM CHLORIDE 4 MILLILITER(S): 9 INJECTION INTRAMUSCULAR; INTRAVENOUS; SUBCUTANEOUS at 12:36

## 2022-01-27 RX ADMIN — NYSTATIN CREAM 1 APPLICATION(S): 100000 CREAM TOPICAL at 06:18

## 2022-01-27 RX ADMIN — LACOSAMIDE 100 MILLIGRAM(S): 50 TABLET ORAL at 18:00

## 2022-01-27 RX ADMIN — ENOXAPARIN SODIUM 40 MILLIGRAM(S): 100 INJECTION SUBCUTANEOUS at 05:09

## 2022-01-27 RX ADMIN — SODIUM CHLORIDE 4 MILLILITER(S): 9 INJECTION INTRAMUSCULAR; INTRAVENOUS; SUBCUTANEOUS at 18:01

## 2022-01-27 NOTE — PROGRESS NOTE ADULT - PROBLEM SELECTOR PLAN 1
A&Ox0. 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; CTA negative  on 1/17 stopped following commands, LUE twitching, given 2mg ativan followed by ?post-ictal state then returned to baseline, no seizure on eeg; loaded with 1g keppra  - neuro checks q4h  - Vimpat 100mg q12  - Cerebral Edema: off HTS  - MRI d/t concern for CAA  - Activity: PT/OT as tolerated  - s/p Thiamine  - appreciating vascular neuro recs: rEEG and MRI w/wo when able

## 2022-01-27 NOTE — PROGRESS NOTE ADULT - PROBLEM SELECTOR PLAN 2
- cw losartan 100qd  - cw amlodipine 10  - Cardiology following     Hx of thoracic aneurysm repair 7 years ago (con), also with 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  TTE EF 70%, no WMA  - SBP goal 100-160  - vascular recs  - needs repeat imaging from abdomen to mid-thigh per vascular, CTA; family refused CTA  - ekg while on vimpat to monitor ME

## 2022-01-27 NOTE — PROGRESS NOTE ADULT - SUBJECTIVE AND OBJECTIVE BOX
DATE OF SERVICE: 01-27-22 @ 10:17    Subjective: Patient seen and examined. No new events except as noted.     SUBJECTIVE/ROS:    Pt seen and examined early this am       MEDICATIONS:  MEDICATIONS  (STANDING):  acetylcysteine 20%  Inhalation 4 milliLiter(s) Inhalation every 6 hours  albuterol/ipratropium for Nebulization 3 milliLiter(s) Nebulizer every 6 hours  amLODIPine   Tablet 10 milliGRAM(s) Oral daily  cefepime   IVPB      cefepime   IVPB 1000 milliGRAM(s) IV Intermittent every 8 hours  dextrose 40% Gel 15 Gram(s) Oral once  dextrose 5%. 1000 milliLiter(s) (50 mL/Hr) IV Continuous <Continuous>  dextrose 50% Injectable 25 Gram(s) IV Push once  dextrose 50% Injectable 12.5 Gram(s) IV Push once  dextrose 50% Injectable 25 Gram(s) IV Push once  enoxaparin Injectable 40 milliGRAM(s) SubCutaneous every 12 hours  glucagon  Injectable 1 milliGRAM(s) IntraMuscular once  insulin lispro (ADMELOG) corrective regimen sliding scale   SubCutaneous every 6 hours  insulin NPH human recombinant 13 Unit(s) SubCutaneous every 6 hours  lacosamide Solution 100 milliGRAM(s) Oral two times a day  losartan 100 milliGRAM(s) Oral daily  nystatin Powder 1 Application(s) Topical two times a day  senna 2 Tablet(s) Oral at bedtime  sodium chloride 3%  Inhalation 4 milliLiter(s) Inhalation every 6 hours      PHYSICAL EXAM:  T(C): 36.8 (01-27-22 @ 04:37), Max: 37.9 (01-26-22 @ 14:58)  HR: 90 (01-27-22 @ 04:37) (76 - 90)  BP: 141/98 (01-27-22 @ 04:37) (141/98 - 149/74)  RR: 20 (01-27-22 @ 04:37) (18 - 24)  SpO2: 97% (01-27-22 @ 04:37) (97% - 98%)  Wt(kg): --  I&O's Summary    26 Jan 2022 07:01  -  27 Jan 2022 07:00  --------------------------------------------------------  IN: 1703 mL / OUT: 500 mL / NET: 1203 mL            JVP: Normal  Neck: supple  Lung: clear   CV: S1 S2 , Murmur:  Abd: soft  Ext: No edema  neuro: Awake  Psych: flat affect  Skin: normal``    LABS/DATA:    CARDIAC MARKERS:                                12.1   6.73  )-----------( 215      ( 27 Jan 2022 06:30 )             38.5     01-27    146<H>  |  104  |  34<H>  ----------------------------<  192<H>  4.2   |  31  |  0.86    Ca    10.3      27 Jan 2022 06:30  Phos  3.7     01-27  Mg     2.5     01-27      proBNP:   Lipid Profile:   HgA1c:   TSH:     TELE:  EKG:

## 2022-01-27 NOTE — PROGRESS NOTE ADULT - ASSESSMENT
81y m W/ PMHx HTN presenting to the ED from [facility] as a transfer for R frontal parenchymal hemorrhage on CT after fall at home, now on medicine floors from [facility]

## 2022-01-27 NOTE — PROGRESS NOTE ADULT - ATTENDING COMMENTS
here with non op intracranial hemorrhage  Remains altered and now w/ fever, CT chest suggestive of aspiration  Continue cefepime  Consider ID consult if fever persistent  Cultures pending.   Discussed family treatment going forward  Mental status seems to be worse here on the floor as pt has never been verbal on the floor but prior neuro notes state he was alert and did repeat/follow few simple commands  Repeat head CT   Palliative care consult. Poor prognosis.  SLP, PT, OT .

## 2022-01-27 NOTE — PROGRESS NOTE ADULT - PROBLEM SELECTOR PLAN 3
hx of COVID 2 years ago and 2 weeks ago  - face tent, wean to ra as tolerated  - aspiration precautions   - febrile last night 1/24 -- repeating w/u  - f/u BCx, RVP, CXR  - elevated d-dimer  - will f/u CT Chest hx of COVID 2 years ago and 2 weeks ago  - face tent, wean to ra as tolerated  - aspiration precautions   - febrile last night 1/24 -- repeating w/u  - f/u BCx, RVP, CXR  - elevated d-dimer  - will f/u CT Chest -- potential aspiration PNA  - cont cefepime for 7 days until 2/1/22

## 2022-01-27 NOTE — PROGRESS NOTE ADULT - SUBJECTIVE AND OBJECTIVE BOX
Micah Hansen, PGY-1  Internal Medicine  [phone] or Facebook Teams    HPI  81y m W/ PMHx HTN presenting to the ED from [facility] as a transfer for R frontal parenchymal hemorrhage on CT after fall at home. Patient is poor historian but states that he was diagnosed with COVID 2 weeks ago and has been feeling "tired" at home, states he felt dizzy earlier today but unable to state what time he fell or further elucidate. Patient denies AC use. Per EMS was also recently diagnosed with a AAA but has been unable to undergo surgical repair due to recent dx of COVID. Patient still endorsing he feels "tired", denies current headache, vision changes, dizziness, chest pain, nausea, back pain, focal numbness/tingling. Endorses weakness to LLE 2/2 pain.     PROGRESS NOTE:     Patient is a 81y old  Male who presents with a chief complaint of R ICH (2022 18:43)    SUBJECTIVE/OVERNIGHT EVENTS:       ADDITIONAL REVIEW OF SYSTEMS:    MEDICATIONS  (STANDING):  acetylcysteine 20%  Inhalation 4 milliLiter(s) Inhalation every 6 hours  albuterol/ipratropium for Nebulization 3 milliLiter(s) Nebulizer every 6 hours  amLODIPine   Tablet 10 milliGRAM(s) Oral daily  cefepime   IVPB      cefepime   IVPB 1000 milliGRAM(s) IV Intermittent every 8 hours  dextrose 40% Gel 15 Gram(s) Oral once  dextrose 5%. 1000 milliLiter(s) (50 mL/Hr) IV Continuous <Continuous>  dextrose 50% Injectable 25 Gram(s) IV Push once  dextrose 50% Injectable 12.5 Gram(s) IV Push once  dextrose 50% Injectable 25 Gram(s) IV Push once  enoxaparin Injectable 40 milliGRAM(s) SubCutaneous every 12 hours  glucagon  Injectable 1 milliGRAM(s) IntraMuscular once  insulin lispro (ADMELOG) corrective regimen sliding scale   SubCutaneous every 6 hours  insulin NPH human recombinant 13 Unit(s) SubCutaneous every 6 hours  lacosamide Solution 100 milliGRAM(s) Oral two times a day  losartan 100 milliGRAM(s) Oral daily  nystatin Powder 1 Application(s) Topical two times a day  senna 2 Tablet(s) Oral at bedtime  sodium chloride 3%  Inhalation 4 milliLiter(s) Inhalation every 6 hours    MEDICATIONS  (PRN):  acetaminophen    Suspension .. 650 milliGRAM(s) Oral every 6 hours PRN Temp greater or equal to 38C (100.4F), Mild Pain (1 - 3)        CAPILLARY BLOOD GLUCOSE    POCT Blood Glucose.: 188 mg/dL (2022 06:10)  POCT Blood Glucose.: 190 mg/dL (2022 23:40)  POCT Blood Glucose.: 172 mg/dL (2022 17:24)  POCT Blood Glucose.: 166 mg/dL (2022 12:07)      I&O's Summary    2022 07:01  -  2022 07:00  --------------------------------------------------------  IN: 1703 mL / OUT: 500 mL / NET: 1203 mL          PHYSICAL EXAM:  Vital Signs Last 24 Hrs  T(C): 36.8 (2022 04:37), Max: 37.9 (2022 14:58)  T(F): 98.2 (2022 04:37), Max: 100.2 (2022 14:58)  HR: 90 (2022 04:37) (76 - 90)  BP: 141/98 (2022 04:37) (141/98 - 149/74)  RR: 20 (2022 04:37) (18 - 24)  SpO2: 97% (2022 04:37) (97% - 98%)      CONSTITUTIONAL: NAD, in bed lethargic with face tent on  HEENT: NC/AT, EOMI  RESPIRATORY: No increased work of breathing, CTAB, no wheezes or crackles appreciated  CARDIOVASCULAR: RRR, S1 and S2 present, no m/r/g  ABDOMEN: soft, NT, ND, bowel sounds present  EXTREMITIES: No LE edema, myoclonus present in upper extremities. in upper extremity restraints  MUSCULOSKELETAL: no joint swelling or tenderness to palpation  NEURO: A&Ox0, too lethargic to participate in exam    LABS:                        12.1   6.86  )-----------( 189      ( 2022 07:07 )             38.0     01-    145  |  102  |  31<H>  ----------------------------<  187<H>  4.3   |  30  |  0.82    Ca    10.2      2022 07:00  Phos  3.7       Mg     2.5           Urinalysis Basic - ( 2022 21:31 )    Color: Yellow / Appearance: Clear / S.023 / pH: x  Gluc: x / Ketone: Negative  / Bili: Negative / Urobili: Negative   Blood: x / Protein: 100 / Nitrite: Negative   Leuk Esterase: Negative / RBC: 0 /hpf / WBC 0 /HPF   Sq Epi: x / Non Sq Epi: 1 /hpf / Bacteria: Negative      Culture - Blood (collected 2022 14:17)  Source: .Blood Blood  Preliminary Report (2022 15:01):    No growth to date.    Culture - Blood (collected 2022 14:17)  Source: .Blood Blood  Preliminary Report (2022 15:01):    No growth to date.      RADIOLOGY & ADDITIONAL TESTS:  CXR   IMPRESSION: Patchy airspace opacities in the left lung, may represent asymmetric pulmonary edema or atypical/viral pneumonia.    CT HEAD   FINDINGS:  Large right frontal lobe parenchymal hematoma measures approximately 7.7 x 3.5 cm, previously 7.5 x 3.2 cm, with surrounding edema and mass effect, without interval interval change compared to prior. Redemonstrated small intraventricular hemorrhage, similar to prior.    Moderate cerebral volume loss with proportional prominence of the sulci and ventricles. Nonspecific white matter hypoattenuation, likely related to chronic microvascular changes.    Left frontal arachnoid cyst, unchanged.    Mucosal thickening of the left maxillary and bilateral ethmoid sinuses. Partially visualized nasogastric tube in situ. Mastoid air cells are clear.    Evidence of bilateral lens surgery.    No displaced calvarial fracture.      IMPRESSION:  Stable exam.   Micah Hansen, PGY-1  Internal Medicine  [phone] / [phone] or [employer] Teams    HPI  81y m W/ PMHx HTN presenting to the ED from [facility] as a transfer for R frontal parenchymal hemorrhage on CT after fall at home. Patient is poor historian but states that he was diagnosed with COVID 2 weeks ago and has been feeling "tired" at home, states he felt dizzy earlier today but unable to state what time he fell or further elucidate. Patient denies AC use. Per EMS was also recently diagnosed with a AAA but has been unable to undergo surgical repair due to recent dx of COVID. Patient still endorsing he feels "tired", denies current headache, vision changes, dizziness, chest pain, nausea, back pain, focal numbness/tingling. Endorses weakness to LLE 2/2 pain.     PROGRESS NOTE:     Patient is a 81y old  Male who presents with a chief complaint of R ICH (24 Jan 2022 18:43)    SUBJECTIVE/OVERNIGHT EVENTS:   Patient was seen and examined at bedside this morning. He remained non-participatory during the encounter, keeping eyes closed with persistent myoclonic movements. He had no acute events overnight.     ADDITIONAL REVIEW OF SYSTEMS:    MEDICATIONS  (STANDING):  acetylcysteine 20%  Inhalation 4 milliLiter(s) Inhalation every 6 hours  albuterol/ipratropium for Nebulization 3 milliLiter(s) Nebulizer every 6 hours  amLODIPine   Tablet 10 milliGRAM(s) Oral daily  cefepime   IVPB      cefepime   IVPB 1000 milliGRAM(s) IV Intermittent every 8 hours  dextrose 40% Gel 15 Gram(s) Oral once  dextrose 5%. 1000 milliLiter(s) (50 mL/Hr) IV Continuous <Continuous>  dextrose 50% Injectable 25 Gram(s) IV Push once  dextrose 50% Injectable 12.5 Gram(s) IV Push once  dextrose 50% Injectable 25 Gram(s) IV Push once  enoxaparin Injectable 40 milliGRAM(s) SubCutaneous every 12 hours  glucagon  Injectable 1 milliGRAM(s) IntraMuscular once  insulin lispro (ADMELOG) corrective regimen sliding scale   SubCutaneous every 6 hours  insulin NPH human recombinant 13 Unit(s) SubCutaneous every 6 hours  lacosamide Solution 100 milliGRAM(s) Oral two times a day  losartan 100 milliGRAM(s) Oral daily  nystatin Powder 1 Application(s) Topical two times a day  senna 2 Tablet(s) Oral at bedtime  sodium chloride 3%  Inhalation 4 milliLiter(s) Inhalation every 6 hours    MEDICATIONS  (PRN):  acetaminophen    Suspension .. 650 milliGRAM(s) Oral every 6 hours PRN Temp greater or equal to 38C (100.4F), Mild Pain (1 - 3)      CAPILLARY BLOOD GLUCOSE    POCT Blood Glucose.: 188 mg/dL (27 Jan 2022 06:10)  POCT Blood Glucose.: 190 mg/dL (26 Jan 2022 23:40)  POCT Blood Glucose.: 172 mg/dL (26 Jan 2022 17:24)  POCT Blood Glucose.: 166 mg/dL (26 Jan 2022 12:07)      I&O's Summary    26 Jan 2022 07:01  -  27 Jan 2022 07:00  --------------------------------------------------------  IN: 1703 mL / OUT: 500 mL / NET: 1203 mL          PHYSICAL EXAM:  Vital Signs Last 24 Hrs  T(C): 36.8 (27 Jan 2022 04:37), Max: 37.9 (26 Jan 2022 14:58)  T(F): 98.2 (27 Jan 2022 04:37), Max: 100.2 (26 Jan 2022 14:58)  HR: 90 (27 Jan 2022 04:37) (76 - 90)  BP: 141/98 (27 Jan 2022 04:37) (141/98 - 149/74)  RR: 20 (27 Jan 2022 04:37) (18 - 24)  SpO2: 97% (27 Jan 2022 04:37) (97% - 98%)      CONSTITUTIONAL: NAD, in bed lethargic with face tent on  HEENT: NC/AT, EOMI  RESPIRATORY: No increased work of breathing, CTAB, no wheezes or crackles appreciated  CARDIOVASCULAR: RRR, S1 and S2 present, no m/r/g  ABDOMEN: soft, NT, ND, bowel sounds present  EXTREMITIES: No LE edema, myoclonus present in upper extremities, R>L. in upper extremity mittens  MUSCULOSKELETAL: no joint swelling or tenderness to palpation  NEURO: A&Ox0, too lethargic to participate in exam      LABS:                        12.1   6.73  )-----------( 215      ( 27 Jan 2022 06:30 )             38.5     01-27    146<H>  |  104  |  34<H>  ----------------------------<  192<H>  4.2   |  31  |  0.86    Ca    10.3      27 Jan 2022 06:30  Phos  3.7     01-27  Mg     2.5     01-27        Culture - Blood (collected 25 Jan 2022 13:34)  Source: .Blood Blood-Venous  Preliminary Report (26 Jan 2022 14:02):    No growth to date.    Culture - Blood (collected 25 Jan 2022 13:34)  Source: .Blood Blood-Peripheral  Preliminary Report (26 Jan 2022 14:02):    No growth to date.      RADIOLOGY & ADDITIONAL TESTS:  CT CHEST 1/26  FINDINGS:    LYMPH NODES: Small mediastinal lymph nodes are similar in appearance to 1/15/2022.    HEART/VASCULATURE: Heart size is normal. Aortic valve and coronary artery calcifications.    AIRWAYS/LUNGS/PLEURA: Evaluation is limited secondary to respiratory motion artifact. Grossly patent central airways. New patchy opacities are seen in the dependent portions of the right upper lobe and bilateral lower lobes.    UPPER ABDOMEN:Enteric tube tip is in the proximal stomach just distal to the gastroesophageal junction. Ingested material is noted within the esophagus.    Redemonstrated atrophic pancreas with a 1 cm calcification in the head of the pancreas. Numerous additional calcifications suggest chronic pancreatitis.    Hepatic cysts adjacent to the gallbladder fossa. Trace layering densities within the gallbladder likely representing cholelithiasis.    BONES/SOFT TISSUES: Multiple old left rib fracture is. A 1.9 cm subpleural lipoma is noted adjacent to one of the old fractures. (3:65).    IMPRESSION:    1. New patchy bilateral dependent opacities, could occur in the setting of pneumonia (? Clinical history of aspiration given its distribution).      CXR 1/25  IMPRESSION: Patchy airspace opacities in the left lung, may represent asymmetric pulmonary edema or atypical/viral pneumonia.    CT HEAD 1/23  FINDINGS:  Large right frontal lobe parenchymal hematoma measures approximately 7.7 x 3.5 cm, previously 7.5 x 3.2 cm, with surrounding edema and mass effect, without interval interval change compared to prior. Redemonstrated small intraventricular hemorrhage, similar to prior.    Moderate cerebral volume loss with proportional prominence of the sulci and ventricles. Nonspecific white matter hypoattenuation, likely related to chronic microvascular changes.    Left frontal arachnoid cyst, unchanged.    Mucosal thickening of the left maxillary and bilateral ethmoid sinuses. Partially visualized nasogastric tube in situ. Mastoid air cells are clear.    Evidence of bilateral lens surgery.    No displaced calvarial fracture.      IMPRESSION:  Stable exam.

## 2022-01-28 LAB
ANION GAP SERPL CALC-SCNC: 12 MMOL/L — SIGNIFICANT CHANGE UP (ref 5–17)
BUN SERPL-MCNC: 39 MG/DL — HIGH (ref 7–23)
CALCIUM SERPL-MCNC: 10.7 MG/DL — HIGH (ref 8.4–10.5)
CHLORIDE SERPL-SCNC: 102 MMOL/L — SIGNIFICANT CHANGE UP (ref 96–108)
CO2 SERPL-SCNC: 32 MMOL/L — HIGH (ref 22–31)
CREAT SERPL-MCNC: 0.97 MG/DL — SIGNIFICANT CHANGE UP (ref 0.5–1.3)
CULTURE RESULTS: SIGNIFICANT CHANGE UP
CULTURE RESULTS: SIGNIFICANT CHANGE UP
GLUCOSE BLDC GLUCOMTR-MCNC: 154 MG/DL — HIGH (ref 70–99)
GLUCOSE BLDC GLUCOMTR-MCNC: 159 MG/DL — HIGH (ref 70–99)
GLUCOSE BLDC GLUCOMTR-MCNC: 180 MG/DL — HIGH (ref 70–99)
GLUCOSE BLDC GLUCOMTR-MCNC: 199 MG/DL — HIGH (ref 70–99)
GLUCOSE SERPL-MCNC: 191 MG/DL — HIGH (ref 70–99)
HCT VFR BLD CALC: 38.1 % — LOW (ref 39–50)
HGB BLD-MCNC: 12 G/DL — LOW (ref 13–17)
MAGNESIUM SERPL-MCNC: 2.6 MG/DL — SIGNIFICANT CHANGE UP (ref 1.6–2.6)
MCHC RBC-ENTMCNC: 30.5 PG — SIGNIFICANT CHANGE UP (ref 27–34)
MCHC RBC-ENTMCNC: 31.5 GM/DL — LOW (ref 32–36)
MCV RBC AUTO: 96.9 FL — SIGNIFICANT CHANGE UP (ref 80–100)
NRBC # BLD: 0 /100 WBCS — SIGNIFICANT CHANGE UP (ref 0–0)
PHOSPHATE SERPL-MCNC: 3.8 MG/DL — SIGNIFICANT CHANGE UP (ref 2.5–4.5)
PLATELET # BLD AUTO: 205 K/UL — SIGNIFICANT CHANGE UP (ref 150–400)
POTASSIUM SERPL-MCNC: 4 MMOL/L — SIGNIFICANT CHANGE UP (ref 3.5–5.3)
POTASSIUM SERPL-SCNC: 4 MMOL/L — SIGNIFICANT CHANGE UP (ref 3.5–5.3)
RBC # BLD: 3.93 M/UL — LOW (ref 4.2–5.8)
RBC # FLD: 13.1 % — SIGNIFICANT CHANGE UP (ref 10.3–14.5)
SODIUM SERPL-SCNC: 146 MMOL/L — HIGH (ref 135–145)
SPECIMEN SOURCE: SIGNIFICANT CHANGE UP
SPECIMEN SOURCE: SIGNIFICANT CHANGE UP
WBC # BLD: 6.72 K/UL — SIGNIFICANT CHANGE UP (ref 3.8–10.5)
WBC # FLD AUTO: 6.72 K/UL — SIGNIFICANT CHANGE UP (ref 3.8–10.5)

## 2022-01-28 PROCEDURE — 99232 SBSQ HOSP IP/OBS MODERATE 35: CPT

## 2022-01-28 PROCEDURE — 95816 EEG AWAKE AND DROWSY: CPT | Mod: 26

## 2022-01-28 PROCEDURE — 99222 1ST HOSP IP/OBS MODERATE 55: CPT | Mod: GC

## 2022-01-28 PROCEDURE — 99233 SBSQ HOSP IP/OBS HIGH 50: CPT | Mod: GC

## 2022-01-28 RX ORDER — OLANZAPINE 15 MG/1
5 TABLET, FILM COATED ORAL ONCE
Refills: 0 | Status: COMPLETED | OUTPATIENT
Start: 2022-01-28 | End: 2022-01-28

## 2022-01-28 RX ADMIN — HUMAN INSULIN 13 UNIT(S): 100 INJECTION, SUSPENSION SUBCUTANEOUS at 12:41

## 2022-01-28 RX ADMIN — Medication 3 MILLILITER(S): at 12:43

## 2022-01-28 RX ADMIN — Medication 2: at 06:34

## 2022-01-28 RX ADMIN — SODIUM CHLORIDE 4 MILLILITER(S): 9 INJECTION INTRAMUSCULAR; INTRAVENOUS; SUBCUTANEOUS at 17:58

## 2022-01-28 RX ADMIN — Medication 2: at 18:16

## 2022-01-28 RX ADMIN — HUMAN INSULIN 13 UNIT(S): 100 INJECTION, SUSPENSION SUBCUTANEOUS at 18:16

## 2022-01-28 RX ADMIN — Medication 4 MILLILITER(S): at 17:57

## 2022-01-28 RX ADMIN — HUMAN INSULIN 13 UNIT(S): 100 INJECTION, SUSPENSION SUBCUTANEOUS at 00:15

## 2022-01-28 RX ADMIN — LOSARTAN POTASSIUM 100 MILLIGRAM(S): 100 TABLET, FILM COATED ORAL at 05:34

## 2022-01-28 RX ADMIN — SODIUM CHLORIDE 4 MILLILITER(S): 9 INJECTION INTRAMUSCULAR; INTRAVENOUS; SUBCUTANEOUS at 00:29

## 2022-01-28 RX ADMIN — LACOSAMIDE 100 MILLIGRAM(S): 50 TABLET ORAL at 05:33

## 2022-01-28 RX ADMIN — Medication 3 MILLILITER(S): at 00:29

## 2022-01-28 RX ADMIN — SODIUM CHLORIDE 4 MILLILITER(S): 9 INJECTION INTRAMUSCULAR; INTRAVENOUS; SUBCUTANEOUS at 05:35

## 2022-01-28 RX ADMIN — Medication 4 MILLILITER(S): at 12:43

## 2022-01-28 RX ADMIN — CEFEPIME 100 MILLIGRAM(S): 1 INJECTION, POWDER, FOR SOLUTION INTRAMUSCULAR; INTRAVENOUS at 13:10

## 2022-01-28 RX ADMIN — NYSTATIN CREAM 1 APPLICATION(S): 100000 CREAM TOPICAL at 05:40

## 2022-01-28 RX ADMIN — ENOXAPARIN SODIUM 40 MILLIGRAM(S): 100 INJECTION SUBCUTANEOUS at 05:40

## 2022-01-28 RX ADMIN — AMLODIPINE BESYLATE 10 MILLIGRAM(S): 2.5 TABLET ORAL at 05:34

## 2022-01-28 RX ADMIN — LACOSAMIDE 100 MILLIGRAM(S): 50 TABLET ORAL at 18:02

## 2022-01-28 RX ADMIN — SODIUM CHLORIDE 4 MILLILITER(S): 9 INJECTION INTRAMUSCULAR; INTRAVENOUS; SUBCUTANEOUS at 12:43

## 2022-01-28 RX ADMIN — Medication 2: at 12:42

## 2022-01-28 RX ADMIN — Medication 3 MILLILITER(S): at 17:58

## 2022-01-28 RX ADMIN — Medication 2: at 00:14

## 2022-01-28 RX ADMIN — OLANZAPINE 5 MILLIGRAM(S): 15 TABLET, FILM COATED ORAL at 12:13

## 2022-01-28 RX ADMIN — Medication 4 MILLILITER(S): at 05:39

## 2022-01-28 RX ADMIN — HUMAN INSULIN 13 UNIT(S): 100 INJECTION, SUSPENSION SUBCUTANEOUS at 06:34

## 2022-01-28 RX ADMIN — CEFEPIME 100 MILLIGRAM(S): 1 INJECTION, POWDER, FOR SOLUTION INTRAMUSCULAR; INTRAVENOUS at 22:04

## 2022-01-28 RX ADMIN — NYSTATIN CREAM 1 APPLICATION(S): 100000 CREAM TOPICAL at 17:58

## 2022-01-28 RX ADMIN — ENOXAPARIN SODIUM 40 MILLIGRAM(S): 100 INJECTION SUBCUTANEOUS at 17:57

## 2022-01-28 RX ADMIN — CEFEPIME 100 MILLIGRAM(S): 1 INJECTION, POWDER, FOR SOLUTION INTRAMUSCULAR; INTRAVENOUS at 05:34

## 2022-01-28 RX ADMIN — Medication 3 MILLILITER(S): at 05:35

## 2022-01-28 RX ADMIN — Medication 4 MILLILITER(S): at 00:54

## 2022-01-28 NOTE — PROGRESS NOTE ADULT - ASSESSMENT
81y m W/ PMHx HTN presenting to the ED from [facility] as a transfer for R frontal parenchymal hemorrhage on CT after fall at home, now on medicine floors from [facility]

## 2022-01-28 NOTE — EEG REPORT - NS EEG TEXT BOX
[facility]   COMPREHENSIVE EPILEPSY CENTER   REPORT OF LONG-TERM VIDEO EEG     [facility]: [address] [place] [address] [address], [place] [address], Ph#: [phone]  [facility]: [id number], [place] [address], Ph#: [phone]  [facility]: [address] [place] [address], Ph#: [phone]    Patient Name: LEV FUNDATOR  Age and : 81y (42)  MRN #: [id number]  Location: [facility] 03  Referring Physician: Onesimo Vazquez    Study date: 22   ROUTINE EEG    _____________________________________________________________  STUDY INFORMATION    Placement and Labeling of Electrodes:  The EEG was performed utilizing 20 channels referential EEG connections (coronal over temporal over parasagittal montage) using all standard 10-20 electrode placements with EKG.  Recording was at a sampling rate of 256 samples per second per channel.  Time synchronized digital video recording was done simultaneously with EEG recording.  A low light infrared camera was used for low light recording.  Landon and seizure detection algorithms were utilized.  _____________________________________________________________  HISTORY    Patient is a 81y old male with HTN and R frontal IPH w/ mass effect after mechanical fall. At 15:00 on 22, he stopped following commands, had LUE twitching, given 2mg ativan followed by ?post-ictal state then returned to baseline. At the time was loaded with keppra, which was weaned a few days later. EEG done now for persistent altered mental status.     NEURO MEDICATIONS:  lacosamide Solution: 100 milliGRAM(s) Oral ( @ 18:00),  100 milliGRAM(s) Oral ( @ 05:33)  OLANZapine: 5 milliGRAM(s) Oral ( @ 12:13)    _____________________________________________________________  STUDY INTERPRETATION    Findings: The background was continuous and reactive. During maximal alerting, the background consisted of mainly theta frequency activity. No posterior dominant rhythm seen.    Background Slowing:  Diffuse theta and polymorphic delta slowing.    Focal Slowing:   Intermittent theta/delta slowing in the right posterior head region.     Sleep Background:  Drowsiness was characterized by fragmentation, attenuation, and slowing of the background activity.    Stage II sleep transients were not appreciated    Other Non-Epileptiform Findings:  None were present.  Excess diffuse beta activity.    Interictal Epileptiform Activity:   Occasional sharp wave discharges over the right posterior head region    Events:  Clinical events: None recorded  Seizures: None recorded.    Activation Procedures:   Hyperventilation was not performed.    Photic stimulation was performed and did not elicit abnormalities.    Artifacts:  Intermittent myogenic and movement artifacts were noted.    ECG:  The heart rate on single channel ECG was predominantly between 75-80 BPM.    _____________________________________________________________  EEG SUMMARY/CLASSIFICATION    Abnormal EEG in the altered patient  - Occasional sharp wave discharges over the right posterior head region  - Intermittent polymorphic slowing, focal, right hemisphere  - Moderate background slowing, generalized     _____________________________________________________________  EEG IMPRESSION/CLINICAL CORRELATE    Abnormal EEG study.  1. Risk for seizures from the right posterior region  2. Structural abnormality in the right hemisphere.  3. Moderate multifocal/diffuse nonspecific cerebral dysfunction.    No seizures recorded.    _____________________________________________________________    Harmony Cárdenas MD  Fellow | [facility]     [facility]   COMPREHENSIVE EPILEPSY CENTER   REPORT OF LONG-TERM VIDEO EEG     [facility]: [address] [place] [address] [address], [place] [address], Ph#: [phone]  [facility]: [id number], [place] [address], Ph#: [phone]  [facility]: [address] [place] [address], Ph#: [phone]    Patient Name: LEV FUNDATOR  Age and : 81y (42)  MRN #: [id number]  Location: [facility] 03  Referring Physician: Onesimo Vazquez    Study date: 22   ROUTINE EEG    _____________________________________________________________  STUDY INFORMATION    Placement and Labeling of Electrodes:  The EEG was performed utilizing 20 channels referential EEG connections (coronal over temporal over parasagittal montage) using all standard 10-20 electrode placements with EKG.  Recording was at a sampling rate of 256 samples per second per channel.  Time synchronized digital video recording was done simultaneously with EEG recording.  A low light infrared camera was used for low light recording.  Landon and seizure detection algorithms were utilized.  _____________________________________________________________  HISTORY    Patient is a 81y old male with HTN and R frontal IPH w/ mass effect after mechanical fall. At 15:00 on 22, he stopped following commands, had LUE twitching, given 2mg ativan followed by ?post-ictal state then returned to baseline. At the time was loaded with keppra, which was weaned a few days later. EEG done now for persistent altered mental status.     NEURO MEDICATIONS:  lacosamide Solution: 100 milliGRAM(s) Oral ( @ 18:00),  100 milliGRAM(s) Oral ( @ 05:33)  OLANZapine: 5 milliGRAM(s) Oral ( @ 12:13)    _____________________________________________________________  STUDY INTERPRETATION    Findings: The background was continuous and reactive. During maximal alerting, the background consisted of mainly theta frequency activity. No posterior dominant rhythm seen.    Background Slowing:  Diffuse theta and polymorphic delta slowing.    Focal Slowing:   Intermittent theta/delta slowing in the right posterior head region.     Sleep Background:  Drowsiness was characterized by fragmentation, attenuation, and slowing of the background activity.    Stage II sleep transients were not appreciated    Other Non-Epileptiform Findings:  None were present.  Excess diffuse beta activity.    Interictal Epileptiform Activity:   Occasional sharp wave discharges over the right posterior head region    Events:  Clinical events: None recorded  Seizures: None recorded.    Activation Procedures:   Hyperventilation was not performed.    Photic stimulation was performed and did not elicit abnormalities.    Artifacts:  Intermittent myogenic and movement artifacts were noted.    ECG:  The heart rate on single channel ECG was predominantly between 75-80 BPM.    _____________________________________________________________  EEG SUMMARY/CLASSIFICATION    Abnormal EEG in the altered patient  - Occasional sharp wave discharges over the right posterior head region  - Intermittent polymorphic slowing, focal, right hemisphere  - Moderate background slowing, generalized     _____________________________________________________________  EEG IMPRESSION/CLINICAL CORRELATE    Abnormal EEG study.  1. Risk for seizures from the right posterior region  2. Structural abnormality in the right hemisphere.  3. Moderate multifocal/diffuse nonspecific cerebral dysfunction.    No seizures recorded.    _____________________________________________________________    Harmony Cárdenas MD  Fellow | [facility]    Juan Marino MD PhD  Director, Epilepsy Division, [facility] EEG Reading Room Ph#: [phone]  Epilepsy Answering Service after 5PM and before 8:30AM: Ph#: [phone]

## 2022-01-28 NOTE — PROGRESS NOTE ADULT - PROBLEM SELECTOR PLAN 3
hx of COVID 2 years ago and 2 weeks ago  - face tent, wean to ra as tolerated  - aspiration precautions   - febrile last night 1/24 -- repeating w/u  - f/u BCx, RVP, CXR  - elevated d-dimer  - will f/u CT Chest -- potential aspiration PNA  - cont cefepime for 7 days until 2/1/22

## 2022-01-28 NOTE — PROGRESS NOTE ADULT - SUBJECTIVE AND OBJECTIVE BOX
Micah Hansen, PGY-1  Internal Medicine  [phone] / [phone] or [employer] Teams    HPI  81y m W/ PMHx HTN presenting to the ED from [facility] as a transfer for R frontal parenchymal hemorrhage on CT after fall at home. Patient is poor historian but states that he was diagnosed with COVID 2 weeks ago and has been feeling "tired" at home, states he felt dizzy earlier today but unable to state what time he fell or further elucidate. Patient denies AC use. Per EMS was also recently diagnosed with a AAA but has been unable to undergo surgical repair due to recent dx of COVID. Patient still endorsing he feels "tired", denies current headache, vision changes, dizziness, chest pain, nausea, back pain, focal numbness/tingling. Endorses weakness to LLE 2/2 pain.     PROGRESS NOTE:     Patient is a 81y old  Male who presents with a chief complaint of R ICH (24 Jan 2022 18:43)    SUBJECTIVE/OVERNIGHT EVENTS:       ADDITIONAL REVIEW OF SYSTEMS:      MEDICATIONS  (STANDING):  acetylcysteine 20%  Inhalation 4 milliLiter(s) Inhalation every 6 hours  albuterol/ipratropium for Nebulization 3 milliLiter(s) Nebulizer every 6 hours  amLODIPine   Tablet 10 milliGRAM(s) Oral daily  cefepime   IVPB      cefepime   IVPB 1000 milliGRAM(s) IV Intermittent every 8 hours  dextrose 40% Gel 15 Gram(s) Oral once  dextrose 5%. 1000 milliLiter(s) (50 mL/Hr) IV Continuous <Continuous>  dextrose 50% Injectable 25 Gram(s) IV Push once  dextrose 50% Injectable 12.5 Gram(s) IV Push once  dextrose 50% Injectable 25 Gram(s) IV Push once  enoxaparin Injectable 40 milliGRAM(s) SubCutaneous every 12 hours  glucagon  Injectable 1 milliGRAM(s) IntraMuscular once  insulin lispro (ADMELOG) corrective regimen sliding scale   SubCutaneous every 6 hours  insulin NPH human recombinant 13 Unit(s) SubCutaneous every 6 hours  lacosamide Solution 100 milliGRAM(s) Oral two times a day  losartan 100 milliGRAM(s) Oral daily  nystatin Powder 1 Application(s) Topical two times a day  senna 2 Tablet(s) Oral at bedtime  sodium chloride 3%  Inhalation 4 milliLiter(s) Inhalation every 6 hours    MEDICATIONS  (PRN):  acetaminophen    Suspension .. 650 milliGRAM(s) Oral every 6 hours PRN Temp greater or equal to 38C (100.4F), Mild Pain (1 - 3)      CAPILLARY BLOOD GLUCOSE    POCT Blood Glucose.: 180 mg/dL (28 Jan 2022 06:15)  POCT Blood Glucose.: 154 mg/dL (28 Jan 2022 00:11)  POCT Blood Glucose.: 195 mg/dL (27 Jan 2022 17:32)  POCT Blood Glucose.: 195 mg/dL (27 Jan 2022 11:59)      I&O's Summary    27 Jan 2022 07:01  -  28 Jan 2022 07:00  --------------------------------------------------------  IN: 0 mL / OUT: 500 mL / NET: -500 mL      PHYSICAL EXAM:  Vital Signs Last 24 Hrs  T(C): 37.3 (28 Jan 2022 04:40), Max: 37.3 (28 Jan 2022 04:40)  T(F): 99.2 (28 Jan 2022 04:40), Max: 99.2 (28 Jan 2022 04:40)  HR: 87 (28 Jan 2022 04:40) (85 - 89)  BP: 125/66 (28 Jan 2022 04:40) (120/76 - 125/66)  RR: 18 (28 Jan 2022 04:40) (18 - 18)  SpO2: 95% (28 Jan 2022 04:40) (95% - 97%)    CONSTITUTIONAL: NAD, in bed lethargic with face tent on  HEENT: NC/AT, EOMI  RESPIRATORY: No increased work of breathing, CTAB, no wheezes or crackles appreciated  CARDIOVASCULAR: RRR, S1 and S2 present, no m/r/g  ABDOMEN: soft, NT, ND, bowel sounds present  EXTREMITIES: No LE edema, myoclonus present in upper extremities, R>L. in upper extremity mittens  MUSCULOSKELETAL: no joint swelling or tenderness to palpation  NEURO: A&Ox0, too lethargic to participate in exam      LABS:                        12.1   6.73  )-----------( 215      ( 27 Jan 2022 06:30 )             38.5     01-27    146<H>  |  104  |  34<H>  ----------------------------<  192<H>  4.2   |  31  |  0.86    Ca    10.3      27 Jan 2022 06:30  Phos  3.7     01-27  Mg     2.5     01-27        Culture - Blood (collected 25 Jan 2022 13:34)  Source: .Blood Blood-Venous  Preliminary Report (26 Jan 2022 14:02):    No growth to date.    Culture - Blood (collected 25 Jan 2022 13:34)  Source: .Blood Blood-Peripheral  Preliminary Report (26 Jan 2022 14:02):    No growth to date.      RADIOLOGY & ADDITIONAL TESTS:  CT CHEST 1/26  FINDINGS:    LYMPH NODES: Small mediastinal lymph nodes are similar in appearance to 1/15/2022.    HEART/VASCULATURE: Heart size is normal. Aortic valve and coronary artery calcifications.    AIRWAYS/LUNGS/PLEURA: Evaluation is limited secondary to respiratory motion artifact. Grossly patent central airways. New patchy opacities are seen in the dependent portions of the right upper lobe and bilateral lower lobes.    UPPER ABDOMEN:Enteric tube tip is in the proximal stomach just distal to the gastroesophageal junction. Ingested material is noted within the esophagus.    Redemonstrated atrophic pancreas with a 1 cm calcification in the head of the pancreas. Numerous additional calcifications suggest chronic pancreatitis.    Hepatic cysts adjacent to the gallbladder fossa. Trace layering densities within the gallbladder likely representing cholelithiasis.    BONES/SOFT TISSUES: Multiple old left rib fracture is. A 1.9 cm subpleural lipoma is noted adjacent to one of the old fractures. (3:65).    IMPRESSION:    1. New patchy bilateral dependent opacities, could occur in the setting of pneumonia (? Clinical history of aspiration given its distribution).      CXR 1/25  IMPRESSION: Patchy airspace opacities in the left lung, may represent asymmetric pulmonary edema or atypical/viral pneumonia.    CT HEAD 1/23  FINDINGS:  Large right frontal lobe parenchymal hematoma measures approximately 7.7 x 3.5 cm, previously 7.5 x 3.2 cm, with surrounding edema and mass effect, without interval interval change compared to prior. Redemonstrated small intraventricular hemorrhage, similar to prior.    Moderate cerebral volume loss with proportional prominence of the sulci and ventricles. Nonspecific white matter hypoattenuation, likely related to chronic microvascular changes.    Left frontal arachnoid cyst, unchanged.    Mucosal thickening of the left maxillary and bilateral ethmoid sinuses. Partially visualized nasogastric tube in situ. Mastoid air cells are clear.    Evidence of bilateral lens surgery.    No displaced calvarial fracture.      IMPRESSION:  Stable exam.   Micah Hansen, PGY-1  Internal Medicine  [phone] / [phone] or [employer] Teams    HPI  81y m W/ PMHx HTN presenting to the ED from [facility] as a transfer for R frontal parenchymal hemorrhage on CT after fall at home. Patient is poor historian but states that he was diagnosed with COVID 2 weeks ago and has been feeling "tired" at home, states he felt dizzy earlier today but unable to state what time he fell or further elucidate. Patient denies AC use. Per EMS was also recently diagnosed with a AAA but has been unable to undergo surgical repair due to recent dx of COVID. Patient still endorsing he feels "tired", denies current headache, vision changes, dizziness, chest pain, nausea, back pain, focal numbness/tingling. Endorses weakness to LLE 2/2 pain.     PROGRESS NOTE:     Patient is a 81y old  Male who presents with a chief complaint of R ICH (24 Jan 2022 18:43)    SUBJECTIVE/OVERNIGHT EVENTS:   Patient was seen and examined at bedside this morning. He went down for CT Head showing improvement of hemorrhage. He remained non-participatory during the encounter and exam. He continues to move the face-tent off of position. He had no other acute events overnight.     ADDITIONAL REVIEW OF SYSTEMS:      MEDICATIONS  (STANDING):  acetylcysteine 20%  Inhalation 4 milliLiter(s) Inhalation every 6 hours  albuterol/ipratropium for Nebulization 3 milliLiter(s) Nebulizer every 6 hours  amLODIPine   Tablet 10 milliGRAM(s) Oral daily  cefepime   IVPB      cefepime   IVPB 1000 milliGRAM(s) IV Intermittent every 8 hours  dextrose 40% Gel 15 Gram(s) Oral once  dextrose 5%. 1000 milliLiter(s) (50 mL/Hr) IV Continuous <Continuous>  dextrose 50% Injectable 25 Gram(s) IV Push once  dextrose 50% Injectable 12.5 Gram(s) IV Push once  dextrose 50% Injectable 25 Gram(s) IV Push once  enoxaparin Injectable 40 milliGRAM(s) SubCutaneous every 12 hours  glucagon  Injectable 1 milliGRAM(s) IntraMuscular once  insulin lispro (ADMELOG) corrective regimen sliding scale   SubCutaneous every 6 hours  insulin NPH human recombinant 13 Unit(s) SubCutaneous every 6 hours  lacosamide Solution 100 milliGRAM(s) Oral two times a day  losartan 100 milliGRAM(s) Oral daily  nystatin Powder 1 Application(s) Topical two times a day  senna 2 Tablet(s) Oral at bedtime  sodium chloride 3%  Inhalation 4 milliLiter(s) Inhalation every 6 hours    MEDICATIONS  (PRN):  acetaminophen    Suspension .. 650 milliGRAM(s) Oral every 6 hours PRN Temp greater or equal to 38C (100.4F), Mild Pain (1 - 3)      CAPILLARY BLOOD GLUCOSE    POCT Blood Glucose.: 180 mg/dL (28 Jan 2022 06:15)  POCT Blood Glucose.: 154 mg/dL (28 Jan 2022 00:11)  POCT Blood Glucose.: 195 mg/dL (27 Jan 2022 17:32)  POCT Blood Glucose.: 195 mg/dL (27 Jan 2022 11:59)      I&O's Summary    27 Jan 2022 07:01  -  28 Jan 2022 07:00  --------------------------------------------------------  IN: 0 mL / OUT: 500 mL / NET: -500 mL      PHYSICAL EXAM:  Vital Signs Last 24 Hrs  T(C): 37.3 (28 Jan 2022 04:40), Max: 37.3 (28 Jan 2022 04:40)  T(F): 99.2 (28 Jan 2022 04:40), Max: 99.2 (28 Jan 2022 04:40)  HR: 87 (28 Jan 2022 04:40) (85 - 89)  BP: 125/66 (28 Jan 2022 04:40) (120/76 - 125/66)  RR: 18 (28 Jan 2022 04:40) (18 - 18)  SpO2: 95% (28 Jan 2022 04:40) (95% - 97%)    CONSTITUTIONAL: NAD, in bed lethargic with face tent on  HEENT: NC/AT, EOMI  RESPIRATORY: No increased work of breathing, CTAB, no wheezes or crackles appreciated  CARDIOVASCULAR: RRR, S1 and S2 present, no m/r/g  ABDOMEN: soft, NT, ND, bowel sounds present  EXTREMITIES: No LE edema, myoclonus present in upper extremities, R>L. in upper extremity mittens  MUSCULOSKELETAL: no joint swelling or tenderness to palpation  NEURO: A&Ox0, too lethargic to participate in exam    LABS:                        12.0   6.72  )-----------( 205      ( 28 Jan 2022 07:20 )             38.1     01-28    146<H>  |  102  |  39<H>  ----------------------------<  191<H>  4.0   |  32<H>  |  0.97    Ca    10.7<H>      28 Jan 2022 07:23  Phos  3.8     01-28  Mg     2.6     01-28      Culture - Blood (collected 25 Jan 2022 13:34)  Source: .Blood Blood-Venous  Preliminary Report (26 Jan 2022 14:02):    No growth to date.    Culture - Blood (collected 25 Jan 2022 13:34)  Source: .Blood Blood-Peripheral  Preliminary Report (26 Jan 2022 14:02):    No growth to date.        RADIOLOGY & ADDITIONAL TESTS:  CT HEAD 1/27    This exam is compared prior head CT performed on January 23, 2020.    Again seen is abnormal high attenuation identified involving the high right frontal cortical subcortical region. This compatible with acute parenchymal hemorrhage surrounding edema. This acute parenchymal hemorrhage measures approximately 7.3 x 3.2 cm and previously measured approximately 7.7 x 3.5 cm. Localized mass effect is seen consisting of sulcal effacement.    Extra-axial low-attenuation with adjacent bony scalloping is again seen involving the high left frontal region. This finding measures approximately 4.2 x 2.8 cm and previously measured approximately 4.1 x 3.1 cm. This is compatible with an arachnoid cyst.    Parenchymal volume loss and chronic microvascular ischemic changes are identified.    Intraventricular hemorrhage is again seen.    Evaluation of structures with the appropriate window appears unremarkable    Left-sided NG tube is seen.    The visualized paranasal sinuses mastoid and middle ear regions appear clear.    Impression: Acute parenchymal hemorrhage is again seen with some expected evolutionary changes.    Arachnoid cyst again seen as described above.      CT CHEST 1/26  FINDINGS:    LYMPH NODES: Small mediastinal lymph nodes are similar in appearance to 1/15/2022.    HEART/VASCULATURE: Heart size is normal. Aortic valve and coronary artery calcifications.    AIRWAYS/LUNGS/PLEURA: Evaluation is limited secondary to respiratory motion artifact. Grossly patent central airways. New patchy opacities are seen in the dependent portions of the right upper lobe and bilateral lower lobes.    UPPER ABDOMEN:Enteric tube tip is in the proximal stomach just distal to the gastroesophageal junction. Ingested material is noted within the esophagus.    Redemonstrated atrophic pancreas with a 1 cm calcification in the head of the pancreas. Numerous additional calcifications suggest chronic pancreatitis.    Hepatic cysts adjacent to the gallbladder fossa. Trace layering densities within the gallbladder likely representing cholelithiasis.    BONES/SOFT TISSUES: Multiple old left rib fracture is. A 1.9 cm subpleural lipoma is noted adjacent to one of the old fractures. (3:65).    IMPRESSION:    1. New patchy bilateral dependent opacities, could occur in the setting of pneumonia (? Clinical history of aspiration given its distribution).      CXR 1/25  IMPRESSION: Patchy airspace opacities in the left lung, may represent asymmetric pulmonary edema or atypical/viral pneumonia.    CT HEAD 1/23  FINDINGS:  Large right frontal lobe parenchymal hematoma measures approximately 7.7 x 3.5 cm, previously 7.5 x 3.2 cm, with surrounding edema and mass effect, without interval interval change compared to prior. Redemonstrated small intraventricular hemorrhage, similar to prior.    Moderate cerebral volume loss with proportional prominence of the sulci and ventricles. Nonspecific white matter hypoattenuation, likely related to chronic microvascular changes.    Left frontal arachnoid cyst, unchanged.    Mucosal thickening of the left maxillary and bilateral ethmoid sinuses. Partially visualized nasogastric tube in situ. Mastoid air cells are clear.    Evidence of bilateral lens surgery.    No displaced calvarial fracture.      IMPRESSION:  Stable exam.

## 2022-01-28 NOTE — PROGRESS NOTE ADULT - ASSESSMENT
81y m W/  HTN presenting to the ED from [facility] as a transfer for R frontal parenchymal hemorrhage on CT after fall at home.  CTH R hemisphere IPH  CTA H/N no aneurysm or AVM  repeat CTH stable   1/26 CT chest wtih opacities  1/27 CTH normal evolutionary changes no new findings   Impression: IPH of unclear etiology.  may be 2/2 HTN. need to r/o CAA or underlying mass   - now on cefepime for infection. monitor for seizure as it can lower seizure threshold and cause change in mental status   - SBP <160  - DVT ppx okay. hold AC/AP  - now on vimpat 100mg BID  - rEEG when able  - MRI brain w/ and w/o when able  - GI for PEG?, now with NGT  - no need for hypertonics  - cardio following. known thoracic aneurysm s/p repair.   - f/uj pulmo for resp status. on face tent  - telemetry  - PT/OT/SS/SLP, OOBC  - check FS, glucose control <180  - GI/DVT ppx  - Thank you for allowing me to participate in the care of this patient. Call with questions.   Carlin Neal MD  Vascular Neurology  Office: [phone]

## 2022-01-28 NOTE — CONSULT NOTE ADULT - ASSESSMENT
Impression:  81y m W/ PMHx HTN presenting to the ED from [facility] as a transfer for R frontal parenchymal hemorrhage on CT after fall at home, course c/b worsening mental status (2/2 seizure?) s/p intubation/extubation, fever (PNA?), GI consulted for PEG    # dysphagia - 2/2 ICH, poor prognosis, unlikely to have meaningful recovery, is within GOC of family for PEG, will plan for next week  # PNA - fever 1/23-1/26 likely 2/2 aspiration PNA, now afebrile w/o leukocytosis on cefepime  # thoracic aneurysm - s/p repair 7 years ago ([place]), aslo w/ ascending aneursym and R iliac aorta >5cm, follows w/ vascular surgery    Recommendations:  - c/w management of AMS per neurology/primary team  - if optimized from neuro standpoint, will plan for EGD/PEG early next week (likely monday-tuesday)  - please repeat covid test sunday, make NPO at [place] sunday night  - rest of care per primary team    GI will continue to follow.     Walter Alan, PGY5  Gastroenterology/Hepatology Fellow  Available on [employer] Teams  [phone] ([employer] Short Range Pager)  [phone] (Long Range Pager)    After 5pm, please contact the on-call GI fellow. [phone]     Impression:  81y m W/ PMHx HTN presenting to the ED from [facility] as a transfer for R frontal parenchymal hemorrhage on CT after fall at home, course c/b worsening mental status (2/2 seizure?) s/p intubation/extubation, fever (PNA?), GI consulted for PEG    # dysphagia - 2/2 ICH, poor prognosis, unlikely to have meaningful recovery, is within GOC of family for PEG, will plan for next week  # PNA - fever 1/23-1/26 likely 2/2 aspiration PNA, now afebrile w/o leukocytosis on cefepime  # thoracic aneurysm - s/p repair 7 years ago ([place]), aslo w/ ascending aneursym and R iliac aorta >5cm, follows w/ vascular surgery    Recommendations:  - c/w management of AMS per neurology/primary team  - GOC discussion with family  - if optimized from neuro standpoint, will plan for EGD/PEG early next week (likely monday-tuesday)  - please repeat covid test sunday, make NPO at [place] sunday night pending GOC discussions with family  - rest of care per primary team    GI will continue to follow.     Walter Alan, PGY5  Gastroenterology/Hepatology Fellow  Available on [employer] Teams  [phone] ([employer] Short Range Pager)  [phone] (Long Range Pager)    After 5pm, please contact the on-call GI fellow. [phone]

## 2022-01-28 NOTE — PROGRESS NOTE ADULT - SUBJECTIVE AND OBJECTIVE BOX
DATE OF SERVICE: 01-28-22 @ 10:43    Subjective: Patient seen and examined. No new events except as noted.     SUBJECTIVE/ROS:        MEDICATIONS:  MEDICATIONS  (STANDING):  acetylcysteine 20%  Inhalation 4 milliLiter(s) Inhalation every 6 hours  albuterol/ipratropium for Nebulization 3 milliLiter(s) Nebulizer every 6 hours  amLODIPine   Tablet 10 milliGRAM(s) Oral daily  cefepime   IVPB      cefepime   IVPB 1000 milliGRAM(s) IV Intermittent every 8 hours  dextrose 40% Gel 15 Gram(s) Oral once  dextrose 5%. 1000 milliLiter(s) (50 mL/Hr) IV Continuous <Continuous>  dextrose 50% Injectable 25 Gram(s) IV Push once  dextrose 50% Injectable 12.5 Gram(s) IV Push once  dextrose 50% Injectable 25 Gram(s) IV Push once  enoxaparin Injectable 40 milliGRAM(s) SubCutaneous every 12 hours  glucagon  Injectable 1 milliGRAM(s) IntraMuscular once  insulin lispro (ADMELOG) corrective regimen sliding scale   SubCutaneous every 6 hours  insulin NPH human recombinant 13 Unit(s) SubCutaneous every 6 hours  lacosamide Solution 100 milliGRAM(s) Oral two times a day  losartan 100 milliGRAM(s) Oral daily  nystatin Powder 1 Application(s) Topical two times a day  senna 2 Tablet(s) Oral at bedtime  sodium chloride 3%  Inhalation 4 milliLiter(s) Inhalation every 6 hours      PHYSICAL EXAM:  T(C): 37.3 (01-28-22 @ 04:40), Max: 37.3 (01-28-22 @ 04:40)  HR: 85 (01-28-22 @ 09:40) (85 - 89)  BP: 125/66 (01-28-22 @ 04:40) (120/76 - 125/66)  RR: 22 (01-28-22 @ 09:40) (18 - 22)  SpO2: 96% (01-28-22 @ 09:40) (95% - 97%)  Wt(kg): --  I&O's Summary    27 Jan 2022 07:01  -  28 Jan 2022 07:00  --------------------------------------------------------  IN: 0 mL / OUT: 500 mL / NET: -500 mL            JVP: Normal  Neck: supple  Lung: clear   CV: S1 S2 , Murmur:  Abd: soft  Ext: No edema  neuro: Awake / alert  Psych: flat affect  Skin: normal``    LABS/DATA:    CARDIAC MARKERS:                                12.0   6.72  )-----------( 205      ( 28 Jan 2022 07:20 )             38.1     01-28    146<H>  |  102  |  39<H>  ----------------------------<  191<H>  4.0   |  32<H>  |  0.97    Ca    10.7<H>      28 Jan 2022 07:23  Phos  3.8     01-28  Mg     2.6     01-28      proBNP:   Lipid Profile:   HgA1c:   TSH:     TELE:  EKG:

## 2022-01-28 NOTE — PROGRESS NOTE ADULT - PROBLEM SELECTOR PLAN 1
A&Ox0. 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

## 2022-01-28 NOTE — PROGRESS NOTE ADULT - ASSESSMENT
HTN  losartan was added by primary team yesterday   cont current meds    DM II  Monitor finger stick. Insulin coverage.   plan as per med

## 2022-01-28 NOTE — PROGRESS NOTE ADULT - ASSESSMENT
82 y/o male pt with bilat foot wounds  - pt seen and evaluated  - no signs of infection noted at this time  - rec betadine to wounds bilat and leave open to air   - rec z flow boots at all times  - will monitor during this admission periodically, reconsult sooner as necessary

## 2022-01-28 NOTE — PROGRESS NOTE ADULT - SUBJECTIVE AND OBJECTIVE BOX
Neurology Progress note  S: patient seen and examined. still lethargic. CTH  unchanged/normal evolutionary chages      Brief HPI:  81y m W/  HTN presenting to the ED from [facility] as a transfer for R frontal parenchymal hemorrhage on CT after fall at home. Patient is poor historian but states that he was diagnosed with COVID 2 weeks ago and has been feeling "tired" at home, states he felt dizzy earlier today but unable to state what time he fell or further elucidate. Patient denies AC use. Per EMS was also recently diagnosed with a AAA but has been unable to undergo surgical repair due to recent dx of COVID. Patient still endorsing he feels "tired", denies current headache, vision changes, dizziness, chest pain, nausea, back pain, focal numbness/tingling. Endorses weakness to LLE 2/2 pain. (15 Mono 2022 15:50)    Medications:   MEDICATIONS  (STANDING):  acetylcysteine 20%  Inhalation 4 milliLiter(s) Inhalation every 6 hours  albuterol/ipratropium for Nebulization 3 milliLiter(s) Nebulizer every 6 hours  amLODIPine   Tablet 10 milliGRAM(s) Oral daily  cefepime   IVPB      cefepime   IVPB 1000 milliGRAM(s) IV Intermittent every 8 hours  dextrose 40% Gel 15 Gram(s) Oral once  dextrose 5%. 1000 milliLiter(s) (50 mL/Hr) IV Continuous <Continuous>  dextrose 50% Injectable 25 Gram(s) IV Push once  dextrose 50% Injectable 12.5 Gram(s) IV Push once  dextrose 50% Injectable 25 Gram(s) IV Push once  enoxaparin Injectable 40 milliGRAM(s) SubCutaneous every 12 hours  glucagon  Injectable 1 milliGRAM(s) IntraMuscular once  insulin lispro (ADMELOG) corrective regimen sliding scale   SubCutaneous every 6 hours  insulin NPH human recombinant 13 Unit(s) SubCutaneous every 6 hours  lacosamide Solution 100 milliGRAM(s) Oral two times a day  losartan 100 milliGRAM(s) Oral daily  nystatin Powder 1 Application(s) Topical two times a day  OLANZapine 5 milliGRAM(s) Oral once  senna 2 Tablet(s) Oral at bedtime  sodium chloride 3%  Inhalation 4 milliLiter(s) Inhalation every 6 hours    MEDICATIONS  (PRN):  acetaminophen    Suspension .. 650 milliGRAM(s) Oral every 6 hours PRN Temp greater or equal to 38C (100.4F), Mild Pain (1 - 3)       Vitals:  Vital Signs Last 24 Hrs  T(C): 37.3 (2022 04:40), Max: 37.3 (2022 04:40)  T(F): 99.2 (2022 04:40), Max: 99.2 (2022 04:40)  HR: 85 (2022 09:40) (85 - 89)  BP: 125/66 (2022 04:40) (120/76 - 125/66)  BP(mean): --  RR: 22 (2022 09:40) (18 - 22)  SpO2: 96% (2022 09:40) (95% - 97%)      General Exam:   General Appearance: Appropriately dressed and in no acute distress       Head: Normocephalic, atraumatic and no dysmorphic features  Ear, Nose, and Throat: Moist mucous membranes +NGT   CVS: S1S2+  Resp: No SOB, no wheeze or rhonchi  GI: soft NT/ND  Extremities: No edema or cyanosis  Skin: No bruises or rashes     Neurological Exam:  Mental Status: Awake, alert and oriented x 1.  Able to follow simple verbal commands intermittently. Able to name and repeat.+ dysarthria more lethargic  Cranial Nerves: PERRL, EOMI, VFFC, sensation V1-V3 intact, L f acial asymmetry, equal elevation of palate, scm/trap 5/5, tongue is midline on protrusion. no obvious papilledema on fundoscopic exam. hearing is grossly intact.   Motor: moving R full strenght. LUE 3/5/ LLE 1-2/5, tremor in uppers at times, myoclonus   Sensation: withdraws R>L  Reflexes: 1+ throughout at biceps, brachioradialis, triceps, patellars and ankles bilaterally and equal. No clonus. R toe and L toe were both downgoing.  Coordination: unablke   Gait: unable     Data/Labs/Imaging which I personally reviewed.     Labs:   CBC Full  -  ( 2022 07:20 )  WBC Count : 6.72 K/uL  RBC Count : 3.93 M/uL  Hemoglobin : 12.0 g/dL  Hematocrit : 38.1 %  Platelet Count - Automated : 205 K/uL  Mean Cell Volume : 96.9 fl  Mean Cell Hemoglobin : 30.5 pg  Mean Cell Hemoglobin Concentration : 31.5 gm/dL  Auto Neutrophil # : x  Auto Lymphocyte # : x  Auto Monocyte # : x  Auto Eosinophil # : x  Auto Basophil # : x  Auto Neutrophil % : x  Auto Lymphocyte % : x  Auto Monocyte % : x  Auto Eosinophil % : x  Auto Basophil % : x        146<H>  |  102  |  39<H>  ----------------------------<  191<H>  4.0   |  32<H>  |  0.97    Ca    10.7<H>      2022 07:23  Phos  3.8       Mg     2.6                     Urinalysis Basic - ( 2022 21:31 )    Color: Yellow / Appearance: Clear / S.023 / pH: x  Gluc: x / Ketone: Negative  / Bili: Negative / Urobili: Negative   Blood: x / Protein: 100 / Nitrite: Negative   Leuk Esterase: Negative / RBC: 0 /hpf / WBC 0 /HPF   Sq Epi: x / Non Sq Epi: 1 /hpf / Bacteria: Negative    < from: CT Angio Head w/ IV Cont (01.15.22 @ 17:16) >    ACC: [id number] EXAM:  CT ANGIO NECK (W)[facility]                        ACC: [id number] EXAM:  CT ANGIO BRAIN (W)[facility]                          PROCEDURE DATE:  01/15/2022          INTERPRETATION:  CT ANGIOGRAPHY OF THE NECK AND BRAIN.    CLINICAL INFORMATION:81 years Male ICH    TECHNIQUE:  CT angiography of the neck and brain was performed during the dynamic   administration of intravenous contrast.  MIP reconstructions were   performed and reviewed. Multiplanar reformations were obtained.  Contrastadministered 70 cc Omnipaque 350, contrast discarded 30 cc    FINDINGS:  NECK  RIGHT CAROTID CIRCULATION:  Evaluation of the right carotid circulation demonstrates no traumatic   occlusion, dissection, or pseudoaneurysm.    LEFT CAROTID CIRCULATION:  Evaluation of the left carotid circulation demonstrates no traumatic   occlusion, dissection, or pseudoaneurysm.    VERTEBRAL ARTERIES:  Evaluation of the vertebral arteries reveals no evidence of a vertebral   artery occlusion or dissection.    BRAIN  ANTERIOR CIRCULATION:  Distal internal carotid arteries are patent. Calcification involves the   cavernous segments with mild degrees of narrowing  Anterior cerebral arteries are patent. A1 segment of the left anterior   cerebral artery is smaller than the right, a normal anatomic variant  Middle cerebral arteries are patent.    POSTERIOR CIRCULATION:  Distal vertebral arteries are patent. Bilateral posterior inferior   cerebellar arteries are seen.  Basilar artery is patent. Proximal superior cerebellar arteries are patent  Posterior cerebral arteries are patent.    OTHER:  No evidence of abnormal enhancement associated with the moderate to large   right frontal parenchymal hemorrhage.  No puddling of contrast.  No aneurysm or arteriovenous malformation seen.    IMPRESSION:  *  NO TRAUMATIC OCCLUSION, DISSECTION, OR PSEUDOANEURYSM.  *  NO ABNORMAL ENHANCEMENT ASSOCIATED WITH THE MODERATE TO LARGE RIGHT   FRONTAL PARENCHYMAL HEMORRHAGE.  *  NO PUDDLING OF CONTRAST. NO ANEURYSM OR AVM SEEN.    --- End of Report ---            PAWAN ODELL MD; Attending Radiologist  This document has been electronically signed. Mono 15 2022  7:49PM    < end of copied text >  < from: CT Head No Cont (22 @ 09:30) >    ACC: [id number] EXAM:  CT BRAIN                          PROCEDURE DATE:  2022          INTERPRETATION:  CLINICAL INFORMATION: Intracranial hemorrhage, follow-up.    TECHNIQUE: Portable noncontrast axial CT images were acquired of the head.    COMPARISON STUDY: CT head 2022.    FINDINGS:  Large right frontal lobe parenchymal hematoma measures approximately 7.7   x 3.5 cm, previously 7.5 x 3.2 cm, with surrounding edema and mass   effect, without interval interval change compared to prior.   Redemonstrated small intraventricular hemorrhage, similar to prior.    Moderate cerebral volume loss with proportional prominence of the sulci   and ventricles. Nonspecific white matter hypoattenuation, likely related   to chronic microvascular changes.    Left frontal arachnoid cyst, unchanged.    Mucosal thickening of the left maxillary and bilateral ethmoid sinuses.   Partially visualized nasogastric tube in situ. Mastoid air cells are   clear.    Evidence of bilateral lens surgery.    Nodisplaced calvarial fracture.      IMPRESSION:  Stable exam.    --- End of Report ---          HARRIET WATSON MD; Resident Radiology  This document has been electronically signed.  CRUZ LÓPEZ MD; Attending Radiologist  This document has been electronically signed. 2022 10:58AM    < end of copied text >    < from: CT Head No Cont (22 @ 22:13) >    ACC: [id number] EXAM:  CT BRAIN                          PROCEDURE DATE:  2022          INTERPRETATION:  Clinical indication: Altered mental status    Multiple axial sections were performed from base skull to vertex without   contrast enhancement. Coronal and sagittal reconstructions were performed   as well    This exam is compared prior head CT performed on 2020.    Again seen is abnormal high attenuation identified involving the high   right frontal cortical subcortical region. This compatible with acute   parenchymal hemorrhage surrounding edema. This acute parenchymal   hemorrhage measures approximately 7.3 x 3.2 cm and previously measured   approximately 7.7 x 3.5 cm. Localized mass effect is seen consisting of   sulcal effacement.    Extra-axial low-attenuation with adjacent bony scalloping is again seen   involving the high left frontal region. This finding measures   approximately 4.2 x 2.8 cm and previously measured approximately 4.1 x   3.1 cm. This is compatible with an arachnoid cyst.    Parenchymal volume loss and chronic microvascular ischemic changes are   identified.    Intraventricular hemorrhage is again seen.    Evaluation of structures with the appropriate window appears unremarkable    Left-sided NG tube is seen.    The visualized paranasal sinuses mastoid and middle ear regions appear   clear.    Impression: Acute parenchymal hemorrhage is again seen with some expected   evolutionary changes.    Arachnoid cyst again seen as described above.    --- End of Report ---            BE TOUSSAINT MD; Attending Radiologist  This document has been electronically signed. 2022  9:04AM    < end of copied text >

## 2022-01-28 NOTE — CONSULT NOTE ADULT - ATTENDING COMMENTS
Patient seen and examined. Agree with above. Patient with minimal mental status on exam - unable to respond to commands or questions, is in mittens for trying to pull out lines. He's unable to follow commands to attempt PO feeds. Per medicine, palliative care consulted. Will await palliative care decision, if consistent with family goals will then plan for PEG next week.

## 2022-01-28 NOTE — PROGRESS NOTE ADULT - PROBLEM SELECTOR PLAN 5
- DVT: SCDs  - Diet: TF, speech swallow eval  - Bowel regimen: miralax, senna - DVT: lovenox BID  - Diet: TF, speech swallow eval  - Bowel regimen: miralax, senna

## 2022-01-28 NOTE — PROVIDER CONTACT NOTE (OTHER) - ACTION/TREATMENT ORDERED:
MD notified will be replaced in AM. Okay to hold morning vimpat until ng rube placed. MD notified will be replaced in AM. Okay to hold morning vimpat until new ng tube placed by primary team.

## 2022-01-28 NOTE — PROGRESS NOTE ADULT - PROBLEM SELECTOR PLAN 2
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

## 2022-01-28 NOTE — PROGRESS NOTE ADULT - ATTENDING COMMENTS
here with non op intracranial hemorrhage  Remains altered, CT chest suggestive of aspiration, repeat head CT w/ slight decrease in area of hemorrhage  Continue cefepime, fever curve improving, plan for 7 day course fo [facility]AP  Consider ID consult if fever recurs  Cultures NGTD  Suspect worsening mental status due to delirium/infection superimposed on neurologic injury from hemorrhage.  Palliative care consulted  GI consulted for PEG per prior [facility]  SLP, PT, OT .

## 2022-01-29 DIAGNOSIS — Z71.89 OTHER SPECIFIED COUNSELING: ICD-10-CM

## 2022-01-29 DIAGNOSIS — R13.10 DYSPHAGIA, UNSPECIFIED: ICD-10-CM

## 2022-01-29 DIAGNOSIS — Z51.5 ENCOUNTER FOR PALLIATIVE CARE: ICD-10-CM

## 2022-01-29 LAB
ALBUMIN SERPL ELPH-MCNC: 3.8 G/DL — SIGNIFICANT CHANGE UP (ref 3.3–5)
ALP SERPL-CCNC: 76 U/L — SIGNIFICANT CHANGE UP (ref 40–120)
ALT FLD-CCNC: 30 U/L — SIGNIFICANT CHANGE UP (ref 10–45)
ANION GAP SERPL CALC-SCNC: 11 MMOL/L — SIGNIFICANT CHANGE UP (ref 5–17)
ANION GAP SERPL CALC-SCNC: 14 MMOL/L — SIGNIFICANT CHANGE UP (ref 5–17)
APPEARANCE UR: CLEAR — SIGNIFICANT CHANGE UP
APTT BLD: 35.3 SEC — SIGNIFICANT CHANGE UP (ref 27.5–35.5)
AST SERPL-CCNC: 74 U/L — HIGH (ref 10–40)
BACTERIA # UR AUTO: 0 — SIGNIFICANT CHANGE UP
BASE EXCESS BLDV CALC-SCNC: 7.9 MMOL/L — HIGH (ref -2–2)
BASE EXCESS BLDV CALC-SCNC: 8.5 MMOL/L — HIGH (ref -2–2)
BILIRUB SERPL-MCNC: 0.8 MG/DL — SIGNIFICANT CHANGE UP (ref 0.2–1.2)
BILIRUB UR-MCNC: NEGATIVE — SIGNIFICANT CHANGE UP
BUN SERPL-MCNC: 48 MG/DL — HIGH (ref 7–23)
BUN SERPL-MCNC: 52 MG/DL — HIGH (ref 7–23)
CA-I SERPL-SCNC: 1.31 MMOL/L — SIGNIFICANT CHANGE UP (ref 1.15–1.33)
CA-I SERPL-SCNC: 1.34 MMOL/L — HIGH (ref 1.15–1.33)
CALCIUM SERPL-MCNC: 10.7 MG/DL — HIGH (ref 8.4–10.5)
CALCIUM SERPL-MCNC: 10.8 MG/DL — HIGH (ref 8.4–10.5)
CHLORIDE BLDV-SCNC: 103 MMOL/L — SIGNIFICANT CHANGE UP (ref 96–108)
CHLORIDE BLDV-SCNC: 104 MMOL/L — SIGNIFICANT CHANGE UP (ref 96–108)
CHLORIDE SERPL-SCNC: 100 MMOL/L — SIGNIFICANT CHANGE UP (ref 96–108)
CHLORIDE SERPL-SCNC: 100 MMOL/L — SIGNIFICANT CHANGE UP (ref 96–108)
CK MB BLD-MCNC: 0.4 % — SIGNIFICANT CHANGE UP (ref 0–3.5)
CK MB CFR SERPL CALC: 13.5 NG/ML — HIGH (ref 0–6.7)
CK SERPL-CCNC: 3050 U/L — HIGH (ref 30–200)
CO2 BLDV-SCNC: 40 MMOL/L — HIGH (ref 22–26)
CO2 BLDV-SCNC: 40 MMOL/L — HIGH (ref 22–26)
CO2 SERPL-SCNC: 32 MMOL/L — HIGH (ref 22–31)
CO2 SERPL-SCNC: 32 MMOL/L — HIGH (ref 22–31)
COLOR SPEC: YELLOW — SIGNIFICANT CHANGE UP
CREAT SERPL-MCNC: 1.1 MG/DL — SIGNIFICANT CHANGE UP (ref 0.5–1.3)
CREAT SERPL-MCNC: 1.33 MG/DL — HIGH (ref 0.5–1.3)
DIFF PNL FLD: NEGATIVE — SIGNIFICANT CHANGE UP
EPI CELLS # UR: 1 — SIGNIFICANT CHANGE UP
GAS PNL BLDA: SIGNIFICANT CHANGE UP
GAS PNL BLDA: SIGNIFICANT CHANGE UP
GAS PNL BLDV: 143 MMOL/L — SIGNIFICANT CHANGE UP (ref 136–145)
GAS PNL BLDV: 144 MMOL/L — SIGNIFICANT CHANGE UP (ref 136–145)
GAS PNL BLDV: SIGNIFICANT CHANGE UP
GLUCOSE BLDC GLUCOMTR-MCNC: 164 MG/DL — HIGH (ref 70–99)
GLUCOSE BLDC GLUCOMTR-MCNC: 165 MG/DL — HIGH (ref 70–99)
GLUCOSE BLDC GLUCOMTR-MCNC: 170 MG/DL — HIGH (ref 70–99)
GLUCOSE BLDC GLUCOMTR-MCNC: 176 MG/DL — HIGH (ref 70–99)
GLUCOSE BLDC GLUCOMTR-MCNC: 177 MG/DL — HIGH (ref 70–99)
GLUCOSE BLDC GLUCOMTR-MCNC: 182 MG/DL — HIGH (ref 70–99)
GLUCOSE BLDV-MCNC: 195 MG/DL — HIGH (ref 70–99)
GLUCOSE BLDV-MCNC: 198 MG/DL — HIGH (ref 70–99)
GLUCOSE SERPL-MCNC: 169 MG/DL — HIGH (ref 70–99)
GLUCOSE SERPL-MCNC: 190 MG/DL — HIGH (ref 70–99)
GLUCOSE UR QL: NEGATIVE — SIGNIFICANT CHANGE UP
GRAN CASTS # UR COMP ASSIST: 2 /LPF — SIGNIFICANT CHANGE UP
HCO3 BLDV-SCNC: 38 MMOL/L — HIGH (ref 22–29)
HCO3 BLDV-SCNC: 38 MMOL/L — HIGH (ref 22–29)
HCT VFR BLD CALC: 41.7 % — SIGNIFICANT CHANGE UP (ref 39–50)
HCT VFR BLD CALC: 42.9 % — SIGNIFICANT CHANGE UP (ref 39–50)
HCT VFR BLDA CALC: 38 % — LOW (ref 39–51)
HCT VFR BLDA CALC: 39 % — SIGNIFICANT CHANGE UP (ref 39–51)
HGB BLD CALC-MCNC: 12.8 G/DL — SIGNIFICANT CHANGE UP (ref 12.6–17.4)
HGB BLD CALC-MCNC: 13 G/DL — SIGNIFICANT CHANGE UP (ref 12.6–17.4)
HGB BLD-MCNC: 12.5 G/DL — LOW (ref 13–17)
HGB BLD-MCNC: 13 G/DL — SIGNIFICANT CHANGE UP (ref 13–17)
HOROWITZ INDEX BLDV+IHG-RTO: 50 — SIGNIFICANT CHANGE UP
HOROWITZ INDEX BLDV+IHG-RTO: 50 — SIGNIFICANT CHANGE UP
HYALINE CASTS # UR AUTO: 6 /LPF — HIGH (ref 0–7)
INR BLD: 1.2 RATIO — HIGH (ref 0.88–1.16)
KETONES UR-MCNC: SIGNIFICANT CHANGE UP
LACTATE BLDV-MCNC: 1.4 MMOL/L — SIGNIFICANT CHANGE UP (ref 0.7–2)
LACTATE BLDV-MCNC: 1.4 MMOL/L — SIGNIFICANT CHANGE UP (ref 0.7–2)
LEUKOCYTE ESTERASE UR-ACNC: NEGATIVE — SIGNIFICANT CHANGE UP
MAGNESIUM SERPL-MCNC: 3 MG/DL — HIGH (ref 1.6–2.6)
MAGNESIUM SERPL-MCNC: 3 MG/DL — HIGH (ref 1.6–2.6)
MCHC RBC-ENTMCNC: 30 GM/DL — LOW (ref 32–36)
MCHC RBC-ENTMCNC: 30.2 PG — SIGNIFICANT CHANGE UP (ref 27–34)
MCHC RBC-ENTMCNC: 30.3 GM/DL — LOW (ref 32–36)
MCHC RBC-ENTMCNC: 30.7 PG — SIGNIFICANT CHANGE UP (ref 27–34)
MCV RBC AUTO: 100.7 FL — HIGH (ref 80–100)
MCV RBC AUTO: 101.2 FL — HIGH (ref 80–100)
NITRITE UR-MCNC: NEGATIVE — SIGNIFICANT CHANGE UP
NRBC # BLD: 0 /100 WBCS — SIGNIFICANT CHANGE UP (ref 0–0)
NRBC # BLD: 0 /100 WBCS — SIGNIFICANT CHANGE UP (ref 0–0)
PCO2 BLDV: 79 MMHG — HIGH (ref 42–55)
PCO2 BLDV: 80 MMHG — HIGH (ref 42–55)
PH BLDV: 7.28 — LOW (ref 7.32–7.43)
PH BLDV: 7.29 — LOW (ref 7.32–7.43)
PH UR: 5.5 — SIGNIFICANT CHANGE UP (ref 5–8)
PHOSPHATE SERPL-MCNC: 5.3 MG/DL — HIGH (ref 2.5–4.5)
PHOSPHATE SERPL-MCNC: 5.4 MG/DL — HIGH (ref 2.5–4.5)
PLATELET # BLD AUTO: 259 K/UL — SIGNIFICANT CHANGE UP (ref 150–400)
PLATELET # BLD AUTO: 312 K/UL — SIGNIFICANT CHANGE UP (ref 150–400)
PO2 BLDV: 33 MMHG — SIGNIFICANT CHANGE UP (ref 25–45)
PO2 BLDV: 38 MMHG — SIGNIFICANT CHANGE UP (ref 25–45)
POTASSIUM BLDV-SCNC: 5.4 MMOL/L — HIGH (ref 3.5–5.1)
POTASSIUM BLDV-SCNC: 5.4 MMOL/L — HIGH (ref 3.5–5.1)
POTASSIUM SERPL-MCNC: 5 MMOL/L — SIGNIFICANT CHANGE UP (ref 3.5–5.3)
POTASSIUM SERPL-MCNC: 5.4 MMOL/L — HIGH (ref 3.5–5.3)
POTASSIUM SERPL-SCNC: 5 MMOL/L — SIGNIFICANT CHANGE UP (ref 3.5–5.3)
POTASSIUM SERPL-SCNC: 5.4 MMOL/L — HIGH (ref 3.5–5.3)
PROT SERPL-MCNC: 8.1 G/DL — SIGNIFICANT CHANGE UP (ref 6–8.3)
PROT UR-MCNC: ABNORMAL
PROTHROM AB SERPL-ACNC: 14.3 SEC — HIGH (ref 10.6–13.6)
RBC # BLD: 4.14 M/UL — LOW (ref 4.2–5.8)
RBC # BLD: 4.24 M/UL — SIGNIFICANT CHANGE UP (ref 4.2–5.8)
RBC # FLD: 13 % — SIGNIFICANT CHANGE UP (ref 10.3–14.5)
RBC # FLD: 13.2 % — SIGNIFICANT CHANGE UP (ref 10.3–14.5)
RBC CASTS # UR COMP ASSIST: 1 /HPF — SIGNIFICANT CHANGE UP (ref 0–4)
SAO2 % BLDV: 55.1 % — LOW (ref 67–88)
SAO2 % BLDV: 62 % — LOW (ref 67–88)
SARS-COV-2 RNA SPEC QL NAA+PROBE: SIGNIFICANT CHANGE UP
SODIUM SERPL-SCNC: 143 MMOL/L — SIGNIFICANT CHANGE UP (ref 135–145)
SODIUM SERPL-SCNC: 146 MMOL/L — HIGH (ref 135–145)
SP GR SPEC: 1.03 — HIGH (ref 1.01–1.02)
TROPONIN T, HIGH SENSITIVITY RESULT: 31 NG/L — SIGNIFICANT CHANGE UP (ref 0–51)
UROBILINOGEN FLD QL: NEGATIVE — SIGNIFICANT CHANGE UP
WBC # BLD: 12.5 K/UL — HIGH (ref 3.8–10.5)
WBC # BLD: 14.36 K/UL — HIGH (ref 3.8–10.5)
WBC # FLD AUTO: 12.5 K/UL — HIGH (ref 3.8–10.5)
WBC # FLD AUTO: 14.36 K/UL — HIGH (ref 3.8–10.5)
WBC UR QL: 1 /HPF — SIGNIFICANT CHANGE UP (ref 0–5)

## 2022-01-29 PROCEDURE — 99291 CRITICAL CARE FIRST HOUR: CPT

## 2022-01-29 PROCEDURE — 99497 ADVNCD CARE PLAN 30 MIN: CPT | Mod: 25

## 2022-01-29 PROCEDURE — 71045 X-RAY EXAM CHEST 1 VIEW: CPT | Mod: 26,76

## 2022-01-29 PROCEDURE — 99222 1ST HOSP IP/OBS MODERATE 55: CPT

## 2022-01-29 RX ORDER — AMLODIPINE BESYLATE 2.5 MG/1
10 TABLET ORAL DAILY
Refills: 0 | Status: DISCONTINUED | OUTPATIENT
Start: 2022-01-29 | End: 2022-01-29

## 2022-01-29 RX ORDER — CHLORHEXIDINE GLUCONATE 213 G/1000ML
15 SOLUTION TOPICAL EVERY 12 HOURS
Refills: 0 | Status: DISCONTINUED | OUTPATIENT
Start: 2022-01-29 | End: 2022-01-30

## 2022-01-29 RX ORDER — CHLORHEXIDINE GLUCONATE 213 G/1000ML
1 SOLUTION TOPICAL
Refills: 0 | Status: DISCONTINUED | OUTPATIENT
Start: 2022-01-29 | End: 2022-01-29

## 2022-01-29 RX ORDER — ENOXAPARIN SODIUM 100 MG/ML
40 INJECTION SUBCUTANEOUS EVERY 12 HOURS
Refills: 0 | Status: DISCONTINUED | OUTPATIENT
Start: 2022-01-29 | End: 2022-02-10

## 2022-01-29 RX ORDER — LACOSAMIDE 50 MG/1
100 TABLET ORAL ONCE
Refills: 0 | Status: DISCONTINUED | OUTPATIENT
Start: 2022-01-29 | End: 2022-01-29

## 2022-01-29 RX ORDER — NOREPINEPHRINE BITARTRATE/D5W 8 MG/250ML
0.05 PLASTIC BAG, INJECTION (ML) INTRAVENOUS
Qty: 8 | Refills: 0 | Status: DISCONTINUED | OUTPATIENT
Start: 2022-01-29 | End: 2022-01-30

## 2022-01-29 RX ORDER — INSULIN LISPRO 100/ML
VIAL (ML) SUBCUTANEOUS EVERY 4 HOURS
Refills: 0 | Status: DISCONTINUED | OUTPATIENT
Start: 2022-01-29 | End: 2022-01-30

## 2022-01-29 RX ORDER — SENNA PLUS 8.6 MG/1
2 TABLET ORAL AT BEDTIME
Refills: 0 | Status: DISCONTINUED | OUTPATIENT
Start: 2022-01-29 | End: 2022-01-29

## 2022-01-29 RX ORDER — FENTANYL CITRATE 50 UG/ML
100 INJECTION INTRAVENOUS ONCE
Refills: 0 | Status: DISCONTINUED | OUTPATIENT
Start: 2022-01-29 | End: 2022-01-29

## 2022-01-29 RX ORDER — PROPOFOL 10 MG/ML
10 INJECTION, EMULSION INTRAVENOUS
Qty: 1000 | Refills: 0 | Status: DISCONTINUED | OUTPATIENT
Start: 2022-01-29 | End: 2022-01-30

## 2022-01-29 RX ORDER — CHLORHEXIDINE GLUCONATE 213 G/1000ML
15 SOLUTION TOPICAL EVERY 12 HOURS
Refills: 0 | Status: DISCONTINUED | OUTPATIENT
Start: 2022-01-29 | End: 2022-01-29

## 2022-01-29 RX ORDER — IPRATROPIUM/ALBUTEROL SULFATE 18-103MCG
3 AEROSOL WITH ADAPTER (GRAM) INHALATION EVERY 6 HOURS
Refills: 0 | Status: DISCONTINUED | OUTPATIENT
Start: 2022-01-29 | End: 2022-02-10

## 2022-01-29 RX ORDER — LACOSAMIDE 50 MG/1
100 TABLET ORAL
Refills: 0 | Status: DISCONTINUED | OUTPATIENT
Start: 2022-01-29 | End: 2022-01-29

## 2022-01-29 RX ORDER — LOSARTAN POTASSIUM 100 MG/1
100 TABLET, FILM COATED ORAL DAILY
Refills: 0 | Status: DISCONTINUED | OUTPATIENT
Start: 2022-01-29 | End: 2022-01-29

## 2022-01-29 RX ORDER — NYSTATIN CREAM 100000 [USP'U]/G
1 CREAM TOPICAL EVERY 12 HOURS
Refills: 0 | Status: DISCONTINUED | OUTPATIENT
Start: 2022-01-29 | End: 2022-02-09

## 2022-01-29 RX ORDER — LACOSAMIDE 50 MG/1
100 TABLET ORAL
Refills: 0 | Status: DISCONTINUED | OUTPATIENT
Start: 2022-01-29 | End: 2022-02-04

## 2022-01-29 RX ORDER — SODIUM CHLORIDE 9 MG/ML
3 INJECTION INTRAMUSCULAR; INTRAVENOUS; SUBCUTANEOUS EVERY 6 HOURS
Refills: 0 | Status: DISCONTINUED | OUTPATIENT
Start: 2022-01-29 | End: 2022-02-10

## 2022-01-29 RX ORDER — MIDAZOLAM HYDROCHLORIDE 1 MG/ML
8 INJECTION, SOLUTION INTRAMUSCULAR; INTRAVENOUS ONCE
Refills: 0 | Status: DISCONTINUED | OUTPATIENT
Start: 2022-01-29 | End: 2022-01-29

## 2022-01-29 RX ORDER — CEFEPIME 1 G/1
1000 INJECTION, POWDER, FOR SOLUTION INTRAMUSCULAR; INTRAVENOUS EVERY 8 HOURS
Refills: 0 | Status: DISCONTINUED | OUTPATIENT
Start: 2022-01-29 | End: 2022-01-31

## 2022-01-29 RX ORDER — SENNA PLUS 8.6 MG/1
2 TABLET ORAL AT BEDTIME
Refills: 0 | Status: DISCONTINUED | OUTPATIENT
Start: 2022-01-29 | End: 2022-02-04

## 2022-01-29 RX ORDER — VANCOMYCIN HCL 1 G
1000 VIAL (EA) INTRAVENOUS ONCE
Refills: 0 | Status: COMPLETED | OUTPATIENT
Start: 2022-01-29 | End: 2022-01-29

## 2022-01-29 RX ORDER — INSULIN HUMAN 100 [IU]/ML
10 INJECTION, SOLUTION SUBCUTANEOUS ONCE
Refills: 0 | Status: COMPLETED | OUTPATIENT
Start: 2022-01-29 | End: 2022-01-29

## 2022-01-29 RX ADMIN — Medication 4 MILLILITER(S): at 11:52

## 2022-01-29 RX ADMIN — Medication 3 MILLILITER(S): at 00:01

## 2022-01-29 RX ADMIN — CEFEPIME 100 MILLIGRAM(S): 1 INJECTION, POWDER, FOR SOLUTION INTRAMUSCULAR; INTRAVENOUS at 13:49

## 2022-01-29 RX ADMIN — Medication 3 MILLILITER(S): at 05:14

## 2022-01-29 RX ADMIN — SODIUM CHLORIDE 4 MILLILITER(S): 9 INJECTION INTRAMUSCULAR; INTRAVENOUS; SUBCUTANEOUS at 00:01

## 2022-01-29 RX ADMIN — FENTANYL CITRATE 100 MICROGRAM(S): 50 INJECTION INTRAVENOUS at 07:55

## 2022-01-29 RX ADMIN — Medication 4 MILLILITER(S): at 05:14

## 2022-01-29 RX ADMIN — Medication 2: at 22:38

## 2022-01-29 RX ADMIN — SENNA PLUS 2 TABLET(S): 8.6 TABLET ORAL at 22:45

## 2022-01-29 RX ADMIN — CEFEPIME 100 MILLIGRAM(S): 1 INJECTION, POWDER, FOR SOLUTION INTRAMUSCULAR; INTRAVENOUS at 22:32

## 2022-01-29 RX ADMIN — Medication 3 MILLILITER(S): at 11:51

## 2022-01-29 RX ADMIN — LACOSAMIDE 100 MILLIGRAM(S): 50 TABLET ORAL at 18:27

## 2022-01-29 RX ADMIN — ENOXAPARIN SODIUM 40 MILLIGRAM(S): 100 INJECTION SUBCUTANEOUS at 05:14

## 2022-01-29 RX ADMIN — CEFEPIME 100 MILLIGRAM(S): 1 INJECTION, POWDER, FOR SOLUTION INTRAMUSCULAR; INTRAVENOUS at 05:13

## 2022-01-29 RX ADMIN — Medication 2: at 00:55

## 2022-01-29 RX ADMIN — Medication 250 MILLIGRAM(S): at 14:12

## 2022-01-29 RX ADMIN — Medication 3 MILLILITER(S): at 23:01

## 2022-01-29 RX ADMIN — Medication 3 MILLILITER(S): at 17:24

## 2022-01-29 RX ADMIN — SODIUM CHLORIDE 3 MILLILITER(S): 9 INJECTION INTRAMUSCULAR; INTRAVENOUS; SUBCUTANEOUS at 23:01

## 2022-01-29 RX ADMIN — MIDAZOLAM HYDROCHLORIDE 8 MILLIGRAM(S): 1 INJECTION, SOLUTION INTRAMUSCULAR; INTRAVENOUS at 07:55

## 2022-01-29 RX ADMIN — Medication 13.2 MICROGRAM(S)/KG/MIN: at 22:32

## 2022-01-29 RX ADMIN — SODIUM CHLORIDE 4 MILLILITER(S): 9 INJECTION INTRAMUSCULAR; INTRAVENOUS; SUBCUTANEOUS at 05:13

## 2022-01-29 RX ADMIN — NYSTATIN CREAM 1 APPLICATION(S): 100000 CREAM TOPICAL at 18:11

## 2022-01-29 RX ADMIN — Medication 2: at 05:41

## 2022-01-29 RX ADMIN — HUMAN INSULIN 13 UNIT(S): 100 INJECTION, SUSPENSION SUBCUTANEOUS at 05:42

## 2022-01-29 RX ADMIN — ENOXAPARIN SODIUM 40 MILLIGRAM(S): 100 INJECTION SUBCUTANEOUS at 18:11

## 2022-01-29 RX ADMIN — Medication 4 MILLILITER(S): at 00:00

## 2022-01-29 RX ADMIN — HUMAN INSULIN 13 UNIT(S): 100 INJECTION, SUSPENSION SUBCUTANEOUS at 01:08

## 2022-01-29 RX ADMIN — Medication 2: at 18:28

## 2022-01-29 RX ADMIN — LACOSAMIDE 120 MILLIGRAM(S): 50 TABLET ORAL at 05:12

## 2022-01-29 RX ADMIN — SODIUM CHLORIDE 3 MILLILITER(S): 9 INJECTION INTRAMUSCULAR; INTRAVENOUS; SUBCUTANEOUS at 17:24

## 2022-01-29 RX ADMIN — CHLORHEXIDINE GLUCONATE 15 MILLILITER(S): 213 SOLUTION TOPICAL at 18:11

## 2022-01-29 RX ADMIN — PROPOFOL 8.44 MICROGRAM(S)/KG/MIN: 10 INJECTION, EMULSION INTRAVENOUS at 22:31

## 2022-01-29 RX ADMIN — NYSTATIN CREAM 1 APPLICATION(S): 100000 CREAM TOPICAL at 05:14

## 2022-01-29 NOTE — GOALS OF CARE CONVERSATION - ADVANCED CARE PLANNING - CONVERSATION DETAILS
I spoke with the patient's brother and daughter that indicated was a strong and healthy person. As an example, last year, he got COVID-19 and was intubated x 2 for up to 10 days. After that, he was able to survive and go to [facility] where he spent 3 months and was able to regain his full functionality and independency to the point that he was able to drive. Hence, they wanted "everything" to be done to try to improve his condition and prolong his life including intubation and resuscitation. He is full code.     d/w Roverto Chávez NP in the MICU.     Will sing off.         Sammy Swan MD   Geriatrics and Palliative Care ([facility]) Consult Service    of Geriatric and Palliative Medicine  [facility]      Please page the following number for clinical matters between the hours of 9 am and 5 pm from Monday through Friday : [phone]    After 5pm and on weekends, please see the contact information below:    In the event of newly developing, evolving, or worsening symptoms, please contact the Palliative Medicine team via pager (if the patient is at [facility] #[phone] or if the patient is at [facility] #[phone]) The Geriatric and Palliative Medicine service has coverage 24 hours a day/ 7 days a week to provide medical recommendations regarding symptom management needs via telephone

## 2022-01-29 NOTE — CONSULT NOTE ADULT - PROBLEM SELECTOR RECOMMENDATION 4
As per the primary team's GOC note dated 1/27, plans is for PEG and rehab. A discussion about codes status was started. Currently, he is full code.  Will be reaching out to the patient's HCP to further discuss about code status.

## 2022-01-29 NOTE — PROGRESS NOTE ADULT - SUBJECTIVE AND OBJECTIVE BOX
Neurology Progress note  S: patient seen and examined. still lethargic. CTH  unchanged/normal evolutionary chages . RRT this AM for hypoxia on NRBM to 90s.     Brief HPI:  81y m W/  HTN presenting to the ED from [facility] as a transfer for R frontal parenchymal hemorrhage on CT after fall at home. Patient is poor historian but states that he was diagnosed with COVID 2 weeks ago and has been feeling "tired" at home, states he felt dizzy earlier today but unable to state what time he fell or further elucidate. Patient denies AC use. Per EMS was also recently diagnosed with a AAA but has been unable to undergo surgical repair due to recent dx of COVID. Patient still endorsing he feels "tired", denies current headache, vision changes, dizziness, chest pain, nausea, back pain, focal numbness/tingling. Endorses weakness to LLE 2/2 pain. (15 Mono 2022 15:50)    Medications:   MEDICATIONS  (STANDING):  albuterol/ipratropium for Nebulization 3 milliLiter(s) Nebulizer every 6 hours  amLODIPine   Tablet 10 milliGRAM(s) Oral daily  cefepime   IVPB 1000 milliGRAM(s) IV Intermittent every 8 hours  enoxaparin Injectable 40 milliGRAM(s) SubCutaneous every 12 hours  insulin lispro (ADMELOG) corrective regimen sliding scale   SubCutaneous every 4 hours  insulin NPH human recombinant 13 Unit(s) SubCutaneous every 6 hours  lacosamide Solution 100 milliGRAM(s) Oral two times a day  losartan 100 milliGRAM(s) Oral daily  norepinephrine Infusion 0.05 MICROgram(s)/kG/Min (13.2 mL/Hr) IV Continuous <Continuous>  nystatin Cream 1 Application(s) Topical every 12 hours  propofol Infusion 10 MICROgram(s)/kG/Min (8.44 mL/Hr) IV Continuous <Continuous>  senna 2 Tablet(s) Oral at bedtime  sodium chloride 0.9% for Nebulization 3 milliLiter(s) Nebulizer every 6 hours    MEDICATIONS  (PRN):       Vitals:  Vital Signs Last 24 Hrs  T(C): 37.3 (2022 04:40), Max: 37.3 (2022 04:40)  T(F): 99.2 (2022 04:40), Max: 99.2 (2022 04:40)  HR: 85 (2022 09:40) (85 - 89)  BP: 125/66 (2022 04:40) (120/76 - 125/66)  BP(mean): --  RR: 22 (2022 09:40) (18 - 22)  SpO2: 96% (2022 09:40) (95% - 97%)      General Exam:   General Appearance: Appropriately dressed and in no acute distress       Head: Normocephalic, atraumatic and no dysmorphic features  Ear, Nose, and Throat: Moist mucous membranes +NGT  +ETT   CVS: S1S2+  Resp: No SOB, no wheeze or rhonchi  GI: soft NT/ND  Extremities: No edema or cyanosis  Skin: No bruises or rashes     Neurological Exam:  Mental Status: Awake, alert and oriented x 0-1.  Able to follow simple verbal commands intermittently. Able to name and repeat.+ dysarthria more lethargic  Cranial Nerves: PERRL, EOMI, VFFC, sensation V1-V3 intact, L f acial asymmetry, equal elevation of palate,  , tongue is midline on protrusion. no obvious papilledema on fundoscopic exam. hearing is grossly intact.   Motor: moving R full strenght. LUE 3/5/ LLE 1-2/5, tremor in uppers at times, myoclonus   Sensation: withdraws R>L  Reflexes: 1+ throughout at biceps, brachioradialis, triceps, patellars and ankles bilaterally and equal. No clonus. R toe and L toe were both downgoing.  Coordination: unablke   Gait: unable     Data/Labs/Imaging which I personally reviewed.     Labs:   CBC Full  -  ( 2022 10:28 )  WBC Count : 14.36 K/uL  RBC Count : 4.24 M/uL  Hemoglobin : 13.0 g/dL  Hematocrit : 42.9 %  Platelet Count - Automated : 312 K/uL  Mean Cell Volume : 101.2 fl  Mean Cell Hemoglobin : 30.7 pg  Mean Cell Hemoglobin Concentration : 30.3 gm/dL  Auto Neutrophil # : x  Auto Lymphocyte # : x  Auto Monocyte # : x  Auto Eosinophil # : x  Auto Basophil # : x  Auto Neutrophil % : x  Auto Lymphocyte % : x  Auto Monocyte % : x  Auto Eosinophil % : x  Auto Basophil % : x      01-29    143  |  100  |  52<H>  ----------------------------<  190<H>  5.4<H>   |  32<H>  |  1.33<H>    Ca    10.7<H>      2022 10:28  Phos  5.3       Mg     3.0         TPro  8.1  /  Alb  3.8  /  TBili  0.8  /  DBili  x   /  AST  74<H>  /  ALT  30  /  AlkPhos  76        Urinalysis Basic - ( 2022 21:31 )    Color: Yellow / Appearance: Clear / S.023 / pH: x  Gluc: x / Ketone: Negative  / Bili: Negative / Urobili: Negative   Blood: x / Protein: 100 / Nitrite: Negative   Leuk Esterase: Negative / RBC: 0 /hpf / WBC 0 /HPF   Sq Epi: x / Non Sq Epi: 1 /hpf / Bacteria: Negative    < from: CT Angio Head w/ IV Cont (01.15.22 @ 17:16) >    ACC: [id number] EXAM:  CT ANGIO NECK (W)[facility]                        ACC: [id number] EXAM:  CT ANGIO BRAIN (W)[facility]                          PROCEDURE DATE:  01/15/2022          INTERPRETATION:  CT ANGIOGRAPHY OF THE NECK AND BRAIN.    CLINICAL INFORMATION:81 years Male ICH    TECHNIQUE:  CT angiography of the neck and brain was performed during the dynamic   administration of intravenous contrast.  MIP reconstructions were   performed and reviewed. Multiplanar reformations were obtained.  Contrastadministered 70 cc Omnipaque 350, contrast discarded 30 cc    FINDINGS:  NECK  RIGHT CAROTID CIRCULATION:  Evaluation of the right carotid circulation demonstrates no traumatic   occlusion, dissection, or pseudoaneurysm.    LEFT CAROTID CIRCULATION:  Evaluation of the left carotid circulation demonstrates no traumatic   occlusion, dissection, or pseudoaneurysm.    VERTEBRAL ARTERIES:  Evaluation of the vertebral arteries reveals no evidence of a vertebral   artery occlusion or dissection.    BRAIN  ANTERIOR CIRCULATION:  Distal internal carotid arteries are patent. Calcification involves the   cavernous segments with mild degrees of narrowing  Anterior cerebral arteries are patent. A1 segment of the left anterior   cerebral artery is smaller than the right, a normal anatomic variant  Middle cerebral arteries are patent.    POSTERIOR CIRCULATION:  Distal vertebral arteries are patent. Bilateral posterior inferior   cerebellar arteries are seen.  Basilar artery is patent. Proximal superior cerebellar arteries are patent  Posterior cerebral arteries are patent.    OTHER:  No evidence of abnormal enhancement associated with the moderate to large   right frontal parenchymal hemorrhage.  No puddling of contrast.  No aneurysm or arteriovenous malformation seen.    IMPRESSION:  *  NO TRAUMATIC OCCLUSION, DISSECTION, OR PSEUDOANEURYSM.  *  NO ABNORMAL ENHANCEMENT ASSOCIATED WITH THE MODERATE TO LARGE RIGHT   FRONTAL PARENCHYMAL HEMORRHAGE.  *  NO PUDDLING OF CONTRAST. NO ANEURYSM OR AVM SEEN.    --- End of Report ---            PAWAN ODELL MD; Attending Radiologist  This document has been electronically signed. Mono 15 2022  7:49PM    < end of copied text >  < from: CT Head No Cont (22 @ 09:30) >    ACC: [id number] EXAM:  CT BRAIN                          PROCEDURE DATE:  2022          INTERPRETATION:  CLINICAL INFORMATION: Intracranial hemorrhage, follow-up.    TECHNIQUE: Portable noncontrast axial CT images were acquired of the head.    COMPARISON STUDY: CT head 2022.    FINDINGS:  Large right frontal lobe parenchymal hematoma measures approximately 7.7   x 3.5 cm, previously 7.5 x 3.2 cm, with surrounding edema and mass   effect, without interval interval change compared to prior.   Redemonstrated small intraventricular hemorrhage, similar to prior.    Moderate cerebral volume loss with proportional prominence of the sulci   and ventricles. Nonspecific white matter hypoattenuation, likely related   to chronic microvascular changes.    Left frontal arachnoid cyst, unchanged.    Mucosal thickening of the left maxillary and bilateral ethmoid sinuses.   Partially visualized nasogastric tube in situ. Mastoid air cells are   clear.    Evidence of bilateral lens surgery.    Nodisplaced calvarial fracture.      IMPRESSION:  Stable exam.    --- End of Report ---          HARRIET WATSON MD; Resident Radiology  This document has been electronically signed.  CRUZ LÓPEZ MD; Attending Radiologist  This document has been electronically signed. 2022 10:58AM    < end of copied text >    < from: CT Head No Cont (22 @ 22:13) >    ACC: [id number] EXAM:  CT BRAIN                          PROCEDURE DATE:  2022          INTERPRETATION:  Clinical indication: Altered mental status    Multiple axial sections were performed from base skull to vertex without   contrast enhancement. Coronal and sagittal reconstructions were performed   as well    This exam is compared prior head CT performed on 2020.    Again seen is abnormal high attenuation identified involving the high   right frontal cortical subcortical region. This compatible with acute   parenchymal hemorrhage surrounding edema. This acute parenchymal   hemorrhage measures approximately 7.3 x 3.2 cm and previously measured   approximately 7.7 x 3.5 cm. Localized mass effect is seen consisting of   sulcal effacement.    Extra-axial low-attenuation with adjacent bony scalloping is again seen   involving the high left frontal region. This finding measures   approximately 4.2 x 2.8 cm and previously measured approximately 4.1 x   3.1 cm. This is compatible with an arachnoid cyst.    Parenchymal volume loss and chronic microvascular ischemic changes are   identified.    Intraventricular hemorrhage is again seen.    Evaluation of structures with the appropriate window appears unremarkable    Left-sided NG tube is seen.    The visualized paranasal sinuses mastoid and middle ear regions appear   clear.    Impression: Acute parenchymal hemorrhage is again seen with some expected   evolutionary changes.    Arachnoid cyst again seen as described above.    --- End of Report ---            BE TOUSSAINT MD; Attending Radiologist  This document has been electronically signed. 2022  9:04AM    < end of copied text >

## 2022-01-29 NOTE — CHART NOTE - NSCHARTNOTEFT_GEN_A_CORE
CHIEF COMPLAINT:    HPI:  81 yr male with PMHx HTN, Ascending Ao aneurysm 4cm, infrarenal AAA 5 cm s/p AAA repair 2015, Rt common iliac aneurysm 5cm (CT scan at [facility] 11/2020) meant for vascular intervention however unable due to COVID-19+ two weeks prior to presentation,  who was  transferred to [facility] 1/5/22 after being found to have Rt frontal parenchymal hemorrhage with mass effect without midline shift found on CTH after presenting originally to [facility] with fatigue, dizziness, s/p fall at home. Pt at [facility] was evaluated by vascular surg, determined no surg intervention at this time, pt evaluated by Neuro surg who offered MIND clinical trial, STX-guided resection vs medical management however family preferred only minimally invasive intervention if necessary. Pt placed on antihypertensive therapy, hypertonic saline therapy.     This hospital course c/b sz like activity 1/16/22, placed on keppra therapy, EEG  however without epileptiform activity          PAST MEDICAL & SURGICAL HISTORY:      FAMILY HISTORY:      SOCIAL HISTORY:  Smoking: [ ] Never Smoked [ ] Former Smoker (__ packs x ___ years) [ ] Current Smoker  (__ packs x ___ years)  Substance Use: [ ] Never Used [ ] Used ____  EtOH Use:  Marital Status: [ ] Single [ ]  [ ]  [ ]   Sexual History:   Occupation:  Recent Travel:  Country of Birth:  Advance Directives:    Allergies    No Known Allergies    Intolerances        HOME MEDICATIONS:    REVIEW OF SYSTEMS:  Constitutional: [ ] fevers [ ] chills [ ] weight loss [ ] weight gain  HEENT: [ ] dry eyes [ ] eye irritation [ ] postnasal drip [ ] nasal congestion  CV: [ ] chest pain [ ] orthopnea [ ] palpitations [ ] murmur  Resp: [ ] cough [ ] shortness of breath [ ] dyspnea [ ] wheezing [ ] sputum [ ] hemoptysis  GI: [ ] nausea [ ] vomiting [ ] diarrhea [ ] constipation [ ] abd pain [ ] dysphagia   : [ ] dysuria [ ] nocturia [ ] hematuria [ ] increased urinary frequency  Musculoskeletal: [ ] back pain [ ] myalgias [ ] arthralgias [ ] fracture  Skin: [ ] rash [ ] itch  Neurological: [ ] headache [ ] dizziness [ ] syncope [ ] weakness [ ] numbness  Psychiatric: [ ] anxiety [ ] depression  Endocrine: [ ] diabetes [ ] thyroid problem  Hematologic/Lymphatic: [ ] anemia [ ] bleeding problem  Allergic/Immunologic: [ ] itchy eyes [ ] nasal discharge [ ] hives [ ] angioedema  [ ] All other systems negative  [ ] Unable to assess ROS because ________    OBJECTIVE:  ICU Vital Signs Last 24 Hrs  T(C): 36.4 (29 Jan 2022 04:33), Max: 37.2 (28 Jan 2022 13:41)  T(F): 97.5 (29 Jan 2022 04:33), Max: 98.9 (28 Jan 2022 13:41)  HR: 93 (29 Jan 2022 04:33) (85 - 93)  BP: 111/66 (29 Jan 2022 04:33) (111/66 - 148/71)  BP(mean): --  ABP: --  ABP(mean): --  RR: 18 (29 Jan 2022 04:33) (18 - 22)  SpO2: 95% (29 Jan 2022 04:33) (95% - 98%)        01-28 @ 07:01  -  01-29 @ 07:00  --------------------------------------------------------  IN: 0 mL / OUT: 975 mL / NET: -975 mL      CAPILLARY BLOOD GLUCOSE      POCT Blood Glucose.: 165 mg/dL (29 Jan 2022 07:39)      PHYSICAL EXAM:  General:   HEENT:   Lymph Nodes:  Neck:   Respiratory:   Cardiovascular:   Abdomen:   Extremities:   Skin:   Neurological:  Psychiatry:    LINES:     HOSPITAL MEDICATIONS:  MEDICATIONS  (STANDING):  acetylcysteine 20%  Inhalation 4 milliLiter(s) Inhalation every 6 hours  albuterol/ipratropium for Nebulization 3 milliLiter(s) Nebulizer every 6 hours  amLODIPine   Tablet 10 milliGRAM(s) Oral daily  cefepime   IVPB      cefepime   IVPB 1000 milliGRAM(s) IV Intermittent every 8 hours  dextrose 40% Gel 15 Gram(s) Oral once  dextrose 5%. 1000 milliLiter(s) (50 mL/Hr) IV Continuous <Continuous>  dextrose 50% Injectable 25 Gram(s) IV Push once  dextrose 50% Injectable 12.5 Gram(s) IV Push once  dextrose 50% Injectable 25 Gram(s) IV Push once  enoxaparin Injectable 40 milliGRAM(s) SubCutaneous every 12 hours  glucagon  Injectable 1 milliGRAM(s) IntraMuscular once  insulin lispro (ADMELOG) corrective regimen sliding scale   SubCutaneous every 6 hours  insulin NPH human recombinant 13 Unit(s) SubCutaneous every 6 hours  lacosamide Solution 100 milliGRAM(s) Oral two times a day  losartan 100 milliGRAM(s) Oral daily  nystatin Powder 1 Application(s) Topical two times a day  senna 2 Tablet(s) Oral at bedtime  sodium chloride 3%  Inhalation 4 milliLiter(s) Inhalation every 6 hours    MEDICATIONS  (PRN):  acetaminophen    Suspension .. 650 milliGRAM(s) Oral every 6 hours PRN Temp greater or equal to 38C (100.4F), Mild Pain (1 - 3)      LABS:                        12.5   12.50 )-----------( 259      ( 29 Jan 2022 07:27 )             41.7     Hgb Trend: 12.5<--, 12.0<--, 12.1<--, 12.1<--, 12.0<--  01-29    146<H>  |  100  |  48<H>  ----------------------------<  169<H>  5.0   |  32<H>  |  1.10    Ca    10.8<H>      29 Jan 2022 07:27  Phos  5.4     01-29  Mg     3.0     01-29      Creatinine Trend: 1.10<--, 0.97<--, 0.86<--, 0.82<--, 0.83<--, 0.83<--            MICROBIOLOGY:     RADIOLOGY:  [ ] Reviewed and interpreted by me    EKG: CHIEF COMPLAINT:    HPI:  81 yr male with PMHx HTN, SWETA on home CPAP,  Ascending Ao aneurysm 4cm, infrarenal AAA 5 cm s/p AAA repair 2015, Rt common iliac aneurysm 5cm (CT scan at [facility] 11/2020) meant for vascular intervention however unable due to COVID-19+ two weeks prior to presentation,  who was  transferred to [facility]  NSICU 1/5/22 after being found to have Rt frontal parenchymal hemorrhage with mass effect without midline shift found on CTH after presenting originally to [facility] with fatigue, dizziness, s/p fall at home. Pt at [facility] was evaluated by vascular surg, determined no surg intervention at this time, pt evaluated by Neuro surg who offered MIND clinical trial, STX-guided resection vs medical management however family preferred only minimally invasive intervention if necessary. 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 Pt initially placed on antihypertensive therapy, hypertonic saline therapy.     This hospital course c/b sz like activity 1/16/22, loaded with keppra and started on vimpat therapy, EEG at that time however without epileptiform activity, development of AMS with hypoxic/hypercapnic resp distress requiring intubation 1/19/22, repeat CTH without acute changes, re demonstration of IPH. Pt with eventual improvement in resp status and extubated 1/22/22. Pt with eventual improvement with transfer to medicine floor 1/25/22.          PAST MEDICAL & SURGICAL HISTORY:      FAMILY HISTORY:      SOCIAL HISTORY:  Smoking: [ ] Never Smoked [ ] Former Smoker (__ packs x ___ years) [ ] Current Smoker  (__ packs x ___ years)  Substance Use: [ ] Never Used [ ] Used ____  EtOH Use:  Marital Status: [ ] Single [ ]  [ ]  [ ]   Sexual History:   Occupation:  Recent Travel:  Country of Birth:  Advance Directives:    Allergies    No Known Allergies    Intolerances        HOME MEDICATIONS:    REVIEW OF SYSTEMS:  Constitutional: [ ] fevers [ ] chills [ ] weight loss [ ] weight gain  HEENT: [ ] dry eyes [ ] eye irritation [ ] postnasal drip [ ] nasal congestion  CV: [ ] chest pain [ ] orthopnea [ ] palpitations [ ] murmur  Resp: [ ] cough [ ] shortness of breath [ ] dyspnea [ ] wheezing [ ] sputum [ ] hemoptysis  GI: [ ] nausea [ ] vomiting [ ] diarrhea [ ] constipation [ ] abd pain [ ] dysphagia   : [ ] dysuria [ ] nocturia [ ] hematuria [ ] increased urinary frequency  Musculoskeletal: [ ] back pain [ ] myalgias [ ] arthralgias [ ] fracture  Skin: [ ] rash [ ] itch  Neurological: [ ] headache [ ] dizziness [ ] syncope [ ] weakness [ ] numbness  Psychiatric: [ ] anxiety [ ] depression  Endocrine: [ ] diabetes [ ] thyroid problem  Hematologic/Lymphatic: [ ] anemia [ ] bleeding problem  Allergic/Immunologic: [ ] itchy eyes [ ] nasal discharge [ ] hives [ ] angioedema  [ ] All other systems negative  [ ] Unable to assess ROS because ________    OBJECTIVE:  ICU Vital Signs Last 24 Hrs  T(C): 36.4 (29 Jan 2022 04:33), Max: 37.2 (28 Jan 2022 13:41)  T(F): 97.5 (29 Jan 2022 04:33), Max: 98.9 (28 Jan 2022 13:41)  HR: 93 (29 Jan 2022 04:33) (85 - 93)  BP: 111/66 (29 Jan 2022 04:33) (111/66 - 148/71)  BP(mean): --  ABP: --  ABP(mean): --  RR: 18 (29 Jan 2022 04:33) (18 - 22)  SpO2: 95% (29 Jan 2022 04:33) (95% - 98%)        01-28 @ 07:01  -  01-29 @ 07:00  --------------------------------------------------------  IN: 0 mL / OUT: 975 mL / NET: -975 mL      CAPILLARY BLOOD GLUCOSE      POCT Blood Glucose.: 165 mg/dL (29 Jan 2022 07:39)      PHYSICAL EXAM:  General:   HEENT:   Lymph Nodes:  Neck:   Respiratory:   Cardiovascular:   Abdomen:   Extremities:   Skin:   Neurological:  Psychiatry:    LINES:     HOSPITAL MEDICATIONS:  MEDICATIONS  (STANDING):  acetylcysteine 20%  Inhalation 4 milliLiter(s) Inhalation every 6 hours  albuterol/ipratropium for Nebulization 3 milliLiter(s) Nebulizer every 6 hours  amLODIPine   Tablet 10 milliGRAM(s) Oral daily  cefepime   IVPB      cefepime   IVPB 1000 milliGRAM(s) IV Intermittent every 8 hours  dextrose 40% Gel 15 Gram(s) Oral once  dextrose 5%. 1000 milliLiter(s) (50 mL/Hr) IV Continuous <Continuous>  dextrose 50% Injectable 25 Gram(s) IV Push once  dextrose 50% Injectable 12.5 Gram(s) IV Push once  dextrose 50% Injectable 25 Gram(s) IV Push once  enoxaparin Injectable 40 milliGRAM(s) SubCutaneous every 12 hours  glucagon  Injectable 1 milliGRAM(s) IntraMuscular once  insulin lispro (ADMELOG) corrective regimen sliding scale   SubCutaneous every 6 hours  insulin NPH human recombinant 13 Unit(s) SubCutaneous every 6 hours  lacosamide Solution 100 milliGRAM(s) Oral two times a day  losartan 100 milliGRAM(s) Oral daily  nystatin Powder 1 Application(s) Topical two times a day  senna 2 Tablet(s) Oral at bedtime  sodium chloride 3%  Inhalation 4 milliLiter(s) Inhalation every 6 hours    MEDICATIONS  (PRN):  acetaminophen    Suspension .. 650 milliGRAM(s) Oral every 6 hours PRN Temp greater or equal to 38C (100.4F), Mild Pain (1 - 3)      LABS:                        12.5   12.50 )-----------( 259      ( 29 Jan 2022 07:27 )             41.7     Hgb Trend: 12.5<--, 12.0<--, 12.1<--, 12.1<--, 12.0<--  01-29    146<H>  |  100  |  48<H>  ----------------------------<  169<H>  5.0   |  32<H>  |  1.10    Ca    10.8<H>      29 Jan 2022 07:27  Phos  5.4     01-29  Mg     3.0     01-29      Creatinine Trend: 1.10<--, 0.97<--, 0.86<--, 0.82<--, 0.83<--, 0.83<--            MICROBIOLOGY:     RADIOLOGY:  [ ] Reviewed and interpreted by nel PATELG: CHIEF COMPLAINT:    HPI:  81 yr male with PMHx HTN, SWETA on home CPAP,  Ascending Ao aneurysm 4cm, infrarenal AAA 5 cm s/p AAA repair 2015, Rt common iliac aneurysm 5cm (CT scan at [facility] 11/2020) meant for vascular intervention however unable due to COVID-19+ two weeks prior to presentation,  who was  transferred to [facility]  NSICU 1/5/22 after being found to have Rt frontal parenchymal hemorrhage with mass effect without midline shift found on CTH after presenting originally to [facility] with fatigue, dizziness, s/p fall at home. Pt at [facility] was evaluated by vascular surg, determined no surg intervention at this time, pt evaluated by Neuro surg who offered MIND clinical trial, STX-guided resection vs medical management however family preferred only minimally invasive intervention if necessary. 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 Pt initially placed on antihypertensive therapy, hypertonic saline therapy.     This hospital course c/b sz like activity 1/16/22, loaded with keppra and started on vimpat therapy, EEG at that time however without epileptiform activity, development of AMS with hypoxic/hypercapnic resp distress requiring intubation 1/19/22, repeat CTH at that time without acute changes, re demonstration of IPH. Pt with eventual improvement in resp status and extubated 1/22/22. Pt with eventually transferred to medicine floor 1/25/22. Course further c/b development of fevers wit Tmax to 38.8 1/25/22, received CT chest 1/26/22 that demonstrated new bilateral patchy opacities concerning for aspiration pneum, started cefepime therapy               PAST MEDICAL & SURGICAL HISTORY:      FAMILY HISTORY:      SOCIAL HISTORY:  Smoking: [ ] Never Smoked [ ] Former Smoker (__ packs x ___ years) [ ] Current Smoker  (__ packs x ___ years)  Substance Use: [ ] Never Used [ ] Used ____  EtOH Use:  Marital Status: [ ] Single [ ]  [ ]  [ ]   Sexual History:   Occupation:  Recent Travel:  Country of Birth:  Advance Directives:    Allergies    No Known Allergies    Intolerances        HOME MEDICATIONS:    REVIEW OF SYSTEMS:  Constitutional: [ ] fevers [ ] chills [ ] weight loss [ ] weight gain  HEENT: [ ] dry eyes [ ] eye irritation [ ] postnasal drip [ ] nasal congestion  CV: [ ] chest pain [ ] orthopnea [ ] palpitations [ ] murmur  Resp: [ ] cough [ ] shortness of breath [ ] dyspnea [ ] wheezing [ ] sputum [ ] hemoptysis  GI: [ ] nausea [ ] vomiting [ ] diarrhea [ ] constipation [ ] abd pain [ ] dysphagia   : [ ] dysuria [ ] nocturia [ ] hematuria [ ] increased urinary frequency  Musculoskeletal: [ ] back pain [ ] myalgias [ ] arthralgias [ ] fracture  Skin: [ ] rash [ ] itch  Neurological: [ ] headache [ ] dizziness [ ] syncope [ ] weakness [ ] numbness  Psychiatric: [ ] anxiety [ ] depression  Endocrine: [ ] diabetes [ ] thyroid problem  Hematologic/Lymphatic: [ ] anemia [ ] bleeding problem  Allergic/Immunologic: [ ] itchy eyes [ ] nasal discharge [ ] hives [ ] angioedema  [ ] All other systems negative  [ ] Unable to assess ROS because ________    OBJECTIVE:  ICU Vital Signs Last 24 Hrs  T(C): 36.4 (29 Jan 2022 04:33), Max: 37.2 (28 Jan 2022 13:41)  T(F): 97.5 (29 Jan 2022 04:33), Max: 98.9 (28 Jan 2022 13:41)  HR: 93 (29 Jan 2022 04:33) (85 - 93)  BP: 111/66 (29 Jan 2022 04:33) (111/66 - 148/71)  BP(mean): --  ABP: --  ABP(mean): --  RR: 18 (29 Jan 2022 04:33) (18 - 22)  SpO2: 95% (29 Jan 2022 04:33) (95% - 98%)        01-28 @ 07:01  -  01-29 @ 07:00  --------------------------------------------------------  IN: 0 mL / OUT: 975 mL / NET: -975 mL      CAPILLARY BLOOD GLUCOSE      POCT Blood Glucose.: 165 mg/dL (29 Jan 2022 07:39)      PHYSICAL EXAM:  General:   HEENT:   Lymph Nodes:  Neck:   Respiratory:   Cardiovascular:   Abdomen:   Extremities:   Skin:   Neurological:  Psychiatry:    LINES:     HOSPITAL MEDICATIONS:  MEDICATIONS  (STANDING):  acetylcysteine 20%  Inhalation 4 milliLiter(s) Inhalation every 6 hours  albuterol/ipratropium for Nebulization 3 milliLiter(s) Nebulizer every 6 hours  amLODIPine   Tablet 10 milliGRAM(s) Oral daily  cefepime   IVPB      cefepime   IVPB 1000 milliGRAM(s) IV Intermittent every 8 hours  dextrose 40% Gel 15 Gram(s) Oral once  dextrose 5%. 1000 milliLiter(s) (50 mL/Hr) IV Continuous <Continuous>  dextrose 50% Injectable 25 Gram(s) IV Push once  dextrose 50% Injectable 12.5 Gram(s) IV Push once  dextrose 50% Injectable 25 Gram(s) IV Push once  enoxaparin Injectable 40 milliGRAM(s) SubCutaneous every 12 hours  glucagon  Injectable 1 milliGRAM(s) IntraMuscular once  insulin lispro (ADMELOG) corrective regimen sliding scale   SubCutaneous every 6 hours  insulin NPH human recombinant 13 Unit(s) SubCutaneous every 6 hours  lacosamide Solution 100 milliGRAM(s) Oral two times a day  losartan 100 milliGRAM(s) Oral daily  nystatin Powder 1 Application(s) Topical two times a day  senna 2 Tablet(s) Oral at bedtime  sodium chloride 3%  Inhalation 4 milliLiter(s) Inhalation every 6 hours    MEDICATIONS  (PRN):  acetaminophen    Suspension .. 650 milliGRAM(s) Oral every 6 hours PRN Temp greater or equal to 38C (100.4F), Mild Pain (1 - 3)      LABS:                        12.5   12.50 )-----------( 259      ( 29 Jan 2022 07:27 )             41.7     Hgb Trend: 12.5<--, 12.0<--, 12.1<--, 12.1<--, 12.0<--  01-29    146<H>  |  100  |  48<H>  ----------------------------<  169<H>  5.0   |  32<H>  |  1.10    Ca    10.8<H>      29 Jan 2022 07:27  Phos  5.4     01-29  Mg     3.0     01-29      Creatinine Trend: 1.10<--, 0.97<--, 0.86<--, 0.82<--, 0.83<--, 0.83<--            MICROBIOLOGY:     RADIOLOGY:  [ ] Reviewed and interpreted by me    EKG: CHIEF COMPLAINT:    HPI:  81 yr male with PMHx HTN, SWETA on home CPAP,  Ascending Ao aneurysm 4cm, infrarenal AAA 5 cm s/p AAA repair 2015, Rt common iliac aneurysm 5cm (CT scan at [facility] 11/2020) meant for vascular intervention however unable due to COVID-19+ two weeks prior to presentation,  who was  transferred to [facility]  NSICU 1/5/22 after being found to have Rt frontal parenchymal hemorrhage with mass effect without midline shift found on CTH after presenting originally to [facility] with fatigue, dizziness, s/p fall at home. Pt at [facility] was evaluated by vascular surg, determined no surg intervention at this time, pt evaluated by Neuro surg who offered MIND clinical trial, STX-guided resection vs medical management however family preferred only minimally invasive intervention if necessary. 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 Pt initially placed on antihypertensive therapy, hypertonic saline therapy.     This hospital course c/b sz like activity 1/16/22, loaded with keppra and started on vimpat therapy, EEG at that time however without epileptiform activity, development of AMS with hypoxic/hypercapnic resp distress requiring intubation 1/19/22, repeat CTH at that time without acute changes, re demonstration of IPH. Pt with eventual improvement in resp status and extubated 1/22/22.       Pt  eventually transferred to medicine floor 1/25/22. Course however further c/b development of fevers wit Tmax to 38.8 1/25/22, received CT chest 1/26/22 that demonstrated new bilateral patchy opacities concerning for aspiration pneum, started cefepime therapy, recurrent worsening AMS and hypoxic resp failure with SPO2 of 88-90's requiring re intubation 1/29/22 with transfer to MICU for hypoxic resp failure to possible aspiration pneum              PAST MEDICAL & SURGICAL HISTORY:      FAMILY HISTORY:      SOCIAL HISTORY:  Smoking: [ ] Never Smoked [ ] Former Smoker (__ packs x ___ years) [ ] Current Smoker  (__ packs x ___ years)  Substance Use: [ ] Never Used [ ] Used ____  EtOH Use:  Marital Status: [ ] Single [ ]  [ ]  [ ]   Sexual History:   Occupation:  Recent Travel:  Country of Birth:  Advance Directives:    Allergies    No Known Allergies    Intolerances        HOME MEDICATIONS:    REVIEW OF SYSTEMS:  Constitutional: [ ] fevers [ ] chills [ ] weight loss [ ] weight gain  HEENT: [ ] dry eyes [ ] eye irritation [ ] postnasal drip [ ] nasal congestion  CV: [ ] chest pain [ ] orthopnea [ ] palpitations [ ] murmur  Resp: [ ] cough [ ] shortness of breath [ ] dyspnea [ ] wheezing [ ] sputum [ ] hemoptysis  GI: [ ] nausea [ ] vomiting [ ] diarrhea [ ] constipation [ ] abd pain [ ] dysphagia   : [ ] dysuria [ ] nocturia [ ] hematuria [ ] increased urinary frequency  Musculoskeletal: [ ] back pain [ ] myalgias [ ] arthralgias [ ] fracture  Skin: [ ] rash [ ] itch  Neurological: [ ] headache [ ] dizziness [ ] syncope [ ] weakness [ ] numbness  Psychiatric: [ ] anxiety [ ] depression  Endocrine: [ ] diabetes [ ] thyroid problem  Hematologic/Lymphatic: [ ] anemia [ ] bleeding problem  Allergic/Immunologic: [ ] itchy eyes [ ] nasal discharge [ ] hives [ ] angioedema  [ ] All other systems negative  [ ] Unable to assess ROS because ________    OBJECTIVE:  ICU Vital Signs Last 24 Hrs  T(C): 36.4 (29 Jan 2022 04:33), Max: 37.2 (28 Jan 2022 13:41)  T(F): 97.5 (29 Jan 2022 04:33), Max: 98.9 (28 Jan 2022 13:41)  HR: 93 (29 Jan 2022 04:33) (85 - 93)  BP: 111/66 (29 Jan 2022 04:33) (111/66 - 148/71)  BP(mean): --  ABP: --  ABP(mean): --  RR: 18 (29 Jan 2022 04:33) (18 - 22)  SpO2: 95% (29 Jan 2022 04:33) (95% - 98%)        01-28 @ 07:01  -  01-29 @ 07:00  --------------------------------------------------------  IN: 0 mL / OUT: 975 mL / NET: -975 mL      CAPILLARY BLOOD GLUCOSE      POCT Blood Glucose.: 165 mg/dL (29 Jan 2022 07:39)      PHYSICAL EXAM:  General:   HEENT:   Lymph Nodes:  Neck:   Respiratory:   Cardiovascular:   Abdomen:   Extremities:   Skin:   Neurological:  Psychiatry:    LINES:     HOSPITAL MEDICATIONS:  MEDICATIONS  (STANDING):  acetylcysteine 20%  Inhalation 4 milliLiter(s) Inhalation every 6 hours  albuterol/ipratropium for Nebulization 3 milliLiter(s) Nebulizer every 6 hours  amLODIPine   Tablet 10 milliGRAM(s) Oral daily  cefepime   IVPB      cefepime   IVPB 1000 milliGRAM(s) IV Intermittent every 8 hours  dextrose 40% Gel 15 Gram(s) Oral once  dextrose 5%. 1000 milliLiter(s) (50 mL/Hr) IV Continuous <Continuous>  dextrose 50% Injectable 25 Gram(s) IV Push once  dextrose 50% Injectable 12.5 Gram(s) IV Push once  dextrose 50% Injectable 25 Gram(s) IV Push once  enoxaparin Injectable 40 milliGRAM(s) SubCutaneous every 12 hours  glucagon  Injectable 1 milliGRAM(s) IntraMuscular once  insulin lispro (ADMELOG) corrective regimen sliding scale   SubCutaneous every 6 hours  insulin NPH human recombinant 13 Unit(s) SubCutaneous every 6 hours  lacosamide Solution 100 milliGRAM(s) Oral two times a day  losartan 100 milliGRAM(s) Oral daily  nystatin Powder 1 Application(s) Topical two times a day  senna 2 Tablet(s) Oral at bedtime  sodium chloride 3%  Inhalation 4 milliLiter(s) Inhalation every 6 hours    MEDICATIONS  (PRN):  acetaminophen    Suspension .. 650 milliGRAM(s) Oral every 6 hours PRN Temp greater or equal to 38C (100.4F), Mild Pain (1 - 3)      LABS:                        12.5   12.50 )-----------( 259      ( 29 Jan 2022 07:27 )             41.7     Hgb Trend: 12.5<--, 12.0<--, 12.1<--, 12.1<--, 12.0<--  01-29    146<H>  |  100  |  48<H>  ----------------------------<  169<H>  5.0   |  32<H>  |  1.10    Ca    10.8<H>      29 Jan 2022 07:27  Phos  5.4     01-29  Mg     3.0     01-29      Creatinine Trend: 1.10<--, 0.97<--, 0.86<--, 0.82<--, 0.83<--, 0.83<--            MICROBIOLOGY:     RADIOLOGY:  [ ] Reviewed and interpreted by me    EKG: CHIEF COMPLAINT:    HPI:  81 yr male with PMHx HTN, SWETA on home CPAP,  Ascending Ao aneurysm 4cm, infrarenal AAA 5 cm s/p AAA repair 2015, Rt common iliac aneurysm 5cm (CT scan at [facility] 11/2020) meant for vascular intervention however unable due to COVID-19+ two weeks prior to presentation,  who was  transferred to [facility]  NSICU 1/5/22 after being found to have Rt frontal parenchymal hemorrhage with mass effect without midline shift found on CTH after presenting originally to [facility] with fatigue, dizziness, s/p fall at home. Pt at [facility] was evaluated by vascular surg, determined no surg intervention at this time, pt evaluated by Neuro surg who offered MIND clinical trial, STX-guided resection vs medical management however family preferred only minimally invasive intervention if necessary. 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 Pt initially placed on antihypertensive therapy, hypertonic saline therapy.     This hospital course c/b sz like activity 1/16/22, loaded with keppra and started on vimpat therapy, EEG at that time however without epileptiform activity, development of AMS with hypoxic/hypercapnic resp distress requiring intubation 1/19/22, repeat CTH at that time without acute changes, re demonstration of IPH. Pt with eventual improvement in resp status and extubated 1/22/22.       Pt  eventually transferred to medicine floor 1/25/22. Course however further c/b development of fevers wit Tmax to 38.8 1/25/22, received CT chest 1/26/22 that demonstrated new bilateral patchy opacities concerning for aspiration pneum, started cefepime therapy, recurrent worsening AMS and hypoxic resp failure with SPO2 of 88-90's requiring re intubation 1/29/22 with transfer to MICU for hypoxic resp failure to possible aspiration pneum              PAST MEDICAL & SURGICAL HISTORY:      FAMILY HISTORY:      SOCIAL HISTORY:  Smoking: [ ] Never Smoked [ ] Former Smoker (__ packs x ___ years) [ ] Current Smoker  (__ packs x ___ years)  Substance Use: [ ] Never Used [ ] Used ____  EtOH Use:  Marital Status: [ ] Single [ ]  [ ]  [ ]   Sexual History:   Occupation:  Recent Travel:  Country of Birth:  Advance Directives:    Allergies    No Known Allergies    Intolerances        HOME MEDICATIONS:    REVIEW OF SYSTEMS:  unable secondary to AMS hypoxic resp failure    OBJECTIVE:  ICU Vital Signs Last 24 Hrs  T(C): 36.4 (29 Jan 2022 04:33), Max: 37.2 (28 Jan 2022 13:41)  T(F): 97.5 (29 Jan 2022 04:33), Max: 98.9 (28 Jan 2022 13:41)  HR: 93 (29 Jan 2022 04:33) (85 - 93)  BP: 111/66 (29 Jan 2022 04:33) (111/66 - 148/71)  BP(mean): --  ABP: --  ABP(mean): --  RR: 18 (29 Jan 2022 04:33) (18 - 22)  SpO2: 95% (29 Jan 2022 04:33) (95% - 98%)        01-28 @ 07:01  -  01-29 @ 07:00  --------------------------------------------------------  IN: 0 mL / OUT: 975 mL / NET: -975 mL      CAPILLARY BLOOD GLUCOSE      POCT Blood Glucose.: 165 mg/dL (29 Jan 2022 07:39)      PHYSICAL EXAM:  GENERAL:   currently sedated, well-groomed, well-developed    HEAD:    Atraumatic, Normocephalic    EYES:   PERRL 2mm sluggishly reactive, conjunctiva and sclera clear    ENMT:   No oropharyngeal exudates, erythema or lesions,  Moist mucous membranes    NECK:   Supple, no cervical lymphadenopathy, no JVD    NERVOUS SYSTEM:  Responsive to  painful stimuli with grimacing withdrawal of upper extremities 2-3/5 Rt, Lt 1-2/5, lower extremities Rt 2/5 Lt 1/5, not answering  simple questions or following simple commands  unable to assess CN    CHEST/LUNG:   orally intubated, triggers vent by    Breaths, Pip    Ppl   DP   .Breath sounds bilaterally,  No crackles, rhonchi, wheezing, or rubs. POCUS     HEART:   cardiac monitor    ; S1/S2 No murmurs, rubs, or gallops, POCUS    ABDOMEN:   Soft, Nontender, Nondistended; Bowel sounds present, Bladder non distended, non palpable    EXTREMITIES:   Pulses palpable radial pulses 2+ bilat, DP/PT 1+/1+ bilat, without clubbing, cyanosis. Digits warm to touch with good cap refill < 3 secs    SKIN:   warm, dry, intact, normal color, no rash or abnormal lesions, without palpable nodes    LINES:     HOSPITAL MEDICATIONS:  MEDICATIONS  (STANDING):  acetylcysteine 20%  Inhalation 4 milliLiter(s) Inhalation every 6 hours  albuterol/ipratropium for Nebulization 3 milliLiter(s) Nebulizer every 6 hours  amLODIPine   Tablet 10 milliGRAM(s) Oral daily  cefepime   IVPB      cefepime   IVPB 1000 milliGRAM(s) IV Intermittent every 8 hours  dextrose 40% Gel 15 Gram(s) Oral once  dextrose 5%. 1000 milliLiter(s) (50 mL/Hr) IV Continuous <Continuous>  dextrose 50% Injectable 25 Gram(s) IV Push once  dextrose 50% Injectable 12.5 Gram(s) IV Push once  dextrose 50% Injectable 25 Gram(s) IV Push once  enoxaparin Injectable 40 milliGRAM(s) SubCutaneous every 12 hours  glucagon  Injectable 1 milliGRAM(s) IntraMuscular once  insulin lispro (ADMELOG) corrective regimen sliding scale   SubCutaneous every 6 hours  insulin NPH human recombinant 13 Unit(s) SubCutaneous every 6 hours  lacosamide Solution 100 milliGRAM(s) Oral two times a day  losartan 100 milliGRAM(s) Oral daily  nystatin Powder 1 Application(s) Topical two times a day  senna 2 Tablet(s) Oral at bedtime  sodium chloride 3%  Inhalation 4 milliLiter(s) Inhalation every 6 hours    MEDICATIONS  (PRN):  acetaminophen    Suspension .. 650 milliGRAM(s) Oral every 6 hours PRN Temp greater or equal to 38C (100.4F), Mild Pain (1 - 3)      LABS:                        12.5   12.50 )-----------( 259      ( 29 Jan 2022 07:27 )             41.7     Hgb Trend: 12.5<--, 12.0<--, 12.1<--, 12.1<--, 12.0<--  01-29    146<H>  |  100  |  48<H>  ----------------------------<  169<H>  5.0   |  32<H>  |  1.10    Ca    10.8<H>      29 Jan 2022 07:27  Phos  5.4     01-29  Mg     3.0     01-29      Creatinine Trend: 1.10<--, 0.97<--, 0.86<--, 0.82<--, 0.83<--, 0.83<--            MICROBIOLOGY:     RADIOLOGY:  [ ] Reviewed and interpreted by me    EKG: CHIEF COMPLAINT:    HPI:  81 yr male with PMHx HTN, SWETA on home CPAP,  Ascending Ao aneurysm 4cm, infrarenal AAA 5 cm s/p AAA repair 2015, Rt common iliac aneurysm 5cm (CT scan at [facility] 11/2020) meant for vascular intervention however unable due to COVID-19+ two weeks prior to presentation,  who was  transferred to [facility]  NSICU 1/5/22 after being found to have Rt frontal parenchymal hemorrhage with mass effect without midline shift found on CTH after presenting originally to [facility] with fatigue, dizziness, s/p fall at home. Pt at [facility] was evaluated by vascular surg, determined no surg intervention at this time, pt evaluated by Neuro surg who offered MIND clinical trial, STX-guided resection vs medical management however family preferred only minimally invasive intervention if necessary. 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 Pt initially placed on antihypertensive therapy, hypertonic saline therapy.     This hospital course c/b sz like activity 1/16/22, loaded with keppra and started on vimpat therapy, EEG at that time however without epileptiform activity, development of AMS with hypoxic/hypercapnic resp distress requiring intubation 1/19/22, repeat CTH at that time without acute changes, re demonstration of IPH. Pt with eventual improvement in resp status and extubated 1/22/22.       Pt  eventually transferred to medicine floor 1/25/22. Course however further c/b development of fevers wit Tmax to 38.8 1/25/22, received CT chest 1/26/22 that demonstrated new bilateral patchy opacities concerning for aspiration pneum, started cefepime therapy, recurrent worsening AMS and hypoxic resp failure with SPO2 of 88-90's requiring re intubation 1/29/22 with transfer to MICU for hypoxic resp failure to possible aspiration pneum              PAST MEDICAL & SURGICAL HISTORY:      FAMILY HISTORY:      SOCIAL HISTORY:  Smoking: [ ] Never Smoked [ ] Former Smoker (__ packs x ___ years) [ ] Current Smoker  (__ packs x ___ years)  Substance Use: [ ] Never Used [ ] Used ____  EtOH Use:  Marital Status: [ ] Single [ ]  [ ]  [ ]   Sexual History:   Occupation:  Recent Travel:  Country of Birth:  Advance Directives:    Allergies    No Known Allergies    Intolerances        HOME MEDICATIONS:    REVIEW OF SYSTEMS:  unable secondary to AMS hypoxic resp failure    OBJECTIVE:  ICU Vital Signs Last 24 Hrs  T(C): 36.4 (29 Jan 2022 04:33), Max: 37.2 (28 Jan 2022 13:41)  T(F): 97.5 (29 Jan 2022 04:33), Max: 98.9 (28 Jan 2022 13:41)  HR: 93 (29 Jan 2022 04:33) (85 - 93)  BP: 111/66 (29 Jan 2022 04:33) (111/66 - 148/71)  BP(mean): --  ABP: --  ABP(mean): --  RR: 18 (29 Jan 2022 04:33) (18 - 22)  SpO2: 95% (29 Jan 2022 04:33) (95% - 98%)        01-28 @ 07:01  -  01-29 @ 07:00  --------------------------------------------------------  IN: 0 mL / OUT: 975 mL / NET: -975 mL      CAPILLARY BLOOD GLUCOSE      POCT Blood Glucose.: 165 mg/dL (29 Jan 2022 07:39)      PHYSICAL EXAM:  GENERAL:   currently sedated, well-groomed, well-developed    HEAD:    Atraumatic, Normocephalic    EYES:   PERRL 2mm sluggishly reactive, conjunctiva and sclera clear    ENMT:   No oropharyngeal exudates, erythema or lesions,  Moist mucous membranes    NECK:   Supple, no cervical lymphadenopathy, no JVD    NERVOUS SYSTEM:  Responsive to  painful stimuli with grimacing withdrawal of upper extremities 2-3/5 Rt, Lt 1-2/5, lower extremities Rt 2/5 Lt 1/5, not answering  simple questions or following simple commands  unable to assess CN    CHEST/LUNG:   orally intubated, triggers vent by    Breaths, Pip    Ppl   DP   .Breath sounds bilaterally,  No crackles, rhonchi, wheezing, or rubs. POCUS     HEART:   cardiac monitor    ; S1/S2 No murmurs, rubs, or gallops, POCUS    ABDOMEN:   Soft, Nontender, Nondistended; Bowel sounds present, Bladder non distended, non palpable    EXTREMITIES:   Pulses palpable radial pulses 2+ bilat, DP/PT 1+/1+ bilat, without clubbing, cyanosis. Digits warm to touch with good cap refill < 3 secs    SKIN:   warm, dry, intact, normal color, no rash or abnormal lesions, without palpable nodes    LINES:     HOSPITAL MEDICATIONS:  MEDICATIONS  (STANDING):  acetylcysteine 20%  Inhalation 4 milliLiter(s) Inhalation every 6 hours  albuterol/ipratropium for Nebulization 3 milliLiter(s) Nebulizer every 6 hours  amLODIPine   Tablet 10 milliGRAM(s) Oral daily  cefepime   IVPB      cefepime   IVPB 1000 milliGRAM(s) IV Intermittent every 8 hours  dextrose 40% Gel 15 Gram(s) Oral once  dextrose 5%. 1000 milliLiter(s) (50 mL/Hr) IV Continuous <Continuous>  dextrose 50% Injectable 25 Gram(s) IV Push once  dextrose 50% Injectable 12.5 Gram(s) IV Push once  dextrose 50% Injectable 25 Gram(s) IV Push once  enoxaparin Injectable 40 milliGRAM(s) SubCutaneous every 12 hours  glucagon  Injectable 1 milliGRAM(s) IntraMuscular once  insulin lispro (ADMELOG) corrective regimen sliding scale   SubCutaneous every 6 hours  insulin NPH human recombinant 13 Unit(s) SubCutaneous every 6 hours  lacosamide Solution 100 milliGRAM(s) Oral two times a day  losartan 100 milliGRAM(s) Oral daily  nystatin Powder 1 Application(s) Topical two times a day  senna 2 Tablet(s) Oral at bedtime  sodium chloride 3%  Inhalation 4 milliLiter(s) Inhalation every 6 hours    MEDICATIONS  (PRN):  acetaminophen    Suspension .. 650 milliGRAM(s) Oral every 6 hours PRN Temp greater or equal to 38C (100.4F), Mild Pain (1 - 3)      LABS:                        12.5   12.50 )-----------( 259      ( 29 Jan 2022 07:27 )             41.7     Hgb Trend: 12.5<--, 12.0<--, 12.1<--, 12.1<--, 12.0<--  01-29    146<H>  |  100  |  48<H>  ----------------------------<  169<H>  5.0   |  32<H>  |  1.10    Ca    10.8<H>      29 Jan 2022 07:27  Phos  5.4     01-29  Mg     3.0     01-29      Creatinine Trend: 1.10<--, 0.97<--, 0.86<--, 0.82<--, 0.83<--, 0.83<--            MICROBIOLOGY:     RADIOLOGY:  [ ] Reviewed and interpreted by me    EKG:            Assessment:  81 yr old male with stated hx significant for HTN, OSH, s/p AAA repair, Ascending Ao aneurysm, infrarenal AAA, Rt common iliac aneurysm who presented from OSH for further management of IPH. Course c/b possible sz activity, recurrent AMS, recurrent hypoxic resp failure secondary to aspiration pneum requiring intubation. Now transferred to MICU for recurrent hypoxic resp failure secondary to possible aspiration pneum    Plan:    #Neuro:    #Pulm:    #CV:    #GI/:    #ID:    #FEN/ENDO/HEME: CHIEF COMPLAINT:    HPI:  81 yr male with PMHx HTN, SWETA on home CPAP, DM2,  Ascending Ao aneurysm 4cm, infrarenal AAA 5 cm s/p AAA repair 2015, Rt common iliac aneurysm 5cm (CT scan at [facility] 11/2020) meant for vascular intervention however unable due to COVID-19+ two weeks prior to presentation,  who was  transferred to [facility]  NSICU 1/5/22 after being found to have Rt frontal parenchymal hemorrhage with mass effect without midline shift found on CTH after presenting originally to [facility] with fatigue, dizziness, s/p fall at home. Pt at [facility] was evaluated by vascular surg, determined no surg intervention at this time, pt evaluated by Neuro surg who offered MIND clinical trial, STX-guided resection vs medical management however family preferred only minimally invasive intervention if necessary. 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 Pt initially placed on antihypertensive therapy, hypertonic saline therapy.     This hospital course c/b sz like activity 1/16/22, loaded with keppra and started on vimpat therapy, EEG at that time however without epileptiform activity, development of AMS with hypoxic/hypercapnic resp distress requiring intubation 1/19/22, repeat CTH at that time without acute changes, re demonstration of IPH. Pt with eventual improvement in resp status and extubated 1/22/22.       Pt  eventually transferred to medicine floor 1/25/22. Course however further c/b development of fevers wit Tmax to 38.8 1/25/22, received CT chest 1/26/22 that demonstrated new bilateral patchy opacities concerning for aspiration pneum, started cefepime therapy, recurrent worsening AMS and hypoxic resp failure with SPO2 of 88-90's requiring re intubation 1/29/22 with transfer to MICU for hypoxic resp failure to possible aspiration pneum              PAST MEDICAL & SURGICAL HISTORY:      FAMILY HISTORY:      SOCIAL HISTORY:  Smoking: [ ] Never Smoked [ ] Former Smoker (__ packs x ___ years) [ ] Current Smoker  (__ packs x ___ years)  Substance Use: [ ] Never Used [ ] Used ____  EtOH Use:  Marital Status: [ ] Single [ ]  [ ]  [ ]   Sexual History:   Occupation:  Recent Travel:  Country of Birth:  Advance Directives:    Allergies    No Known Allergies    Intolerances        HOME MEDICATIONS:    REVIEW OF SYSTEMS:  unable secondary to AMS hypoxic resp failure    OBJECTIVE:  ICU Vital Signs Last 24 Hrs  T(C): 36.4 (29 Jan 2022 04:33), Max: 37.2 (28 Jan 2022 13:41)  T(F): 97.5 (29 Jan 2022 04:33), Max: 98.9 (28 Jan 2022 13:41)  HR: 93 (29 Jan 2022 04:33) (85 - 93)  BP: 111/66 (29 Jan 2022 04:33) (111/66 - 148/71)  BP(mean): --  ABP: --  ABP(mean): --  RR: 18 (29 Jan 2022 04:33) (18 - 22)  SpO2: 95% (29 Jan 2022 04:33) (95% - 98%)        01-28 @ 07:01  -  01-29 @ 07:00  --------------------------------------------------------  IN: 0 mL / OUT: 975 mL / NET: -975 mL      CAPILLARY BLOOD GLUCOSE      POCT Blood Glucose.: 165 mg/dL (29 Jan 2022 07:39)      PHYSICAL EXAM:  GENERAL:   currently sedated, well-groomed, well-developed    HEAD:    Atraumatic, Normocephalic    EYES:   PERRL 2mm sluggishly reactive, conjunctiva and sclera clear    ENMT:   No oropharyngeal exudates, erythema or lesions,  Moist mucous membranes    NECK:   Supple, no cervical lymphadenopathy, no JVD    NERVOUS SYSTEM:  Responsive to  painful stimuli with grimacing  and withdrawal of upper extremities with purposeful movement  2-3/5 Rt, Lt 1-2/5, lower extremities Rt 2/5 Lt 1/5, not answering  simple questions or following simple commands  unable to assess CN    CHEST/LUNG:   orally intubated, triggers vent by    Breaths, Pip    Ppl   DP   .Breath sounds bilaterally,  No crackles, rhonchi, wheezing, or rubs. POCUS     HEART:   cardiac monitor    ; S1/S2 No murmurs, rubs, or gallops, POCUS    ABDOMEN:   Soft, Nontender, Nondistended; Bowel sounds present, Bladder non distended, non palpable    EXTREMITIES:   Pulses palpable radial pulses 2+ bilat, DP/PT 1+/1+ bilat, without clubbing, cyanosis. Digits warm to touch with good cap refill < 3 secs    SKIN:   warm, dry, intact, normal color, no rash or abnormal lesions, without palpable nodes    LINES:     HOSPITAL MEDICATIONS:  MEDICATIONS  (STANDING):  acetylcysteine 20%  Inhalation 4 milliLiter(s) Inhalation every 6 hours  albuterol/ipratropium for Nebulization 3 milliLiter(s) Nebulizer every 6 hours  amLODIPine   Tablet 10 milliGRAM(s) Oral daily  cefepime   IVPB      cefepime   IVPB 1000 milliGRAM(s) IV Intermittent every 8 hours  dextrose 40% Gel 15 Gram(s) Oral once  dextrose 5%. 1000 milliLiter(s) (50 mL/Hr) IV Continuous <Continuous>  dextrose 50% Injectable 25 Gram(s) IV Push once  dextrose 50% Injectable 12.5 Gram(s) IV Push once  dextrose 50% Injectable 25 Gram(s) IV Push once  enoxaparin Injectable 40 milliGRAM(s) SubCutaneous every 12 hours  glucagon  Injectable 1 milliGRAM(s) IntraMuscular once  insulin lispro (ADMELOG) corrective regimen sliding scale   SubCutaneous every 6 hours  insulin NPH human recombinant 13 Unit(s) SubCutaneous every 6 hours  lacosamide Solution 100 milliGRAM(s) Oral two times a day  losartan 100 milliGRAM(s) Oral daily  nystatin Powder 1 Application(s) Topical two times a day  senna 2 Tablet(s) Oral at bedtime  sodium chloride 3%  Inhalation 4 milliLiter(s) Inhalation every 6 hours    MEDICATIONS  (PRN):  acetaminophen    Suspension .. 650 milliGRAM(s) Oral every 6 hours PRN Temp greater or equal to 38C (100.4F), Mild Pain (1 - 3)      LABS:                        12.5   12.50 )-----------( 259      ( 29 Jan 2022 07:27 )             41.7     Hgb Trend: 12.5<--, 12.0<--, 12.1<--, 12.1<--, 12.0<--  01-29    146<H>  |  100  |  48<H>  ----------------------------<  169<H>  5.0   |  32<H>  |  1.10    Ca    10.8<H>      29 Jan 2022 07:27  Phos  5.4     01-29  Mg     3.0     01-29      Creatinine Trend: 1.10<--, 0.97<--, 0.86<--, 0.82<--, 0.83<--, 0.83<--            MICROBIOLOGY:     RADIOLOGY:  [ ] Reviewed and interpreted by me    EKG:            Assessment:  81 yr old male with stated hx significant for HTN, OSH, s/p AAA repair, Ascending Ao aneurysm, infrarenal AAA, Rt common iliac aneurysm who presented from OSH for further management of IPH. Course c/b possible sz activity, recurrent AMS, recurrent hypoxic resp failure secondary to aspiration pneum requiring intubation. 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  -re current AMS secondary to possible metabolic encephalopathy due to asp pneum   -Grimaces to glabella tap and painful stimuli to extremities, withdraws extremities from painful stimuli with purposeful movement  -CT head 1/27/22 re demonstrated Acute parenchymal hemorrhage with evolutionary changes and Arachnoid cyst   -consider repeat CT head if no improvement in AMS  -consider MRI brain   -Neuro checks q 2 hrs and prn for changes  -activity as tolerated  -physical therapy consult when stable  -palliative care involved     #Pulm:  -Hx of SWETA on home CPAP therapy  -re current hypoxic resp failure secondary to possible aspiration pneum  -Mechanical Vent Therapy - titrate to maintain ph 7.35-7.45; PCO2 35-45; SPO2 > 92%  -Bronchodilator therapy q 6 hrs and  prn sob/wheezes  -hypertonic saline nebs q 6 hrs  -Lung protective therapy    #CV:  -Hx HTN  -Hx of ascending Ao aneurysm, infrarenal AAA, Rt common iliac aneurysm, s/p AAA repair 2015,   -ECG now and q am x3  -Cardiac Enzymes now and q am x 3  -norepi gtt as needed to maintain MAP >/= 65-70  -will hold losartan and amlodipine at present     #GI/:  -RACHEL with sCr 1.33 - most likely pre renal  -OGT  -strict I & O's - keep even  -will start tube feeds - glucerna     #ID:  -hypoxic resp failure secondary to possible asp pneum  -pan culture  -continue cefepime  -vanco 1 gm IV x 1  -obtain vanco trough 1/30/22 @ 1400  -titrate vanco to trough of 15-20  #FEN/ENDO/HEME: CHIEF COMPLAINT:    HPI:  81 yr male with PMHx HTN, SWETA on home CPAP, DM2,  Ascending Ao aneurysm 4cm, infrarenal AAA 5 cm s/p AAA repair 2015, Rt common iliac aneurysm 5cm (CT scan at [facility] 11/2020) meant for vascular intervention however unable due to COVID-19+ two weeks prior to presentation,  who was  transferred to [facility]  NSICU 1/5/22 after being found to have Rt frontal parenchymal hemorrhage with mass effect without midline shift found on CTH after presenting originally to [facility] with fatigue, dizziness, s/p fall at home. Pt at [facility] was evaluated by vascular surg, determined no surg intervention at this time, pt evaluated by Neuro surg who offered MIND clinical trial, STX-guided resection vs medical management however family preferred only minimally invasive intervention if necessary. 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 Pt initially placed on antihypertensive therapy, hypertonic saline therapy.     This hospital course c/b sz like activity 1/16/22, loaded with keppra and started on vimpat therapy, EEG at that time however without epileptiform activity, development of AMS with hypoxic/hypercapnic resp distress requiring intubation 1/19/22, repeat CTH at that time without acute changes, re demonstration of IPH. Pt with eventual improvement in resp status and extubated 1/22/22.       Pt  eventually transferred to medicine floor 1/25/22. Course however further c/b development of fevers wit Tmax to 38.8 1/25/22, received CT chest 1/26/22 that demonstrated new bilateral patchy opacities concerning for aspiration pneum, started cefepime therapy, recurrent worsening AMS and hypoxic resp failure with SPO2 of 88-90's requiring re intubation 1/29/22 with transfer to MICU for hypoxic resp failure to possible aspiration pneum              PAST MEDICAL & SURGICAL HISTORY:      FAMILY HISTORY:      SOCIAL HISTORY:  Smoking: [ ] Never Smoked [ ] Former Smoker (__ packs x ___ years) [ ] Current Smoker  (__ packs x ___ years)  Substance Use: [ ] Never Used [ ] Used ____  EtOH Use:  Marital Status: [ ] Single [ ]  [ ]  [ ]   Sexual History:   Occupation:  Recent Travel:  Country of Birth:  Advance Directives:    Allergies    No Known Allergies    Intolerances        HOME MEDICATIONS:    REVIEW OF SYSTEMS:  unable secondary to AMS hypoxic resp failure    OBJECTIVE:  ICU Vital Signs Last 24 Hrs  T(C): 36.4 (29 Jan 2022 04:33), Max: 37.2 (28 Jan 2022 13:41)  T(F): 97.5 (29 Jan 2022 04:33), Max: 98.9 (28 Jan 2022 13:41)  HR: 93 (29 Jan 2022 04:33) (85 - 93)  BP: 111/66 (29 Jan 2022 04:33) (111/66 - 148/71)  BP(mean): --  ABP: --  ABP(mean): --  RR: 18 (29 Jan 2022 04:33) (18 - 22)  SpO2: 95% (29 Jan 2022 04:33) (95% - 98%)        01-28 @ 07:01  -  01-29 @ 07:00  --------------------------------------------------------  IN: 0 mL / OUT: 975 mL / NET: -975 mL      CAPILLARY BLOOD GLUCOSE      POCT Blood Glucose.: 165 mg/dL (29 Jan 2022 07:39)      PHYSICAL EXAM:  GENERAL:   currently sedated, well-groomed, well-developed    HEAD:    Atraumatic, Normocephalic    EYES:   PERRL 2mm sluggishly reactive, conjunctiva and sclera clear    ENMT:   No oropharyngeal exudates, erythema or lesions,  Moist mucous membranes    NECK:   Supple, no cervical lymphadenopathy, no JVD    NERVOUS SYSTEM:  Responsive to  painful stimuli with grimacing  and withdrawal of upper extremities with purposeful movement  2-3/5 Rt, Lt 1-2/5, lower extremities Rt 2/5 Lt 1/5, not answering  simple questions or following simple commands  unable to assess CN    CHEST/LUNG:   orally intubated, triggers vent by    Breaths, Pip    Ppl   DP   .Breath sounds bilaterally,  No crackles, rhonchi, wheezing, or rubs. POCUS     HEART:   cardiac monitor    ; S1/S2 No murmurs, rubs, or gallops, POCUS    ABDOMEN:   Soft, Nontender, Nondistended; Bowel sounds present, Bladder non distended, non palpable    EXTREMITIES:   Pulses palpable radial pulses 2+ bilat, DP/PT 1+/1+ bilat, without clubbing, cyanosis. Digits warm to touch with good cap refill < 3 secs    SKIN:   warm, dry, intact, normal color, no rash or abnormal lesions, without palpable nodes    LINES:     HOSPITAL MEDICATIONS:  MEDICATIONS  (STANDING):  acetylcysteine 20%  Inhalation 4 milliLiter(s) Inhalation every 6 hours  albuterol/ipratropium for Nebulization 3 milliLiter(s) Nebulizer every 6 hours  amLODIPine   Tablet 10 milliGRAM(s) Oral daily  cefepime   IVPB      cefepime   IVPB 1000 milliGRAM(s) IV Intermittent every 8 hours  dextrose 40% Gel 15 Gram(s) Oral once  dextrose 5%. 1000 milliLiter(s) (50 mL/Hr) IV Continuous <Continuous>  dextrose 50% Injectable 25 Gram(s) IV Push once  dextrose 50% Injectable 12.5 Gram(s) IV Push once  dextrose 50% Injectable 25 Gram(s) IV Push once  enoxaparin Injectable 40 milliGRAM(s) SubCutaneous every 12 hours  glucagon  Injectable 1 milliGRAM(s) IntraMuscular once  insulin lispro (ADMELOG) corrective regimen sliding scale   SubCutaneous every 6 hours  insulin NPH human recombinant 13 Unit(s) SubCutaneous every 6 hours  lacosamide Solution 100 milliGRAM(s) Oral two times a day  losartan 100 milliGRAM(s) Oral daily  nystatin Powder 1 Application(s) Topical two times a day  senna 2 Tablet(s) Oral at bedtime  sodium chloride 3%  Inhalation 4 milliLiter(s) Inhalation every 6 hours    MEDICATIONS  (PRN):  acetaminophen    Suspension .. 650 milliGRAM(s) Oral every 6 hours PRN Temp greater or equal to 38C (100.4F), Mild Pain (1 - 3)      LABS:                        12.5   12.50 )-----------( 259      ( 29 Jan 2022 07:27 )             41.7     Hgb Trend: 12.5<--, 12.0<--, 12.1<--, 12.1<--, 12.0<--  01-29    146<H>  |  100  |  48<H>  ----------------------------<  169<H>  5.0   |  32<H>  |  1.10    Ca    10.8<H>      29 Jan 2022 07:27  Phos  5.4     01-29  Mg     3.0     01-29      Creatinine Trend: 1.10<--, 0.97<--, 0.86<--, 0.82<--, 0.83<--, 0.83<--            MICROBIOLOGY:     RADIOLOGY:  [ ] Reviewed and interpreted by me    EKG:            Assessment:  81 yr old male with stated hx significant for HTN, OSH, s/p AAA repair, Ascending Ao aneurysm, infrarenal AAA, Rt common iliac aneurysm who presented from OSH for further management of IPH. Course c/b possible sz activity, recurrent AMS, recurrent hypoxic resp failure secondary to aspiration pneum requiring intubation. 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  -re current AMS secondary to possible metabolic encephalopathy due to asp pneum   -Grimaces to glabella tap and painful stimuli to extremities, withdraws extremities from painful stimuli with purposeful movement  -CT head 1/27/22 re demonstrated Acute parenchymal hemorrhage with evolutionary changes and Arachnoid cyst   -consider repeat CT head if no improvement in AMS  -consider MRI brain   -Neuro checks q 2 hrs and prn for changes  -activity as tolerated  -physical therapy consult when stable  -palliative care involved     #Pulm:  -Hx of SWETA on home CPAP therapy  -re current hypoxic resp failure secondary to possible aspiration pneum  -Mechanical Vent Therapy - titrate to maintain ph 7.35-7.45; PCO2 35-45; SPO2 > 92%  -Bronchodilator therapy q 6 hrs and  prn sob/wheezes  -hypertonic saline nebs q 6 hrs  -Lung protective therapy    #CV:  -Hx HTN  -Hx of ascending Ao aneurysm, infrarenal AAA, Rt common iliac aneurysm, s/p AAA repair 2015,   -ECG now and q am x3  -Cardiac Enzymes now and q am x 3  -norepi gtt as needed to maintain MAP >/= 65-70  -will hold losartan and amlodipine at present     #GI/:  -RACHEL with sCr 1.33 - most likely pre renal  -OGT  -strict I & O's - keep even  -will start tube feeds - glucerna     #ID:  -hypoxic resp failure secondary to possible asp pneum  -pan culture  -continue cefepime  -vanco 1 gm IV x 1  -obtain vanco trough 1/30/22 @ 1400  -titrate vanco to trough of 15-20    #FEN/ENDO/HEME:  -obtain CMP/Mg++/PO--4/CBC w diff/PT/PTT/INR now and q. a.m.  -abg prn  -DM2  -will hold NPH at present and re evaluate glucose trend for need  -POC with ISS q 4 hrs - maintain glucose 160-180  -DVT prophylaxis with enoxaparin 40 mg subq q d CHIEF COMPLAINT:    HPI:  81 yr male with PMHx HTN, SWETA on home CPAP, DM2,  Ascending Ao aneurysm 4cm, infrarenal AAA 5 cm s/p AAA repair 2015, Rt common iliac aneurysm 5cm (CT scan at [facility] 11/2020) meant for vascular intervention however unable due to COVID-19+ two weeks prior to presentation,  who was  transferred to [facility]  NSICU 1/5/22 after being found to have Rt frontal parenchymal hemorrhage with mass effect without midline shift found on CTH after presenting originally to [facility] with fatigue, dizziness, s/p fall at home. Pt at [facility] was evaluated by vascular surg, determined no surg intervention at this time, pt evaluated by Neuro surg who offered MIND clinical trial, STX-guided resection vs medical management however family preferred only minimally invasive intervention if necessary. 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 Pt initially placed on antihypertensive therapy, hypertonic saline therapy.     This hospital course c/b sz like activity 1/16/22, loaded with keppra and started on vimpat therapy, EEG at that time however without epileptiform activity, development of AMS with hypoxic/hypercapnic resp distress requiring intubation 1/19/22, repeat CTH at that time without acute changes, re demonstration of IPH. Pt with eventual improvement in resp status and extubated 1/22/22.       Pt  eventually transferred to medicine floor 1/25/22. Course however further c/b development of fevers wit Tmax to 38.8 1/25/22, received CT chest 1/26/22 that demonstrated new bilateral patchy opacities concerning for aspiration pneum, started cefepime therapy, recurrent worsening AMS and hypoxic resp failure with SPO2 of 88-90's requiring re intubation 1/29/22 with transfer to MICU for hypoxic resp failure to possible aspiration pneum and septic shock of unknown origin              PAST MEDICAL & SURGICAL HISTORY:      FAMILY HISTORY:      SOCIAL HISTORY:  Smoking: [ ] Never Smoked [ ] Former Smoker (__ packs x ___ years) [ ] Current Smoker  (__ packs x ___ years)  Substance Use: [ ] Never Used [ ] Used ____  EtOH Use:  Marital Status: [ ] Single [ ]  [ ]  [ ]   Sexual History:   Occupation:  Recent Travel:  Country of Birth:  Advance Directives:    Allergies    No Known Allergies    Intolerances        HOME MEDICATIONS:    REVIEW OF SYSTEMS:  unable secondary to AMS hypoxic resp failure    OBJECTIVE:  ICU Vital Signs Last 24 Hrs  T(C): 36.4 (29 Jan 2022 04:33), Max: 37.2 (28 Jan 2022 13:41)  T(F): 97.5 (29 Jan 2022 04:33), Max: 98.9 (28 Jan 2022 13:41)  HR: 93 (29 Jan 2022 04:33) (85 - 93)  BP: 111/66 (29 Jan 2022 04:33) (111/66 - 148/71)  BP(mean): --  ABP: --  ABP(mean): --  RR: 18 (29 Jan 2022 04:33) (18 - 22)  SpO2: 95% (29 Jan 2022 04:33) (95% - 98%)        01-28 @ 07:01  -  01-29 @ 07:00  --------------------------------------------------------  IN: 0 mL / OUT: 975 mL / NET: -975 mL      CAPILLARY BLOOD GLUCOSE      POCT Blood Glucose.: 165 mg/dL (29 Jan 2022 07:39)      PHYSICAL EXAM:  GENERAL:   currently sedated, well-groomed, well-developed    HEAD:    Atraumatic, Normocephalic    EYES:   PERRL 2mm sluggishly reactive, conjunctiva and sclera clear    ENMT:   No oropharyngeal exudates, erythema or lesions,  Moist mucous membranes    NECK:   Supple, no cervical lymphadenopathy, no JVD    NERVOUS SYSTEM:  Responsive to  painful stimuli with grimacing  and withdrawal of upper extremities with purposeful movement  2-3/5 Rt, Lt 1-2/5, lower extremities Rt 2/5 Lt 1/5, not answering  simple questions or following simple commands  unable to assess CN    CHEST/LUNG:   orally intubated, triggers vent by    Breaths, Pip    Ppl   DP   .Breath sounds bilaterally,  No crackles, rhonchi, wheezing, or rubs. POCUS     HEART:   cardiac monitor    ; S1/S2 No murmurs, rubs, or gallops, POCUS    ABDOMEN:   Soft, Nontender, Nondistended; Bowel sounds present, Bladder non distended, non palpable    EXTREMITIES:   Pulses palpable radial pulses 2+ bilat, DP/PT 1+/1+ bilat, without clubbing, cyanosis. Digits warm to touch with good cap refill < 3 secs    SKIN:   warm, dry, intact, normal color, no rash or abnormal lesions, without palpable nodes    LINES:     HOSPITAL MEDICATIONS:  MEDICATIONS  (STANDING):  acetylcysteine 20%  Inhalation 4 milliLiter(s) Inhalation every 6 hours  albuterol/ipratropium for Nebulization 3 milliLiter(s) Nebulizer every 6 hours  amLODIPine   Tablet 10 milliGRAM(s) Oral daily  cefepime   IVPB      cefepime   IVPB 1000 milliGRAM(s) IV Intermittent every 8 hours  dextrose 40% Gel 15 Gram(s) Oral once  dextrose 5%. 1000 milliLiter(s) (50 mL/Hr) IV Continuous <Continuous>  dextrose 50% Injectable 25 Gram(s) IV Push once  dextrose 50% Injectable 12.5 Gram(s) IV Push once  dextrose 50% Injectable 25 Gram(s) IV Push once  enoxaparin Injectable 40 milliGRAM(s) SubCutaneous every 12 hours  glucagon  Injectable 1 milliGRAM(s) IntraMuscular once  insulin lispro (ADMELOG) corrective regimen sliding scale   SubCutaneous every 6 hours  insulin NPH human recombinant 13 Unit(s) SubCutaneous every 6 hours  lacosamide Solution 100 milliGRAM(s) Oral two times a day  losartan 100 milliGRAM(s) Oral daily  nystatin Powder 1 Application(s) Topical two times a day  senna 2 Tablet(s) Oral at bedtime  sodium chloride 3%  Inhalation 4 milliLiter(s) Inhalation every 6 hours    MEDICATIONS  (PRN):  acetaminophen    Suspension .. 650 milliGRAM(s) Oral every 6 hours PRN Temp greater or equal to 38C (100.4F), Mild Pain (1 - 3)      LABS:                        12.5   12.50 )-----------( 259      ( 29 Jan 2022 07:27 )             41.7     Hgb Trend: 12.5<--, 12.0<--, 12.1<--, 12.1<--, 12.0<--  01-29    146<H>  |  100  |  48<H>  ----------------------------<  169<H>  5.0   |  32<H>  |  1.10    Ca    10.8<H>      29 Jan 2022 07:27  Phos  5.4     01-29  Mg     3.0     01-29      Creatinine Trend: 1.10<--, 0.97<--, 0.86<--, 0.82<--, 0.83<--, 0.83<--            MICROBIOLOGY:     RADIOLOGY:  [ ] Reviewed and interpreted by me    EKG:            Assessment:  81 yr old male with stated hx significant for HTN, OSH, s/p AAA repair, Ascending Ao aneurysm, infrarenal AAA, Rt common iliac aneurysm who presented from OSH for further management of IPH. Course c/b possible sz activity, recurrent AMS, recurrent hypoxic resp failure secondary to aspiration pneum requiring intubation. 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  -re current AMS secondary to possible metabolic encephalopathy due to asp pneum   -Grimaces to glabella tap and painful stimuli to extremities, withdraws extremities from painful stimuli with purposeful movement  -CT head 1/27/22 re demonstrated Acute parenchymal hemorrhage with evolutionary changes and Arachnoid cyst   -consider repeat CT head if no improvement in AMS  -consider MRI brain   -Neuro checks q 2 hrs and prn for changes  -activity as tolerated  -physical therapy consult when stable  -palliative care involved     #Pulm:  -Hx of SWETA on home CPAP therapy  -re current hypoxic resp failure secondary to possible aspiration pneum  -Mechanical Vent Therapy - titrate to maintain ph 7.35-7.45; PCO2 35-45; SPO2 > 92%  -Bronchodilator therapy q 6 hrs and  prn sob/wheezes  -hypertonic saline nebs q 6 hrs  -Lung protective therapy    #CV:  -Hx HTN  -Hx of ascending Ao aneurysm, infrarenal AAA, Rt common iliac aneurysm, s/p AAA repair 2015,   -septic shock of unknown origin  -ECG now and q am x3  -Cardiac Enzymes now and q am x 3  -norepi gtt as needed to maintain MAP >/= 65-70  -will hold losartan and amlodipine at present     #GI/:  -RACHEL with sCr 1.33 - most likely pre renal  -OGT  -strict I & O's - keep even  -will start tube feeds - glucerna     #ID:  -hypoxic resp failure secondary to possible asp pneum  -pan culture  -continue cefepime  -vanco 1 gm IV x 1  -obtain vanco trough 1/30/22 @ 1400  -titrate vanco to trough of 15-20    #FEN/ENDO/HEME:  -obtain CMP/Mg++/PO--4/CBC w diff/PT/PTT/INR now and q. a.m.  -abg prn  -DM2  -will hold NPH at present and re evaluate glucose trend for need  -POC with ISS q 4 hrs - maintain glucose 160-180  -DVT prophylaxis with enoxaparin 40 mg subq q d CHIEF COMPLAINT:    HPI:  81 yr male with PMHx HTN, SWETA on home CPAP, DM2,  Ascending Ao aneurysm 4cm, infrarenal AAA 5 cm s/p AAA repair 2015, Rt common iliac aneurysm 5cm (CT scan at [facility] 11/2020) meant for vascular intervention however unable due to COVID-19+ two weeks prior to presentation,  who was  transferred to [facility]  NSICU 1/5/22 after being found to have Rt frontal parenchymal hemorrhage with mass effect without midline shift found on CTH after presenting originally to [facility] with fatigue, dizziness, s/p fall at home. Pt at [facility] was evaluated by vascular surg, determined no surg intervention at this time, pt evaluated by Neuro surg who offered MIND clinical trial, STX-guided resection vs medical management however family preferred only minimally invasive intervention if necessary. 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 Pt initially placed on antihypertensive therapy, hypertonic saline therapy.     This hospital course c/b sz like activity 1/16/22, loaded with keppra and started on vimpat therapy, EEG at that time however without epileptiform activity, development of AMS with hypoxic/hypercapnic resp distress requiring intubation 1/19/22, repeat CTH at that time without acute changes, re demonstration of IPH. Pt with eventual improvement in resp status and extubated 1/22/22.       Pt  eventually transferred to medicine floor 1/25/22. Course however further c/b development of fevers wit Tmax to 38.8 1/25/22, received CT chest 1/26/22 that demonstrated new bilateral patchy opacities concerning for aspiration pneum, started cefepime therapy, recurrent worsening AMS and hypoxic resp failure with SPO2 of 88-90's requiring re intubation 1/29/22 with transfer to MICU for hypoxic resp failure to possible aspiration pneum and septic shock of unknown origin              PAST MEDICAL & SURGICAL HISTORY:      FAMILY HISTORY:      SOCIAL HISTORY:  Smoking: [ ] Never Smoked [ ] Former Smoker (__ packs x ___ years) [ ] Current Smoker  (__ packs x ___ years)  Substance Use: [ ] Never Used [ ] Used ____  EtOH Use:  Marital Status: [ ] Single [ ]  [ ]  [ ]   Sexual History:   Occupation:  Recent Travel:  Country of Birth:  Advance Directives:    Allergies    No Known Allergies    Intolerances        HOME MEDICATIONS:    REVIEW OF SYSTEMS:  unable secondary to AMS hypoxic resp failure    OBJECTIVE:  ICU Vital Signs Last 24 Hrs  T(C): 36.4 (29 Jan 2022 04:33), Max: 37.2 (28 Jan 2022 13:41)  T(F): 97.5 (29 Jan 2022 04:33), Max: 98.9 (28 Jan 2022 13:41)  HR: 93 (29 Jan 2022 04:33) (85 - 93)  BP: 111/66 (29 Jan 2022 04:33) (111/66 - 148/71)  BP(mean): --  ABP: --  ABP(mean): --  RR: 18 (29 Jan 2022 04:33) (18 - 22)  SpO2: 95% (29 Jan 2022 04:33) (95% - 98%)        01-28 @ 07:01  -  01-29 @ 07:00  --------------------------------------------------------  IN: 0 mL / OUT: 975 mL / NET: -975 mL      CAPILLARY BLOOD GLUCOSE      POCT Blood Glucose.: 165 mg/dL (29 Jan 2022 07:39)      PHYSICAL EXAM:  GENERAL:   currently sedated, well-groomed, well-developed    HEAD:    Atraumatic, Normocephalic    EYES:   PERRL 2mm sluggishly reactive, conjunctiva and sclera clear    ENMT:   No oropharyngeal exudates, erythema or lesions,  Moist mucous membranes    NECK:   Supple, no cervical lymphadenopathy, no JVD    NERVOUS SYSTEM:  Responsive to  painful stimuli with grimacing  and withdrawal of upper extremities with purposeful movement  2-3/5 Rt, Lt 1-2/5, lower extremities Rt 2/5 Lt 1/5, not answering  simple questions or following simple commands  unable to assess CN    CHEST/LUNG:   orally intubated, triggers vent by    Breaths, Pip    Ppl   DP   .Breath sounds bilaterally,  No crackles, rhonchi, wheezing, or rubs. POCUS     HEART:   cardiac monitor    ; S1/S2 No murmurs, rubs, or gallops, POCUS    ABDOMEN:   Soft, Nontender, Nondistended; Bowel sounds present, Bladder non distended, non palpable    EXTREMITIES:   Pulses palpable radial pulses 2+ bilat, DP/PT 1+/1+ bilat, without clubbing, cyanosis. Digits warm to touch with good cap refill < 3 secs    SKIN:   warm, dry, intact, normal color, no rash or abnormal lesions, without palpable nodes    LINES:     HOSPITAL MEDICATIONS:  MEDICATIONS  (STANDING):  acetylcysteine 20%  Inhalation 4 milliLiter(s) Inhalation every 6 hours  albuterol/ipratropium for Nebulization 3 milliLiter(s) Nebulizer every 6 hours  amLODIPine   Tablet 10 milliGRAM(s) Oral daily  cefepime   IVPB      cefepime   IVPB 1000 milliGRAM(s) IV Intermittent every 8 hours  dextrose 40% Gel 15 Gram(s) Oral once  dextrose 5%. 1000 milliLiter(s) (50 mL/Hr) IV Continuous <Continuous>  dextrose 50% Injectable 25 Gram(s) IV Push once  dextrose 50% Injectable 12.5 Gram(s) IV Push once  dextrose 50% Injectable 25 Gram(s) IV Push once  enoxaparin Injectable 40 milliGRAM(s) SubCutaneous every 12 hours  glucagon  Injectable 1 milliGRAM(s) IntraMuscular once  insulin lispro (ADMELOG) corrective regimen sliding scale   SubCutaneous every 6 hours  insulin NPH human recombinant 13 Unit(s) SubCutaneous every 6 hours  lacosamide Solution 100 milliGRAM(s) Oral two times a day  losartan 100 milliGRAM(s) Oral daily  nystatin Powder 1 Application(s) Topical two times a day  senna 2 Tablet(s) Oral at bedtime  sodium chloride 3%  Inhalation 4 milliLiter(s) Inhalation every 6 hours    MEDICATIONS  (PRN):  acetaminophen    Suspension .. 650 milliGRAM(s) Oral every 6 hours PRN Temp greater or equal to 38C (100.4F), Mild Pain (1 - 3)      LABS:                        12.5   12.50 )-----------( 259      ( 29 Jan 2022 07:27 )             41.7     Hgb Trend: 12.5<--, 12.0<--, 12.1<--, 12.1<--, 12.0<--  01-29    146<H>  |  100  |  48<H>  ----------------------------<  169<H>  5.0   |  32<H>  |  1.10    Ca    10.8<H>      29 Jan 2022 07:27  Phos  5.4     01-29  Mg     3.0     01-29      Creatinine Trend: 1.10<--, 0.97<--, 0.86<--, 0.82<--, 0.83<--, 0.83<--            MICROBIOLOGY:     RADIOLOGY:  [ ] Reviewed and interpreted by me    EKG:            Assessment:  81 yr old male with stated hx significant for HTN, OSH, s/p AAA repair, Ascending Ao aneurysm, infrarenal AAA, Rt common iliac aneurysm who presented from OSH for further management of IPH. Course c/b possible sz activity, recurrent AMS, recurrent hypoxic resp failure secondary to aspiration pneum requiring intubation. 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  -re current AMS secondary to possible metabolic encephalopathy due to asp pneum   -Grimaces to glabella tap and painful stimuli to extremities, withdraws extremities from painful stimuli with purposeful movement  -CT head 1/27/22 re demonstrated Acute parenchymal hemorrhage with evolutionary changes and Arachnoid cyst   -consider repeat CT head if no improvement in AMS  -consider MRI brain   -Neuro checks q 2 hrs and prn for changes  -activity as tolerated  -physical therapy consult when stable  -palliative care involved     #Pulm:  -Hx of SWETA on home CPAP therapy  -re current hypoxic resp failure secondary to possible aspiration pneum  -Mechanical Vent Therapy - titrate to maintain ph 7.35-7.45; PCO2 35-45; SPO2 > 92%  -Bronchodilator therapy q 6 hrs and  prn sob/wheezes  -hypertonic saline nebs q 6 hrs  -Lung protective therapy    #CV:  -Hx HTN  -Hx of ascending Ao aneurysm, infrarenal AAA, Rt common iliac aneurysm, s/p AAA repair 2015,   -septic shock of unknown origin  -ECG now and q am x3  -Cardiac Enzymes now and q am x 3  -norepi gtt as needed to maintain MAP >/= 65-70  -will hold losartan and amlodipine at present     #GI/:  -RACHEL with sCr 1.33 - most likely pre renal  -OGT  -strict I & O's - keep even  -will start tube feeds - glucerna     #ID:  -hypoxic resp failure secondary to possible asp pneum  -pan culture  -continue cefepime  -vanco 1 gm IV x 1  -obtain vanco trough 1/30/22 @ 1400  -titrate vanco to trough of 15-20    #FEN/ENDO/HEME:  -obtain CMP/Mg++/PO--4/CBC w diff/PT/PTT/INR now and q. a.m.  -abg prn  -DM2  -will hold NPH at present and re evaluate glucose trend for need  -POC with ISS q 4 hrs - maintain glucose 160-180  -DVT prophylaxis with enoxaparin 40 mg subq q d    Attestation Statements:    Attestation Statements:  Attending supervision statement: I have personally seen and examined the patient.  I fully participated in the care of this patient.  I have made amendments to the documentation where necessary, and agree with the history, physical exam, and plan as documented by the Nurse practitioner.     82 y/o M w/vascular disease and recent COVID infection who was transferred to [facility] for management of traumatic intraparenchymal hemorrhage following a fall w/hospital course c/b acute respiratory failure requiring intubation later extubated now with recurrence of acute hypoxemic respiratory failure likely secondary to aspiration pneumonia. Hypotension likely secondary to severe sepsis with septic shock due to aspiration PNA.     - Fentanyl PRN for discomfort  - Continue mechanical ventilation, will likely require tracheostomy due to poor baseline mental status  - Titrate pressors as needed goal MAP >= 65  - Bedside POCUS with grossly normal RV and LV size and contractility consistent with distributive shock  - Broad spectrum abx, pan culture  - Insulin as needed  - Mechanical DVT prophylaxis as patient with recent intraparenchymal hemorrhage  - Full Code    Critical care services provided.     I have personally and independently provided 35 minutes of critical care services.  This excludes any time spent on separate procedures or teaching. CHIEF COMPLAINT:    HPI:  81 yr male with PMHx HTN, SWETA on home CPAP, DM2,  Ascending Ao aneurysm 4cm, infrarenal AAA 5 cm s/p AAA repair 2015, Rt common iliac aneurysm 5cm (CT scan at [facility] 11/2020) meant for vascular intervention however unable due to COVID-19+ two weeks prior to presentation,  who was  transferred to [facility]  NSICU 1/5/22 after being found to have Rt frontal parenchymal hemorrhage with mass effect without midline shift found on CTH after presenting originally to [facility] with fatigue, dizziness, s/p fall at home. Pt at [facility] was evaluated by vascular surg, determined no surg intervention at this time, pt evaluated by Neuro surg who offered MIND clinical trial, STX-guided resection vs medical management however family preferred only minimally invasive intervention if necessary. 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 Pt initially placed on antihypertensive therapy, hypertonic saline therapy.     This hospital course c/b sz like activity 1/16/22, loaded with keppra and started on vimpat therapy, EEG at that time however without epileptiform activity, development of AMS with hypoxic/hypercapnic resp distress requiring intubation 1/19/22, repeat CTH at that time without acute changes, re demonstration of IPH. Pt with eventual improvement in resp status and extubated 1/22/22.       Pt  eventually transferred to medicine floor 1/25/22. Course however further c/b development of fevers wit Tmax to 38.8 1/25/22, received CT chest 1/26/22 that demonstrated new bilateral patchy opacities concerning for aspiration pneum, started cefepime therapy, recurrent worsening AMS and hypoxic resp failure with SPO2 of 88-90's requiring re intubation 1/29/22 with transfer to MICU for hypoxic resp failure to possible aspiration pneum and septic shock of unknown origin              PAST MEDICAL & SURGICAL HISTORY:      FAMILY HISTORY:      SOCIAL HISTORY:  Smoking: [ ] Never Smoked [ ] Former Smoker (__ packs x ___ years) [ ] Current Smoker  (__ packs x ___ years)  Substance Use: [ ] Never Used [ ] Used ____  EtOH Use:  Marital Status: [ ] Single [ ]  [ ]  [ ]   Sexual History:   Occupation:  Recent Travel:  Country of Birth:  Advance Directives:    Allergies    No Known Allergies    Intolerances        HOME MEDICATIONS:    REVIEW OF SYSTEMS:  unable secondary to AMS hypoxic resp failure    OBJECTIVE:  ICU Vital Signs Last 24 Hrs  T(C): 36.4 (29 Jan 2022 04:33), Max: 37.2 (28 Jan 2022 13:41)  T(F): 97.5 (29 Jan 2022 04:33), Max: 98.9 (28 Jan 2022 13:41)  HR: 93 (29 Jan 2022 04:33) (85 - 93)  BP: 111/66 (29 Jan 2022 04:33) (111/66 - 148/71)  BP(mean): --  ABP: --  ABP(mean): --  RR: 18 (29 Jan 2022 04:33) (18 - 22)  SpO2: 95% (29 Jan 2022 04:33) (95% - 98%)        01-28 @ 07:01  -  01-29 @ 07:00  --------------------------------------------------------  IN: 0 mL / OUT: 975 mL / NET: -975 mL      CAPILLARY BLOOD GLUCOSE      POCT Blood Glucose.: 165 mg/dL (29 Jan 2022 07:39)      PHYSICAL EXAM:  GENERAL:   currently sedated, well-groomed, well-developed    HEAD:    Atraumatic, Normocephalic    EYES:   PERRL 2mm sluggishly reactive, conjunctiva and sclera clear    ENMT:   No oropharyngeal exudates, erythema or lesions,  Moist mucous membranes    NECK:   Supple, no cervical lymphadenopathy, no JVD    NERVOUS SYSTEM:  Responsive to  painful stimuli with grimacing  and withdrawal of upper extremities with purposeful movement  2-3/5 Rt, Lt 1-2/5, lower extremities Rt 2/5 Lt 1/5, not answering  simple questions or following simple commands  unable to assess CN    CHEST/LUNG:   orally intubated, triggers vent by    Breaths, Pip    Ppl   DP   .Breath sounds bilaterally,  No crackles, rhonchi, wheezing, or rubs. POCUS     HEART:   cardiac monitor    ; S1/S2 No murmurs, rubs, or gallops, POCUS    ABDOMEN:   Soft, Nontender, Nondistended; Bowel sounds present, Bladder non distended, non palpable    EXTREMITIES:   Pulses palpable radial pulses 2+ bilat, DP/PT 1+/1+ bilat, without clubbing, cyanosis. Digits warm to touch with good cap refill < 3 secs    SKIN:   warm, dry, intact, normal color, no rash or abnormal lesions, without palpable nodes    LINES:     HOSPITAL MEDICATIONS:  MEDICATIONS  (STANDING):  acetylcysteine 20%  Inhalation 4 milliLiter(s) Inhalation every 6 hours  albuterol/ipratropium for Nebulization 3 milliLiter(s) Nebulizer every 6 hours  amLODIPine   Tablet 10 milliGRAM(s) Oral daily  cefepime   IVPB      cefepime   IVPB 1000 milliGRAM(s) IV Intermittent every 8 hours  dextrose 40% Gel 15 Gram(s) Oral once  dextrose 5%. 1000 milliLiter(s) (50 mL/Hr) IV Continuous <Continuous>  dextrose 50% Injectable 25 Gram(s) IV Push once  dextrose 50% Injectable 12.5 Gram(s) IV Push once  dextrose 50% Injectable 25 Gram(s) IV Push once  enoxaparin Injectable 40 milliGRAM(s) SubCutaneous every 12 hours  glucagon  Injectable 1 milliGRAM(s) IntraMuscular once  insulin lispro (ADMELOG) corrective regimen sliding scale   SubCutaneous every 6 hours  insulin NPH human recombinant 13 Unit(s) SubCutaneous every 6 hours  lacosamide Solution 100 milliGRAM(s) Oral two times a day  losartan 100 milliGRAM(s) Oral daily  nystatin Powder 1 Application(s) Topical two times a day  senna 2 Tablet(s) Oral at bedtime  sodium chloride 3%  Inhalation 4 milliLiter(s) Inhalation every 6 hours    MEDICATIONS  (PRN):  acetaminophen    Suspension .. 650 milliGRAM(s) Oral every 6 hours PRN Temp greater or equal to 38C (100.4F), Mild Pain (1 - 3)      LABS:                        12.5   12.50 )-----------( 259      ( 29 Jan 2022 07:27 )             41.7     Hgb Trend: 12.5<--, 12.0<--, 12.1<--, 12.1<--, 12.0<--  01-29    146<H>  |  100  |  48<H>  ----------------------------<  169<H>  5.0   |  32<H>  |  1.10    Ca    10.8<H>      29 Jan 2022 07:27  Phos  5.4     01-29  Mg     3.0     01-29      Creatinine Trend: 1.10<--, 0.97<--, 0.86<--, 0.82<--, 0.83<--, 0.83<--            MICROBIOLOGY:     RADIOLOGY:  [ ] Reviewed and interpreted by me    EKG:            Assessment:  81 yr old male with stated hx significant for HTN, OSH, s/p AAA repair, Ascending Ao aneurysm, infrarenal AAA, Rt common iliac aneurysm who presented from OSH for further management of IPH. Course c/b possible sz activity, recurrent AMS, recurrent hypoxic resp failure secondary to aspiration pneum requiring intubation. 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  -re current AMS secondary to possible metabolic encephalopathy due to asp pneum   -Grimaces to glabella tap and painful stimuli to extremities, withdraws extremities from painful stimuli with purposeful movement  -CT head 1/27/22 re demonstrated Acute parenchymal hemorrhage with evolutionary changes and Arachnoid cyst   -consider repeat CT head if no improvement in AMS  -consider MRI brain   -Neuro checks q 2 hrs and prn for changes  -activity as tolerated  -physical therapy consult when stable  -palliative care involved     #Pulm:  -Hx of SWETA on home CPAP therapy  -re current hypoxic resp failure secondary to possible aspiration pneum  -Mechanical Vent Therapy - titrate to maintain ph 7.35-7.45; PCO2 35-45; SPO2 > 92%  -Bronchodilator therapy q 6 hrs and  prn sob/wheezes  -hypertonic saline nebs q 6 hrs  -Lung protective therapy    #CV:  -Hx HTN  -Hx of ascending Ao aneurysm, infrarenal AAA, Rt common iliac aneurysm, s/p AAA repair 2015,   -septic shock of unknown origin  -ECG now and q am x3  -Cardiac Enzymes now and q am x 3  -norepi gtt as needed to maintain MAP >/= 65-70  -will hold losartan and amlodipine at present     #GI/:  -RACHEL with sCr 1.33 - most likely pre renal  -OGT  -strict I & O's - keep even  -will start tube feeds - glucerna     #ID:  -hypoxic resp failure secondary to possible asp pneum  -pan culture  -continue cefepime  -vanco 1 gm IV x 1  -obtain vanco trough 1/30/22 @ 1400  -titrate vanco to trough of 15-20    #FEN/ENDO/HEME:  -obtain CMP/Mg++/PO--4/CBC w diff/PT/PTT/INR now and q. a.m.  -abg prn  -DM2  -will hold NPH at present and re evaluate glucose trend for need  -POC with ISS q 4 hrs - maintain glucose 160-180  -DVT prophylaxis with enoxaparin 40 mg subq q d    Attestation Statements:    Attestation Statements:  Attending supervision statement: I have personally seen and examined the patient.  I fully participated in the care of this patient.  I have made amendments to the documentation where necessary, and agree with the history, physical exam, and plan as documented by the Nurse practitioner.     80 y/o M w/vascular disease and recent COVID infection who was transferred to [facility] for management of traumatic intraparenchymal hemorrhage following a fall w/hospital course c/b acute respiratory failure requiring intubation later extubated now with recurrence of acute hypoxemic respiratory failure likely secondary to aspiration pneumonia. Hypotension likely secondary to severe sepsis with septic shock due to aspiration PNA.     - Fentanyl PRN for discomfort  - Continue mechanical ventilation, will likely require tracheostomy due to poor baseline mental status  - Titrate pressors as needed goal MAP >= 65  - Bedside POCUS with grossly normal RV and LV size and contractility consistent with distributive shock  - Broad spectrum abx, pan culture  - Insulin as needed  - DVT prophylaxis  - Full Code    Critical care services provided.     I have personally and independently provided 35 minutes of critical care services.  This excludes any time spent on separate procedures or teaching. CHIEF COMPLAINT:    HPI:  81 yr male with PMHx HTN, SWETA on home CPAP, DM2,  Ascending Ao aneurysm 4cm, infrarenal AAA 5 cm s/p AAA repair 2015, Rt common iliac aneurysm 5cm (CT scan at [facility] 11/2020) meant for vascular intervention however unable due to COVID-19+ two weeks prior to presentation,  who was  transferred to [facility]  NSICU 1/5/22 after being found to have Rt frontal parenchymal hemorrhage with mass effect without midline shift found on CTH after presenting originally to [facility] with fatigue, dizziness, s/p fall at home. Pt at [facility] was evaluated by vascular surg, determined no surg intervention at this time, pt evaluated by Neuro surg who offered MIND clinical trial, STX-guided resection vs medical management however family preferred only minimally invasive intervention if necessary. 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 Pt initially placed on antihypertensive therapy, hypertonic saline therapy.     This hospital course c/b sz like activity 1/16/22, loaded with keppra and started on vimpat therapy, EEG at that time however without epileptiform activity, development of AMS with hypoxic/hypercapnic resp distress requiring intubation 1/19/22, repeat CTH at that time without acute changes, re demonstration of IPH. Pt with eventual improvement in resp status and extubated 1/22/22.       Pt  eventually transferred to medicine floor 1/25/22. Course however further c/b development of fevers wit Tmax to 38.8 1/25/22, received CT chest 1/26/22 that demonstrated new bilateral patchy opacities concerning for aspiration pneum, started cefepime therapy, recurrent worsening AMS and hypoxic resp failure with SPO2 of 88-90's requiring re intubation 1/29/22 with transfer to MICU for hypoxic resp failure to possible aspiration pneum and septic shock of unknown origin      ADD 1640 1/29/22: Discussed with Dr. Swan (Palliative Care), he spoke with family and they wish that patient have intervention that will prolong his life        PAST MEDICAL & SURGICAL HISTORY:      FAMILY HISTORY:      SOCIAL HISTORY:  Smoking: [ ] Never Smoked [ ] Former Smoker (__ packs x ___ years) [ ] Current Smoker  (__ packs x ___ years)  Substance Use: [ ] Never Used [ ] Used ____  EtOH Use:  Marital Status: [ ] Single [ ]  [ ]  [ ]   Sexual History:   Occupation:  Recent Travel:  Country of Birth:  Advance Directives:    Allergies    No Known Allergies    Intolerances        HOME MEDICATIONS:    REVIEW OF SYSTEMS:  unable secondary to AMS hypoxic resp failure    OBJECTIVE:  ICU Vital Signs Last 24 Hrs  T(C): 36.4 (29 Jan 2022 04:33), Max: 37.2 (28 Jan 2022 13:41)  T(F): 97.5 (29 Jan 2022 04:33), Max: 98.9 (28 Jan 2022 13:41)  HR: 93 (29 Jan 2022 04:33) (85 - 93)  BP: 111/66 (29 Jan 2022 04:33) (111/66 - 148/71)  BP(mean): --  ABP: --  ABP(mean): --  RR: 18 (29 Jan 2022 04:33) (18 - 22)  SpO2: 95% (29 Jan 2022 04:33) (95% - 98%)        01-28 @ 07:01  -  01-29 @ 07:00  --------------------------------------------------------  IN: 0 mL / OUT: 975 mL / NET: -975 mL      CAPILLARY BLOOD GLUCOSE      POCT Blood Glucose.: 165 mg/dL (29 Jan 2022 07:39)      PHYSICAL EXAM:  GENERAL:   currently sedated, well-groomed, well-developed    HEAD:    Atraumatic, Normocephalic    EYES:   PERRL 2mm sluggishly reactive, conjunctiva and sclera clear    ENMT:   No oropharyngeal exudates, erythema or lesions,  Moist mucous membranes    NECK:   Supple, no cervical lymphadenopathy, no JVD    NERVOUS SYSTEM:  Responsive to  painful stimuli with grimacing  and withdrawal of upper extremities with purposeful movement  2-3/5 Rt, Lt 1-2/5, lower extremities Rt 2/5 Lt 1/5, not answering  simple questions or following simple commands  unable to assess CN    CHEST/LUNG:   orally intubated, triggers vent by    Breaths, Pip    Ppl   DP   .Breath sounds bilaterally,  No crackles, rhonchi, wheezing, or rubs. POCUS     HEART:   cardiac monitor    ; S1/S2 No murmurs, rubs, or gallops, POCUS    ABDOMEN:   Soft, Nontender, Nondistended; Bowel sounds present, Bladder non distended, non palpable    EXTREMITIES:   Pulses palpable radial pulses 2+ bilat, DP/PT 1+/1+ bilat, without clubbing, cyanosis. Digits warm to touch with good cap refill < 3 secs    SKIN:   warm, dry, intact, normal color, no rash or abnormal lesions, without palpable nodes    LINES:     HOSPITAL MEDICATIONS:  MEDICATIONS  (STANDING):  acetylcysteine 20%  Inhalation 4 milliLiter(s) Inhalation every 6 hours  albuterol/ipratropium for Nebulization 3 milliLiter(s) Nebulizer every 6 hours  amLODIPine   Tablet 10 milliGRAM(s) Oral daily  cefepime   IVPB      cefepime   IVPB 1000 milliGRAM(s) IV Intermittent every 8 hours  dextrose 40% Gel 15 Gram(s) Oral once  dextrose 5%. 1000 milliLiter(s) (50 mL/Hr) IV Continuous <Continuous>  dextrose 50% Injectable 25 Gram(s) IV Push once  dextrose 50% Injectable 12.5 Gram(s) IV Push once  dextrose 50% Injectable 25 Gram(s) IV Push once  enoxaparin Injectable 40 milliGRAM(s) SubCutaneous every 12 hours  glucagon  Injectable 1 milliGRAM(s) IntraMuscular once  insulin lispro (ADMELOG) corrective regimen sliding scale   SubCutaneous every 6 hours  insulin NPH human recombinant 13 Unit(s) SubCutaneous every 6 hours  lacosamide Solution 100 milliGRAM(s) Oral two times a day  losartan 100 milliGRAM(s) Oral daily  nystatin Powder 1 Application(s) Topical two times a day  senna 2 Tablet(s) Oral at bedtime  sodium chloride 3%  Inhalation 4 milliLiter(s) Inhalation every 6 hours    MEDICATIONS  (PRN):  acetaminophen    Suspension .. 650 milliGRAM(s) Oral every 6 hours PRN Temp greater or equal to 38C (100.4F), Mild Pain (1 - 3)      LABS:                        12.5   12.50 )-----------( 259      ( 29 Jan 2022 07:27 )             41.7     Hgb Trend: 12.5<--, 12.0<--, 12.1<--, 12.1<--, 12.0<--  01-29    146<H>  |  100  |  48<H>  ----------------------------<  169<H>  5.0   |  32<H>  |  1.10    Ca    10.8<H>      29 Jan 2022 07:27  Phos  5.4     01-29  Mg     3.0     01-29      Creatinine Trend: 1.10<--, 0.97<--, 0.86<--, 0.82<--, 0.83<--, 0.83<--            MICROBIOLOGY:     RADIOLOGY:  [ ] Reviewed and interpreted by me    EKG:            Assessment:  81 yr old male with stated hx significant for HTN, OSH, s/p AAA repair, Ascending Ao aneurysm, infrarenal AAA, Rt common iliac aneurysm who presented from OSH for further management of IPH. Course c/b possible sz activity, recurrent AMS, recurrent hypoxic resp failure secondary to aspiration pneum requiring intubation. 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  -re current AMS secondary to possible metabolic encephalopathy due to asp pneum   -Grimaces to glabella tap and painful stimuli to extremities, withdraws extremities from painful stimuli with purposeful movement  -CT head 1/27/22 re demonstrated Acute parenchymal hemorrhage with evolutionary changes and Arachnoid cyst   -consider repeat CT head if no improvement in AMS  -consider MRI brain   -Neuro checks q 2 hrs and prn for changes  -activity as tolerated  -physical therapy consult when stable  -palliative care involved     #Pulm:  -Hx of SWETA on home CPAP therapy  -re current hypoxic resp failure secondary to possible aspiration pneum  -Mechanical Vent Therapy - titrate to maintain ph 7.35-7.45; PCO2 35-45; SPO2 > 92%  -Bronchodilator therapy q 6 hrs and  prn sob/wheezes  -hypertonic saline nebs q 6 hrs  -Lung protective therapy    #CV:  -Hx HTN  -Hx of ascending Ao aneurysm, infrarenal AAA, Rt common iliac aneurysm, s/p AAA repair 2015,   -septic shock of unknown origin  -ECG now and q am x3  -Cardiac Enzymes now and q am x 3  -norepi gtt as needed to maintain MAP >/= 65-70  -will hold losartan and amlodipine at present     #GI/:  -RACHEL with sCr 1.33 - most likely pre renal  -OGT  -strict I & O's - keep even  -will start tube feeds - glucerna     #ID:  -hypoxic resp failure secondary to possible asp pneum  -pan culture  -continue cefepime  -vanco 1 gm IV x 1  -obtain vanco trough 1/30/22 @ 1400  -titrate vanco to trough of 15-20    #FEN/ENDO/HEME:  -obtain CMP/Mg++/PO--4/CBC w diff/PT/PTT/INR now and q. a.m.  -abg prn  -DM2  -will hold NPH at present and re evaluate glucose trend for need  -POC with ISS q 4 hrs - maintain glucose 160-180  -DVT prophylaxis with enoxaparin 40 mg subq q d    Attestation Statements:    Attestation Statements:  Attending supervision statement: I have personally seen and examined the patient.  I fully participated in the care of this patient.  I have made amendments to the documentation where necessary, and agree with the history, physical exam, and plan as documented by the Nurse practitioner.     80 y/o M w/vascular disease and recent COVID infection who was transferred to [facility] for management of traumatic intraparenchymal hemorrhage following a fall w/hospital course c/b acute respiratory failure requiring intubation later extubated now with recurrence of acute hypoxemic respiratory failure likely secondary to aspiration pneumonia. Hypotension likely secondary to severe sepsis with septic shock due to aspiration PNA.     - Fentanyl PRN for discomfort  - Continue mechanical ventilation, will likely require tracheostomy due to poor baseline mental status  - Titrate pressors as needed goal MAP >= 65  - Bedside POCUS with grossly normal RV and LV size and contractility consistent with distributive shock  - Broad spectrum abx, pan culture  - Insulin as needed  - DVT prophylaxis  - Full Code    Critical care services provided.     I have personally and independently provided 35 minutes of critical care services.  This excludes any time spent on separate procedures or teaching. CHIEF COMPLAINT:    HPI:  81 yr male with PMHx HTN, SWETA on home CPAP, DM2,  Ascending Ao aneurysm 4cm, infrarenal AAA 5 cm s/p AAA repair 2015, Rt common iliac aneurysm 5cm (CT scan at [facility] 11/2020) meant for vascular intervention however unable due to COVID-19+ two weeks prior to presentation,  who was  transferred to [facility]  NSICU 1/5/22 after being found to have Rt frontal parenchymal hemorrhage with mass effect without midline shift found on CTH after presenting originally to [facility] with fatigue, dizziness, s/p fall at home. Pt at [facility] was evaluated by vascular surg, determined no surg intervention at this time, pt evaluated by Neuro surg who offered MIND clinical trial, STX-guided resection vs medical management however family preferred only minimally invasive intervention if necessary. 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 Pt initially placed on antihypertensive therapy, hypertonic saline therapy.     This hospital course c/b sz like activity 1/16/22, loaded with keppra and started on vimpat therapy, EEG at that time however without epileptiform activity, development of AMS with hypoxic/hypercapnic resp distress requiring intubation 1/19/22, repeat CTH at that time without acute changes, re demonstration of IPH. Pt with eventual improvement in resp status and extubated 1/22/22.       Pt  eventually transferred to medicine floor 1/25/22. Course however further c/b development of fevers wit Tmax to 38.8 1/25/22, received CT chest 1/26/22 that demonstrated new bilateral patchy opacities concerning for aspiration pneum, started cefepime therapy, recurrent worsening AMS and hypoxic resp failure with SPO2 of 88-90's requiring re intubation 1/29/22 with transfer to MICU for hypoxic resp failure to possible aspiration pneum and septic shock of unknown origin      ADD 1640 1/29/22: Discussed with Dr. Swan (Palliative Care), he spoke with family and they wish that patient have intervention that will prolong his life        PAST MEDICAL & SURGICAL HISTORY:      FAMILY HISTORY:      SOCIAL HISTORY:  Smoking: [ ] Never Smoked [ ] Former Smoker (__ packs x ___ years) [ ] Current Smoker  (__ packs x ___ years)  Substance Use: [ ] Never Used [ ] Used ____  EtOH Use:  Marital Status: [ ] Single [ ]  [ ]  [ ]   Sexual History:   Occupation:  Recent Travel:  Country of Birth:  Advance Directives:    Allergies    No Known Allergies    Intolerances        HOME MEDICATIONS:    REVIEW OF SYSTEMS:  unable secondary to AMS hypoxic resp failure    OBJECTIVE:  ICU Vital Signs Last 24 Hrs  T(C): 36.4 (29 Jan 2022 04:33), Max: 37.2 (28 Jan 2022 13:41)  T(F): 97.5 (29 Jan 2022 04:33), Max: 98.9 (28 Jan 2022 13:41)  HR: 93 (29 Jan 2022 04:33) (85 - 93)  BP: 111/66 (29 Jan 2022 04:33) (111/66 - 148/71)  BP(mean): --  ABP: --  ABP(mean): --  RR: 18 (29 Jan 2022 04:33) (18 - 22)  SpO2: 95% (29 Jan 2022 04:33) (95% - 98%)        01-28 @ 07:01  -  01-29 @ 07:00  --------------------------------------------------------  IN: 0 mL / OUT: 975 mL / NET: -975 mL      CAPILLARY BLOOD GLUCOSE      POCT Blood Glucose.: 165 mg/dL (29 Jan 2022 07:39)      PHYSICAL EXAM:  GENERAL:   currently sedated, well-groomed, well-developed    HEAD:    Atraumatic, Normocephalic    EYES:   PERRL 2mm sluggishly reactive, conjunctiva and sclera clear    ENMT:   No oropharyngeal exudates, erythema or lesions,  Moist mucous membranes    NECK:   Supple, no cervical lymphadenopathy, no JVD    NERVOUS SYSTEM:  Responsive to  painful stimuli with grimacing  and withdrawal of upper extremities with purposeful movement  2-3/5 Rt, Lt 1-2/5, lower extremities Rt 2/5 Lt 1/5, not answering  simple questions or following simple commands  unable to assess CN    CHEST/LUNG:   orally intubated, triggers vent by    Breaths, Pip    Ppl   DP   .Breath sounds bilaterally,  No crackles, rhonchi, wheezing, or rubs. POCUS     HEART:   cardiac monitor    ; S1/S2 No murmurs, rubs, or gallops, POCUS    ABDOMEN:   Soft, Nontender, Nondistended; Bowel sounds present, Bladder non distended, non palpable    EXTREMITIES:   Pulses palpable radial pulses 2+ bilat, DP/PT 1+/1+ bilat, without clubbing, cyanosis. Digits warm to touch with good cap refill < 3 secs    SKIN:   warm, dry, intact, normal color, no rash or abnormal lesions, without palpable nodes    LINES:     HOSPITAL MEDICATIONS:  MEDICATIONS  (STANDING):  acetylcysteine 20%  Inhalation 4 milliLiter(s) Inhalation every 6 hours  albuterol/ipratropium for Nebulization 3 milliLiter(s) Nebulizer every 6 hours  amLODIPine   Tablet 10 milliGRAM(s) Oral daily  cefepime   IVPB      cefepime   IVPB 1000 milliGRAM(s) IV Intermittent every 8 hours  dextrose 40% Gel 15 Gram(s) Oral once  dextrose 5%. 1000 milliLiter(s) (50 mL/Hr) IV Continuous <Continuous>  dextrose 50% Injectable 25 Gram(s) IV Push once  dextrose 50% Injectable 12.5 Gram(s) IV Push once  dextrose 50% Injectable 25 Gram(s) IV Push once  enoxaparin Injectable 40 milliGRAM(s) SubCutaneous every 12 hours  glucagon  Injectable 1 milliGRAM(s) IntraMuscular once  insulin lispro (ADMELOG) corrective regimen sliding scale   SubCutaneous every 6 hours  insulin NPH human recombinant 13 Unit(s) SubCutaneous every 6 hours  lacosamide Solution 100 milliGRAM(s) Oral two times a day  losartan 100 milliGRAM(s) Oral daily  nystatin Powder 1 Application(s) Topical two times a day  senna 2 Tablet(s) Oral at bedtime  sodium chloride 3%  Inhalation 4 milliLiter(s) Inhalation every 6 hours    MEDICATIONS  (PRN):  acetaminophen    Suspension .. 650 milliGRAM(s) Oral every 6 hours PRN Temp greater or equal to 38C (100.4F), Mild Pain (1 - 3)      LABS:                        12.5   12.50 )-----------( 259      ( 29 Jan 2022 07:27 )             41.7     Hgb Trend: 12.5<--, 12.0<--, 12.1<--, 12.1<--, 12.0<--  01-29    146<H>  |  100  |  48<H>  ----------------------------<  169<H>  5.0   |  32<H>  |  1.10    Ca    10.8<H>      29 Jan 2022 07:27  Phos  5.4     01-29  Mg     3.0     01-29      Creatinine Trend: 1.10<--, 0.97<--, 0.86<--, 0.82<--, 0.83<--, 0.83<--            MICROBIOLOGY:     RADIOLOGY:  [ ] Reviewed and interpreted by me    EKG:            Assessment:  81 yr old male with stated hx significant for HTN, OSH, s/p AAA repair, Ascending Ao aneurysm, infrarenal AAA, Rt common iliac aneurysm who presented from OSH for further management of IPH. Course c/b possible sz activity, recurrent AMS, recurrent hypoxic resp failure secondary to aspiration pneum requiring intubation. 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  -re current AMS secondary to possible metabolic encephalopathy due to asp pneum   -Grimaces to glabella tap and painful stimuli to extremities, withdraws extremities from painful stimuli with purposeful movement  -CT head 1/27/22 re demonstrated Acute parenchymal hemorrhage with evolutionary changes and Arachnoid cyst   -consider repeat CT head if no improvement in AMS  -consider MRI brain   -Neuro checks q 2 hrs and prn for changes  -activity as tolerated  -physical therapy consult when stable  -palliative care involved     #Pulm:  -Hx of SWETA on home CPAP therapy  -re current hypoxic resp failure secondary to possible aspiration pneum  -Mechanical Vent Therapy - titrate to maintain ph 7.35-7.45; PCO2 35-45; SPO2 > 92%  -Bronchodilator therapy q 6 hrs and  prn sob/wheezes  -hypertonic saline nebs q 6 hrs  -Lung protective therapy    #CV:  -Hx HTN  -Hx of ascending Ao aneurysm, infrarenal AAA, Rt common iliac aneurysm, s/p AAA repair 2015,   -septic shock of unknown origin  -ECG now and q am x3  -Cardiac Enzymes now and q am x 3  -norepi gtt as needed to maintain MAP >/= 65-70  -will hold losartan and amlodipine at present     #GI/:  -RACHEL with sCr 1.33 - most likely pre renal  -OGT  -strict I & O's - keep even  -will start tube feeds - glucerna     #ID:  -hypoxic resp failure secondary to possible asp pneum  -pan culture  -continue cefepime  -vanco 1 gm IV x 1  -obtain vanco trough 1/30/22 @ 1400  -titrate vanco to trough of 15-20    #FEN/ENDO/HEME:  -obtain CMP/Mg++/PO--4/CBC w diff/PT/PTT/INR now and q. a.m.  -abg prn  -DM2  -will hold NPH at present and re evaluate glucose trend for need  -POC with ISS q 4 hrs - maintain glucose 160-180  -DVT prophylaxis with enoxaparin 40 mg subq q d    Attestation Statements:    Attestation Statements:  Attending supervision statement: I have personally seen and examined the patient.  I fully participated in the care of this patient.  I have made amendments to the documentation where necessary, and agree with the history, physical exam, and plan as documented by the Nurse practitioner.     82 y/o M w/vascular disease and recent COVID infection who was transferred to [facility] for management of traumatic intraparenchymal hemorrhage following a fall w/hospital course c/b acute respiratory failure requiring intubation later extubated now with recurrence of acute hypoxemic respiratory failure likely secondary to aspiration pneumonia. Hypotension likely secondary to severe sepsis with septic shock due to aspiration PNA.     - Fentanyl PRN for discomfort  - Continue mechanical ventilation  - Titrate pressors as needed goal MAP >= 65  - Bedside POCUS with grossly normal RV and LV size and contractility consistent with distributive shock  - Broad spectrum abx, pan culture  - Insulin as needed  - DVT prophylaxis  - Full Code    Critical care services provided.     I have personally and independently provided 35 minutes of critical care services.  This excludes any time spent on separate procedures or teaching.

## 2022-01-29 NOTE — PATIENT PROFILE ADULT - NSPROMEDSADMININFO_GEN_A_NUR
Left message informing patient of results below. She was asked to call back with any questions. difficulty swallowing pills

## 2022-01-29 NOTE — CONSULT NOTE ADULT - PROBLEM SELECTOR RECOMMENDATION 9
Neuro suggesting MRI to r/o Mass/? Malignancy.   As per the EMR: "Neurosurgery discussed the potential for intervention for the patient however the family expressed that they did not want to proceed with surgery unless absolutely necessary and was management conservatively."  On Vimpat for seizure prophylaxis

## 2022-01-29 NOTE — PROVIDER CONTACT NOTE (OTHER) - SITUATION
Pt not getting feeds due to NG tube being clogged, . What insulin should be given? Pt ordered for 13 units humulin.

## 2022-01-29 NOTE — CONSULT NOTE ADULT - SUBJECTIVE AND OBJECTIVE BOX
This is not my initial not but a template.   HPI:81y m W/ PMHx HTN presenting to the ED from [facility] as a transfer for R frontal parenchymal hemorrhage on CT after fall at home, [facility]    PERTINENT PM/SXH: HTN       FAMILY HISTORY: Unknown     ITEMS NOT CHECKED ARE NOT PRESENT    SOCIAL HISTORY:   Significant other/partner[ ]  Children[ ]  Shinto/Spirituality:  Substance hx:  [ ]   Tobacco hx:  [ ]   Alcohol hx: [ ]   Home Opioid hx:  [ ] I-Stop Reference No:  Living Situation: [ ]Home  [ ]Long term care  [ ]Rehab [ ]Other  LMSW contacted patients daughter  Maria C Woody ([phone]) for supportive interview and collateral  information. LMSW introduced self and explained social works role. Patients  daughter affect was appropriate for current situation, verbalized good  understanding and was receptive to continue speaking.      Patient is  and resides with his sister in apartment at [address], [address]. [address] building. Patient unable to  identify a caregiver at this time. Patient has a strong family, social support,  which also includes his brother Austin Woody ([phone]) who daughter  states is HCP and reports housing is safe and stable. Patient is insured  through Medicaid ([id number]) and Medicare ([id number]). Patients PCP is Dr. Robert Schrader ([phone]) in [place].  ADVANCE DIRECTIVES:    DNR  MOLST  [ ]  Living Will  [ ]   DECISION MAKER(s):  [ ] Health Care Proxy(s)  [ ] Surrogate(s)  [ ] Guardian           Name(s): Phone Number(s):    BASELINE (I)ADL(s) (prior to admission):  [place]: [ ]Total  [ ] Moderate [ ]Dependent    Allergies    No Known Allergies    Intolerances    MEDICATIONS  (STANDING):  acetylcysteine 20%  Inhalation 4 milliLiter(s) Inhalation every 6 hours  albuterol/ipratropium for Nebulization 3 milliLiter(s) Nebulizer every 6 hours  amLODIPine   Tablet 10 milliGRAM(s) Oral daily  cefepime   IVPB      cefepime   IVPB 1000 milliGRAM(s) IV Intermittent every 8 hours  dextrose 40% Gel 15 Gram(s) Oral once  dextrose 5%. 1000 milliLiter(s) (50 mL/Hr) IV Continuous <Continuous>  dextrose 50% Injectable 25 Gram(s) IV Push once  dextrose 50% Injectable 12.5 Gram(s) IV Push once  dextrose 50% Injectable 25 Gram(s) IV Push once  enoxaparin Injectable 40 milliGRAM(s) SubCutaneous every 12 hours  glucagon  Injectable 1 milliGRAM(s) IntraMuscular once  insulin lispro (ADMELOG) corrective regimen sliding scale   SubCutaneous every 6 hours  insulin NPH human recombinant 13 Unit(s) SubCutaneous every 6 hours  lacosamide IVPB 100 milliGRAM(s) IV Intermittent once  lacosamide Solution 100 milliGRAM(s) Oral two times a day  losartan 100 milliGRAM(s) Oral daily  nystatin Powder 1 Application(s) Topical two times a day  senna 2 Tablet(s) Oral at bedtime  sodium chloride 3%  Inhalation 4 milliLiter(s) Inhalation every 6 hours    MEDICATIONS  (PRN):  acetaminophen    Suspension .. 650 milliGRAM(s) Oral every 6 hours PRN Temp greater or equal to 38C (100.4F), Mild Pain (1 - 3)    PRESENT SYMPTOMS: [ ]Unable to obtain due to poor mentation   Source if other than patient:  [ ]Family   [ ]Team     Pain: [ ]yes [ ]no  QOL impact -   Location -                    Aggravating factors -  Quality -  Radiation -  Timing-  Severity (0-10 scale):  Minimal acceptable level (0-10 scale):     CPOT:    https://www.[facility].org/getattachment/[email or web address]/Critical-Care-Pain-Observation-Tool-(CPOT)      PAIN AD Score:     http://geriatrictoolkit.[facility]/cog/painad.pdf (press ctrl +  left click to view)    Dyspnea:                           [ ]Mild [ ]Moderate [ ]Severe  Anxiety:                             [ ]Mild [ ]Moderate [ ]Severe  Fatigue:                             [ ]Mild [ ]Moderate [ ]Severe  Nausea:                             [ ]Mild [ ]Moderate [ ]Severe  Loss of appetite:              [ ]Mild [ ]Moderate [ ]Severe  Constipation:                    [ ]Mild [ ]Moderate [ ]Severe    Other Symptoms:  [ ]All other review of systems negative     Palliative Performance Status Version 2:         %    http://npcrc.org/files/news/palliative_performance_scale_ppsv2.pdf  PHYSICAL EXAM:  Vital Signs Last 24 Hrs  T(C): 36.4 (29 Jan 2022 04:33), Max: 37.2 (28 Jan 2022 13:41)  T(F): 97.5 (29 Jan 2022 04:33), Max: 98.9 (28 Jan 2022 13:41)  HR: 93 (29 Jan 2022 04:33) (85 - 93)  BP: 111/66 (29 Jan 2022 04:33) (111/66 - 148/71)  BP(mean): --  RR: 18 (29 Jan 2022 04:33) (18 - 22)  SpO2: 95% (29 Jan 2022 04:33) (95% - 98%) I&O's Summary    27 Jan 2022 07:01  -  28 Jan 2022 07:00  --------------------------------------------------------  IN: 0 mL / OUT: 500 mL / NET: -500 mL    28 Jan 2022 07:01  -  29 Jan 2022 04:43  --------------------------------------------------------  IN: 0 mL / OUT: 725 mL / NET: -725 mL      GENERAL:  [ ]Alert  [ ]Oriented x   [ ]Lethargic  [ ]Cachexia  [ ]Unarousable  [ ]Verbal  [ ]Non-Verbal  Behavioral:   [ ] Anxiety  [ ] Delirium [ ] Agitation [ ] Other  HEENT:  [ ]Normal   [ ]Dry mouth   [ ]ET Tube/Trach  [ ]Oral lesions  PULMONARY:   [ ]Clear [ ]Tachypnea  [ ]Audible excessive secretions   [ ]Rhonchi        [ ]Right [ ]Left [ ]Bilateral  [ ]Crackles        [ ]Right [ ]Left [ ]Bilateral  [ ]Wheezing     [ ]Right [ ]Left [ ]Bilateral  [ ]Diminished breath sounds [ ]right [ ]left [ ]bilateral  CARDIOVASCULAR:    [ ]Regular [ ]Irregular [ ]Tachy  [ ]Alex [ ]Murmur [ ]Other  GASTROINTESTINAL:  [ ]Soft  [ ]Distended   [ ]+BS  [ ]Non tender [ ]Tender  [ ]PEG [ ]OGT/ NGT  Last BM:   GENITOURINARY:  [ ]Normal [ ] Incontinent   [ ]Oliguria/Anuria   [ ]Chavarria  MUSCULOSKELETAL:   [ ]Normal   [ ]Weakness  [ ]Bed/Wheelchair bound [ ]Edema  NEUROLOGIC:   [ ]No focal deficits  [ ]Cognitive impairment  [ ]Dysphagia [ ]Dysarthria [ ]Paresis [ ]Other   SKIN:   [ ]Normal    [ ]Rash  [ ]Pressure ulcer(s)       Present on admission [ ]y [ ]n    CRITICAL CARE:  [ ] Shock Present  [ ]Septic [ ]Cardiogenic [ ]Neurologic [ ]Hypovolemic  [ ]  Vasopressors [ ]  Inotropes   [ ]Respiratory failure present [ ]Mechanical ventilation [ ]Non-invasive ventilatory support [ ]High flow    [ ]Acute  [ ]Chronic [ ]Hypoxic  [ ]Hypercarbic [ ]Other  [ ]Other organ failure     LABS:                        12.0   6.72  )-----------( 205      ( 28 Jan 2022 07:20 )             38.1   01-28    146<H>  |  102  |  39<H>  ----------------------------<  191<H>  4.0   |  32<H>  |  0.97    Ca    10.7<H>      28 Jan 2022 07:23  Phos  3.8     01-28  Mg     2.6     01-28          RADIOLOGY & ADDITIONAL STUDIES:  c< from: CT Head No Cont (01.27.22 @ 22:13) >    Impression: Acute parenchymal hemorrhage is again seen with some expected   evolutionary changes.    Arachnoid cyst again seen as described above.    --- End of Report ---            BE TOUSSAINT MD; Attending Radiologist  This document has been electronically signed. Jan 28 2022  9:04AM    < end of copied text >    PROTEIN CALORIE MALNUTRITION PRESENT: [ ]mild [ ]moderate [ ]severe [ ]underweight [ ]morbid obesity  https://www.andeal.org/vault/2440/web/files/ONC/Table_Clinical%20Characteristics%20to%20Document%20Malnutrition-White%20JV%20et%20al%202012.pdf      Weight (kg): 140.6 (01-15-22 @ 14:42)    [ ]PPSV2 < or = to 30% [ ]significant weight loss  [ ]poor nutritional intake  [ ]anasarca      [ ]Artificial Nutrition      REFERRALS:   [ ]Chaplaincy  [ ]Hospice  [ ]Child Life  [ ]Social Work  [ ]Case management [ ]Holistic Therapy     Goals of Care Document:  This is not my initial note but a template.   HPI:81y m W/ PMHx HTN presenting to the ED from [facility] as a transfer for R frontal parenchymal hemorrhage on CT after fall at home, [facility]    PERTINENT PM/SXH: HTN       FAMILY HISTORY: Unknown     ITEMS NOT CHECKED ARE NOT PRESENT    SOCIAL HISTORY:   Significant other/partner[ ]  Children[ ]  Hindu/Spirituality:  Substance hx:  [ ]   Tobacco hx:  [ ]   Alcohol hx: [ ]   Home Opioid hx:  [ ] I-Stop Reference No:  Living Situation: [ ]Home  [ ]Long term care  [ ]Rehab [ ]Other  LMSW contacted patients daughter  Maria C Woody ([phone]) for supportive interview and collateral  information. LMSW introduced self and explained social works role. Patients  daughter affect was appropriate for current situation, verbalized good  understanding and was receptive to continue speaking.      Patient is  and resides with his sister in apartment at [address], [address]. [address]. Patient unable to  identify a caregiver at this time. Patient has a strong family, social support,  which also includes his brother Austin Woody ([phone]) who daughter  states is HCP and reports housing is safe and stable. Patient is insured  through Medicaid ([id number]) and Medicare ([id number]). Patients PCP is Dr. Robert Schrader ([phone]) in [place].  ADVANCE DIRECTIVES:    DNR  MOLST  [ ]  Living Will  [ ]   DECISION MAKER(s):  [ ] Health Care Proxy(s)  [ ] Surrogate(s)  [ ] Guardian           Name(s): Phone Number(s):    BASELINE (I)ADL(s) (prior to admission):  [place]: [ ]Total  [ ] Moderate [ ]Dependent    Allergies    No Known Allergies    Intolerances    MEDICATIONS  (STANDING):  acetylcysteine 20%  Inhalation 4 milliLiter(s) Inhalation every 6 hours  albuterol/ipratropium for Nebulization 3 milliLiter(s) Nebulizer every 6 hours  amLODIPine   Tablet 10 milliGRAM(s) Oral daily  cefepime   IVPB      cefepime   IVPB 1000 milliGRAM(s) IV Intermittent every 8 hours  dextrose 40% Gel 15 Gram(s) Oral once  dextrose 5%. 1000 milliLiter(s) (50 mL/Hr) IV Continuous <Continuous>  dextrose 50% Injectable 25 Gram(s) IV Push once  dextrose 50% Injectable 12.5 Gram(s) IV Push once  dextrose 50% Injectable 25 Gram(s) IV Push once  enoxaparin Injectable 40 milliGRAM(s) SubCutaneous every 12 hours  glucagon  Injectable 1 milliGRAM(s) IntraMuscular once  insulin lispro (ADMELOG) corrective regimen sliding scale   SubCutaneous every 6 hours  insulin NPH human recombinant 13 Unit(s) SubCutaneous every 6 hours  lacosamide IVPB 100 milliGRAM(s) IV Intermittent once  lacosamide Solution 100 milliGRAM(s) Oral two times a day  losartan 100 milliGRAM(s) Oral daily  nystatin Powder 1 Application(s) Topical two times a day  senna 2 Tablet(s) Oral at bedtime  sodium chloride 3%  Inhalation 4 milliLiter(s) Inhalation every 6 hours    MEDICATIONS  (PRN):  acetaminophen    Suspension .. 650 milliGRAM(s) Oral every 6 hours PRN Temp greater or equal to 38C (100.4F), Mild Pain (1 - 3)    PRESENT SYMPTOMS: [ ]Unable to obtain due to poor mentation   Source if other than patient:  [ ]Family   [ ]Team     Pain: [ ]yes [ ]no  QOL impact -   Location -                    Aggravating factors -  Quality -  Radiation -  Timing-  Severity (0-10 scale):  Minimal acceptable level (0-10 scale):     CPOT:    https://www.[facility].org/getattachment/[email or web address]/Critical-Care-Pain-Observation-Tool-(CPOT)      PAIN AD Score:     http://geriatrictoolkit.[facility]/cog/painad.pdf (press ctrl +  left click to view)    Dyspnea:                           [ ]Mild [ ]Moderate [ ]Severe  Anxiety:                             [ ]Mild [ ]Moderate [ ]Severe  Fatigue:                             [ ]Mild [ ]Moderate [ ]Severe  Nausea:                             [ ]Mild [ ]Moderate [ ]Severe  Loss of appetite:              [ ]Mild [ ]Moderate [ ]Severe  Constipation:                    [ ]Mild [ ]Moderate [ ]Severe    Other Symptoms:  [ ]All other review of systems negative     Palliative Performance Status Version 2:         %    http://npcrc.org/files/news/palliative_performance_scale_ppsv2.pdf  PHYSICAL EXAM:  Vital Signs Last 24 Hrs  T(C): 36.4 (29 Jan 2022 04:33), Max: 37.2 (28 Jan 2022 13:41)  T(F): 97.5 (29 Jan 2022 04:33), Max: 98.9 (28 Jan 2022 13:41)  HR: 93 (29 Jan 2022 04:33) (85 - 93)  BP: 111/66 (29 Jan 2022 04:33) (111/66 - 148/71)  BP(mean): --  RR: 18 (29 Jan 2022 04:33) (18 - 22)  SpO2: 95% (29 Jan 2022 04:33) (95% - 98%) I&O's Summary    27 Jan 2022 07:01  -  28 Jan 2022 07:00  --------------------------------------------------------  IN: 0 mL / OUT: 500 mL / NET: -500 mL    28 Jan 2022 07:01  -  29 Jan 2022 04:43  --------------------------------------------------------  IN: 0 mL / OUT: 725 mL / NET: -725 mL      GENERAL:  [ ]Alert  [ ]Oriented x   [ ]Lethargic  [ ]Cachexia  [ ]Unarousable  [ ]Verbal  [ ]Non-Verbal  Behavioral:   [ ] Anxiety  [ ] Delirium [ ] Agitation [ ] Other  HEENT:  [ ]Normal   [ ]Dry mouth   [ ]ET Tube/Trach  [ ]Oral lesions  PULMONARY:   [ ]Clear [ ]Tachypnea  [ ]Audible excessive secretions   [ ]Rhonchi        [ ]Right [ ]Left [ ]Bilateral  [ ]Crackles        [ ]Right [ ]Left [ ]Bilateral  [ ]Wheezing     [ ]Right [ ]Left [ ]Bilateral  [ ]Diminished breath sounds [ ]right [ ]left [ ]bilateral  CARDIOVASCULAR:    [ ]Regular [ ]Irregular [ ]Tachy  [ ]Alex [ ]Murmur [ ]Other  GASTROINTESTINAL:  [ ]Soft  [ ]Distended   [ ]+BS  [ ]Non tender [ ]Tender  [ ]PEG [ ]OGT/ NGT  Last BM:   GENITOURINARY:  [ ]Normal [ ] Incontinent   [ ]Oliguria/Anuria   [ ]Chavarria  MUSCULOSKELETAL:   [ ]Normal   [ ]Weakness  [ ]Bed/Wheelchair bound [ ]Edema  NEUROLOGIC:   [ ]No focal deficits  [ ]Cognitive impairment  [ ]Dysphagia [ ]Dysarthria [ ]Paresis [ ]Other   SKIN:   [ ]Normal    [ ]Rash  [ ]Pressure ulcer(s)       Present on admission [ ]y [ ]n    CRITICAL CARE:  [ ] Shock Present  [ ]Septic [ ]Cardiogenic [ ]Neurologic [ ]Hypovolemic  [ ]  Vasopressors [ ]  Inotropes   [ ]Respiratory failure present [ ]Mechanical ventilation [ ]Non-invasive ventilatory support [ ]High flow    [ ]Acute  [ ]Chronic [ ]Hypoxic  [ ]Hypercarbic [ ]Other  [ ]Other organ failure     LABS:                        12.0   6.72  )-----------( 205      ( 28 Jan 2022 07:20 )             38.1   01-28    146<H>  |  102  |  39<H>  ----------------------------<  191<H>  4.0   |  32<H>  |  0.97    Ca    10.7<H>      28 Jan 2022 07:23  Phos  3.8     01-28  Mg     2.6     01-28          RADIOLOGY & ADDITIONAL STUDIES:  c< from: CT Head No Cont (01.27.22 @ 22:13) >    Impression: Acute parenchymal hemorrhage is again seen with some expected   evolutionary changes.    Arachnoid cyst again seen as described above.    --- End of Report ---            BE TOUSSAINT MD; Attending Radiologist  This document has been electronically signed. Jan 28 2022  9:04AM    < end of copied text >    PROTEIN CALORIE MALNUTRITION PRESENT: [ ]mild [ ]moderate [ ]severe [ ]underweight [ ]morbid obesity  https://www.andeal.org/vault/2440/web/files/ONC/Table_Clinical%20Characteristics%20to%20Document%20Malnutrition-White%20JV%20et%20al%202012.pdf      Weight (kg): 140.6 (01-15-22 @ 14:42)    [ ]PPSV2 < or = to 30% [ ]significant weight loss  [ ]poor nutritional intake  [ ]anasarca      [ ]Artificial Nutrition      REFERRALS:   [ ]Chaplaincy  [ ]Hospice  [ ]Child Life  [ ]Social Work  [ ]Case management [ ]Holistic Therapy     Goals of Care Document:  This is not my initial note but a template.   HPI:81y m W/ PMHx HTN presenting to the ED from [facility] as a transfer for R frontal parenchymal hemorrhage on CT after fall at home, now on medicine floors from [facility]    Overnight events: The patient developed respiratory failure and is now intubated in the MICU     PERTINENT PM/SXH: HTN       FAMILY HISTORY: Unknown     ITEMS NOT CHECKED ARE NOT PRESENT    SOCIAL HISTORY:   Significant other/partner[ ]  Children[ ]  Voodoo/Spirituality:  Substance hx:  [ ]   Tobacco hx:  [ ]   Alcohol hx: [ ]   Home Opioid hx:  [ ] I-Stop Reference No:  Living Situation: [ ]Home  [ ]Long term care  [ ]Rehab [ ]Other  LMSW contacted patients daughter  Maria C Woody ([phone]) for supportive interview and collateral  information. LMSW introduced self and explained social works role. Patients  daughter affect was appropriate for current situation, verbalized good  understanding and was receptive to continue speaking.      Patient is  and resides with his sister in apartment at [address] [place] [address], [address]. [address] building. Patient unable to  identify a caregiver at this time. Patient has a strong family, social support,  which also includes his brother Austin Woody ([phone]) who daughter  states is HCP and reports housing is safe and stable. Patient is insured  through Medicaid ([id number]) and Medicare ([id number]). Patients PCP is Dr. Robert Schrader ([phone]) in [place].  ADVANCE DIRECTIVES:    DNR  MOLST  [ ]  Living Will  [ ]   DECISION MAKER(s):  [ ] Health Care Proxy(s)  [ ] Surrogate(s)  [ ] Guardian           Name(s): Phone Number(s):    BASELINE (I)ADL(s) (prior to admission):  [place]: [ ]Total  [ ] Moderate [ ]Dependent    Allergies    No Known Allergies    Intolerances    MEDICATIONS  (STANDING):  acetylcysteine 20%  Inhalation 4 milliLiter(s) Inhalation every 6 hours  albuterol/ipratropium for Nebulization 3 milliLiter(s) Nebulizer every 6 hours  amLODIPine   Tablet 10 milliGRAM(s) Oral daily  cefepime   IVPB      cefepime   IVPB 1000 milliGRAM(s) IV Intermittent every 8 hours  dextrose 40% Gel 15 Gram(s) Oral once  dextrose 5%. 1000 milliLiter(s) (50 mL/Hr) IV Continuous <Continuous>  dextrose 50% Injectable 25 Gram(s) IV Push once  dextrose 50% Injectable 12.5 Gram(s) IV Push once  dextrose 50% Injectable 25 Gram(s) IV Push once  enoxaparin Injectable 40 milliGRAM(s) SubCutaneous every 12 hours  glucagon  Injectable 1 milliGRAM(s) IntraMuscular once  insulin lispro (ADMELOG) corrective regimen sliding scale   SubCutaneous every 6 hours  insulin NPH human recombinant 13 Unit(s) SubCutaneous every 6 hours  lacosamide IVPB 100 milliGRAM(s) IV Intermittent once  lacosamide Solution 100 milliGRAM(s) Oral two times a day  losartan 100 milliGRAM(s) Oral daily  nystatin Powder 1 Application(s) Topical two times a day  senna 2 Tablet(s) Oral at bedtime  sodium chloride 3%  Inhalation 4 milliLiter(s) Inhalation every 6 hours    MEDICATIONS  (PRN):  acetaminophen    Suspension .. 650 milliGRAM(s) Oral every 6 hours PRN Temp greater or equal to 38C (100.4F), Mild Pain (1 - 3)    PRESENT SYMPTOMS: [x ]Unable to obtain due to poor mentation   Source if other than patient:  [ ]Family   [ ]Team     Pain: [ ]yes [x ]no  QOL impact -   Location -                    Aggravating factors -  Quality -  Radiation -  Timing-  Severity (0-10 scale):  Minimal acceptable level (0-10 scale):     CPOT:    https://www.[facility].org/getattachment/[email or web address]/Critical-Care-Pain-Observation-Tool-(CPOT)      PAIN AD Score: 0    http://geriatrictoolkit.[place].[facility]/cog/painad.pdf (press ctrl +  left click to view)    Dyspnea:                           [ ]Mild [ ]Moderate [ ]Severe  Anxiety:                             [ ]Mild [ ]Moderate [ ]Severe  Fatigue:                             [ ]Mild [ ]Moderate [ ]Severe  Nausea:                             [ ]Mild [ ]Moderate [ ]Severe  Loss of appetite:              [ ]Mild [ ]Moderate [ ]Severe  Constipation:                    [ ]Mild [ ]Moderate [ ]Severe    Other Symptoms:  [ ]All other review of systems negative     Palliative Performance Status Version 2:   10      %    http://[facility].org/files/news/palliative_performance_scale_ppsv2.pdf  PHYSICAL EXAM:  Vital Signs Last 24 Hrs  T(C): 36.4 (29 Jan 2022 04:33), Max: 37.2 (28 Jan 2022 13:41)  T(F): 97.5 (29 Jan 2022 04:33), Max: 98.9 (28 Jan 2022 13:41)  HR: 93 (29 Jan 2022 04:33) (85 - 93)  BP: 111/66 (29 Jan 2022 04:33) (111/66 - 148/71)  BP(mean): --  RR: 18 (29 Jan 2022 04:33) (18 - 22)  SpO2: 95% (29 Jan 2022 04:33) (95% - 98%) I&O's Summary    27 Jan 2022 07:01  -  28 Jan 2022 07:00  --------------------------------------------------------  IN: 0 mL / OUT: 500 mL / NET: -500 mL    28 Jan 2022 07:01  -  29 Jan 2022 04:43  --------------------------------------------------------  IN: 0 mL / OUT: 725 mL / NET: -725 mL      GENERAL:  [ ]Alert  [ ]Oriented x   [ ]Lethargic  [ ]Cachexia  [x ]Unarousable  [ ]Verbal  [ ]Non-Verbal  Behavioral:   [ ] Anxiety  [ ] Delirium [ ] Agitation [ ] Other  HEENT:  [ ]Normal   [ ]Dry mouth   [ x]ET Tube/Trach  [ ]Oral lesions  PULMONARY:   On Mechanical Ventilation   CARDIOVASCULAR:    [x] Regular [ ]Irregular [ ]Tachy  [ ]Alex [ ]Murmur [ ]Other  GASTROINTESTINAL:  [ ]Soft  [ ]Distended   [ ]+BS  [x ]Non tender [ ]Tender  [ ]PEG [ ]OGT/ NGT  Last BM: 1/25  GENITOURINARY:  [ ]Normal [x ] Incontinent   [ ]Oliguria/Anuria   [ ]Chavarria  MUSCULOSKELETAL:   [ ]Normal   [ x]Weakness  [ ]Bed/Wheelchair bound [ ]Edema  NEUROLOGIC:   [ ]No focal deficits  [ ]Cognitive impairment  [ ]Dysphagia [ ]Dysarthria [ ]Paresis [x ]Other: Sedated   SKIN:   [ ]Normal    [ ]Rash  [ ]Pressure ulcer(s)       Present on admission [ ]y [ ]n  BL foot with diabetic ulcers.   Left Iliac crest wound   CRITICAL CARE:  [ ] Shock Present  [ ]Septic [ ]Cardiogenic [ ]Neurologic [ ]Hypovolemic  [ ]  Vasopressors [ ]  Inotropes   [ ]Respiratory failure present [ ]Mechanical ventilation [ ]Non-invasive ventilatory support [ ]High flow    [ ]Acute  [ ]Chronic [ ]Hypoxic  [ ]Hypercarbic [ ]Other  [ ]Other organ failure     LABS:                        12.0   6.72  )-----------( 205      ( 28 Jan 2022 07:20 )             38.1   01-28    146<H>  |  102  |  39<H>  ----------------------------<  191<H>  4.0   |  32<H>  |  0.97    Ca    10.7<H>      28 Jan 2022 07:23  Phos  3.8     01-28  Mg     2.6     01-28          RADIOLOGY & ADDITIONAL STUDIES:  c< from: CT Head No Cont (01.27.22 @ 22:13) >    Impression: Acute parenchymal hemorrhage is again seen with some expected   evolutionary changes.    Arachnoid cyst again seen as described above.    --- End of Report ---            BE TOUSSAINT MD; Attending Radiologist  This document has been electronically signed. Jan 28 2022  9:04AM    < end of copied text >    PROTEIN CALORIE MALNUTRITION PRESENT: [ ]mild [ ]moderate [ ]severe [ ]underweight [ ]morbid obesity  https://www.andeal.org/vault/5780/web/files/ONC/Table_Clinical%20Characteristics%20to%20Document%20Malnutrition-White%20JV%20et%20al%202012.pdf      Weight (kg): 140.6 (01-15-22 @ 14:42)    [ ]PPSV2 < or = to 30% [ ]significant weight loss  [ ]poor nutritional intake  [ ]anasarca      [ ]Artificial Nutrition      REFERRALS:   [ ]Chaplaincy  [ ]Hospice  [ ]Child Life  [ ]Social Work  [ ]Case management [ ]Holistic Therapy     Goals of Care Document:

## 2022-01-29 NOTE — PROGRESS NOTE ADULT - ASSESSMENT
81y m W/  HTN presenting to the ED from [facility] as a transfer for R frontal parenchymal hemorrhage on CT after fall at home.  CTH R hemisphere IPH  CTA H/N no aneurysm or AVM  repeat CTH stable   1/26 CT chest wtih opacities  1/27 CTH normal evolutionary changes no new findings   1/29 RRT for hypoxia. intubated.   Impression: IPH of unclear etiology.  may be 2/2 HTN. need to r/o CAA or underlying mass   - rising white count. hypoxia on NRBM. MICU called after RRT intubated in  ICU now for acute hypoxic respiratory failure   - f/u repeat CTH ordred  - f/u palliative   - now on cefepime for infection. monitor for seizure as it can lower seizure threshold and cause change in mental status   - SBP <160  - DVT ppx okay. hold AC/AP  - now on vimpat 100mg BID  - rEEG when able  - MRI brain w/ and w/o when able  - GI for PEG?, now with NGT  - no need for hypertonics  - cardio following. known thoracic aneurysm s/p repair.   - f/uj pulmo for resp status. on face tent  - telemetry  - PT/OT/SS/SLP, OOBC  - check FS, glucose control <180  - GI/DVT ppx  - Thank you for allowing me to participate in the care of this patient. Call with questions.   Carlin Neal MD  Vascular Neurology  Office: [phone]

## 2022-01-29 NOTE — PROGRESS NOTE ADULT - ASSESSMENT
81y m W/ PMHx HTN presenting to the ED from [facility] as a transfer for R frontal parenchymal hemorrhage on CT after fall at home, now on medicine floors from [facility]

## 2022-01-29 NOTE — PATIENT PROFILE ADULT - FALL HARM RISK - HARM RISK INTERVENTIONS

## 2022-01-29 NOTE — CONSULT NOTE ADULT - PROBLEM SELECTOR RECOMMENDATION 3
On NGT feeds but now having issues with NGT  Aspiration precautions.   Seen by S&S on 1/16 and recommending NPO and, as per medicine notes, GI was consulted for PEG based on GOC.

## 2022-01-29 NOTE — PROCEDURE NOTE - NSPROCDETAILS_GEN_ALL_CORE
patient pre-oxygenated, tube inserted, placement confirmed
patient pre-oxygenated, tube inserted, placement confirmed

## 2022-01-29 NOTE — PROCEDURE NOTE - NSTRACHPOSTINTU_RESP_A_CORE
Appropriate capnography/Breath sounds bilateral/Breath sounds equal/Chest excursion noted/Positive end tidal Co2 noted
Chest X-Ray

## 2022-01-29 NOTE — RAPID RESPONSE TEAM SUMMARY - NSSITUATIONBACKGROUNDRRT_GEN_ALL_CORE
81y m W/ PMHx HTN presenting to the ED from [facility] as a transfer for R frontal parenchymal hemorrhage on CT after fall at home, now on medicine floors from NSICU. Mental status continued to be poor on floors, AOx0, muttering nonsense, non-purposeful movements. CTH repeat 1/28 w/ improvement in IPH. RRT called for AMS and hypoxia to 90 on NRB. Patient significantly obtunded, unresponsive to most stimuli, pinpoint pupils. Coarse breath sounds, concern for aspiration. Patient intubated for hypoxia and inability to protect airway. Started on propofol and levophed, sent to MICU.

## 2022-01-29 NOTE — PROGRESS NOTE ADULT - SUBJECTIVE AND OBJECTIVE BOX
DATE OF SERVICE: 01-29-22 @ 14:26    Subjective: Patient seen and examined. No new events except as noted.     SUBJECTIVE/ROS:  pt now in ICU intubated with hypoxic respiratory failure     MEDICATIONS:  MEDICATIONS  (STANDING):  albuterol/ipratropium for Nebulization 3 milliLiter(s) Nebulizer every 6 hours  cefepime   IVPB 1000 milliGRAM(s) IV Intermittent every 8 hours  chlorhexidine 0.12% Liquid 15 milliLiter(s) Oral Mucosa every 12 hours  enoxaparin Injectable 40 milliGRAM(s) SubCutaneous every 12 hours  insulin lispro (ADMELOG) corrective regimen sliding scale   SubCutaneous every 4 hours  insulin NPH human recombinant 13 Unit(s) SubCutaneous every 6 hours  lacosamide Solution 100 milliGRAM(s) Oral two times a day  norepinephrine Infusion 0.05 MICROgram(s)/kG/Min (13.2 mL/Hr) IV Continuous <Continuous>  nystatin Cream 1 Application(s) Topical every 12 hours  propofol Infusion 10 MICROgram(s)/kG/Min (8.44 mL/Hr) IV Continuous <Continuous>  senna 2 Tablet(s) Oral at bedtime  sodium chloride 0.9% for Nebulization 3 milliLiter(s) Nebulizer every 6 hours      PHYSICAL EXAM:  T(C): 37.1 (01-29-22 @ 09:03), Max: 37.1 (01-29-22 @ 09:03)  HR: 86 (01-29-22 @ 12:04) (86 - 101)  BP: 116/67 (01-29-22 @ 10:45) (73/52 - 178/86)  RR: 12 (01-29-22 @ 10:45) (10 - 18)  SpO2: 99% (01-29-22 @ 12:04) (94% - 100%)  Wt(kg): --  I&O's Summary    28 Jan 2022 07:01  -  29 Jan 2022 07:00  --------------------------------------------------------  IN: 0 mL / OUT: 975 mL / NET: -975 mL      Height (cm): 187.9 (01-29 @ 09:03)      Appearance: on vent 	  Cardiovascular: Normal S1 S2,    Murmur:   Neck: JVP limited to be evaluated   Respiratory: Lungs few rhonchi   Gastrointestinal:  Soft  Skin: normal   Neuro: limited as pt on vent      LABS/DATA:    CARDIAC MARKERS:  CARDIAC MARKERS ( 29 Jan 2022 10:28 )  x     / x     / 3050 U/L / x     / 13.5 ng/mL                                13.0   14.36 )-----------( 312      ( 29 Jan 2022 10:28 )             42.9     01-29    143  |  100  |  52<H>  ----------------------------<  190<H>  5.4<H>   |  32<H>  |  1.33<H>    Ca    10.7<H>      29 Jan 2022 10:28  Phos  5.3     01-29  Mg     3.0     01-29    TPro  8.1  /  Alb  3.8  /  TBili  0.8  /  DBili  x   /  AST  74<H>  /  ALT  30  /  AlkPhos  76  01-29    proBNP:   Lipid Profile:   HgA1c:   TSH:     TELE:  EKG:

## 2022-01-29 NOTE — CONSULT NOTE ADULT - PROBLEM SELECTOR RECOMMENDATION 5
Sammy Swan MD   Geriatrics and Palliative Care (GAP) Consult Service    of Geriatric and Palliative Medicine  [facility]      Please page the following number for clinical matters between the hours of 9 am and 5 pm from Monday through Friday : [phone]    After 5pm and on weekends, please see the contact information below:    In the event of newly developing, evolving, or worsening symptoms, please contact the Palliative Medicine team via pager (if the patient is at [facility] #[phone] or if the patient is at [facility] #[phone]) The Geriatric and Palliative Medicine service has coverage 24 hours a day/ 7 days a week to provide medical recommendations regarding symptom management needs via telephone

## 2022-01-29 NOTE — CHART NOTE - NSCHARTNOTEFT_GEN_A_CORE
: Roverto Chávez    INDICATION:    PROCEDURE:  [xx ] LIMITED ECHO  [xx ] LIMITED CHEST  [ ] LIMITED RETROPERITONEAL  [ ] LIMITED ABDOMINAL  [ ] LIMITED DVT  [ ] NEEDLE GUIDANCE VASCULAR  [ ] NEEDLE GUIDANCE THORACENTESIS  [ ] NEEDLE GUIDANCE PARACENTESIS  [ ] NEEDLE GUIDANCE PERICARDIOCENTESIS  [ ] OTHER    FINDINGS:  A lines anteriorly with few focal B lines, small areas of consolidation bilateral lower lung fields, curtain sign appreciated    Good LVFx RV<LV, VTI 19 unable to visualize IVC    INTERPRETATION:  bilateral lower lung field consolidations, may represent pneum     Good LVFx

## 2022-01-29 NOTE — CONSULT NOTE ADULT - ASSESSMENT
81y m W/ PMHx HTN presenting to the ED from [facility] as a transfer for R frontal parenchymal hemorrhage on CT after fall at home, now on medicine floors from [facility]

## 2022-01-29 NOTE — PROGRESS NOTE ADULT - PROBLEM SELECTOR PLAN 2
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

## 2022-01-30 LAB
ALBUMIN SERPL ELPH-MCNC: 3.3 G/DL — SIGNIFICANT CHANGE UP (ref 3.3–5)
ALP SERPL-CCNC: 70 U/L — SIGNIFICANT CHANGE UP (ref 40–120)
ALT FLD-CCNC: 32 U/L — SIGNIFICANT CHANGE UP (ref 10–45)
ANION GAP SERPL CALC-SCNC: 11 MMOL/L — SIGNIFICANT CHANGE UP (ref 5–17)
APTT BLD: 34.3 SEC — SIGNIFICANT CHANGE UP (ref 27.5–35.5)
AST SERPL-CCNC: 82 U/L — HIGH (ref 10–40)
BILIRUB SERPL-MCNC: 0.5 MG/DL — SIGNIFICANT CHANGE UP (ref 0.2–1.2)
BUN SERPL-MCNC: 60 MG/DL — HIGH (ref 7–23)
CALCIUM SERPL-MCNC: 10.5 MG/DL — SIGNIFICANT CHANGE UP (ref 8.4–10.5)
CHLORIDE SERPL-SCNC: 103 MMOL/L — SIGNIFICANT CHANGE UP (ref 96–108)
CO2 SERPL-SCNC: 31 MMOL/L — SIGNIFICANT CHANGE UP (ref 22–31)
CREAT SERPL-MCNC: 1.06 MG/DL — SIGNIFICANT CHANGE UP (ref 0.5–1.3)
CULTURE RESULTS: SIGNIFICANT CHANGE UP
CULTURE RESULTS: SIGNIFICANT CHANGE UP
GAS PNL BLDA: SIGNIFICANT CHANGE UP
GLUCOSE BLDC GLUCOMTR-MCNC: 176 MG/DL — HIGH (ref 70–99)
GLUCOSE BLDC GLUCOMTR-MCNC: 178 MG/DL — HIGH (ref 70–99)
GLUCOSE BLDC GLUCOMTR-MCNC: 185 MG/DL — HIGH (ref 70–99)
GLUCOSE BLDC GLUCOMTR-MCNC: 209 MG/DL — HIGH (ref 70–99)
GLUCOSE SERPL-MCNC: 207 MG/DL — HIGH (ref 70–99)
GRAM STN FLD: SIGNIFICANT CHANGE UP
HCT VFR BLD CALC: 37.4 % — LOW (ref 39–50)
HGB BLD-MCNC: 11.4 G/DL — LOW (ref 13–17)
INR BLD: 1.21 RATIO — HIGH (ref 0.88–1.16)
MAGNESIUM SERPL-MCNC: 2.9 MG/DL — HIGH (ref 1.6–2.6)
MCHC RBC-ENTMCNC: 30.2 PG — SIGNIFICANT CHANGE UP (ref 27–34)
MCHC RBC-ENTMCNC: 30.5 GM/DL — LOW (ref 32–36)
MCV RBC AUTO: 99.2 FL — SIGNIFICANT CHANGE UP (ref 80–100)
MRSA PCR RESULT.: SIGNIFICANT CHANGE UP
NRBC # BLD: 0 /100 WBCS — SIGNIFICANT CHANGE UP (ref 0–0)
PHOSPHATE SERPL-MCNC: 3.9 MG/DL — SIGNIFICANT CHANGE UP (ref 2.5–4.5)
PLATELET # BLD AUTO: 241 K/UL — SIGNIFICANT CHANGE UP (ref 150–400)
POTASSIUM SERPL-MCNC: 4.6 MMOL/L — SIGNIFICANT CHANGE UP (ref 3.5–5.3)
POTASSIUM SERPL-SCNC: 4.6 MMOL/L — SIGNIFICANT CHANGE UP (ref 3.5–5.3)
PROT SERPL-MCNC: 7.1 G/DL — SIGNIFICANT CHANGE UP (ref 6–8.3)
PROTHROM AB SERPL-ACNC: 14.4 SEC — HIGH (ref 10.6–13.6)
RBC # BLD: 3.77 M/UL — LOW (ref 4.2–5.8)
RBC # FLD: 13.1 % — SIGNIFICANT CHANGE UP (ref 10.3–14.5)
S AUREUS DNA NOSE QL NAA+PROBE: SIGNIFICANT CHANGE UP
SODIUM SERPL-SCNC: 145 MMOL/L — SIGNIFICANT CHANGE UP (ref 135–145)
SPECIMEN SOURCE: SIGNIFICANT CHANGE UP
WBC # BLD: 10.07 K/UL — SIGNIFICANT CHANGE UP (ref 3.8–10.5)
WBC # FLD AUTO: 10.07 K/UL — SIGNIFICANT CHANGE UP (ref 3.8–10.5)

## 2022-01-30 PROCEDURE — 71045 X-RAY EXAM CHEST 1 VIEW: CPT | Mod: 26,77,76

## 2022-01-30 PROCEDURE — 71045 X-RAY EXAM CHEST 1 VIEW: CPT | Mod: 26,76

## 2022-01-30 PROCEDURE — 70450 CT HEAD/BRAIN W/O DYE: CPT | Mod: 26

## 2022-01-30 PROCEDURE — 95816 EEG AWAKE AND DROWSY: CPT | Mod: 26

## 2022-01-30 PROCEDURE — 99291 CRITICAL CARE FIRST HOUR: CPT

## 2022-01-30 RX ORDER — VANCOMYCIN HCL 1 G
1000 VIAL (EA) INTRAVENOUS EVERY 12 HOURS
Refills: 0 | Status: DISCONTINUED | OUTPATIENT
Start: 2022-01-30 | End: 2022-01-30

## 2022-01-30 RX ORDER — INSULIN LISPRO 100/ML
VIAL (ML) SUBCUTANEOUS EVERY 6 HOURS
Refills: 0 | Status: DISCONTINUED | OUTPATIENT
Start: 2022-01-30 | End: 2022-02-10

## 2022-01-30 RX ORDER — DEXMEDETOMIDINE HYDROCHLORIDE IN 0.9% SODIUM CHLORIDE 4 UG/ML
0.2 INJECTION INTRAVENOUS
Qty: 200 | Refills: 0 | Status: DISCONTINUED | OUTPATIENT
Start: 2022-01-30 | End: 2022-01-30

## 2022-01-30 RX ADMIN — CEFEPIME 100 MILLIGRAM(S): 1 INJECTION, POWDER, FOR SOLUTION INTRAMUSCULAR; INTRAVENOUS at 22:01

## 2022-01-30 RX ADMIN — Medication 3 MILLILITER(S): at 11:30

## 2022-01-30 RX ADMIN — SODIUM CHLORIDE 3 MILLILITER(S): 9 INJECTION INTRAMUSCULAR; INTRAVENOUS; SUBCUTANEOUS at 11:30

## 2022-01-30 RX ADMIN — SODIUM CHLORIDE 3 MILLILITER(S): 9 INJECTION INTRAMUSCULAR; INTRAVENOUS; SUBCUTANEOUS at 05:30

## 2022-01-30 RX ADMIN — Medication 250 MILLIGRAM(S): at 17:48

## 2022-01-30 RX ADMIN — NYSTATIN CREAM 1 APPLICATION(S): 100000 CREAM TOPICAL at 17:49

## 2022-01-30 RX ADMIN — CEFEPIME 100 MILLIGRAM(S): 1 INJECTION, POWDER, FOR SOLUTION INTRAMUSCULAR; INTRAVENOUS at 06:25

## 2022-01-30 RX ADMIN — CHLORHEXIDINE GLUCONATE 15 MILLILITER(S): 213 SOLUTION TOPICAL at 06:25

## 2022-01-30 RX ADMIN — Medication 2: at 01:01

## 2022-01-30 RX ADMIN — Medication 3 MILLILITER(S): at 23:03

## 2022-01-30 RX ADMIN — Medication 2: at 06:24

## 2022-01-30 RX ADMIN — Medication 4: at 17:48

## 2022-01-30 RX ADMIN — LACOSAMIDE 100 MILLIGRAM(S): 50 TABLET ORAL at 17:47

## 2022-01-30 RX ADMIN — ENOXAPARIN SODIUM 40 MILLIGRAM(S): 100 INJECTION SUBCUTANEOUS at 17:48

## 2022-01-30 RX ADMIN — SODIUM CHLORIDE 3 MILLILITER(S): 9 INJECTION INTRAMUSCULAR; INTRAVENOUS; SUBCUTANEOUS at 17:59

## 2022-01-30 RX ADMIN — Medication 3 MILLILITER(S): at 05:30

## 2022-01-30 RX ADMIN — CEFEPIME 100 MILLIGRAM(S): 1 INJECTION, POWDER, FOR SOLUTION INTRAMUSCULAR; INTRAVENOUS at 13:59

## 2022-01-30 RX ADMIN — SODIUM CHLORIDE 3 MILLILITER(S): 9 INJECTION INTRAMUSCULAR; INTRAVENOUS; SUBCUTANEOUS at 23:03

## 2022-01-30 RX ADMIN — ENOXAPARIN SODIUM 40 MILLIGRAM(S): 100 INJECTION SUBCUTANEOUS at 06:25

## 2022-01-30 RX ADMIN — Medication 3 MILLILITER(S): at 17:59

## 2022-01-30 RX ADMIN — Medication 2: at 12:44

## 2022-01-30 RX ADMIN — NYSTATIN CREAM 1 APPLICATION(S): 100000 CREAM TOPICAL at 06:25

## 2022-01-30 RX ADMIN — LACOSAMIDE 100 MILLIGRAM(S): 50 TABLET ORAL at 06:24

## 2022-01-30 NOTE — EEG REPORT - NS EEG TEXT BOX
[facility]   COMPREHENSIVE EPILEPSY CENTER   REPORT OF ROUTINE VIDEO EEG     [facility]: [address] [place] [address] [address], [place] [address], Ph#: [phone]  [facility]: [id number], [place] [address], Ph#: [phone]  [facility]: [address] [place] [address], Ph#: [phone]    Patient Name: LEV FUNDATOR  Age and : 81y (42)  MRN #: [id number]  Location: [address]  Referring Physician: Malachi Michaud    Study Date: 22  _____________________________________________________________  TECHNICAL INFORMATION    Placement and Labeling of Electrodes:  The EEG was performed utilizing 20 channels referential EEG connections (coronal over temporal over parasagittal montage) using all standard 10-20 electrode placements with EKG.  Recording was at a sampling rate of 256 samples per second per channel.  Time synchronized digital video recording was done simultaneously with EEG recording.  A low light infrared camera was used for low light recording.  Landon and seizure detection algorithms were utilized.    _____________________________________________________________  HISTORY    Patient is a 81y old  Male who presents with a chief complaint of R ICH (2022 11:11)      PERTINENT MEDICATION:  dexMEDEtomidine Infusion 0.2 MICROgram(s)/kG/Hr (7.03 mL/Hr) IV Continuous <Continuous>  lacosamide Solution 100 milliGRAM(s) Oral two times a day  _____________________________________________________________  STUDY INTERPRETATION    Findings: The background was continuous and reactive. During maximal alerting, the background consisted of mainly theta frequency activity. No posterior dominant rhythm seen.    Background Slowing:  Diffuse theta and polymorphic delta slowing.    Focal Slowing:   Continuous theta/delta slowing in the right posterior head region.     Sleep Background:  Drowsiness was characterized by fragmentation, attenuation, and slowing of the background activity.    Stage II sleep transients were not appreciated    Other Non-Epileptiform Findings:  None were present.  Excess diffuse beta activity.    Interictal Epileptiform Activity:   Abundant sharp wave discharges over the right posterior quadrant (parieto-occipitally), at times quasi periodic to 1Hz.  Rare sharp wave discharges over left parietal region.    Events:  Clinical events: None recorded  Seizures: None recorded.    Activation Procedures:   Hyperventilation was not performed.    Photic stimulation was performed and did not elicit abnormalities.    Artifacts:  Intermittent myogenic and movement artifacts were noted.    ECG:  The heart rate on single channel ECG was predominantly between 75-80 BPM.    _____________________________________________________________  EEG SUMMARY/CLASSIFICATION    Abnormal EEG in the altered patient  - Abundant sharp wave discharges over the right posterior quadrant.  - Continuous polymorphic slowing, focal, right hemisphere  - Moderate background slowing, generalized   _____________________________________________________________  EEG IMPRESSION/CLINICAL CORRELATE    Abnormal EEG study.  1. Risk for seizures from the right posterior quadrant and left parietal region.   2. Structural abnormality in the right hemisphere.  3. Moderate multifocal/diffuse nonspecific cerebral dysfunction.    No seizures recorded.    _____________________________________________________________    Jerry Tran MD, TL  Fellow | [facility]        [facility] EEG Reading Room Ph#: [phone]  Epilepsy Answering Service after 5PM and before 8:30AM: Ph#: [phone]      [facility]   COMPREHENSIVE EPILEPSY CENTER   REPORT OF ROUTINE VIDEO EEG     [facility]: [address] [place] [address] [address], [place] [address], Ph#: [phone]  [facility]: [id number], [place] [address], Ph#: [phone]  [facility]: [address] [place] [address], Ph#: [phone]    Patient Name: LEV FUNDATOR  Age and : 81y (42)  MRN #: [id number]  Location: [address]  Referring Physician: Malachi Michaud    Study Date: 22  _____________________________________________________________  TECHNICAL INFORMATION    Placement and Labeling of Electrodes:  The EEG was performed utilizing 20 channels referential EEG connections (coronal over temporal over parasagittal montage) using all standard 10-20 electrode placements with EKG.  Recording was at a sampling rate of 256 samples per second per channel.  Time synchronized digital video recording was done simultaneously with EEG recording.  A low light infrared camera was used for low light recording.  Landon and seizure detection algorithms were utilized.    _____________________________________________________________  HISTORY    Patient is a 81y old  Male who presents with a chief complaint of R ICH (2022 11:11)      PERTINENT MEDICATION:  dexMEDEtomidine Infusion 0.2 MICROgram(s)/kG/Hr (7.03 mL/Hr) IV Continuous <Continuous>  lacosamide Solution 100 milliGRAM(s) Oral two times a day  _____________________________________________________________  STUDY INTERPRETATION    Findings: The background was continuous and reactive. During maximal alerting, the background consisted of mainly theta frequency activity. No posterior dominant rhythm seen.    Background Slowing:  Diffuse theta and polymorphic delta slowing.    Focal Slowing:   Continuous theta/delta slowing in the right posterior head region.     Sleep Background:  Drowsiness was characterized by fragmentation, attenuation, and slowing of the background activity.    Stage II sleep transients were not appreciated    Other Non-Epileptiform Findings:  None were present.  Excess diffuse beta activity.    Interictal Epileptiform Activity:   Abundant sharp wave discharges over the right posterior quadrant (parieto-occipitally), at times quasi periodic to 1Hz.  Rare sharp wave discharges over left parietal region.    Events:  Clinical events: None recorded  Seizures: None recorded.    Activation Procedures:   Hyperventilation was not performed.    Photic stimulation was performed and did not elicit abnormalities.    Artifacts:  Intermittent myogenic and movement artifacts were noted.    ECG:  The heart rate on single channel ECG was predominantly between 75-80 BPM.    _____________________________________________________________  EEG SUMMARY/CLASSIFICATION    Abnormal EEG in the altered patient  - Abundant sharp wave discharges over the right posterior quadrant.  - Continuous polymorphic slowing, focal, right hemisphere  - Moderate background slowing, generalized   _____________________________________________________________  EEG IMPRESSION/CLINICAL CORRELATE    Abnormal EEG study.  1. Risk for seizures from the right posterior quadrant and left parietal region.   2. Structural abnormality in the right hemisphere.  3. Moderate multifocal/diffuse nonspecific cerebral dysfunction.    No seizures recorded.    _____________________________________________________________    Jerry Tran MD, TL  Fellow | [facility]        [facility] EEG Reading Room Ph#: [phone]  Epilepsy Answering Service after 5PM and before 8:30AM: Ph#: [phone]

## 2022-01-30 NOTE — PROGRESS NOTE ADULT - ATTENDING COMMENTS
82 y/o M w/vascular disease and recent COVID infection who was transferred to [facility] for management of traumatic intraparenchymal hemorrhage following a fall w/hospital course c/b acute respiratory failure requiring intubation later extubated now with recurrence of acute hypoxemic respiratory failure likely secondary to aspiration pneumonia. Hypotension likely secondary to severe sepsis with septic shock due to aspiration PNA.     - Fentanyl PRN for discomfort  - Continue mechanical ventilation, will likely require tracheostomy due to poor baseline mental status  - Titrate pressors as needed goal MAP >= 65  - Bedside POCUS with grossly normal RV and LV size and contractility consistent with distributive shock  - Broad spectrum abx, pan culture  - Insulin as needed  - DVT prophylaxis  - Full Code 82 y/o M w/vascular disease and recent COVID infection who was transferred to [facility] for management of traumatic intraparenchymal hemorrhage following a fall w/hospital course c/b acute respiratory failure requiring intubation later extubated now with recurrence of acute hypoxemic respiratory failure likely secondary to aspiration pneumonia. Hypotension likely secondary to severe sepsis with septic shock due to aspiration PNA.     - Fentanyl PRN for discomfort  - Continue mechanical ventilation, SAT/SBT  - Titrate pressors as needed goal MAP >= 65  - Broad spectrum abx, pan culture  - Insulin as needed  - DVT prophylaxis  - Full Code

## 2022-01-30 NOTE — PROGRESS NOTE ADULT - SUBJECTIVE AND OBJECTIVE BOX
DATE OF SERVICE: 01-30-22 @ 11:11    Subjective: Patient seen and examined. No new events except as noted.     SUBJECTIVE/ROS:  ROS is limited as pt currently sedated on ventilator.       MEDICATIONS:  MEDICATIONS  (STANDING):  albuterol/ipratropium for Nebulization 3 milliLiter(s) Nebulizer every 6 hours  cefepime   IVPB 1000 milliGRAM(s) IV Intermittent every 8 hours  chlorhexidine 0.12% Liquid 15 milliLiter(s) Oral Mucosa every 12 hours  enoxaparin Injectable 40 milliGRAM(s) SubCutaneous every 12 hours  insulin lispro (ADMELOG) corrective regimen sliding scale   SubCutaneous every 6 hours  lacosamide Solution 100 milliGRAM(s) Oral two times a day  norepinephrine Infusion 0.05 MICROgram(s)/kG/Min (13.2 mL/Hr) IV Continuous <Continuous>  nystatin Cream 1 Application(s) Topical every 12 hours  propofol Infusion 10 MICROgram(s)/kG/Min (8.44 mL/Hr) IV Continuous <Continuous>  senna 2 Tablet(s) Oral at bedtime  sodium chloride 0.9% for Nebulization 3 milliLiter(s) Nebulizer every 6 hours      PHYSICAL EXAM:  T(C): 37.1 (01-30-22 @ 04:00), Max: 37.1 (01-30-22 @ 04:00)  HR: 75 (01-30-22 @ 10:05) (66 - 87)  BP: 119/72 (01-30-22 @ 10:00) (81/52 - 148/73)  RR: 12 (01-30-22 @ 10:00) (8 - 36)  SpO2: 95% (01-30-22 @ 10:05) (84% - 100%)  Wt(kg): --  I&O's Summary    29 Jan 2022 07:01  -  30 Jan 2022 07:00  --------------------------------------------------------  IN: 1445.1 mL / OUT: 675 mL / NET: 770.1 mL    30 Jan 2022 07:01  -  30 Jan 2022 11:11  --------------------------------------------------------  IN: 51 mL / OUT: 0 mL / NET: 51 mL        Appearance: on vent 	  Cardiovascular: Normal S1 S2,    Murmur:   Neck: JVP limited to be evaluated   Respiratory: Lungs few rhonchi   Gastrointestinal:  Soft  Skin: normal   Neuro: limited as pt on vent      LABS/DATA:    CARDIAC MARKERS:  CARDIAC MARKERS ( 29 Jan 2022 10:28 )  x     / x     / 3050 U/L / x     / 13.5 ng/mL                                11.4   10.07 )-----------( 241      ( 30 Jan 2022 00:29 )             37.4     01-30    145  |  103  |  60<H>  ----------------------------<  207<H>  4.6   |  31  |  1.06    Ca    10.5      30 Jan 2022 00:29  Phos  3.9     01-30  Mg     2.9     01-30    TPro  7.1  /  Alb  3.3  /  TBili  0.5  /  DBili  x   /  AST  82<H>  /  ALT  32  /  AlkPhos  70  01-30    proBNP:   Lipid Profile:   HgA1c:   TSH:     TELE:  EKG:

## 2022-01-30 NOTE — CHART NOTE - NSCHARTNOTEFT_GEN_A_CORE
Neurosurgery contacted regarding patient. Patient not a MIND trial candidate. No further neurosurgical treatment indicated at this time. Care per stroke and primary team.

## 2022-01-30 NOTE — AIRWAY REMOVAL NOTE  ADULT & PEDS - ARTIFICAL AIRWAY REMOVAL COMMENTS
Written order for extubation verified. The patient was identified by full name and birth date compared to the identification band. Present during the procedure was  Dr Duval
Written order for extubation verified.The patient was identified by full name and birth date compared to the identification band. Present during the procedure was Celine Ireland)

## 2022-01-30 NOTE — PROGRESS NOTE ADULT - SUBJECTIVE AND OBJECTIVE BOX
Neurology Progress note  S: patient seen and examined in ICU. still lethargic. CTH  unchanged/normal evolutionary chages . on prop and levo. on vent. CTH 10am today     Brief HPI:  81y m W/  HTN presenting to the ED from [facility] as a transfer for R frontal parenchymal hemorrhage on CT after fall at home. Patient is poor historian but states that he was diagnosed with COVID 2 weeks ago and has been feeling "tired" at home, states he felt dizzy earlier today but unable to state what time he fell or further elucidate. Patient denies AC use. Per EMS was also recently diagnosed with a AAA but has been unable to undergo surgical repair due to recent dx of COVID. Patient still endorsing he feels "tired", denies current headache, vision changes, dizziness, chest pain, nausea, back pain, focal numbness/tingling. Endorses weakness to LLE 2/2 pain. (15 Mono 2022 15:50)    Medications:     MEDICATIONS  (STANDING):  albuterol/ipratropium for Nebulization 3 milliLiter(s) Nebulizer every 6 hours  cefepime   IVPB 1000 milliGRAM(s) IV Intermittent every 8 hours  chlorhexidine 0.12% Liquid 15 milliLiter(s) Oral Mucosa every 12 hours  dexMEDEtomidine Infusion 0.2 MICROgram(s)/kG/Hr (7.03 mL/Hr) IV Continuous <Continuous>  enoxaparin Injectable 40 milliGRAM(s) SubCutaneous every 12 hours  insulin lispro (ADMELOG) corrective regimen sliding scale   SubCutaneous every 6 hours  lacosamide Solution 100 milliGRAM(s) Oral two times a day  norepinephrine Infusion 0.05 MICROgram(s)/kG/Min (13.2 mL/Hr) IV Continuous <Continuous>  nystatin Cream 1 Application(s) Topical every 12 hours  senna 2 Tablet(s) Oral at bedtime  sodium chloride 0.9% for Nebulization 3 milliLiter(s) Nebulizer every 6 hours    MEDICATIONS  (PRN):       Vitals  ICU Vital Signs Last 24 Hrs  T(C): 37.1 (2022 04:00), Max: 37.1 (2022 04:00)  T(F): 98.8 (2022 04:00), Max: 98.8 (2022 04:00)  HR: 93 (2022 11:15) (66 - 93)  BP: 136/71 (2022 11:15) (81/52 - 148/73)  BP(mean): 97 (2022 11:15) (61 - 101)  ABP: --  ABP(mean): --  RR: 20 (2022 11:15) (8 - 36)  SpO2: 93% (2022 11:15) (84% - 100%)      General Exam:   General Appearance: Appropriately dressed and in no acute distress       Head: Normocephalic, atraumatic and no dysmorphic features  Ear, Nose, and Throat: Moist mucous membranes +NGT  +ETT   CVS: S1S2+  Resp: No SOB, no wheeze or rhonchi  GI: soft NT/ND  Extremities: No edema or cyanosis  Skin: No bruises or rashes     Neurological Exam: on propofol   Mental Status: Awake, alert and oriented x 0-1.  Able to follow simple verbal commands intermittently. Able to name and repeat.+ dysarthria more lethargic  Cranial Nerves: PERRL, EOMI, VFFC, sensation V1-V3 intact, L f acial asymmetry, equal elevation of palate,  , tongue is midline on protrusion. no obvious papilledema on fundoscopic exam. hearing is grossly intact.   Motor: moving R full strenght. LUE 3/5/ LLE 1-2/5, tremor in uppers at times, myoclonus when sedation off  Sensation: withdraws R>L  Reflexes: 1+ throughout at biceps, brachioradialis, triceps, patellars and ankles bilaterally and equal. No clonus. R toe and L toe were both downgoing.  Coordination: unable   Gait: unable     Data/Labs/Imaging which I personally reviewed.     Labs:   CBC Full  -  ( 2022 00:29 )  WBC Count : 10.07 K/uL  RBC Count : 3.77 M/uL  Hemoglobin : 11.4 g/dL  Hematocrit : 37.4 %  Platelet Count - Automated : 241 K/uL  Mean Cell Volume : 99.2 fl  Mean Cell Hemoglobin : 30.2 pg  Mean Cell Hemoglobin Concentration : 30.5 gm/dL  Auto Neutrophil # : x  Auto Lymphocyte # : x  Auto Monocyte # : x  Auto Eosinophil # : x  Auto Basophil # : x  Auto Neutrophil % : x  Auto Lymphocyte % : x  Auto Monocyte % : x  Auto Eosinophil % : x  Auto Basophil % : x          145  |  103  |  60<H>  ----------------------------<  207<H>  4.6   |  31  |  1.06    Ca    10.5      2022 00:29  Phos  3.9       Mg     2.9         TPro  7.1  /  Alb  3.3  /  TBili  0.5  /  DBili  x   /  AST  82<H>  /  ALT  32  /  AlkPhos  70        Urinalysis Basic - ( 2022 21:31 )    Color: Yellow / Appearance: Clear / S.023 / pH: x  Gluc: x / Ketone: Negative  / Bili: Negative / Urobili: Negative   Blood: x / Protein: 100 / Nitrite: Negative   Leuk Esterase: Negative / RBC: 0 /hpf / WBC 0 /HPF   Sq Epi: x / Non Sq Epi: 1 /hpf / Bacteria: Negative    < from: CT Angio Head w/ IV Cont (01.15.22 @ 17:16) >    ACC: [id number] EXAM:  CT ANGIO NECK (W)AW IC                        ACC: [id number] EXAM:  CT ANGIO BRAIN (W)AW IC                          PROCEDURE DATE:  01/15/2022          INTERPRETATION:  CT ANGIOGRAPHY OF THE NECK AND BRAIN.    CLINICAL INFORMATION:81 years Male ICH    TECHNIQUE:  CT angiography of the neck and brain was performed during the dynamic   administration of intravenous contrast.  MIP reconstructions were   performed and reviewed. Multiplanar reformations were obtained.  Contrastadministered 70 cc Omnipaque 350, contrast discarded 30 cc    FINDINGS:  NECK  RIGHT CAROTID CIRCULATION:  Evaluation of the right carotid circulation demonstrates no traumatic   occlusion, dissection, or pseudoaneurysm.    LEFT CAROTID CIRCULATION:  Evaluation of the left carotid circulation demonstrates no traumatic   occlusion, dissection, or pseudoaneurysm.    VERTEBRAL ARTERIES:  Evaluation of the vertebral arteries reveals no evidence of a vertebral   artery occlusion or dissection.    BRAIN  ANTERIOR CIRCULATION:  Distal internal carotid arteries are patent. Calcification involves the   cavernous segments with mild degrees of narrowing  Anterior cerebral arteries are patent. A1 segment of the left anterior   cerebral artery is smaller than the right, a normal anatomic variant  Middle cerebral arteries are patent.    POSTERIOR CIRCULATION:  Distal vertebral arteries are patent. Bilateral posterior inferior   cerebellar arteries are seen.  Basilar artery is patent. Proximal superior cerebellar arteries are patent  Posterior cerebral arteries are patent.    OTHER:  No evidence of abnormal enhancement associated with the moderate to large   right frontal parenchymal hemorrhage.  No puddling of contrast.  No aneurysm or arteriovenous malformation seen.    IMPRESSION:  *  NO TRAUMATIC OCCLUSION, DISSECTION, OR PSEUDOANEURYSM.  *  NO ABNORMAL ENHANCEMENT ASSOCIATED WITH THE MODERATE TO LARGE RIGHT   FRONTAL PARENCHYMAL HEMORRHAGE.  *  NO PUDDLING OF CONTRAST. NO ANEURYSM OR AVM SEEN.    --- End of Report ---            PAWAN ODELL MD; Attending Radiologist  This document has been electronically signed. Mono 15 2022  7:49PM    < end of copied text >  < from: CT Head No Cont (22 @ 09:30) >    ACC: [id number] EXAM:  CT BRAIN                          PROCEDURE DATE:  2022          INTERPRETATION:  CLINICAL INFORMATION: Intracranial hemorrhage, follow-up.    TECHNIQUE: Portable noncontrast axial CT images were acquired of the head.    COMPARISON STUDY: CT head 2022.    FINDINGS:  Large right frontal lobe parenchymal hematoma measures approximately 7.7   x 3.5 cm, previously 7.5 x 3.2 cm, with surrounding edema and mass   effect, without interval interval change compared to prior.   Redemonstrated small intraventricular hemorrhage, similar to prior.    Moderate cerebral volume loss with proportional prominence of the sulci   and ventricles. Nonspecific white matter hypoattenuation, likely related   to chronic microvascular changes.    Left frontal arachnoid cyst, unchanged.    Mucosal thickening of the left maxillary and bilateral ethmoid sinuses.   Partially visualized nasogastric tube in situ. Mastoid air cells are   clear.    Evidence of bilateral lens surgery.    Nodisplaced calvarial fracture.      IMPRESSION:  Stable exam.    --- End of Report ---          HARRIET WATSON MD; Resident Radiology  This document has been electronically signed.  CRUZ LÓPEZ MD; Attending Radiologist  This document has been electronically signed. 2022 10:58AM    < end of copied text >    < from: CT Head No Cont (22 @ 22:13) >    ACC: [id number] EXAM:  CT BRAIN                          PROCEDURE DATE:  2022          INTERPRETATION:  Clinical indication: Altered mental status    Multiple axial sections were performed from base skull to vertex without   contrast enhancement. Coronal and sagittal reconstructions were performed   as well    This exam is compared prior head CT performed on 2020.    Again seen is abnormal high attenuation identified involving the high   right frontal cortical subcortical region. This compatible with acute   parenchymal hemorrhage surrounding edema. This acute parenchymal   hemorrhage measures approximately 7.3 x 3.2 cm and previously measured   approximately 7.7 x 3.5 cm. Localized mass effect is seen consisting of   sulcal effacement.    Extra-axial low-attenuation with adjacent bony scalloping is again seen   involving the high left frontal region. This finding measures   approximately 4.2 x 2.8 cm and previously measured approximately 4.1 x   3.1 cm. This is compatible with an arachnoid cyst.    Parenchymal volume loss and chronic microvascular ischemic changes are   identified.    Intraventricular hemorrhage is again seen.    Evaluation of structures with the appropriate window appears unremarkable    Left-sided NG tube is seen.    The visualized paranasal sinuses mastoid and middle ear regions appear   clear.    Impression: Acute parenchymal hemorrhage is again seen with some expected   evolutionary changes.    Arachnoid cyst again seen as described above.    --- End of Report ---            BE TOUSSAINT MD; Attending Radiologist  This document has been electronically signed. 2022  9:04AM    < end of copied text >

## 2022-01-30 NOTE — AIRWAY REMOVAL NOTE  ADULT & PEDS - RESPIRATORY RHYTHM/PATTERN
depth regular/pattern regular
no shortness of breath/rate regular/depth regular/pattern regular/unlabored

## 2022-01-30 NOTE — PROGRESS NOTE ADULT - ASSESSMENT
hypoxic resp failure   sepsis  shock     on vent   presser support  abx  appreciate ICU care     IVH, SAH  fu with nsx     [facility] documentation noted pt is full code

## 2022-01-30 NOTE — PROGRESS NOTE ADULT - SUBJECTIVE AND OBJECTIVE BOX
INTERVAL HPI/OVERNIGHT EVENTS: ON decreased Fio2 from 50% to 40%.     Patient is a 81y old  Male who presents with a chief complaint of R ICH .    81 yr male with PMHx HTN, SWETA on home CPAP, DM2,  Ascending Ao aneurysm 4cm, infrarenal AAA 5 cm s/p AAA repair , Rt common iliac aneurysm 5cm (CT scan at [facility] 2020) meant for vascular intervention however unable due to COVID-19+ two weeks prior to presentation,  who was  transferred to [facility]  NSICU 22 after being found to have Rt frontal parenchymal hemorrhage with mass effect without midline shift found on CTH after presenting originally to [facility] with fatigue, dizziness, s/p fall at home. Pt at [facility] was evaluated by vascular surg, determined no surg intervention at this time, pt evaluated by Neuro surg who offered MIND clinical trial, STX-guided resection vs medical management however family preferred only minimally invasive intervention if necessary. Neuro surg concerned for CAA (amyloid angiopathy), Pt baseline as per documentation pt initially AOx1 with improvement to AOx3 (22). Pt initially placed on antihypertensive therapy, hypertonic saline therapy.     This hospital course c/b sz like activity 22, loaded with keppra and started on vimpat therapy, EEG at that time however without epileptiform activity, development of AMS with hypoxic/hypercapnic resp distress requiring intubation 22, repeat CTH at that time without acute changes, re demonstration of IPH. Pt with eventual improvement in resp status and extubated 22.       Pt  eventually transferred to medicine floor 22. Course however further c/b development of fevers wit Tmax to 38.8 22, received CT chest 22 that demonstrated new bilateral patchy opacities concerning for aspiration pneum, started cefepime therapy, recurrent worsening AMS and hypoxic resp failure with SPO2 of 88-90's requiring re intubation 22 with transfer to MICU for hypoxic resp failure to possible aspiration pneum and septic shock of unknown origin      ADD 1640 22: Discussed with Dr. Swan (Palliative Care), he spoke with family and they wish that patient have intervention that will prolong his life      ROS:  Unable to ROS . Intubated and sedated.       Allergies    No Known Allergies    Intolerances        ANTIBIOTICS/RELEVANT:  antimicrobials  cefepime   IVPB 1000 milliGRAM(s) IV Intermittent every 8 hours    immunologic:    OTHER:  albuterol/ipratropium for Nebulization 3 milliLiter(s) Nebulizer every 6 hours  chlorhexidine 0.12% Liquid 15 milliLiter(s) Oral Mucosa every 12 hours  enoxaparin Injectable 40 milliGRAM(s) SubCutaneous every 12 hours  insulin lispro (ADMELOG) corrective regimen sliding scale   SubCutaneous every 6 hours  lacosamide Solution 100 milliGRAM(s) Oral two times a day  norepinephrine Infusion 0.05 MICROgram(s)/kG/Min IV Continuous <Continuous>  nystatin Cream 1 Application(s) Topical every 12 hours  propofol Infusion 10 MICROgram(s)/kG/Min IV Continuous <Continuous>  senna 2 Tablet(s) Oral at bedtime  sodium chloride 0.9% for Nebulization 3 milliLiter(s) Nebulizer every 6 hours      Objective:  Vital Signs Last 24 Hrs  T(C): 36.9 (2022 00:00), Max: 37.1 (2022 09:03)  T(F): 98.4 (2022 00:00), Max: 98.8 (2022 09:03)  HR: 74 (2022 03:16) (66 - 101)  BP: 130/72 (2022 00:15) (73/52 - 178/86)  BP(mean): 96 (2022 00:15) (58 - 123)  RR: 13 (2022 00:15) (8 - 33)  SpO2: 100% (2022 03:16) (94% - 100%)    PHYSICAL EXAM:  General -   HEENT -   CV -   Resp -   Abdomen -   Extremities -   Skin -       LABS:                        11.4   10.07 )-----------( 241      ( 2022 00:29 )             37.4     01-30    145  |  103  |  60<H>  ----------------------------<  207<H>  4.6   |  31  |  1.06    Ca    10.5      2022 00:29  Phos  3.9     01-30  Mg     2.9     01-30    TPro  7.1  /  Alb  3.3  /  TBili  0.5  /  DBili  x   /  AST  82<H>  /  ALT  32  /  AlkPhos  70      PT/INR - ( 2022 00:29 )   PT: 14.4 sec;   INR: 1.21 ratio         PTT - ( 2022 00:29 )  PTT:34.3 sec  Urinalysis Basic - ( 2022 20:53 )    Color: Yellow / Appearance: Clear / S.029 / pH: x  Gluc: x / Ketone: Trace  / Bili: Negative / Urobili: Negative   Blood: x / Protein: 30 mg/dL / Nitrite: Negative   Leuk Esterase: Negative / RBC: 1 /hpf / WBC 1 /HPF   Sq Epi: x / Non Sq Epi: 1 / Bacteria: 0.0          MICROBIOLOGY:    Culture - Blood (22 @ 13:34)    Specimen Source: .Blood Blood-Venous    Culture Results:   No growth to date.    Culture - Blood (22 @ 13:34)    Specimen Source: .Blood Blood-Peripheral    Culture Results:   No growth to date.        RADIOLOGY & ADDITIONAL STUDIES:    PROCEDURE DATE:  2022          INDICATION: OG tube placement    COMPARISON: Same day at 8:23 AM    FINDINGS/  IMPRESSION:    Endotracheal tube tip above the ace. Enteric tube within the stomach.   Opacities at the lung bases are unchanged.        PROCEDURE DATE:  2022        This exam is compared prior head CT performed on 2020.    Again seen is abnormal high attenuation identified involving the high   right frontal cortical subcortical region. This compatible with acute   parenchymal hemorrhage surrounding edema. This acute parenchymal   hemorrhage measures approximately 7.3 x 3.2 cm and previously measured   approximately 7.7 x 3.5 cm. Localized mass effect is seen consisting of   sulcal effacement.    Extra-axial low-attenuation with adjacent bony scalloping is again seen   involving the high left frontal region. This finding measures   approximately 4.2 x 2.8 cm and previously measured approximately 4.1 x   3.1 cm. This is compatible with an arachnoid cyst.    Parenchymal volume loss and chronic microvascular ischemic changes are   identified.    Intraventricular hemorrhage is again seen.    Evaluation of structures with the appropriate window appears unremarkable    Left-sided NG tube is seen.    The visualized paranasal sinuses mastoid and middle ear regions appear   clear.    Impression: Acute parenchymal hemorrhage is again seen with some expected   evolutionary changes.    Arachnoid cyst again seen as described above.    < end of copied text >   INTERVAL HPI/OVERNIGHT EVENTS: ON decreased Fio2 from 50% to 40%.     Patient is a 81y old  Male who presents with a chief complaint of R ICH .    81 yr male with PMHx HTN, SWETA on home CPAP, DM2,  Ascending Ao aneurysm 4cm, infrarenal AAA 5 cm s/p AAA repair , Rt common iliac aneurysm 5cm (CT scan at [facility] 2020) meant for vascular intervention however unable due to COVID-19+ two weeks prior to presentation,  who was  transferred to [facility]  NSICU 22 after being found to have Rt frontal parenchymal hemorrhage with mass effect without midline shift found on CTH after presenting originally to [facility] with fatigue, dizziness, s/p fall at home. Pt at [facility] was evaluated by vascular surg, determined no surg intervention at this time, pt evaluated by Neuro surg who offered MIND clinical trial, STX-guided resection vs medical management however family preferred only minimally invasive intervention if necessary. Neuro surg concerned for CAA (amyloid angiopathy), Pt baseline as per documentation pt initially AOx1 with improvement to AOx3 (22). Pt initially placed on antihypertensive therapy, hypertonic saline therapy.     This hospital course c/b sz like activity 22, loaded with keppra and started on vimpat therapy, EEG at that time however without epileptiform activity, development of AMS with hypoxic/hypercapnic resp distress requiring intubation 22, repeat CTH at that time without acute changes, re demonstration of IPH. Pt with eventual improvement in resp status and extubated 22.       Pt  eventually transferred to medicine floor 22. Course however further c/b development of fevers wit Tmax to 38.8 22, received CT chest 22 that demonstrated new bilateral patchy opacities concerning for aspiration pneum, started cefepime therapy, recurrent worsening AMS and hypoxic resp failure with SPO2 of 88-90's requiring re intubation 22 with transfer to MICU for hypoxic resp failure to possible aspiration pneum and septic shock of unknown origin      ADD 1640 22: Discussed with Dr. Swan (Palliative Care), he spoke with family and they wish that patient have intervention that will prolong his life      ROS:  Unable to ROS . Intubated and sedated.       Allergies    No Known Allergies    Intolerances        ANTIBIOTICS/RELEVANT:  antimicrobials  cefepime   IVPB 1000 milliGRAM(s) IV Intermittent every 8 hours    immunologic:    OTHER:  albuterol/ipratropium for Nebulization 3 milliLiter(s) Nebulizer every 6 hours  chlorhexidine 0.12% Liquid 15 milliLiter(s) Oral Mucosa every 12 hours  enoxaparin Injectable 40 milliGRAM(s) SubCutaneous every 12 hours  insulin lispro (ADMELOG) corrective regimen sliding scale   SubCutaneous every 6 hours  lacosamide Solution 100 milliGRAM(s) Oral two times a day  norepinephrine Infusion 0.05 MICROgram(s)/kG/Min IV Continuous <Continuous>  nystatin Cream 1 Application(s) Topical every 12 hours  propofol Infusion 10 MICROgram(s)/kG/Min IV Continuous <Continuous>  senna 2 Tablet(s) Oral at bedtime  sodium chloride 0.9% for Nebulization 3 milliLiter(s) Nebulizer every 6 hours      Objective:  Vital Signs Last 24 Hrs  T(C): 36.9 (2022 00:00), Max: 37.1 (2022 09:03)  T(F): 98.4 (2022 00:00), Max: 98.8 (2022 09:03)  HR: 74 (2022 03:16) (66 - 101)  BP: 130/72 (2022 00:15) (73/52 - 178/86)  BP(mean): 96 (2022 00:15) (58 - 123)  RR: 13 (2022 00:15) (8 - 33)  SpO2: 100% (2022 03:16) (94% - 100%)    PHYSICAL EXAM:  GENERAL:   currently sedated, well-groomed, well-developed    HEAD:    Atraumatic, Normocephalic    EYES:   PERRL 2mm sluggishly reactive, conjunctiva and sclera clear    ENMT:    Moist mucous membranes    NECK:    no JVD    NERVOUS SYSTEM:  Responsive to  painful stimuli with grimacing  and withdrawal of upper extremities with purposeful movement  2-3/5 Rt, Lt 1-2/5, lower extremities Rt 2/5 Lt 1/5,   CHEST/LUNG:   orally intubated, triggers vent by  .Breath sounds bilaterally,  No crackles, rhonchi, wheezing, or rubs.     HEART:    S1/S2 No murmurs, rubs, or gallops, POCUS    ABDOMEN:   Soft, Nontender, Nondistended; Bowel sounds present, Bladder non distended, non palpable    EXTREMITIES:   Pulses palpable radial pulses 2+ bilat, DP/PT 1+/1+ bilat, without clubbing, cyanosis. Digits warm to touch with good cap refill < 3 secs    SKIN:   warm, dry, intact, normal color, no rash or abnormal lesions, without palpable nodes          LABS:                        11.4   10.07 )-----------( 241      ( 2022 00:29 )             37.4         145  |  103  |  60<H>  ----------------------------<  207<H>  4.6   |  31  |  1.06    Ca    10.5      2022 00:29  Phos  3.9       Mg     2.9         TPro  7.1  /  Alb  3.3  /  TBili  0.5  /  DBili  x   /  AST  82<H>  /  ALT  32  /  AlkPhos  70  30    PT/INR - ( 2022 00:29 )   PT: 14.4 sec;   INR: 1.21 ratio         PTT - ( 2022 00:29 )  PTT:34.3 sec  Urinalysis Basic - ( 2022 20:53 )    Color: Yellow / Appearance: Clear / S.029 / pH: x  Gluc: x / Ketone: Trace  / Bili: Negative / Urobili: Negative   Blood: x / Protein: 30 mg/dL / Nitrite: Negative   Leuk Esterase: Negative / RBC: 1 /hpf / WBC 1 /HPF   Sq Epi: x / Non Sq Epi: 1 / Bacteria: 0.0          MICROBIOLOGY:    Culture - Blood (22 @ 13:34)    Specimen Source: .Blood Blood-Venous    Culture Results:   No growth to date.    Culture - Blood (22 @ 13:34)    Specimen Source: .Blood Blood-Peripheral    Culture Results:   No growth to date.        RADIOLOGY & ADDITIONAL STUDIES:    PROCEDURE DATE:  2022          INDICATION: OG tube placement    COMPARISON: Same day at 8:23 AM    FINDINGS/  IMPRESSION:    Endotracheal tube tip above the ace. Enteric tube within the stomach.   Opacities at the lung bases are unchanged.        PROCEDURE DATE:  2022        This exam is compared prior head CT performed on 2020.    Again seen is abnormal high attenuation identified involving the high   right frontal cortical subcortical region. This compatible with acute   parenchymal hemorrhage surrounding edema. This acute parenchymal   hemorrhage measures approximately 7.3 x 3.2 cm and previously measured   approximately 7.7 x 3.5 cm. Localized mass effect is seen consisting of   sulcal effacement.    Extra-axial low-attenuation with adjacent bony scalloping is again seen   involving the high left frontal region. This finding measures   approximately 4.2 x 2.8 cm and previously measured approximately 4.1 x   3.1 cm. This is compatible with an arachnoid cyst.    Parenchymal volume loss and chronic microvascular ischemic changes are   identified.    Intraventricular hemorrhage is again seen.    Evaluation of structures with the appropriate window appears unremarkable    Left-sided NG tube is seen.    The visualized paranasal sinuses mastoid and middle ear regions appear   clear.    Impression: Acute parenchymal hemorrhage is again seen with some expected   evolutionary changes.    Arachnoid cyst again seen as described above.    < end of copied text >   INTERVAL HPI/OVERNIGHT EVENTS: ON decreased Fio2 from 50% to 40%.     Patient is a 81y old  Male who presents with a chief complaint of R ICH .    81 yr male with PMHx HTN, SWETA on home CPAP, DM2,  Ascending Ao aneurysm 4cm, infrarenal AAA 5 cm s/p AAA repair , Rt common iliac aneurysm 5cm (CT scan at [facility] 2020) meant for vascular intervention however unable due to COVID-19+ two weeks prior to presentation,  who was  transferred to [facility]  NSICU 22 after being found to have Rt frontal parenchymal hemorrhage with mass effect without midline shift found on CTH after presenting originally to [facility] with fatigue, dizziness, s/p fall at home. Pt at [facility] was evaluated by vascular surg, determined no surg intervention at this time, pt evaluated by Neuro surg who offered MIND clinical trial, STX-guided resection vs medical management however family preferred only minimally invasive intervention if necessary. Neuro surg concerned for CAA (amyloid angiopathy), Pt baseline as per documentation pt initially AOx1 with improvement to AOx3 (22). Pt initially placed on antihypertensive therapy, hypertonic saline therapy.     This hospital course c/b sz like activity 22, loaded with keppra and started on vimpat therapy, EEG at that time however without epileptiform activity, development of AMS with hypoxic/hypercapnic resp distress requiring intubation 22, repeat CTH at that time without acute changes, re demonstration of IPH. Pt with eventual improvement in resp status and extubated 22.       Pt  eventually transferred to medicine floor 22. Course however further c/b development of fevers wit Tmax to 38.8 22, received CT chest 22 that demonstrated new bilateral patchy opacities concerning for aspiration pneum, started cefepime therapy, recurrent worsening AMS and hypoxic resp failure with SPO2 of 88-90's requiring re intubation 22 with transfer to MICU for hypoxic resp failure to possible aspiration pneum and septic shock of unknown origin      ADD 1640 22: Discussed with Dr. Swan (Palliative Care), he spoke with family and they wish that patient have intervention that will prolong his life      ROS:  Unable to ROS . Intubated and sedated.       Allergies    No Known Allergies    Intolerances        ANTIBIOTICS/RELEVANT:  antimicrobials  cefepime   IVPB 1000 milliGRAM(s) IV Intermittent every 8 hours    immunologic:    OTHER:  albuterol/ipratropium for Nebulization 3 milliLiter(s) Nebulizer every 6 hours  chlorhexidine 0.12% Liquid 15 milliLiter(s) Oral Mucosa every 12 hours  enoxaparin Injectable 40 milliGRAM(s) SubCutaneous every 12 hours  insulin lispro (ADMELOG) corrective regimen sliding scale   SubCutaneous every 6 hours  lacosamide Solution 100 milliGRAM(s) Oral two times a day  norepinephrine Infusion 0.05 MICROgram(s)/kG/Min IV Continuous <Continuous>  nystatin Cream 1 Application(s) Topical every 12 hours  propofol Infusion 10 MICROgram(s)/kG/Min IV Continuous <Continuous>  senna 2 Tablet(s) Oral at bedtime  sodium chloride 0.9% for Nebulization 3 milliLiter(s) Nebulizer every 6 hours      Objective:  Vital Signs Last 24 Hrs  T(C): 36.9 (2022 00:00), Max: 37.1 (2022 09:03)  T(F): 98.4 (2022 00:00), Max: 98.8 (2022 09:03)  HR: 74 (2022 03:16) (66 - 101)  BP: 130/72 (2022 00:15) (73/52 - 178/86)  BP(mean): 96 (2022 00:15) (58 - 123)  RR: 13 (2022 00:15) (8 - 33)  SpO2: 100% (2022 03:16) (94% - 100%)    PHYSICAL EXAM:  GENERAL:   currently sedated, well-groomed, well-developed    HEAD:    Atraumatic, Normocephalic    EYES:   PERRL 2mm sluggishly reactive, conjunctiva and sclera clear    ENMT:    Moist mucous membranes    NECK:    no JVD    NERVOUS SYSTEM:  Responsive to  painful stimuli with grimacing  and withdrawal of upper extremities with purposeful movement  2-3/5 Rt, Lt 1-2/5, lower extremities Rt 2/5 Lt 1/5,   CHEST/LUNG:   orally intubated, triggers vent by  .Breath sounds bilaterally,  No crackles, rhonchi, wheezing, or rubs.     HEART:    S1/S2 No murmurs, rubs, or gallops, POCUS    ABDOMEN:   Soft, Nontender, Nondistended; Bowel sounds present, Bladder non distended, non palpable    EXTREMITIES:   Pulses palpable radial pulses 2+ bilat, DP/PT 1+/1+ bilat, without clubbing, cyanosis. Digits warm to touch with good cap refill < 3 secs    SKIN:   warm, dry, intact, normal color, no rash or abnormal lesions, without palpable nodes          LABS:                        11.4   10.07 )-----------( 241      ( 2022 00:29 )             37.4         145  |  103  |  60<H>  ----------------------------<  207<H>  4.6   |  31  |  1.06    Ca    10.5      2022 00:29  Phos  3.9       Mg     2.9         TPro  7.1  /  Alb  3.3  /  TBili  0.5  /  DBili  x   /  AST  82<H>  /  ALT  32  /  AlkPhos  70  30    PT/INR - ( 2022 00:29 )   PT: 14.4 sec;   INR: 1.21 ratio         PTT - ( 2022 00:29 )  PTT:34.3 sec  Urinalysis Basic - ( 2022 20:53 )    Color: Yellow / Appearance: Clear / S.029 / pH: x  Gluc: x / Ketone: Trace  / Bili: Negative / Urobili: Negative   Blood: x / Protein: 30 mg/dL / Nitrite: Negative   Leuk Esterase: Negative / RBC: 1 /hpf / WBC 1 /HPF   Sq Epi: x / Non Sq Epi: 1 / Bacteria: 0.0          MICROBIOLOGY:    Culture - Blood (22 @ 13:34)    Specimen Source: .Blood Blood-Venous    Culture Results:   No growth to date.    Culture - Blood (22 @ 13:34)    Specimen Source: .Blood Blood-Peripheral    Culture Results:   No growth to date.        RADIOLOGY & ADDITIONAL STUDIES:    < from: CT Head No Cont (22 @ 09:57) >  PROCEDURE DATE:  2022          INTERPRETATION:  Clinical indication: Altered mental status    Multiple axial sections were performed from base skull to vertex without   contrast enhancement. Coronal and sagittal destructions were performed as   well    This exam is compared with prior head CT performed on 2022.    Abnormal high attenuation involving the high left frontal cortical   subcortical region is again seen. This finding measures approximately 5.1   x 2.8 cm and previously measured approximately 7.3 x 3.2 cm. Surrounding   edema is again seen.    Small amount of subrecord hemorrhage is again suspected involving the   left posterior temporal/posterior frontal region    Intraventricular hemorrhage is again seen.    The size and configuration of ventricles appear unchanged    No new areas of acute hemorrhage is seen.    Extra-axial low-attenuation from the high left frontal region is again   seen with bony scalloping. This appears unchanged when compared with the   prior exam and is likely compatible with an arachnoid cyst    Evaluation of osseous structures with the appropriate window appears   unchanged    The visualized paranasal sinuses mastoid and middle ear regions appear   clear.    < from: CT Head No Cont (22 @ 09:57) >  IMPRESSION: Acute parenchymal hemorrhage is again seen as well as   intraventricular hemorrhage. Subrecord hemorrhage is suspected.    < end of copied text >      PROCEDURE DATE:  2022          INDICATION: OG tube placement    COMPARISON: Same day at 8:23 AM    FINDINGS/  IMPRESSION:    Endotracheal tube tip above the ace. Enteric tube within the stomach.   Opacities at the lung bases are unchanged.        PROCEDURE DATE:  2022        This exam is compared prior head CT performed on 2020.    Again seen is abnormal high attenuation identified involving the high   right frontal cortical subcortical region. This compatible with acute   parenchymal hemorrhage surrounding edema. This acute parenchymal   hemorrhage measures approximately 7.3 x 3.2 cm and previously measured   approximately 7.7 x 3.5 cm. Localized mass effect is seen consisting of   sulcal effacement.    Extra-axial low-attenuation with adjacent bony scalloping is again seen   involving the high left frontal region. This finding measures   approximately 4.2 x 2.8 cm and previously measured approximately 4.1 x   3.1 cm. This is compatible with an arachnoid cyst.    Parenchymal volume loss and chronic microvascular ischemic changes are   identified.    Intraventricular hemorrhage is again seen.    Evaluation of structures with the appropriate window appears unremarkable    Left-sided NG tube is seen.    The visualized paranasal sinuses mastoid and middle ear regions appear   clear.    Impression: Acute parenchymal hemorrhage is again seen with some expected   evolutionary changes.    Arachnoid cyst again seen as described above.    < end of copied text >

## 2022-01-30 NOTE — PROGRESS NOTE ADULT - ASSESSMENT
81 yr old male with stated hx significant for HTN, OSH, s/p AAA repair, Ascending Ao aneurysm, infrarenal AAA, Rt common iliac aneurysm who presented from OSH for further management of IPH. Course c/b possible sz activity, recurrent AMS, recurrent hypoxic resp failure secondary to aspiration pneum requiring intubation 1/29 was transferred to MICU for recurrent hypoxic resp failure secondary to possible aspiration PNA and septic shock of unknown origin.     Plan:    #Neuro:  -IPH, possible CAA (amyloid angiopathy)  -re current AMS secondary to possible metabolic encephalopathy due to asp PNA now in septic shock s/p intubation    -CT head 1/27/22 re demonstrated Acute parenchymal hemorrhage with evolutionary changes and Arachnoid cyst   -consider repeat CT head if no improvement in AMS  -consider MRI brain   -Neuro checks q 4 hrs and prn for changes  -activity as tolerated  -physical therapy consult when stable  -palliative care involved.      #Pulm:  Acute hypoxic respiratory failure in setting of aspiration PNA   -Hx of SWETA on home CPAP therapy  -CXR 1/29 w/ Opacities at the lung bases .  -s/p intubation 1/29  -vent setting : 8/400/40/8; ABG - ( 30 Jan 2022 00:16 )pH, Arterial: 7.42  pH, Blood: x     /  pCO2: 60    /  pO2: 123   / HCO3: 39    / Base Excess: 12.2  /  SaO2: 99.3    -PS trail as tolerated   -Bronchodilator therapy q 6 hrs and  prn sob/wheezes  -hypertonic saline nebs q 6 hrs      #CV:  Septic shock 2/2 aspiration PNA and r/o other etiology.   -Hx HTN. holding antihypertensives ( losartan and amlodipine) in setting of shock   -Hx of ascending Ao aneurysm, infrarenal AAA, Rt common iliac aneurysm, s/p AAA repair 2015,   -Levophed gtt @ 0.01 ; to maintain MAP > 65. wean as tolerated   -POCUS w/ normal LV/RV contractility       #:  RACHEL   -improving   -trend BUN /.Cr level   -condom cath for I&os   -daily weight   -renally dose drugs     GI  Oropharyngeal edema   -OGT W TF ; glucerna @ 56   -c/w bowel regimen , last BMP 1/29       #ID:  Septic shock 2/2 aspiration PNA   -BC x 2 & Sputum cx P 1/29   -MRSA P collection  -covid -19 NGTD 1/29  -UA NGTD 1/29  -on cefepime 1/29-->   -vanco 1 gm IV x 11/29  -obtain vanco trough 1/30/22 @ 1400      #FEN/ENDO/HEME:  -obtain CMP/Mg++/PO--4/CBC w diff/PT/PTT/INR now and q. a.m.  -abg prn  -DM2  -will hold NPH at present and re evaluate glucose trend for need  -POC with ISS q 4 hrs - maintain glucose 160-180  -DVT prophylaxis with enoxaparin 40 mg subq q d   81 yr old male with stated hx significant for HTN, OSH, s/p AAA repair, Ascending Ao aneurysm, infrarenal AAA, Rt common iliac aneurysm who presented from OSH for further management of IPH. Course c/b possible sz activity, recurrent AMS, recurrent hypoxic resp failure secondary to aspiration pneum requiring intubation 1/29 was transferred to MICU for recurrent hypoxic resp failure secondary to possible aspiration PNA and septic shock of unknown origin.     Plan:    #Neuro:  -IPH, possible CAA (amyloid angiopathy)  -re current AMS secondary to possible metabolic encephalopathy due to asp PNA now in septic shock s/p intubation    -CT head 1/27/22 re demonstrated Acute parenchymal hemorrhage with evolutionary changes and Arachnoid cyst   -consider repeat CT head if no improvement in AMS  -consider MRI brain   -Neuro checks q 4 hrs and prn for changes  -activity as tolerated  -physical therapy consult when stable  -palliative care involved.      #Pulm:  Acute hypoxic respiratory failure in setting of aspiration PNA   -Hx of SWETA on home CPAP therapy  -CXR 1/29 w/ Opacities at the lung bases .  -s/p intubation 1/29  -vent setting : 8/400/40/8; ABG - ( 30 Jan 2022 00:16 )pH, Arterial: 7.42  pH, Blood: x     /  pCO2: 60    /  pO2: 123   / HCO3: 39    / Base Excess: 12.2  /  SaO2: 99.3    -PS trail as tolerated   -Bronchodilator therapy q 6 hrs and  prn sob/wheezes  -hypertonic saline nebs q 6 hrs      #CV:  Septic shock 2/2 aspiration PNA and r/o other etiology.   -Hx HTN. holding antihypertensives ( losartan and amlodipine) in setting of shock   -Hx of ascending Ao aneurysm, infrarenal AAA, Rt common iliac aneurysm, s/p AAA repair 2015,   -Levophed gtt @ 0.01 ; to maintain MAP > 65. wean as tolerated   -POCUS w/ normal LV/RV contractility       #:  RACHEL   -improving   -trend BUN /.Cr level   -condom cath for I&os   -daily weight   -renally dose drugs     GI  Oropharyngeal edema   -OGT W TF ; glucerna @ 56   -c/w bowel regimen , last BMP 1/29       #ID:  Septic shock 2/2 aspiration PNA   -BC x 2 & Sputum cx P 1/29   -MRSA P collection  -covid -19 NGTD 1/29  -UA NGTD 1/29  -on cefepime 1/29-->   -vanco 1 gm IV x 11/29  -obtain vanco trough 1/30/22 @ 1400      #ENDO  -Hx of DMT2  -mod ISS q 6   -POC with ISS q 6 hrs - maintain glucose 160-180    HEME:  -CT H w/ acute PH  -CTH 1/29 stable   -maintain platelets > 100, hgb > 7, active type and screen  -on Lovenox SQ BID for ppx      PPX  -DVT prophylaxis with enoxaparin 40 mg subq q BID    GOC  FULL code   -palliative on board.    81 yr old male with stated hx significant for HTN, OSH, s/p AAA repair, Ascending Ao aneurysm, infrarenal AAA, Rt common iliac aneurysm who presented from OSH for further management of IPH. Course c/b possible sz activity, recurrent AMS, recurrent hypoxic resp failure secondary to aspiration pneum requiring intubation 1/29 was transferred to MICU for recurrent hypoxic resp failure secondary to possible aspiration PNA and septic shock of unknown origin.     Plan:    #Neuro:  -IPH, possible CAA (amyloid angiopathy)  -re current AMS secondary to possible metabolic encephalopathy due to asp PNA now in septic shock s/p intubation    -CT head 1/27/22 re demonstrated Acute parenchymal hemorrhage with evolutionary changes and Arachnoid cyst   -consider repeat CT head if no improvement in AMS  -consider MRI brain   -Neuro checks q 4 hrs and prn for changes  -activity as tolerated  -physical therapy consult when stable  -palliative care involved.      #Pulm:  Acute hypoxic respiratory failure in setting of aspiration PNA   -Hx of SWETA on home CPAP therapy  -CXR 1/29 w/ Opacities at the lung bases .  -s/p intubation 1/29  -vent setting : 8/400/40/8; ABG - ( 30 Jan 2022 00:16 )pH, Arterial: 7.42  pH, Blood: x     /  pCO2: 60    /  pO2: 123   / HCO3: 39    / Base Excess: 12.2  /  SaO2: 99.3    -PS trail as tolerated   -Bronchodilator therapy q 6 hrs and  prn sob/wheezes  -hypertonic saline nebs q 6 hrs      #CV:  Septic shock 2/2 aspiration PNA and r/o other etiology.   -Hx HTN. holding antihypertensives ( losartan and amlodipine) in setting of shock   -Hx of ascending Ao aneurysm, infrarenal AAA, Rt common iliac aneurysm, s/p AAA repair 2015,   -Levophed gtt @ 0.01 ; to maintain MAP > 65. wean as tolerated   -POCUS w/ normal LV/RV contractility       #:  RACHEL   -improving   -trend BUN /.Cr level   -condom cath for I&os   -daily weight   -renally dose drugs     GI  Oropharyngeal edema   -OGT W TF ; glucerna @ 56   -c/w bowel regimen , last BMP 1/29   -plan fpr PEG placed on hold since back on IV pressors . GI would like to be reached out once off pressors       #ID:  Septic shock 2/2 aspiration PNA   -BC x 2 & Sputum cx P 1/29   -MRSA P collection  -covid -19 NGTD 1/29  -UA NGTD 1/29  -on cefepime 1/29-->   -vanco 1 gm IV x 11/29  -obtain vanco trough 1/30/22 @ 1400      #ENDO  -Hx of DMT2  -mod ISS q 6   -POC with ISS q 6 hrs - maintain glucose 160-180    HEME:  -CT H w/ acute PH  -CTH 1/29 stable   -maintain platelets > 100, hgb > 7, active type and screen  -on Lovenox SQ BID for ppx      PPX  -DVT prophylaxis with enoxaparin 40 mg subq q BID    GOC  FULL code   -palliative on board.    81 yr old male with stated hx significant for HTN, OSH, s/p AAA repair, Ascending Ao aneurysm, infrarenal AAA, Rt common iliac aneurysm who presented from OSH for further management of IPH. Course c/b possible sz activity, recurrent AMS, recurrent hypoxic resp failure secondary to aspiration pneum requiring intubation 1/29 was transferred to MICU for recurrent hypoxic resp failure secondary to possible aspiration PNA and septic shock of unknown origin.     Plan:    #Neuro:  -IPH, possible CAA (amyloid angiopathy)  -re current AMS secondary to possible metabolic encephalopathy due to asp PNA now in septic shock s/p intubation    -CT head 1/27/22 re demonstrated Acute parenchymal hemorrhage with evolutionary changes and Arachnoid cyst   Vimpat 100 BID    -Neuro checks q 4 hrs and prn for changes  -activity as tolerated  -physical therapy consult when stable  -palliative care involved.      #Pulm:  Acute hypoxic respiratory failure in setting of aspiration PNA   -Hx of SWETA on home CPAP therapy  -CXR 1/29 w/ Opacities at the lung bases .  -s/p intubation 1/29  -vent setting : 8/400/40/8; ABG - ( 30 Jan 2022 00:16 )pH, Arterial: 7.42  pH, Blood: x     /  pCO2: 60    /  pO2: 123   / HCO3: 39    / Base Excess: 12.2  /  SaO2: 99.3    -PS trail as tolerated   -Bronchodilator therapy q 6 hrs and  prn sob/wheezes  -hypertonic saline nebs q 6 hrs      #CV:  Septic shock 2/2 aspiration PNA and r/o other etiology.   -Hx HTN. holding antihypertensives ( losartan and amlodipine) in setting of shock   -Hx of ascending Ao aneurysm, infrarenal AAA, Rt common iliac aneurysm, s/p AAA repair 2015,   -Levophed gtt @ 0.01 ; to maintain MAP > 65. wean as tolerated   -POCUS w/ normal LV/RV contractility       #:  RACHEL   -improving   -trend BUN /.Cr level   -condom cath for I&os   -daily weight   -renally dose drugs     GI  Oropharyngeal edema   -OGT W TF ; glucerna @ 56   -c/w bowel regimen , last BMP 1/29   -plan fpr PEG placed on hold since back on IV pressors . GI would like to be reached out once off pressors       #ID:  Septic shock 2/2 aspiration PNA   -BC x 2 & Sputum cx P 1/29   -MRSA P collection  -covid -19 NGTD 1/29  -UA NGTD 1/29  -on cefepime 1/29-->   -vanco 1 gm IV x 11/29  -obtain vanco trough 1/30/22 @ 1400      #ENDO  -Hx of DMT2  -mod ISS q 6   -POC with ISS q 6 hrs - maintain glucose 160-180    HEME:  -CT H w/ acute PH  -CTH 1/29 stable   -maintain platelets > 100, hgb > 7, active type and screen  -on Lovenox SQ BID for ppx      PPX  -DVT prophylaxis with enoxaparin 40 mg subq q BID    GOC  FULL code   -palliative on board.    81 yr old male with stated hx significant for HTN, OSH, s/p AAA repair, Ascending Ao aneurysm, infrarenal AAA, Rt common iliac aneurysm who presented from OSH for further management of IPH. Course c/b possible sz activity, recurrent AMS, recurrent hypoxic resp failure secondary to aspiration pneum requiring intubation 1/29 was transferred to MICU for recurrent hypoxic resp failure secondary to possible aspiration PNA and septic shock of unknown origin.     Plan:    #Neuro:  -IPH, possible CAA (amyloid angiopathy)  -re current AMS secondary to possible metabolic encephalopathy due to asp PNA now in septic shock s/p intubation    -CT head 1/27/22 re demonstrated Acute parenchymal hemorrhage with evolutionary changes and Arachnoid cyst   -repeat CT H 1/30 ; pending results   Vimpat 100 BID    -EEG spot pending   -Neuro checks q 4 hrs and prn for changes  -activity as tolerated  -physical therapy consult when stable  -palliative care involved.      #Pulm:  Acute hypoxic respiratory failure in setting of aspiration PNA   -Hx of SWETA on home CPAP therapy  -CXR 1/29 w/ Opacities at the lung bases .  -s/p intubation 1/29  -vent setting : 8/400/40/8; ABG - ( 30 Jan 2022 00:16 )pH, Arterial: 7.42  pH, Blood: x     /  pCO2: 60    /  pO2: 123   / HCO3: 39    / Base Excess: 12.2  /  SaO2: 99.3    -PS trail as tolerated   -Bronchodilator therapy q 6 hrs and  prn sob/wheezes  -hypertonic saline nebs q 6 hrs      #CV:  Septic shock 2/2 aspiration PNA and r/o other etiology.   -Hx HTN. holding antihypertensives ( losartan and amlodipine) in setting of shock   -Hx of ascending Ao aneurysm, infrarenal AAA, Rt common iliac aneurysm, s/p AAA repair 2015,   -Levophed gtt @ 0.01 ; to maintain MAP > 65. wean as tolerated   -POCUS w/ normal LV/RV contractility       #:  RACHEL   -improving   -trend BUN /.Cr level   -condom cath for I&os   -daily weight   -renally dose drugs     GI  Oropharyngeal edema   -OGT W TF ; glucerna @ 56   -c/w bowel regimen , last BMP 1/29   -plan fpr PEG placed on hold since back on IV pressors . GI would like to be reached out once off pressors       #ID:  Septic shock 2/2 aspiration PNA   -BC x 2 & Sputum cx P 1/29   -MRSA P collection  -covid -19 NGTD 1/29  -UA NGTD 1/29  -on cefepime 1/29-->   -vanco 1 gm IV x 11/29  -obtain vanco trough 1/30/22 @ 1400      #ENDO  -Hx of DMT2  -mod ISS q 6   -POC with ISS q 6 hrs - maintain glucose 160-180    HEME:  -CT H w/ acute PH  -CTH 1/29 stable   -maintain platelets > 100, hgb > 7, active type and screen  -on Lovenox SQ BID for ppx      PPX  -DVT prophylaxis with enoxaparin 40 mg subq q BID    GOC  FULL code   -palliative on board.

## 2022-01-30 NOTE — CHART NOTE - NSCHARTNOTEFT_GEN_A_CORE
GI previously consulted for PEG, planned tentatively for 1/31/22. However since then patient w/ hypoxic resp failure requiring intubation and septic shock, now on pressors, transferred to MICU.   Will defer PEG placement until patient's clinical status stabilizes, if PEG continues to be within GOC.     Plan discussed with MICU team. GI will sign off. Please call back as needed.     Walter Alan, PGY5  Gastroenterology/Hepatology Fellow  Available on [employer] Teams  [phone] ([id number] Short Range Pager)  [phone] (Long Range Pager)    After 5pm, please contact the on-call GI fellow. [phone]

## 2022-01-30 NOTE — CHART NOTE - NSCHARTNOTEFT_GEN_A_CORE
Neurosurgery spoke to patient's spouse regarding patient's CTH. It was reiterated that no neurosurgical intervention at this time would be beneficial. Patient's wife verbalized understanding.

## 2022-01-30 NOTE — PROGRESS NOTE ADULT - ASSESSMENT
81y m W/  HTN presenting to the ED from [facility] as a transfer for R frontal parenchymal hemorrhage on CT after fall at home.  CTH R hemisphere IPH  CTA H/N no aneurysm or AVM  repeat CTH stable   CK 3000  1/26 CT chest wtih opacities  1/27 CTH normal evolutionary changes no new findings   1/29 RRT for hypoxia. intubated.   Impression: IPH of unclear etiology.  may be 2/2 HTN. need to r/o CAA or underlying mass   - rising white count. hypoxia on NRBM. MICU called after RRT intubated in  ICU now for acute hypoxic respiratory failure   - f/u repeat CTH 10 AM today  - nsx f/u.   - f/u palliative   - on pressure support with Levo, wean as tolerated  - trial of CPAP as tolerated   - check NH3   - now on cefepime for infection. monitor for seizure as it can lower seizure threshold and cause change in mental status   - SBP <160  - DVT ppx okay. hold AC/AP  - now on vimpat 100mg BID  - rEEG when able  - MRI brain w/ and w/o when able  - GI for PEG?, now with NGT  - no need for hypertonics  - cardio following. known thoracic aneurysm s/p repair.   - f/uj pulmo for resp status. on face tent  - telemetry  - PT/OT/SS/SLP, OOBC  - check FS, glucose control <180  - GI/DVT ppx  - Thank you for allowing me to participate in the care of this patient. Call with questions.   Carlin Neal MD  Vascular Neurology  Office: [phone]

## 2022-01-31 DIAGNOSIS — E87.0 HYPEROSMOLALITY AND HYPERNATREMIA: ICD-10-CM

## 2022-01-31 LAB
ALBUMIN SERPL ELPH-MCNC: 3.3 G/DL — SIGNIFICANT CHANGE UP (ref 3.3–5)
ALP SERPL-CCNC: 73 U/L — SIGNIFICANT CHANGE UP (ref 40–120)
ALT FLD-CCNC: 31 U/L — SIGNIFICANT CHANGE UP (ref 10–45)
ANION GAP SERPL CALC-SCNC: 9 MMOL/L — SIGNIFICANT CHANGE UP (ref 5–17)
APTT BLD: 32.6 SEC — SIGNIFICANT CHANGE UP (ref 27.5–35.5)
AST SERPL-CCNC: 57 U/L — HIGH (ref 10–40)
BILIRUB SERPL-MCNC: 0.5 MG/DL — SIGNIFICANT CHANGE UP (ref 0.2–1.2)
BUN SERPL-MCNC: 49 MG/DL — HIGH (ref 7–23)
CALCIUM SERPL-MCNC: 10.4 MG/DL — SIGNIFICANT CHANGE UP (ref 8.4–10.5)
CHLORIDE SERPL-SCNC: 106 MMOL/L — SIGNIFICANT CHANGE UP (ref 96–108)
CO2 SERPL-SCNC: 33 MMOL/L — HIGH (ref 22–31)
CREAT SERPL-MCNC: 0.99 MG/DL — SIGNIFICANT CHANGE UP (ref 0.5–1.3)
CULTURE RESULTS: SIGNIFICANT CHANGE UP
GAS PNL BLDA: SIGNIFICANT CHANGE UP
GLUCOSE BLDC GLUCOMTR-MCNC: 163 MG/DL — HIGH (ref 70–99)
GLUCOSE BLDC GLUCOMTR-MCNC: 183 MG/DL — HIGH (ref 70–99)
GLUCOSE BLDC GLUCOMTR-MCNC: 184 MG/DL — HIGH (ref 70–99)
GLUCOSE SERPL-MCNC: 195 MG/DL — HIGH (ref 70–99)
HCT VFR BLD CALC: 38 % — LOW (ref 39–50)
HGB BLD-MCNC: 11.6 G/DL — LOW (ref 13–17)
INR BLD: 1.15 RATIO — SIGNIFICANT CHANGE UP (ref 0.88–1.16)
MAGNESIUM SERPL-MCNC: 2.7 MG/DL — HIGH (ref 1.6–2.6)
MCHC RBC-ENTMCNC: 29.7 PG — SIGNIFICANT CHANGE UP (ref 27–34)
MCHC RBC-ENTMCNC: 30.5 GM/DL — LOW (ref 32–36)
MCV RBC AUTO: 97.4 FL — SIGNIFICANT CHANGE UP (ref 80–100)
NRBC # BLD: 0 /100 WBCS — SIGNIFICANT CHANGE UP (ref 0–0)
PHOSPHATE SERPL-MCNC: 2 MG/DL — LOW (ref 2.5–4.5)
PLATELET # BLD AUTO: 235 K/UL — SIGNIFICANT CHANGE UP (ref 150–400)
POTASSIUM SERPL-MCNC: 4 MMOL/L — SIGNIFICANT CHANGE UP (ref 3.5–5.3)
POTASSIUM SERPL-SCNC: 4 MMOL/L — SIGNIFICANT CHANGE UP (ref 3.5–5.3)
PROT SERPL-MCNC: 7.3 G/DL — SIGNIFICANT CHANGE UP (ref 6–8.3)
PROTHROM AB SERPL-ACNC: 13.7 SEC — HIGH (ref 10.6–13.6)
RBC # BLD: 3.9 M/UL — LOW (ref 4.2–5.8)
RBC # FLD: 12.7 % — SIGNIFICANT CHANGE UP (ref 10.3–14.5)
SODIUM SERPL-SCNC: 148 MMOL/L — HIGH (ref 135–145)
WBC # BLD: 7.92 K/UL — SIGNIFICANT CHANGE UP (ref 3.8–10.5)
WBC # FLD AUTO: 7.92 K/UL — SIGNIFICANT CHANGE UP (ref 3.8–10.5)

## 2022-01-31 PROCEDURE — 99232 SBSQ HOSP IP/OBS MODERATE 35: CPT

## 2022-01-31 PROCEDURE — 99233 SBSQ HOSP IP/OBS HIGH 50: CPT | Mod: GC

## 2022-01-31 PROCEDURE — 99221 1ST HOSP IP/OBS SF/LOW 40: CPT

## 2022-01-31 RX ORDER — POTASSIUM PHOSPHATE, MONOBASIC POTASSIUM PHOSPHATE, DIBASIC 236; 224 MG/ML; MG/ML
30 INJECTION, SOLUTION INTRAVENOUS ONCE
Refills: 0 | Status: COMPLETED | OUTPATIENT
Start: 2022-01-31 | End: 2022-01-31

## 2022-01-31 RX ADMIN — SODIUM CHLORIDE 3 MILLILITER(S): 9 INJECTION INTRAMUSCULAR; INTRAVENOUS; SUBCUTANEOUS at 05:10

## 2022-01-31 RX ADMIN — Medication 2: at 01:00

## 2022-01-31 RX ADMIN — POTASSIUM PHOSPHATE, MONOBASIC POTASSIUM PHOSPHATE, DIBASIC 83.33 MILLIMOLE(S): 236; 224 INJECTION, SOLUTION INTRAVENOUS at 01:37

## 2022-01-31 RX ADMIN — LACOSAMIDE 100 MILLIGRAM(S): 50 TABLET ORAL at 05:22

## 2022-01-31 RX ADMIN — ENOXAPARIN SODIUM 40 MILLIGRAM(S): 100 INJECTION SUBCUTANEOUS at 05:22

## 2022-01-31 RX ADMIN — SODIUM CHLORIDE 3 MILLILITER(S): 9 INJECTION INTRAMUSCULAR; INTRAVENOUS; SUBCUTANEOUS at 18:33

## 2022-01-31 RX ADMIN — SODIUM CHLORIDE 3 MILLILITER(S): 9 INJECTION INTRAMUSCULAR; INTRAVENOUS; SUBCUTANEOUS at 11:43

## 2022-01-31 RX ADMIN — Medication 3 MILLILITER(S): at 05:10

## 2022-01-31 RX ADMIN — Medication 3 MILLILITER(S): at 18:33

## 2022-01-31 RX ADMIN — Medication 3 MILLILITER(S): at 11:43

## 2022-01-31 RX ADMIN — CEFEPIME 100 MILLIGRAM(S): 1 INJECTION, POWDER, FOR SOLUTION INTRAMUSCULAR; INTRAVENOUS at 05:22

## 2022-01-31 RX ADMIN — Medication 2: at 05:23

## 2022-01-31 RX ADMIN — Medication 2: at 11:20

## 2022-01-31 RX ADMIN — LACOSAMIDE 100 MILLIGRAM(S): 50 TABLET ORAL at 18:39

## 2022-01-31 RX ADMIN — NYSTATIN CREAM 1 APPLICATION(S): 100000 CREAM TOPICAL at 05:23

## 2022-01-31 RX ADMIN — NYSTATIN CREAM 1 APPLICATION(S): 100000 CREAM TOPICAL at 18:58

## 2022-01-31 RX ADMIN — Medication 2: at 18:32

## 2022-01-31 RX ADMIN — ENOXAPARIN SODIUM 40 MILLIGRAM(S): 100 INJECTION SUBCUTANEOUS at 18:33

## 2022-01-31 NOTE — PROGRESS NOTE ADULT - PROBLEM SELECTOR PLAN 5
patient with mild hypernatremia  - free water flushes thru  q4h  - cont to trend and supplement electrolytes

## 2022-01-31 NOTE — PROGRESS NOTE ADULT - ASSESSMENT
81 yr old male with stated hx significant for HTN, OSH, s/p AAA repair, Ascending Ao aneurysm, infrarenal AAA, Rt common iliac aneurysm who presented from OSH for further management of IPH. Course c/b possible sz activity, recurrent AMS, recurrent hypoxic resp failure secondary to aspiration pneum requiring intubation 1/29 was transferred to MICU for recurrent hypoxic resp failure secondary to possible aspiration PNA and septic shock of unknown origin.     Plan:    #Neuro:  -IPH, possible CAA (amyloid angiopathy)  -re current AMS secondary to possible metabolic encephalopathy due to asp PNA now in septic shock s/p intubation    -CT head 1/27/22 re demonstrated Acute parenchymal hemorrhage with evolutionary changes and Arachnoid cyst   -repeat CT H 1/30 ;IMPRESSION: Acute parenchymal hemorrhage is again seen as well as intraventricular hemorrhage. Subrecord hemorrhage is suspected.  -Vimpat 100 BID    -EEG 1/30  Risk for seizures from the right posterior quadrant and left parietal region.  Structural abnormality in the right hemisphere. Moderate multifocal/diffuse nonspecific cerebral dysfunction. No seizures recorded.  -Neuro checks q 4 hrs and prn for changes  -activity as tolerated  -physical therapy consult when stable  -palliative care involved.      #Pulm:  Acute hypoxic respiratory failure in setting of aspiration PNA   -Hx of SWETA on home CPAP therapy  -CXR 1/29 w/ Opacities at the lung bases .  -s/p intubation 1/29 and extubation 1/30   -NIPPV ON   -Bronchodilator therapy q 6 hrs and  prn sob/wheezes  -hypertonic saline nebs q 6 hrs      #CV:  Septic shock 2/2 aspiration PNA and r/o other etiology.   -off levophed 1/30  -Hx HTN. holding antihypertensives ( losartan and amlodipine) in setting of shock   -Hx of ascending Ao aneurysm, infrarenal AAA, Rt common iliac aneurysm, s/p AAA repair 2015,   -POCUS w/ normal LV/RV contractility       #:  RACHEL   -improving   -trend BUN /.Cr level   -electolytes supplemented PRN.   -condom cath for I&os   -daily weight   -renally dose drugs     Hypernatremia   -Free h2o 250 q 4 hrs   -q d BMP, MG, Phos level     GI  Oropharyngeal edema   -s/p NGT W TF ; glucerna @ 55   -c/w bowel regimen , last BMP 1/29   -plan for PEG placement when hemodynamics stable       #ID:  Septic shock 2/2 aspiration PNA   -resolved   -on cefepime 1/29-->   -vanco 1 gm IV x 11/29  -BC x 2 & Sputum cx 1/29 NGTD   -MRSA 1/29 NGTD   -covid -19 NGTD 1/29  -UA NGTD 1/29          #ENDO  -Hx of DMT2  -mod ISS q 6   -POC with ISS q 6 hrs - maintain glucose 160-180    HEME:  -CT H w/ acute PH  -CTH 1/29, 1/30  stable   -maintain platelets > 100, hgb > 7, active type and screen  -on Lovenox SQ BID for ppx      PPX  -DVT prophylaxis with enoxaparin 40 mg subq q BID    GOC  FULL code   -palliative on board.     DISPO   Plan to BB to medical unit    81 yr old male with stated hx significant for HTN, OSH, s/p AAA repair, Ascending Ao aneurysm, infrarenal AAA, Rt common iliac aneurysm who presented from OSH for further management of IPH. Course c/b possible sz activity, recurrent AMS, recurrent hypoxic resp failure secondary to aspiration pneum requiring intubation 1/29 was transferred to MICU for recurrent hypoxic resp failure secondary to possible aspiration PNA and septic shock of unknown origin.     Plan:    #Neuro:  -IPH, possible CAA (amyloid angiopathy)  -re current AMS secondary to possible metabolic encephalopathy due to asp PNA now in septic shock s/p intubation    -CT head 1/27/22 re demonstrated Acute parenchymal hemorrhage with evolutionary changes and Arachnoid cyst   -repeat CT H 1/30 ;IMPRESSION: Acute parenchymal hemorrhage is again seen as well as intraventricular hemorrhage. Subrecord hemorrhage is suspected.  -Vimpat 100 BID    -EEG 1/30  Risk for seizures from the right posterior quadrant and left parietal region.  Structural abnormality in the right hemisphere. Moderate multifocal/diffuse nonspecific cerebral dysfunction. No seizures recorded.  -Neuro checks q 4 hrs and prn for changes  -activity as tolerated  -physical therapy consult when stable  -palliative care involved.      #Pulm:  Acute hypoxic respiratory failure in setting of aspiration PNA   -Hx of SWETA on home CPAP therapy  -CXR 1/29 w/ Opacities at the lung bases .  -s/p intubation 1/29 and extubation 1/30   -NIPPV ON 10/5/30%; ABG - ( 31 Jan 2022 06:26 )pH, Arterial: 7.47  pH, Blood: x     /  pCO2: 51    /  pO2: 80    / HCO3: 37    / Base Excess: 11.7  /  SaO2: 97.8    -Bronchodilator therapy q 6 hrs and  prn sob/wheezes  -hypertonic saline nebs q 6 hrs      #CV:  Septic shock 2/2 aspiration PNA and r/o other etiology.   -off levophed 1/30  -Hx HTN. holding antihypertensives ( losartan and amlodipine) in setting of shock   -Hx of ascending Ao aneurysm, infrarenal AAA, Rt common iliac aneurysm, s/p AAA repair 2015,   -POCUS w/ normal LV/RV contractility       #:  RACHEL   -improving   -trend BUN /.Cr level   -electolytes supplemented PRN.   -condom cath for I&os   -daily weight   -renally dose drugs     Hypernatremia   -Free h2o 250 q 4 hrs   -q d BMP, MG, Phos level     GI  Oropharyngeal edema   -s/p NGT W TF ; glucerna @ 55   -c/w bowel regimen , last BMP 1/29   -plan for PEG placement when hemodynamics stable       #ID:  Septic shock 2/2 aspiration PNA   -resolved   -on cefepime 1/29-->   -vanco 1 gm IV x 11/29  -BC x 2 & Sputum cx 1/29 NGTD   -MRSA 1/29 NGTD   -covid -19 NGTD 1/29  -UA NGTD 1/29          #ENDO  -Hx of DMT2  -mod ISS q 6   -POC with ISS q 6 hrs - maintain glucose 160-180    HEME:  -CT H w/ acute PH  -CTH 1/29, 1/30  stable   -maintain platelets > 100, hgb > 7, active type and screen  -on Lovenox SQ BID for ppx      PPX  -DVT prophylaxis with enoxaparin 40 mg subq q BID    GOC  FULL code   -palliative on board.     DISPO   Plan to BB to medical unit    81 yr old male with stated hx significant for HTN, OSH, s/p AAA repair, Ascending Ao aneurysm, infrarenal AAA, Rt common iliac aneurysm who presented from OSH for further management of IPH. Course c/b possible sz activity, recurrent AMS, recurrent hypoxic resp failure secondary to aspiration pneum requiring intubation 1/29 was transferred to MICU for recurrent hypoxic resp failure secondary to possible aspiration PNA and septic shock of unknown origin.     Plan:    #Neuro:  -IPH, possible CAA (amyloid angiopathy)  -re current AMS secondary to possible metabolic encephalopathy due to asp PNA now in septic shock s/p intubation    -CT head 1/27/22 re demonstrated Acute parenchymal hemorrhage with evolutionary changes and Arachnoid cyst   -repeat CT H 1/30 ;IMPRESSION: Acute parenchymal hemorrhage is again seen as well as intraventricular hemorrhage. Subrecord hemorrhage is suspected.  -Vimpat 100 BID    -EEG 1/30  Risk for seizures from the right posterior quadrant and left parietal region.  Structural abnormality in the right hemisphere. Moderate multifocal/diffuse nonspecific cerebral dysfunction. No seizures recorded.  -Neuro checks q 4 hrs and prn for changes  -activity as tolerated  -physical therapy consult when stable  -palliative care involved.      #Pulm:  Acute hypoxic respiratory failure in setting of aspiration PNA   -Hx of SWETA on home CPAP therapy  -CXR 1/29 w/ Opacities at the lung bases .  -s/p intubation 1/29 and extubation 1/30   -NIPPV ON 10/5/30%; ABG - ( 31 Jan 2022 06:26 )pH, Arterial: 7.47  pH, Blood: x     /  pCO2: 51    /  pO2: 80    / HCO3: 37    / Base Excess: 11.7  /  SaO2: 97.8    -Bronchodilator therapy q 6 hrs and  prn sob/wheezes  -hypertonic saline nebs q 6 hrs      #CV:  Septic shock 2/2 aspiration PNA and r/o other etiology.   -off levophed 1/30  -Hx HTN. holding antihypertensives ( losartan and amlodipine) in setting of shock   -Hx of ascending Ao aneurysm, infrarenal AAA, Rt common iliac aneurysm, s/p AAA repair 2015,   -POCUS w/ normal LV/RV contractility       #:  RACHEL   -improving   -trend BUN /.Cr level   -electolytes supplemented PRN.   -condom cath for I&os   -daily weight   -renally dose drugs     Hypernatremia   -Free h2o 250 q 4 hrs   -q d BMP, MG, Phos level     GI  Oropharyngeal edema   -s/p NGT W TF ; glucerna @ 56   -c/w bowel regimen , last BMP 1/30  -plan for PEG placement when hemodynamics stable       #ID:  Septic shock 2/2 aspiration PNA   -resolved   -on cefepime 1/29-->   -vanco 1 gm IV x 11/29  -BC x 2 & Sputum cx 1/29 NGTD   -MRSA 1/29 NGTD   -covid -19 NGTD 1/29  -UA NGTD 1/29          #ENDO  -Hx of DMT2  -mod ISS q 6   -POC with ISS q 6 hrs - maintain glucose 160-180    HEME:  -CT H w/ acute PH  -CTH 1/29, 1/30  stable   -maintain platelets > 100, hgb > 7, active type and screen  -on Lovenox SQ BID for ppx      PPX  -DVT prophylaxis with enoxaparin 40 mg subq q BID    GOC  FULL code   -palliative on board.     DISPO   Plan to BB to medical unit    81 yr old male with stated hx significant for HTN, OSH, s/p AAA repair, Ascending Ao aneurysm, infrarenal AAA, Rt common iliac aneurysm who presented from OSH for further management of IPH. Course c/b possible sz activity, recurrent AMS, recurrent hypoxic resp failure secondary to aspiration pneum requiring intubation 1/29 was transferred to MICU for recurrent hypoxic resp failure secondary to possible aspiration PNA and septic shock of unknown origin.     Plan:    #Neuro:  -IPH, possible CAA (amyloid angiopathy)  -re current AMS secondary to possible metabolic encephalopathy due to asp PNA now in septic shock s/p intubation    -CT head 1/27/22 re demonstrated Acute parenchymal hemorrhage with evolutionary changes and Arachnoid cyst   -repeat CT H 1/30 ;IMPRESSION: Acute parenchymal hemorrhage is again seen as well as intraventricular hemorrhage. Subrecord hemorrhage is suspected.  -Vimpat 100 BID;  d/w NSX whom deferred to neurology to make decision regarding length of drug use . D/w neuro resident who recommended for pt to ; remain on medication for now ; can be titrated OP  -EEG 1/30  Risk for seizures from the right posterior quadrant and left parietal region.  Structural abnormality in the right hemisphere. Moderate multifocal/diffuse nonspecific cerebral dysfunction. No seizures recorded.  -Neuro checks q 4 hrs and prn for changes  -activity as tolerated  -physical therapy consult when stable  -palliative care involved.      #Pulm:  Acute hypoxic respiratory failure in setting of aspiration PNA   -Hx of SWETA on home CPAP therapy  -CXR 1/29 w/ Opacities at the lung bases .  -s/p intubation 1/29 and extubation 1/30   -NIPPV ON 10/5/30%; ABG - ( 31 Jan 2022 06:26 )pH, Arterial: 7.47  pH, Blood: x     /  pCO2: 51    /  pO2: 80    / HCO3: 37    / Base Excess: 11.7  /  SaO2: 97.8    -Bronchodilator therapy q 6 hrs and  prn sob/wheezes  -hypertonic saline nebs q 6 hrs      #CV:  Septic shock 2/2 aspiration PNA and r/o other etiology.   -off levophed 1/30  -Hx HTN. holding antihypertensives ( losartan and amlodipine) in setting of shock   -Hx of ascending Ao aneurysm, infrarenal AAA, Rt common iliac aneurysm, s/p AAA repair 2015,   -POCUS w/ normal LV/RV contractility       #:  RACHEL   -improving   -trend BUN /.Cr level   -electolytes supplemented PRN.   -condom cath for I&os   -daily weight   -renally dose drugs     Hypernatremia   -Free h2o 250 q 4 hrs   -q d BMP, MG, Phos level     GI  Oropharyngeal edema   -s/p NGT W TF ; glucerna @ 56   -c/w bowel regimen , last BMP 1/30  -plan for PEG placement when hemodynamics stable       #ID:  Septic shock 2/2 aspiration PNA   -resolved   -on cefepime 1/29-->   -vanco 1 gm IV x 11/29  -BC x 2 & Sputum cx 1/29 NGTD   -MRSA 1/29 NGTD   -covid -19 NGTD 1/29  -UA NGTD 1/29          #ENDO  -Hx of DMT2  -mod ISS q 6   -POC with ISS q 6 hrs - maintain glucose 160-180    HEME:  -CT H w/ acute PH  -CTH 1/29, 1/30  stable   -maintain platelets > 100, hgb > 7, active type and screen  -on Lovenox SQ BID for ppx      PPX  -DVT prophylaxis with enoxaparin 40 mg subq q BID    GO  FULL code   -palliative on board.     DISPO   Plan to BB to medical unit

## 2022-01-31 NOTE — PROGRESS NOTE ADULT - SUBJECTIVE AND OBJECTIVE BOX
PROGRESS NOTE:     Patient is a 81y old  Male who presents with a chief complaint of R ICH (2022 12:23)      OVERNIGHT EVENTS: No acute events overnight    SUBJECTIVE: Pt seen and examined at bedside this morning.     ADDITIONAL REVIEW OF SYSTEMS:    MEDICATIONS  (STANDING):  albuterol/ipratropium for Nebulization 3 milliLiter(s) Nebulizer every 6 hours  enoxaparin Injectable 40 milliGRAM(s) SubCutaneous every 12 hours  insulin lispro (ADMELOG) corrective regimen sliding scale   SubCutaneous every 6 hours  lacosamide Solution 100 milliGRAM(s) Oral two times a day  nystatin Cream 1 Application(s) Topical every 12 hours  senna 2 Tablet(s) Oral at bedtime  sodium chloride 0.9% for Nebulization 3 milliLiter(s) Nebulizer every 6 hours    MEDICATIONS  (PRN):      CAPILLARY BLOOD GLUCOSE      POCT Blood Glucose.: 163 mg/dL (2022 11:02)  POCT Blood Glucose.: 184 mg/dL (2022 05:20)  POCT Blood Glucose.: 209 mg/dL (2022 17:45)    I&O's Summary    2022 07:01  -  2022 07:00  --------------------------------------------------------  IN: 2226.1 mL / OUT: 850 mL / NET: 1376.1 mL    2022 07:01  -  2022 15:49  --------------------------------------------------------  IN: 330 mL / OUT: 100 mL / NET: 230 mL        PHYSICAL EXAM:  Vital Signs Last 24 Hrs  T(C): 36.8 (2022 12:54), Max: 37.3 (2022 20:00)  T(F): 98.2 (2022 12:54), Max: 99.1 (2022 20:00)  HR: 91 (2022 12:54) (75 - 103)  BP: 150/93 (2022 12:54) (114/75 - 161/71)  BP(mean): 105 (2022 12:00) (84 - 111)  RR: 20 (2022 12:54) (19 - 31)  SpO2: 92% (2022 12:54) (90% - 100%)    CONSTITUTIONAL: NAD, well-developed  HEENT: NC/AT, EOMI  RESPIRATORY: No increased work of breathing, CTAB, no wheezes or crackles appreciated  CARDIOVASCULAR: RRR, S1 and S2 present, no m/r/g  ABDOMEN: soft, NT, ND, bowel sounds present  EXTREMITIES: No LE edema  MUSCULOSKELETAL: no joint swelling or tenderness to palpation  NEURO: A&Ox3, moving all extremities    LABS:                        11.6   7.92  )-----------( 235      ( 2022 00:38 )             38.0     01    148<H>  |  106  |  49<H>  ----------------------------<  195<H>  4.0   |  33<H>  |  0.99    Ca    10.4      2022 00:38  Phos  2.0       Mg     2.7         TPro  7.3  /  Alb  3.3  /  TBili  0.5  /  DBili  x   /  AST  57<H>  /  ALT  31  /  AlkPhos  73  01-31    PT/INR - ( 2022 00:38 )   PT: 13.7 sec;   INR: 1.15 ratio         PTT - ( 2022 00:38 )  PTT:32.6 sec      Urinalysis Basic - ( 2022 20:53 )    Color: Yellow / Appearance: Clear / S.029 / pH: x  Gluc: x / Ketone: Trace  / Bili: Negative / Urobili: Negative   Blood: x / Protein: 30 mg/dL / Nitrite: Negative   Leuk Esterase: Negative / RBC: 1 /hpf / WBC 1 /HPF   Sq Epi: x / Non Sq Epi: 1 / Bacteria: 0.0        Culture - Sputum (collected 2022 13:58)  Source: .Sputum Sputum  Gram Stain (2022 07:20):    Moderate polymorphonuclear leukocytes per low power field    Rare Squamous epithelial cells per low power field    No organisms seen per oil power field    Culture - Blood (collected 2022 13:53)  Source: .Blood Blood-Venous  Preliminary Report (2022 14:01):    No growth to date.    Culture - Blood (collected 2022 13:53)  Source: .Blood Blood-Peripheral  Preliminary Report (2022 14:01):    No growth to date.        RADIOLOGY & ADDITIONAL TESTS:    Results Reviewed:   Imaging Personally Reviewed:  Electrocardiogram Personally Reviewed:    COORDINATION OF CARE:  Care Discussed with Consultants/Other Providers [Y/N]:  Prior or Outpatient Records Reviewed [Y/N]:   PROGRESS NOTE:     Patient is a 81y old  Male who presents with a chief complaint of R ICH (2022 12:23)      OVERNIGHT EVENTS: No acute events overnight    SUBJECTIVE: Pt seen and examined at bedside this morning.     ADDITIONAL REVIEW OF SYSTEMS:    MEDICATIONS  (STANDING):  albuterol/ipratropium for Nebulization 3 milliLiter(s) Nebulizer every 6 hours  enoxaparin Injectable 40 milliGRAM(s) SubCutaneous every 12 hours  insulin lispro (ADMELOG) corrective regimen sliding scale   SubCutaneous every 6 hours  lacosamide Solution 100 milliGRAM(s) Oral two times a day  nystatin Cream 1 Application(s) Topical every 12 hours  senna 2 Tablet(s) Oral at bedtime  sodium chloride 0.9% for Nebulization 3 milliLiter(s) Nebulizer every 6 hours    MEDICATIONS  (PRN):      CAPILLARY BLOOD GLUCOSE      POCT Blood Glucose.: 163 mg/dL (2022 11:02)  POCT Blood Glucose.: 184 mg/dL (2022 05:20)  POCT Blood Glucose.: 209 mg/dL (2022 17:45)    I&O's Summary    2022 07:01  -  2022 07:00  --------------------------------------------------------  IN: 2226.1 mL / OUT: 850 mL / NET: 1376.1 mL    2022 07:01  -  2022 15:49  --------------------------------------------------------  IN: 330 mL / OUT: 100 mL / NET: 230 mL        PHYSICAL EXAM:  Vital Signs Last 24 Hrs  T(C): 36.8 (2022 12:54), Max: 37.3 (2022 20:00)  T(F): 98.2 (2022 12:54), Max: 99.1 (2022 20:00)  HR: 91 (2022 12:54) (75 - 103)  BP: 150/93 (2022 12:54) (114/75 - 161/71)  BP(mean): 105 (2022 12:00) (84 - 111)  RR: 20 (2022 12:54) (19 - 31)  SpO2: 92% (2022 12:54) (90% - 100%)    CONSTITUTIONAL: NAD, well-developed  HEENT: NC/AT, EOMI  RESPIRATORY: No increased work of breathing, CTAB, no wheezes or crackles appreciated  CARDIOVASCULAR: RRR, S1 and S2 present, no m/r/g  ABDOMEN: soft, NT, ND, bowel sounds present  EXTREMITIES: No LE edema  MUSCULOSKELETAL: no joint swelling or tenderness to palpation  NEURO: A&Ox1, responding to questions and commands    LABS:                        11.6   7.92  )-----------( 235      ( 2022 00:38 )             38.0     01-    148<H>  |  106  |  49<H>  ----------------------------<  195<H>  4.0   |  33<H>  |  0.99    Ca    10.4      2022 00:38  Phos  2.0       Mg     2.7         TPro  7.3  /  Alb  3.3  /  TBili  0.5  /  DBili  x   /  AST  57<H>  /  ALT  31  /  AlkPhos  73  01-31    PT/INR - ( 2022 00:38 )   PT: 13.7 sec;   INR: 1.15 ratio         PTT - ( 2022 00:38 )  PTT:32.6 sec      Urinalysis Basic - ( 2022 20:53 )    Color: Yellow / Appearance: Clear / S.029 / pH: x  Gluc: x / Ketone: Trace  / Bili: Negative / Urobili: Negative   Blood: x / Protein: 30 mg/dL / Nitrite: Negative   Leuk Esterase: Negative / RBC: 1 /hpf / WBC 1 /HPF   Sq Epi: x / Non Sq Epi: 1 / Bacteria: 0.0        Culture - Sputum (collected 2022 13:58)  Source: .Sputum Sputum  Gram Stain (2022 07:20):    Moderate polymorphonuclear leukocytes per low power field    Rare Squamous epithelial cells per low power field    No organisms seen per oil power field    Culture - Blood (collected 2022 13:53)  Source: .Blood Blood-Venous  Preliminary Report (2022 14:01):    No growth to date.    Culture - Blood (collected 2022 13:53)  Source: .Blood Blood-Peripheral  Preliminary Report (2022 14:01):    No growth to date.        RADIOLOGY & ADDITIONAL TESTS:    Results Reviewed:   Imaging Personally Reviewed:  Electrocardiogram Personally Reviewed:    COORDINATION OF CARE:  Care Discussed with Consultants/Other Providers [Y/N]:  Prior or Outpatient Records Reviewed [Y/N]:

## 2022-01-31 NOTE — ADVANCED PRACTICE NURSE CONSULT - ASSESSMENT
The pt was encountered on a [employer] sport support surface and was being turned and positioned, staff had applied Complete CAir boots to off-load the heels.   Mr Annalise receives enteral feeds and is being followed by nutrition.  The pt was a/w foot wounds and is being followed by Podiatry  Upon assessment, there is a well-demarcated are of blanchable erythemal on the left buttocks- it measures 8cmx 8cm x0cm. As the area in question is blanchable will not classify as a deep tissue injury- the etiology is unclear. will recommend to continue to monitor for further changes.

## 2022-01-31 NOTE — ADVANCED PRACTICE NURSE CONSULT - REASON FOR CONSULT
Requested by staff to assess skin status: left buttocks. PMH is noted;  Briefly, this is a 81 yr male with PMHx HTN, SWETA on home CPAP, DM2,  Ascending Ao aneurysm 4cm, infrarenal AAA 5 cm s/p AAA repair 2015, Rt common iliac aneurysm 5cm (CT scan at [facility] 11/2020) meant for vascular intervention however unable due to COVID-19+ two weeks prior to presentation,  who was  transferred to [facility]  NSICU 1/5/22 after being found to have Rt frontal parenchymal hemorrhage with mass effect without midline shift found on CTH after presenting originally to [facility] with fatigue, dizziness, s/p fall at home. Pt at [facility] was evaluated by vascular surg, determined no surg intervention at this time, pt evaluated by Neuro surg who offered MIND clinical trial, STX-guided resection vs medical management however family preferred only minimally invasive intervention if necessary. 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 Pt initially placed on antihypertensive therapy, hypertonic saline therapy.     This hospital course c/b sz like activity 1/16/22, loaded with keppra and started on vimpat therapy, EEG at that time however without epileptiform activity, development of AMS with hypoxic/hypercapnic resp distress requiring intubation 1/19/22, repeat CTH at that time without acute changes, re demonstration of IPH. Pt with eventual improvement in resp status and extubated 1/22/22.       Pt  eventually transferred to medicine floor 1/25/22. Course however further c/b development of fevers wit Tmax to 38.8 1/25/22, received CT chest 1/26/22 that demonstrated new bilateral patchy opacities concerning for aspiration pneum, started cefepime therapy, recurrent worsening AMS and hypoxic resp failure with SPO2 of 88-90's requiring re intubation 1/29/22 with transfer to MICU for hypoxic resp failure to possible aspiration pneum and septic shock of unknown origin.  ADD 1640 1/29/22: Discussed with Dr. Swan (Palliative Care), he spoke with family and they wish that patient have intervention that will prolong his life  1/30 extubated to NC , NIPPV ON , neuro status ; following on all 4 extremities . Primary languge Afghan ; using . NGT in place for TF / meds.   1/31 BCx x 2 1/29 NGTD. Completed course with Cefepime. Will transfer to medical floor. GI notified , pt is ready to be placed back on the eval for PEG.   Since assessment in the MICU this am, the pt was transferred to [facility].

## 2022-01-31 NOTE — ADVANCED PRACTICE NURSE CONSULT - RECOMMEDATIONS
Will recommend the followin. Left buttocks: continue to monitor, apply Cavilon daily  2. continue with T&P  3. continue with boots  4. Nutrition support as pt condition allows  5. Foot care as per Podiatry  Tx plan discussed with RN Will recommend the followin. Left buttocks: continue to monitor, apply Cavilon daily  2. continue with T&P  3. continue with boots  4. Nutrition support as pt condition allows  5. Foot care as per Podiatry  Tx plan discussed with RN  22 No evidence on exam for left buttock collection. FRANK Andres MD, FACS

## 2022-01-31 NOTE — PROGRESS NOTE ADULT - PROBLEM SELECTOR PLAN 1
A&Ox0. 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  Structural abnormality in the right hemisphere. Moderate multifocal/diffuse nonspecific cerebral dysfunction. No seizures recorded.  -Neuro checks q 4 hrs and prn for changes  -activity as tolerated  -physical therapy consult when stable

## 2022-01-31 NOTE — PROGRESS NOTE ADULT - ASSESSMENT
81 yr old male with stated hx significant for HTN, OSH, s/p AAA repair, Ascending Ao aneurysm, infrarenal AAA, Rt common iliac aneurysm who presented from OSH for further management of IPH. Course c/b possible sz activity, recurrent AMS, recurrent hypoxic resp failure secondary to aspiration pneum requiring intubation 1/29 was transferred to MICU for recurrent hypoxic resp failure secondary to possible aspiration PNA and septic shock of unknown origin.     Plan:    #Neuro:  -IPH, possible CAA (amyloid angiopathy)  -re current AMS secondary to possible metabolic encephalopathy due to asp PNA now in septic shock s/p intubation    -CT head 1/27/22 re demonstrated Acute parenchymal hemorrhage with evolutionary changes and Arachnoid cyst   -repeat CT H 1/30 ;IMPRESSION: Acute parenchymal hemorrhage is again seen as well as intraventricular hemorrhage. Subrecord hemorrhage is suspected.  -Vimpat 100 BID  ; d/w NSX whom deferred to neurology to make decision regarding length of drug use . D/w neuro resident who recommended for pt to ; remain on medication for now ; can be titrated OP   -EEG 1/30  Risk for seizures from the right posterior quadrant and left parietal region.  Structural abnormality in the right hemisphere. Moderate multifocal/diffuse nonspecific cerebral dysfunction. No seizures recorded.  -Neuro checks q 4 hrs and prn for changes  -activity as tolerated  -physical therapy consult when stable  -palliative care involved however family decided they want all the interventions and pt is full code.     #Pulm:  Acute hypoxic respiratory failure in setting of aspiration PNA   -Hx of SWETA on home CPAP therapy  -CXR 1/29 w/ Opacities at the lung bases .  -s/p intubation 1/29 and extubation 1/30     SWETA on home CPAP   -NIPPV ON 10/5/30%; ABG - ( 31 Jan 2022 06:26 )pH, Arterial: 7.47  pH, Blood: x     /  pCO2: 51    /  pO2: 80    / HCO3: 37    / Base Excess: 11.7  /  SaO2: 97.8 ; will change to NIPPV 8/5/30%    -Bronchodilator therapy q 6 hrs and  prn sob/wheezes  -hypertonic saline nebs q 6 hrs      #CV:  Septic shock 2/2 aspiration PNA and r/o other etiology.   -off levophed 1/30  -Hx HTN. holding antihypertensives ( losartan and amlodipine) in setting of shock   -Hx of ascending Ao aneurysm, infrarenal AAA, Rt common iliac aneurysm, s/p AAA repair 2015,   -POCUS w/ normal LV/RV contractility       #:  RACHEL   -improving   -trend BUN /.Cr level   -electolytes supplemented PRN.   -condom cath for I&os   -daily weight   -renally dose drugs     Hypernatremia   -Free h2o 250 q 4 hrs   -q d BMP, MG, Phos level     GI  Oropharyngeal edema   -s/p NGT W TF ; glucerna @ 55   -c/w bowel regimen , last BMP 1/30  -plan for PEG placement when hemodynamics stable       #ID:  Septic shock 2/2 aspiration PNA   -resolved   -on cefepime 1/25--> 1/31  -vanco 1 gm IV x 11/29  -BC x 2 & Sputum cx 1/29 NGTD   -MRSA 1/29 NGTD   -covid -19 NGTD 1/29  -UA NGTD 1/29          #ENDO  -Hx of DMT2  -mod ISS q 6   -POC with ISS q 6 hrs - maintain glucose 160-180    HEME:  -CT H w/ acute PH  -CTH 1/29, 1/30  stable   -maintain platelets > 100, hgb > 7, active type and screen  -on Lovenox SQ BID for ppx      PPX  -DVT prophylaxis with enoxaparin 40 mg subq q BID    GOC  FULL code   -palliative on board.     DISPO    BB to medical unit to hospitalist service .               For Followup:  -speech and swallow   -GI follow up for PEG placement   -neuro checks   -hypernatremia on free h20 ; daily BMP   81 yr old male with stated hx significant for HTN, OSH, s/p AAA repair, Ascending Ao aneurysm, infrarenal AAA, Rt common iliac aneurysm who presented from OSH for further management of IPH. Course c/b possible sz activity, recurrent AMS, recurrent hypoxic resp failure secondary to aspiration pneum requiring intubation 1/29 was transferred to MICU for recurrent hypoxic resp failure secondary to possible aspiration PNA and septic shock of unknown origin.

## 2022-01-31 NOTE — PROGRESS NOTE ADULT - PROBLEM SELECTOR PLAN 6
- DVT: lovenox BID  - Diet: TF, speech swallow eval  - Bowel regimen: miralax, senna  - ETHICS: full code, waiting for PEG placement per family

## 2022-01-31 NOTE — PROGRESS NOTE ADULT - SUBJECTIVE AND OBJECTIVE BOX
Interval Events:   No acute events noted, however unable to obtain full HPI due to underlying mental status [A/O x1].    ROS:   Unable to fully obtain ROS.    Hospital Medications:  albuterol/ipratropium for Nebulization 3 milliLiter(s) Nebulizer every 6 hours  enoxaparin Injectable 40 milliGRAM(s) SubCutaneous every 12 hours  insulin lispro (ADMELOG) corrective regimen sliding scale   SubCutaneous every 6 hours  lacosamide Solution 100 milliGRAM(s) Oral two times a day  nystatin Cream 1 Application(s) Topical every 12 hours  senna 2 Tablet(s) Oral at bedtime  sodium chloride 0.9% for Nebulization 3 milliLiter(s) Nebulizer every 6 hours      PHYSICAL EXAM:   Vital Signs:  Vital Signs Last 24 Hrs  T(C): 36.6 (2022 08:00), Max: 37.3 (2022 20:00)  T(F): 97.9 (2022 08:00), Max: 99.1 (2022 20:00)  HR: 76 (2022 10:00) (61 - 103)  BP: 136/65 (2022 10:00) (78/53 - 161/71)  BP(mean): 94 (2022 10:00) (62 - 111)  RR: 21 (2022 10:00) (19 - 31)  SpO2: 99% (2022 10:00) (90% - 100%)  Daily     Daily Weight in k (2022 04:00)    GENERAL: no acute distress  NEURO: not fully alert/oriented [A/O x1]  HEENT: anicteric sclera, no conjunctival pallor appreciated  CHEST: no respiratory distress, no accessory muscle use  CARDIAC: regular rate, +S1/S2  ABDOMEN: soft, nondistended, nontender, no rebound or guarding  EXTREMITIES: warm, well perfused, no edema  SKIN: no lesions noted    LABS: reviewed                        11.6   7.92  )-----------( 235      ( 2022 00:38 )             38.0         148<H>  |  106  |  49<H>  ----------------------------<  195<H>  4.0   |  33<H>  |  0.99    Ca    10.4      2022 00:38  Phos  2.0       Mg     2.7         TPro  7.3  /  Alb  3.3  /  TBili  0.5  /  DBili  x   /  AST  57<H>  /  ALT  31  /  AlkPhos  73      LIVER FUNCTIONS - ( 2022 00:38 )  Alb: 3.3 g/dL / Pro: 7.3 g/dL / ALK PHOS: 73 U/L / ALT: 31 U/L / AST: 57 U/L / GGT: x             Interval Diagnostic Studies: see sunrise for full report

## 2022-01-31 NOTE — CHART NOTE - NSCHARTNOTEFT_GEN_A_CORE
MAR MICU TRANSFER NOTE    Please refer to MICU transfer note for full details.    Briefly, this is a 81 yr male with PMHx HTN, SWETA on home CPAP, DM2,  Ascending Ao aneurysm 4cm, infrarenal AAA 5 cm s/p AAA repair 2015, Rt common iliac aneurysm 5cm (CT scan at [facility] 11/2020) meant for vascular intervention however unable due to COVID-19+ two weeks prior to presentation,  who was  transferred to [facility]  NSICU 1/5/22 after being found to have Rt frontal parenchymal hemorrhage with mass effect without midline shift found on CTH after presenting originally to [facility] with fatigue, dizziness, s/p fall at home. Pt at [facility] was evaluated by vascular surg, determined no surg intervention at this time, pt evaluated by Neuro surg who offered MIND clinical trial, STX-guided resection vs medical management however family preferred only minimally invasive intervention if necessary. 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 Pt initially placed on antihypertensive therapy, hypertonic saline therapy.     This hospital course c/b sz like activity 1/16/22, loaded with keppra and started on vimpat therapy, EEG at that time however without epileptiform activity, development of AMS with hypoxic/hypercapnic resp distress requiring intubation 1/19/22, repeat CTH at that time without acute changes, re demonstration of IPH. Pt with eventual improvement in resp status and extubated 1/22/22.       Pt  eventually transferred to medicine floor 1/25/22. Course however further c/b development of fevers wit Tmax to 38.8 1/25/22, received CT chest 1/26/22 that demonstrated new bilateral patchy opacities concerning for aspiration pneum, started cefepime therapy, recurrent worsening AMS and hypoxic resp failure with SPO2 of 88-90's requiring re intubation 1/29/22 with transfer to MICU for hypoxic resp failure to possible aspiration pneum and septic shock of unknown origin.  ADD 1640 1/29/22: Discussed with Dr. Swan (Palliative Care), he spoke with family and they wish that patient have intervention that will prolong his life  1/30 extubated to NC , NIPPV ON , neuro status ; following on all 4 extremities . Primary languge R[place]andan ; using . NGT in place for TF / meds.   1/31 BCx x 2 1/29 NGTD. Completed course with Cefepime. Will transfer to medical floor. GI notified , pt is ready to be placed back on the eval for PEG.           Vital Signs Last 24 Hrs  T(C): 36.7 (31 Jan 2022 12:00), Max: 37.3 (30 Jan 2022 20:00)  T(F): 98.1 (31 Jan 2022 12:00), Max: 99.1 (30 Jan 2022 20:00)  HR: 84 (31 Jan 2022 12:00) (75 - 103)  BP: 141/82 (31 Jan 2022 12:00) (99/56 - 161/71)  BP(mean): 105 (31 Jan 2022 12:00) (71 - 111)  RR: 20 (31 Jan 2022 12:00) (19 - 31)  SpO2: 100% (31 Jan 2022 12:00) (90% - 100%)  I&O's Summary    30 Jan 2022 07:01  -  31 Jan 2022 07:00  --------------------------------------------------------  IN: 2226.1 mL / OUT: 850 mL / NET: 1376.1 mL    31 Jan 2022 07:01  -  31 Jan 2022 12:37  --------------------------------------------------------  IN: 330 mL / OUT: 100 mL / NET: 230 mL      Allergies    No Known Allergies    Intolerances      MEDICATIONS  (STANDING):  albuterol/ipratropium for Nebulization 3 milliLiter(s) Nebulizer every 6 hours  enoxaparin Injectable 40 milliGRAM(s) SubCutaneous every 12 hours  insulin lispro (ADMELOG) corrective regimen sliding scale   SubCutaneous every 6 hours  lacosamide Solution 100 milliGRAM(s) Oral two times a day  nystatin Cream 1 Application(s) Topical every 12 hours  senna 2 Tablet(s) Oral at bedtime  sodium chloride 0.9% for Nebulization 3 milliLiter(s) Nebulizer every 6 hours    MEDICATIONS  (PRN):                                  11.6   7.92  )-----------( 235      ( 31 Jan 2022 00:38 )             38.0     01-31    148<H>  |  106  |  49<H>  ----------------------------<  195<H>  4.0   |  33<H>  |  0.99    Ca    10.4      31 Jan 2022 00:38  Phos  2.0     01-31  Mg     2.7     01-31    TPro  7.3  /  Alb  3.3  /  TBili  0.5  /  DBili  x   /  AST  57<H>  /  ALT  31  /  AlkPhos  73  01-31    PT/INR - ( 31 Jan 2022 00:38 )   PT: 13.7 sec;   INR: 1.15 ratio         PTT - ( 31 Jan 2022 00:38 )  PTT:32.6 sec        ASSESSMENT & PLAN:   81 yr old male with stated hx significant for HTN, OSH, s/p AAA repair, Ascending Ao aneurysm, infrarenal AAA, Rt common iliac aneurysm who presented from OSH for further management of IPH. Course c/b possible sz activity, recurrent AMS, recurrent hypoxic resp failure secondary to aspiration pneum requiring intubation 1/29 was transferred to MICU for recurrent hypoxic resp failure secondary to possible aspiration PNA and septic shock of unknown origin.     Plan:    #Neuro:  -IPH, possible CAA (amyloid angiopathy)  -re current AMS secondary to possible metabolic encephalopathy due to asp PNA now in septic shock s/p intubation    -CT head 1/27/22 re demonstrated Acute parenchymal hemorrhage with evolutionary changes and Arachnoid cyst   -repeat CT H 1/30 ;IMPRESSION: Acute parenchymal hemorrhage is again seen as well as intraventricular hemorrhage. Subrecord hemorrhage is suspected.  -Vimpat 100 BID  ; d/w NSX whom deferred to neurology to make decision regarding length of drug use . D/w neuro resident who recommended for pt to ; remain on medication for now ; can be titrated OP   -EEG 1/30  Risk for seizures from the right posterior quadrant and left parietal region.  Structural abnormality in the right hemisphere. Moderate multifocal/diffuse nonspecific cerebral dysfunction. No seizures recorded.  -Neuro checks q 4 hrs and prn for changes  -activity as tolerated  -physical therapy consult when stable  -palliative care involved however family decided they want all the interventions and pt is full code.     #Pulm:  Acute hypoxic respiratory failure in setting of aspiration PNA   -Hx of SWETA on home CPAP therapy  -CXR 1/29 w/ Opacities at the lung bases .  -s/p intubation 1/29 and extubation 1/30     SWETA on home CPAP   -NIPPV ON 10/5/30%; ABG - ( 31 Jan 2022 06:26 )pH, Arterial: 7.47  pH, Blood: x     /  pCO2: 51    /  pO2: 80    / HCO3: 37    / Base Excess: 11.7  /  SaO2: 97.8 ; will change to NIPPV 8/5/30%    -Bronchodilator therapy q 6 hrs and  prn sob/wheezes  -hypertonic saline nebs q 6 hrs      #CV:  Septic shock 2/2 aspiration PNA and r/o other etiology.   -off levophed 1/30  -Hx HTN. holding antihypertensives ( losartan and amlodipine) in setting of shock   -Hx of ascending Ao aneurysm, infrarenal AAA, Rt common iliac aneurysm, s/p AAA repair 2015,   -POCUS w/ normal LV/RV contractility       #:  RACHEL   -improving   -trend BUN /.Cr level   -electolytes supplemented PRN.   -condom cath for I&os   -daily weight   -renally dose drugs     Hypernatremia   -Free h2o 250 q 4 hrs   -q d BMP, MG, Phos level     GI  Oropharyngeal edema   -s/p NGT W TF ; glucerna @ 55   -c/w bowel regimen , last BMP 1/30  -plan for PEG placement when hemodynamics stable       #ID:  Septic shock 2/2 aspiration PNA   -resolved   -on cefepime 1/25--> 1/31  -vanco 1 gm IV x 11/29  -BC x 2 & Sputum cx 1/29 NGTD   -MRSA 1/29 NGTD   -covid -19 NGTD 1/29  -UA NGTD 1/29          #ENDO  -Hx of DMT2  -mod ISS q 6   -POC with ISS q 6 hrs - maintain glucose 160-180    HEME:  -CT H w/ acute PH  -CTH 1/29, 1/30  stable   -maintain platelets > 100, hgb > 7, active type and screen  -on Lovenox SQ BID for ppx      PPX  -DVT prophylaxis with enoxaparin 40 mg subq q BID    GOC  FULL code   -palliative on board.     DISPO    BB to medical unit to hospitalist service .               For Followup:  -speech and swallow   -GI follow up for PEG placement   -neuro checks   -hypernatremia on free h20 ; daily BMP    Judah Rogers MD  PGY-3 | Internal Medicine

## 2022-01-31 NOTE — CHART NOTE - NSCHARTNOTEFT_GEN_A_CORE
Mr. Robert Woody is an 81y m W/ PMHx HTN presenting to the ED from [facility] as a transfer for R frontal parenchymal hemorrhage on CT after fall at home. Patient is poor historian but states that he was diagnosed with COVID 2 weeks ago and has been feeling "tired" at home, states he felt dizzy earlier today but unable to state what time he fell or further elucidate. Patient denies AC use. Per EMS was also recently diagnosed with a AAA but has been unable to undergo surgical repair due to recent dx of COVID. Vascular surgery was consulted as patient has hx of aneurysms. History was obtained from daughter Amy. Patient had a history of ?open thoracic aneurysm repair 7 years ago in [place]. And patient has known hx ascending aorta aneurysm measuring 4cm, infrarenal AAA 5cm and right common iliac aneurysm measuring 5cm as reported by daughter based on last CT scan done in [place] in November 11/20. Patient is here with ICH. Vascular was called to evaluate.    > Vascular surgery for Aortic and iliac aneurysms. Will monitor and treat possible as outpatient due to c/i to AC  > Possible MIND study candidate    Swallow history: No reports in SCM. No studies in PACS. Pt is new to this service from this admission. Initial evaluation completed on Mr. Robert Woody is an 81y m W/ PMHx HTN presenting to the ED from [facility] as a transfer for R frontal parenchymal hemorrhage on CT after fall at home. Patient is poor historian but states that he was diagnosed with COVID 2 weeks ago and has been feeling "tired" at home, states he felt dizzy earlier today but unable to state what time he fell or further elucidate. Patient denies AC use. Per EMS was also recently diagnosed with a AAA but has been unable to undergo surgical repair due to recent dx of COVID. Vascular surgery was consulted as patient has hx of aneurysms. History was obtained from daughter Amy. Patient had a history of ?open thoracic aneurysm repair 7 years ago in [place]. And patient has known hx ascending aorta aneurysm measuring 4cm, infrarenal AAA 5cm and right common iliac aneurysm measuring 5cm as reported by daughter based on last CT scan done in [place] in November 11/20. Patient is here with ICH. Vascular was called to evaluate.    > Vascular surgery for Aortic and iliac aneurysms. Will monitor and treat possible as outpatient due to c/i to AC  > Possible MIND study candidate  >GI previously consulted for PEG, planned tentatively for 1/31/22. However since then patient w/ hypoxic resp failure requiring intubation and septic shock, now on pressors, transferred to MICU.       Swallow history: No reports in SCM. No studies in PACS. Pt is new to this service from this admission. Initial evaluation completed on  >Pt is a 81 M s/p fall and R ICH in presence of encephalopathy; AMS/Lethargic state. Oropharyngeal swallow skills p/w a deficit in presence of AMS, limited evaluation given poor participation, ability to follow commands, as well as remain alert. Pt is at a high risk for aspiration given current AMS.    TODAY, pt was seen for re-evaluation of swallow function. Currently on active caseload, as team has been following since 1/16. Transferred to floor from MICU today. NGT in situ R mary. Utilized Malian  this date. Encountered pt awake/alert, attempting to communicate however at times  not able to understand the patient. He was able to indicate he was in the hospital when asked multiple choice questions. Xerostomia+. Offered moderately thick liquids via tsp x2 with adequate orientation/reception, ability to strip the bolus from the tsp, and reduced control of bolus suspected posteriorly. Pharyngeal phase marked by suspected latent swallow response, suspected pharyngeal residue 2/2 multiple swallows and subsequent cough response, concern for airway invasion (penetration/aspiration). No additional trials given concern for airway invasion. Of note, progressive fatigue and lethargic state as this intervention continued.     Impression; Currently mentation remains a negative barrier with regards to po initiation. Oropharyngeal swallow skills p/w deficits with regards to control of bolus with suspected latent swallow response, reduced constriction, concern for pharyngeal residues leading to overt s/s of aspiration. He remains a HIGH RISK FOR ASPIRATION. He is not appropriate candidate for a swallow screen by nursing.     Recommendations:  >NPO/NGT in the interim   >Stringent oral care to reduced oral pathogens   >Aspiration precautions with keofeed   >Swallow tx to remain on consult during hospital course with possible intervention required at time of d/c pending improvement   >PT/OT    D/w Team this date, Micah Hansen MD.     GOAL; Pt to adhere to dysphagia management during course    Cassie Lindsey MS CCC-SLP pager [phone]

## 2022-01-31 NOTE — PROGRESS NOTE ADULT - PROBLEM SELECTOR PLAN 2
- cw losartan 100qd  - cw amlodipine 10  - Cardiology following     Hx of thoracic aneurysm repair 7 years ago (con), also with 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  TTE EF 70%, no WMA  - SBP goal 100-160  - vascular recs  - needs repeat imaging from abdomen to mid-thigh per vascular, CTA; family refused CTA  - ekg while on vimpat to monitor [place]

## 2022-01-31 NOTE — CHART NOTE - NSCHARTNOTEFT_GEN_A_CORE
MICU Transfer Note    Transfer from: MICU    Transfer to: ( x ) Medicine    (  ) Telemetry     (   ) RCU        (    ) Palliative         (   ) Stroke Unit          (   ) __________________    Accepting physican:      MICU COURSE:  81 yr male with PMHx HTN, SWETA on home CPAP, DM2,  Ascending Ao aneurysm 4cm, infrarenal AAA 5 cm s/p AAA repair 2015, Rt common iliac aneurysm 5cm (CT scan at [facility] 11/2020) meant for vascular intervention however unable due to COVID-19+ two weeks prior to presentation,  who was  transferred to [facility]  NSICU 1/5/22 after being found to have Rt frontal parenchymal hemorrhage with mass effect without midline shift found on CTH after presenting originally to [facility] with fatigue, dizziness, s/p fall at home. Pt at [facility] was evaluated by vascular surg, determined no surg intervention at this time, pt evaluated by Neuro surg who offered MIND clinical trial, STX-guided resection vs medical management however family preferred only minimally invasive intervention if necessary. 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 Pt initially placed on antihypertensive therapy, hypertonic saline therapy.     This hospital course c/b sz like activity 1/16/22, loaded with keppra and started on vimpat therapy, EEG at that time however without epileptiform activity, development of AMS with hypoxic/hypercapnic resp distress requiring intubation 1/19/22, repeat CTH at that time without acute changes, re demonstration of IPH. Pt with eventual improvement in resp status and extubated 1/22/22.       Pt  eventually transferred to medicine floor 1/25/22. Course however further c/b development of fevers wit Tmax to 38.8 1/25/22, received CT chest 1/26/22 that demonstrated new bilateral patchy opacities concerning for aspiration pneum, started cefepime therapy, recurrent worsening AMS and hypoxic resp failure with SPO2 of 88-90's requiring re intubation 1/29/22 with transfer to MICU for hypoxic resp failure to possible aspiration pneum and septic shock of unknown origin.  ADD 1640 1/29/22: Discussed with Dr. Swan (Palliative Care), he spoke with family and they wish that patient have intervention that will prolong his life  1/30 extubated to NC , NIPPV ON , neuro status ; following on all 4 extremities . Primary languge [place] ; using . NGT in place for TF / meds.   1/31 BCx x 2 1/29 NGTD. Completed course with Cefepime. Will transfer to medical floor. GI notified , pt is ready to be placed back on the eval for PEG.           ASSESSMENT & PLAN:     81 yr old male with stated hx significant for HTN, OSH, s/p AAA repair, Ascending Ao aneurysm, infrarenal AAA, Rt common iliac aneurysm who presented from OSH for further management of IPH. Course c/b possible sz activity, recurrent AMS, recurrent hypoxic resp failure secondary to aspiration pneum requiring intubation 1/29 was transferred to MICU for recurrent hypoxic resp failure secondary to possible aspiration PNA and septic shock of unknown origin.     Plan:    #Neuro:  -IPH, possible CAA (amyloid angiopathy)  -re current AMS secondary to possible metabolic encephalopathy due to asp PNA now in septic shock s/p intubation    -CT head 1/27/22 re demonstrated Acute parenchymal hemorrhage with evolutionary changes and Arachnoid cyst   -repeat CT H 1/30 ;IMPRESSION: Acute parenchymal hemorrhage is again seen as well as intraventricular hemorrhage. Subrecord hemorrhage is suspected.  -Vimpat 100 BID    -EEG 1/30  Risk for seizures from the right posterior quadrant and left parietal region.  Structural abnormality in the right hemisphere. Moderate multifocal/diffuse nonspecific cerebral dysfunction. No seizures recorded.  -Neuro checks q 4 hrs and prn for changes  -activity as tolerated  -physical therapy consult when stable  -palliative care involved.      #Pulm:  Acute hypoxic respiratory failure in setting of aspiration PNA   -Hx of SWETA on home CPAP therapy  -CXR 1/29 w/ Opacities at the lung bases .  -s/p intubation 1/29 and extubation 1/30     SWETA on home CPAP   -NIPPV ON 10/5/30%; ABG - ( 31 Jan 2022 06:26 )pH, Arterial: 7.47  pH, Blood: x     /  pCO2: 51    /  pO2: 80    / HCO3: 37    / Base Excess: 11.7  /  SaO2: 97.8 ; will change to NIPPV 8/5/30%    -Bronchodilator therapy q 6 hrs and  prn sob/wheezes  -hypertonic saline nebs q 6 hrs      #CV:  Septic shock 2/2 aspiration PNA and r/o other etiology.   -off levophed 1/30  -Hx HTN. holding antihypertensives ( losartan and amlodipine) in setting of shock   -Hx of ascending Ao aneurysm, infrarenal AAA, Rt common iliac aneurysm, s/p AAA repair 2015,   -POCUS w/ normal LV/RV contractility       #:  RACHEL   -improving   -trend BUN /.Cr level   -electolytes supplemented PRN.   -condom cath for I&os   -daily weight   -renally dose drugs     Hypernatremia   -Free h2o 250 q 4 hrs   -q d BMP, MG, Phos level     GI  Oropharyngeal edema   -s/p NGT W TF ; glucerna @ 55   -c/w bowel regimen , last BMP 1/30  -plan for PEG placement when hemodynamics stable       #ID:  Septic shock 2/2 aspiration PNA   -resolved   -on cefepime 1/25--> 1/31  -vanco 1 gm IV x 11/29  -BC x 2 & Sputum cx 1/29 NGTD   -MRSA 1/29 NGTD   -covid -19 NGTD 1/29  -UA NGTD 1/29          #ENDO  -Hx of DMT2  -mod ISS q 6   -POC with ISS q 6 hrs - maintain glucose 160-180    HEME:  -CT H w/ acute PH  -CTH 1/29, 1/30  stable   -maintain platelets > 100, hgb > 7, active type and screen  -on Lovenox SQ BID for ppx      PPX  -DVT prophylaxis with enoxaparin 40 mg subq q BID    GOC  FULL code   -palliative on board.     DISPO    BB to medical unit to hospitalist service .               For Followup:  -speech and swallow   -GI follow up for PEG placement   -neuro checks   -hypernatremia on free h20 ; daily BMP        Vital Signs Last 24 Hrs  T(C): 36.6 (31 Jan 2022 08:00), Max: 37.3 (30 Jan 2022 20:00)  T(F): 97.9 (31 Jan 2022 08:00), Max: 99.1 (30 Jan 2022 20:00)  HR: 77 (31 Jan 2022 08:53) (61 - 103)  BP: 161/71 (31 Jan 2022 08:00) (78/53 - 161/71)  BP(mean): 96 (31 Jan 2022 08:00) (62 - 111)  RR: 23 (31 Jan 2022 08:00) (12 - 31)  SpO2: 100% (31 Jan 2022 08:53) (90% - 100%)  I&O's Summary    30 Jan 2022 07:01  -  31 Jan 2022 07:00  --------------------------------------------------------  IN: 2226.1 mL / OUT: 850 mL / NET: 1376.1 mL        MEDICATIONS  (STANDING):  albuterol/ipratropium for Nebulization 3 milliLiter(s) Nebulizer every 6 hours  enoxaparin Injectable 40 milliGRAM(s) SubCutaneous every 12 hours  insulin lispro (ADMELOG) corrective regimen sliding scale   SubCutaneous every 6 hours  lacosamide Solution 100 milliGRAM(s) Oral two times a day  nystatin Cream 1 Application(s) Topical every 12 hours  senna 2 Tablet(s) Oral at bedtime  sodium chloride 0.9% for Nebulization 3 milliLiter(s) Nebulizer every 6 hours    MEDICATIONS  (PRN):        LABS                                            11.6                  Neurophils% (auto):   x      (01-31 @ 00:38):    7.92 )-----------(235          Lymphocytes% (auto):  x                                             38.0                   Eosinphils% (auto):   x        Manual%: Neutrophils x    ; Lymphocytes x    ; Eosinophils x    ; Bands%: x    ; Blasts x                                    148    |  106    |  49                  Calcium: 10.4  / iCa: x      (01-31 @ 00:38)    ----------------------------<  195       Magnesium: 2.7                              4.0     |  33     |  0.99             Phosphorous: 2.0      TPro  7.3    /  Alb  3.3    /  TBili  0.5    /  DBili  x      /  AST  57     /  ALT  31     /  AlkPhos  73     31 Jan 2022 00:38    ( 01-31 @ 00:38 )   PT: 13.7 sec;   INR: 1.15 ratio  aPTT: 32.6 sec      Terra Chambers [facility]   [phone] MICU Transfer Note    Transfer from: MICU    Transfer to: ( x ) Medicine    (  ) Telemetry     (   ) RCU        (    ) Palliative         (   ) Stroke Unit          (   ) __________________    Accepting physican: Dr. Otto       MICU COURSE:  81 yr male with PMHx HTN, SWETA on home CPAP, DM2,  Ascending Ao aneurysm 4cm, infrarenal AAA 5 cm s/p AAA repair 2015, Rt common iliac aneurysm 5cm (CT scan at [facility] 11/2020) meant for vascular intervention however unable due to COVID-19+ two weeks prior to presentation,  who was  transferred to [facility]  NSICU 1/5/22 after being found to have Rt frontal parenchymal hemorrhage with mass effect without midline shift found on CTH after presenting originally to [facility] with fatigue, dizziness, s/p fall at home. Pt at [facility] was evaluated by vascular surg, determined no surg intervention at this time, pt evaluated by Neuro surg who offered MIND clinical trial, STX-guided resection vs medical management however family preferred only minimally invasive intervention if necessary. 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 Pt initially placed on antihypertensive therapy, hypertonic saline therapy.     This hospital course c/b sz like activity 1/16/22, loaded with keppra and started on vimpat therapy, EEG at that time however without epileptiform activity, development of AMS with hypoxic/hypercapnic resp distress requiring intubation 1/19/22, repeat CTH at that time without acute changes, re demonstration of IPH. Pt with eventual improvement in resp status and extubated 1/22/22.       Pt  eventually transferred to medicine floor 1/25/22. Course however further c/b development of fevers wit Tmax to 38.8 1/25/22, received CT chest 1/26/22 that demonstrated new bilateral patchy opacities concerning for aspiration pneum, started cefepime therapy, recurrent worsening AMS and hypoxic resp failure with SPO2 of 88-90's requiring re intubation 1/29/22 with transfer to MICU for hypoxic resp failure to possible aspiration pneum and septic shock of unknown origin.  ADD 1640 1/29/22: Discussed with Dr. Swan (Palliative Care), he spoke with family and they wish that patient have intervention that will prolong his life  1/30 extubated to NC , NIPPV ON , neuro status ; following on all 4 extremities . Primary languge [place] ; using . NGT in place for TF / meds.   1/31 BCx x 2 1/29 NGTD. Completed course with Cefepime. Will transfer to medical floor. GI notified , pt is ready to be placed back on the eval for PEG.           ASSESSMENT & PLAN:     81 yr old male with stated hx significant for HTN, OSH, s/p AAA repair, Ascending Ao aneurysm, infrarenal AAA, Rt common iliac aneurysm who presented from OSH for further management of IPH. Course c/b possible sz activity, recurrent AMS, recurrent hypoxic resp failure secondary to aspiration pneum requiring intubation 1/29 was transferred to MICU for recurrent hypoxic resp failure secondary to possible aspiration PNA and septic shock of unknown origin.     Plan:    #Neuro:  -IPH, possible CAA (amyloid angiopathy)  -re current AMS secondary to possible metabolic encephalopathy due to asp PNA now in septic shock s/p intubation    -CT head 1/27/22 re demonstrated Acute parenchymal hemorrhage with evolutionary changes and Arachnoid cyst   -repeat CT H 1/30 ;IMPRESSION: Acute parenchymal hemorrhage is again seen as well as intraventricular hemorrhage. Subrecord hemorrhage is suspected.  -Vimpat 100 BID    -EEG 1/30  Risk for seizures from the right posterior quadrant and left parietal region.  Structural abnormality in the right hemisphere. Moderate multifocal/diffuse nonspecific cerebral dysfunction. No seizures recorded.  -Neuro checks q 4 hrs and prn for changes  -activity as tolerated  -physical therapy consult when stable  -palliative care involved however family decided they want all the interventions and pt is full code.     #Pulm:  Acute hypoxic respiratory failure in setting of aspiration PNA   -Hx of SWETA on home CPAP therapy  -CXR 1/29 w/ Opacities at the lung bases .  -s/p intubation 1/29 and extubation 1/30     SWETA on home CPAP   -NIPPV ON 10/5/30%; ABG - ( 31 Jan 2022 06:26 )pH, Arterial: 7.47  pH, Blood: x     /  pCO2: 51    /  pO2: 80    / HCO3: 37    / Base Excess: 11.7  /  SaO2: 97.8 ; will change to NIPPV 8/5/30%    -Bronchodilator therapy q 6 hrs and  prn sob/wheezes  -hypertonic saline nebs q 6 hrs      #CV:  Septic shock 2/2 aspiration PNA and r/o other etiology.   -off levophed 1/30  -Hx HTN. holding antihypertensives ( losartan and amlodipine) in setting of shock   -Hx of ascending Ao aneurysm, infrarenal AAA, Rt common iliac aneurysm, s/p AAA repair 2015,   -POCUS w/ normal LV/RV contractility       #:  RACHEL   -improving   -trend BUN /.Cr level   -electolytes supplemented PRN.   -condom cath for I&os   -daily weight   -renally dose drugs     Hypernatremia   -Free h2o 250 q 4 hrs   -q d BMP, MG, Phos level     GI  Oropharyngeal edema   -s/p NGT W TF ; glucerna @ 55   -c/w bowel regimen , last BMP 1/30  -plan for PEG placement when hemodynamics stable       #ID:  Septic shock 2/2 aspiration PNA   -resolved   -on cefepime 1/25--> 1/31  -vanco 1 gm IV x 11/29  -BC x 2 & Sputum cx 1/29 NGTD   -MRSA 1/29 NGTD   -covid -19 NGTD 1/29  -UA NGTD 1/29          #ENDO  -Hx of DMT2  -mod ISS q 6   -POC with ISS q 6 hrs - maintain glucose 160-180    HEME:  -CT H w/ acute PH  -CTH 1/29, 1/30  stable   -maintain platelets > 100, hgb > 7, active type and screen  -on Lovenox SQ BID for ppx      PPX  -DVT prophylaxis with enoxaparin 40 mg subq q BID    GOC  FULL code   -palliative on board.     DISPO    BB to medical unit to hospitalist service .               For Followup:  -speech and swallow   -GI follow up for PEG placement   -neuro checks   -hypernatremia on free h20 ; daily BMP        Vital Signs Last 24 Hrs  T(C): 36.6 (31 Jan 2022 08:00), Max: 37.3 (30 Jan 2022 20:00)  T(F): 97.9 (31 Jan 2022 08:00), Max: 99.1 (30 Jan 2022 20:00)  HR: 77 (31 Jan 2022 08:53) (61 - 103)  BP: 161/71 (31 Jan 2022 08:00) (78/53 - 161/71)  BP(mean): 96 (31 Jan 2022 08:00) (62 - 111)  RR: 23 (31 Jan 2022 08:00) (12 - 31)  SpO2: 100% (31 Jan 2022 08:53) (90% - 100%)  I&O's Summary    30 Jan 2022 07:01  -  31 Jan 2022 07:00  --------------------------------------------------------  IN: 2226.1 mL / OUT: 850 mL / NET: 1376.1 mL        MEDICATIONS  (STANDING):  albuterol/ipratropium for Nebulization 3 milliLiter(s) Nebulizer every 6 hours  enoxaparin Injectable 40 milliGRAM(s) SubCutaneous every 12 hours  insulin lispro (ADMELOG) corrective regimen sliding scale   SubCutaneous every 6 hours  lacosamide Solution 100 milliGRAM(s) Oral two times a day  nystatin Cream 1 Application(s) Topical every 12 hours  senna 2 Tablet(s) Oral at bedtime  sodium chloride 0.9% for Nebulization 3 milliLiter(s) Nebulizer every 6 hours    MEDICATIONS  (PRN):        LABS                                            11.6                  Neurophils% (auto):   x      (01-31 @ 00:38):    7.92 )-----------(235          Lymphocytes% (auto):  x                                             38.0                   Eosinphils% (auto):   x        Manual%: Neutrophils x    ; Lymphocytes x    ; Eosinophils x    ; Bands%: x    ; Blasts x                                    148    |  106    |  49                  Calcium: 10.4  / iCa: x      (01-31 @ 00:38)    ----------------------------<  195       Magnesium: 2.7                              4.0     |  33     |  0.99             Phosphorous: 2.0      TPro  7.3    /  Alb  3.3    /  TBili  0.5    /  DBili  x      /  AST  57     /  ALT  31     /  AlkPhos  73     31 Jan 2022 00:38    ( 01-31 @ 00:38 )   PT: 13.7 sec;   INR: 1.15 ratio  aPTT: 32.6 sec      Terra Chambers [facility]   [phone] MICU Transfer Note    Transfer from: MICU    Transfer to: ( x ) Medicine    (  ) Telemetry     (   ) RCU        (    ) Palliative         (   ) Stroke Unit          (   ) __________________    Accepting physican: Dr. Otto       MICU COURSE:  81 yr male with PMHx HTN, SWETA on home CPAP, DM2,  Ascending Ao aneurysm 4cm, infrarenal AAA 5 cm s/p AAA repair 2015, Rt common iliac aneurysm 5cm (CT scan at [facility] 11/2020) meant for vascular intervention however unable due to COVID-19+ two weeks prior to presentation,  who was  transferred to [facility]  NSICU 1/5/22 after being found to have Rt frontal parenchymal hemorrhage with mass effect without midline shift found on CTH after presenting originally to [facility] with fatigue, dizziness, s/p fall at home. Pt at [facility] was evaluated by vascular surg, determined no surg intervention at this time, pt evaluated by Neuro surg who offered MIND clinical trial, STX-guided resection vs medical management however family preferred only minimally invasive intervention if necessary. 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 Pt initially placed on antihypertensive therapy, hypertonic saline therapy.     This hospital course c/b sz like activity 1/16/22, loaded with keppra and started on vimpat therapy, EEG at that time however without epileptiform activity, development of AMS with hypoxic/hypercapnic resp distress requiring intubation 1/19/22, repeat CTH at that time without acute changes, re demonstration of IPH. Pt with eventual improvement in resp status and extubated 1/22/22.       Pt  eventually transferred to medicine floor 1/25/22. Course however further c/b development of fevers wit Tmax to 38.8 1/25/22, received CT chest 1/26/22 that demonstrated new bilateral patchy opacities concerning for aspiration pneum, started cefepime therapy, recurrent worsening AMS and hypoxic resp failure with SPO2 of 88-90's requiring re intubation 1/29/22 with transfer to MICU for hypoxic resp failure to possible aspiration pneum and septic shock of unknown origin.  ADD 1640 1/29/22: Discussed with Dr. Swan (Palliative Care), he spoke with family and they wish that patient have intervention that will prolong his life  1/30 extubated to NC , NIPPV ON , neuro status ; following on all 4 extremities . Primary languge [place] ; using . NGT in place for TF / meds.   1/31 BCx x 2 1/29 NGTD. Completed course with Cefepime. Will transfer to medical floor. GI notified , pt is ready to be placed back on the eval for PEG.           ASSESSMENT & PLAN:     81 yr old male with stated hx significant for HTN, OSH, s/p AAA repair, Ascending Ao aneurysm, infrarenal AAA, Rt common iliac aneurysm who presented from OSH for further management of IPH. Course c/b possible sz activity, recurrent AMS, recurrent hypoxic resp failure secondary to aspiration pneum requiring intubation 1/29 was transferred to MICU for recurrent hypoxic resp failure secondary to possible aspiration PNA and septic shock of unknown origin.     Plan:    #Neuro:  -IPH, possible CAA (amyloid angiopathy)  -re current AMS secondary to possible metabolic encephalopathy due to asp PNA now in septic shock s/p intubation    -CT head 1/27/22 re demonstrated Acute parenchymal hemorrhage with evolutionary changes and Arachnoid cyst   -repeat CT H 1/30 ;IMPRESSION: Acute parenchymal hemorrhage is again seen as well as intraventricular hemorrhage. Subrecord hemorrhage is suspected.  -Vimpat 100 BID  ; d/w NSX whom deferred to neurology to make decision regarding length of drug use . D/w neuro resident who recommended for pt to ; remain on medication for now ; can be titrated OP   -EEG 1/30  Risk for seizures from the right posterior quadrant and left parietal region.  Structural abnormality in the right hemisphere. Moderate multifocal/diffuse nonspecific cerebral dysfunction. No seizures recorded.  -Neuro checks q 4 hrs and prn for changes  -activity as tolerated  -physical therapy consult when stable  -palliative care involved however family decided they want all the interventions and pt is full code.     #Pulm:  Acute hypoxic respiratory failure in setting of aspiration PNA   -Hx of SWETA on home CPAP therapy  -CXR 1/29 w/ Opacities at the lung bases .  -s/p intubation 1/29 and extubation 1/30     SWETA on home CPAP   -NIPPV ON 10/5/30%; ABG - ( 31 Jan 2022 06:26 )pH, Arterial: 7.47  pH, Blood: x     /  pCO2: 51    /  pO2: 80    / HCO3: 37    / Base Excess: 11.7  /  SaO2: 97.8 ; will change to NIPPV 8/5/30%    -Bronchodilator therapy q 6 hrs and  prn sob/wheezes  -hypertonic saline nebs q 6 hrs      #CV:  Septic shock 2/2 aspiration PNA and r/o other etiology.   -off levophed 1/30  -Hx HTN. holding antihypertensives ( losartan and amlodipine) in setting of shock   -Hx of ascending Ao aneurysm, infrarenal AAA, Rt common iliac aneurysm, s/p AAA repair 2015,   -POCUS w/ normal LV/RV contractility       #:  RACHEL   -improving   -trend BUN /.Cr level   -electolytes supplemented PRN.   -condom cath for I&os   -daily weight   -renally dose drugs     Hypernatremia   -Free h2o 250 q 4 hrs   -q d BMP, MG, Phos level     GI  Oropharyngeal edema   -s/p NGT W TF ; glucerna @ 55   -c/w bowel regimen , last BMP 1/30  -plan for PEG placement when hemodynamics stable       #ID:  Septic shock 2/2 aspiration PNA   -resolved   -on cefepime 1/25--> 1/31  -vanco 1 gm IV x 11/29  -BC x 2 & Sputum cx 1/29 NGTD   -MRSA 1/29 NGTD   -covid -19 NGTD 1/29  -UA NGTD 1/29          #ENDO  -Hx of DMT2  -mod ISS q 6   -POC with ISS q 6 hrs - maintain glucose 160-180    HEME:  -CT H w/ acute PH  -CTH 1/29, 1/30  stable   -maintain platelets > 100, hgb > 7, active type and screen  -on Lovenox SQ BID for ppx      PPX  -DVT prophylaxis with enoxaparin 40 mg subq q BID    [facility]  FULL code   -palliative on board.     DISPO    BB to medical unit to hospitalist service .               For Followup:  -speech and swallow   -GI follow up for PEG placement   -neuro checks   -hypernatremia on free h20 ; daily BMP        Vital Signs Last 24 Hrs  T(C): 36.6 (31 Jan 2022 08:00), Max: 37.3 (30 Jan 2022 20:00)  T(F): 97.9 (31 Jan 2022 08:00), Max: 99.1 (30 Jan 2022 20:00)  HR: 77 (31 Jan 2022 08:53) (61 - 103)  BP: 161/71 (31 Jan 2022 08:00) (78/53 - 161/71)  BP(mean): 96 (31 Jan 2022 08:00) (62 - 111)  RR: 23 (31 Jan 2022 08:00) (12 - 31)  SpO2: 100% (31 Jan 2022 08:53) (90% - 100%)  I&O's Summary    30 Jan 2022 07:01  -  31 Jan 2022 07:00  --------------------------------------------------------  IN: 2226.1 mL / OUT: 850 mL / NET: 1376.1 mL        MEDICATIONS  (STANDING):  albuterol/ipratropium for Nebulization 3 milliLiter(s) Nebulizer every 6 hours  enoxaparin Injectable 40 milliGRAM(s) SubCutaneous every 12 hours  insulin lispro (ADMELOG) corrective regimen sliding scale   SubCutaneous every 6 hours  lacosamide Solution 100 milliGRAM(s) Oral two times a day  nystatin Cream 1 Application(s) Topical every 12 hours  senna 2 Tablet(s) Oral at bedtime  sodium chloride 0.9% for Nebulization 3 milliLiter(s) Nebulizer every 6 hours    MEDICATIONS  (PRN):        LABS                                            11.6                  Neurophils% (auto):   x      (01-31 @ 00:38):    7.92 )-----------(235          Lymphocytes% (auto):  x                                             38.0                   Eosinphils% (auto):   x        Manual%: Neutrophils x    ; Lymphocytes x    ; Eosinophils x    ; Bands%: x    ; Blasts x                                    148    |  106    |  49                  Calcium: 10.4  / iCa: x      (01-31 @ 00:38)    ----------------------------<  195       Magnesium: 2.7                              4.0     |  33     |  0.99             Phosphorous: 2.0      TPro  7.3    /  Alb  3.3    /  TBili  0.5    /  DBili  x      /  AST  57     /  ALT  31     /  AlkPhos  73     31 Jan 2022 00:38    ( 01-31 @ 00:38 )   PT: 13.7 sec;   INR: 1.15 ratio  aPTT: 32.6 sec      Terra Chambers [facility]   [phone]

## 2022-01-31 NOTE — PROGRESS NOTE ADULT - ASSESSMENT
81y m W/  HTN presenting to the ED from [facility] as a transfer for R frontal parenchymal hemorrhage on CT after fall at home.  CTH R hemisphere IPH  CTA H/N no aneurysm or AVM  repeat CTH stable   CK 3000  1/26 CT chest wtih opacities  1/27 CTH normal evolutionary changes no new findings   1/29 RRT for hypoxia. intubated. in MICU  CTH 1/30 still wtih IVH and SAH.  extubated  EEG 1/30 without seizures but potential risk from R post quadrant and L parietla   Impression: IPH of unclear etiology.  may be 2/2 HTN. need to r/o CAA or underlying mass   - plan for transfer to stepdown   - nsx f/u.   - f/u palliative   - check NH3   - now on cefepime for infection. monitor for seizure as it can lower seizure threshold and cause change in mental status   - SBP <160  - DVT ppx okay. hold AC/AP  -  on vimpat 100mg BID--> continue   - MRI brain w/ and w/o when able  - GI for PEG?, now with NGT.  needs peg eval   - no need for hypertonics  - cardio following. known thoracic aneurysm s/p repair.   - f/uj pulmo for resp status. on face tent  - telemetry  - PT/OT/SS/SLP, OOBC  - check FS, glucose control <180  - GI/DVT ppx  - Thank you for allowing me to participate in the care of this patient. Call with questions.   Carlin Neal MD  Vascular Neurology  Office: [phone]

## 2022-01-31 NOTE — PROGRESS NOTE ADULT - SUBJECTIVE AND OBJECTIVE BOX
DATE OF SERVICE: 01-31-22 @ 12:23    Subjective: Patient seen and examined. No new events except as noted.     SUBJECTIVE/ROS:  Pt seen and examined early this am       MEDICATIONS:  MEDICATIONS  (STANDING):  albuterol/ipratropium for Nebulization 3 milliLiter(s) Nebulizer every 6 hours  enoxaparin Injectable 40 milliGRAM(s) SubCutaneous every 12 hours  insulin lispro (ADMELOG) corrective regimen sliding scale   SubCutaneous every 6 hours  lacosamide Solution 100 milliGRAM(s) Oral two times a day  nystatin Cream 1 Application(s) Topical every 12 hours  senna 2 Tablet(s) Oral at bedtime  sodium chloride 0.9% for Nebulization 3 milliLiter(s) Nebulizer every 6 hours      PHYSICAL EXAM:  T(C): 36.7 (01-31-22 @ 12:00), Max: 37.3 (01-30-22 @ 20:00)  HR: 84 (01-31-22 @ 12:00) (61 - 103)  BP: 141/82 (01-31-22 @ 12:00) (99/56 - 161/71)  RR: 20 (01-31-22 @ 12:00) (19 - 31)  SpO2: 100% (01-31-22 @ 12:00) (90% - 100%)  Wt(kg): --  I&O's Summary    30 Jan 2022 07:01  -  31 Jan 2022 07:00  --------------------------------------------------------  IN: 2226.1 mL / OUT: 850 mL / NET: 1376.1 mL    31 Jan 2022 07:01  -  31 Jan 2022 12:23  --------------------------------------------------------  IN: 330 mL / OUT: 100 mL / NET: 230 mL        Weight (kg): 110 (01-31 @ 04:00)      JVP: Normal  Neck: supple  Lung: clear   CV: S1 S2 , Murmur:  Abd: soft  Ext: No edema  neuro: Awake  Psych: flat affect  Skin: normal``    LABS/DATA:    CARDIAC MARKERS:  CARDIAC MARKERS ( 29 Jan 2022 10:28 )  x     / x     / 3050 U/L / x     / 13.5 ng/mL                                11.6   7.92  )-----------( 235      ( 31 Jan 2022 00:38 )             38.0     01-31    148<H>  |  106  |  49<H>  ----------------------------<  195<H>  4.0   |  33<H>  |  0.99    Ca    10.4      31 Jan 2022 00:38  Phos  2.0     01-31  Mg     2.7     01-31    TPro  7.3  /  Alb  3.3  /  TBili  0.5  /  DBili  x   /  AST  57<H>  /  ALT  31  /  AlkPhos  73  01-31    proBNP:   Lipid Profile:   HgA1c:   TSH:     TELE:  EKG:

## 2022-01-31 NOTE — PROGRESS NOTE ADULT - SUBJECTIVE AND OBJECTIVE BOX
Neurology Progress note  S: patient seen and examined in ICU. extubated yesterday, plan for transfer to floor     Brief HPI:  81y m W/  HTN presenting to the ED from [facility] as a transfer for R frontal parenchymal hemorrhage on CT after fall at home. Patient is poor historian but states that he was diagnosed with COVID 2 weeks ago and has been feeling "tired" at home, states he felt dizzy earlier today but unable to state what time he fell or further elucidate. Patient denies AC use. Per EMS was also recently diagnosed with a AAA but has been unable to undergo surgical repair due to recent dx of COVID. Patient still endorsing he feels "tired", denies current headache, vision changes, dizziness, chest pain, nausea, back pain, focal numbness/tingling. Endorses weakness to LLE 2/2 pain. (15 Mono 2022 15:50)    Medications:     MEDICATIONS  (STANDING):  albuterol/ipratropium for Nebulization 3 milliLiter(s) Nebulizer every 6 hours  enoxaparin Injectable 40 milliGRAM(s) SubCutaneous every 12 hours  insulin lispro (ADMELOG) corrective regimen sliding scale   SubCutaneous every 6 hours  lacosamide Solution 100 milliGRAM(s) Oral two times a day  nystatin Cream 1 Application(s) Topical every 12 hours  senna 2 Tablet(s) Oral at bedtime  sodium chloride 0.9% for Nebulization 3 milliLiter(s) Nebulizer every 6 hours    MEDICATIONS  (PRN):   :       Vitals  ICU Vital Signs Last 24 Hrs     T(C): 36.6 (2022 08:00), Max: 37.3 (2022 20:00)  T(F): 97.9 (2022 08:00), Max: 99.1 (2022 20:00)  HR: 76 (2022 10:00) (61 - 103)  BP: 136/65 (2022 10:00) (78/53 - 161/71)  BP(mean): 94 (2022 10:00) (62 - 111)  ABP: --  ABP(mean): --  RR: 21 (2022 10:00) (19 - 31)  SpO2: 99% (2022 10:00) (90% - 100%)        General Exam:   General Appearance: Appropriately dressed and in no acute distress       Head: Normocephalic, atraumatic and no dysmorphic features  Ear, Nose, and Throat: Moist mucous membranes +NGT    CVS: S1S2+  Resp: No SOB, no wheeze or rhonchi  GI: soft NT/ND  Extremities: No edema or cyanosis  Skin: No bruises or rashes     Neurological Exam:    Mental Status: Awake, alert and oriented x 1-2.  Able to follow simple verbal commands intermittently. Able to name and repeat.+ dysarthria    Cranial Nerves: PERRL, EOMI, VFFC, sensation V1-V3 intact, L f acial asymmetry, equal elevation of palate,  , tongue is midline on protrusion. no obvious papilledema on fundoscopic exam. hearing is grossly intact.   Motor: moving R full strength LUE 3/5/ LLE 1-2/5, tremor in uppers at times, myoclonus when sedation off  Sensation: withdraws R>L  Reflexes: 1+ throughout at biceps, brachioradialis, triceps, patellars and ankles bilaterally and equal. No clonus. R toe and L toe were both downgoing.  Coordination: unable   Gait: unable     Data/Labs/Imaging which I personally reviewed.     Labs:   CBC Full  -  ( 2022 00:38 )  WBC Count : 7.92 K/uL  RBC Count : 3.90 M/uL  Hemoglobin : 11.6 g/dL  Hematocrit : 38.0 %  Platelet Count - Automated : 235 K/uL  Mean Cell Volume : 97.4 fl  Mean Cell Hemoglobin : 29.7 pg  Mean Cell Hemoglobin Concentration : 30.5 gm/dL  Auto Neutrophil # : x  Auto Lymphocyte # : x  Auto Monocyte # : x  Auto Eosinophil # : x  Auto Basophil # : x  Auto Neutrophil % : x  Auto Lymphocyte % : x  Auto Monocyte % : x  Auto Eosinophil % : x  Auto Basophil % : x          148<H>  |  106  |  49<H>  ----------------------------<  195<H>  4.0   |  33<H>  |  0.99    Ca    10.4      2022 00:38  Phos  2.0       Mg     2.7         TPro  7.3  /  Alb  3.3  /  TBili  0.5  /  DBili  x   /  AST  57<H>  /  ALT  31  /  AlkPhos  73          Urinalysis Basic - ( 2022 21:31 )    Color: Yellow / Appearance: Clear / S.023 / pH: x  Gluc: x / Ketone: Negative  / Bili: Negative / Urobili: Negative   Blood: x / Protein: 100 / Nitrite: Negative   Leuk Esterase: Negative / RBC: 0 /hpf / WBC 0 /HPF   Sq Epi: x / Non Sq Epi: 1 /hpf / Bacteria: Negative    < from: CT Angio Head w/ IV Cont (01.15.22 @ 17:16) >    ACC: [id number] EXAM:  CT ANGIO NECK (W)AW IC                        ACC: [id number] EXAM:  CT ANGIO BRAIN (W)AW IC                          PROCEDURE DATE:  01/15/2022          INTERPRETATION:  CT ANGIOGRAPHY OF THE NECK AND BRAIN.    CLINICAL INFORMATION:81 years Male ICH    TECHNIQUE:  CT angiography of the neck and brain was performed during the dynamic   administration of intravenous contrast.  MIP reconstructions were   performed and reviewed. Multiplanar reformations were obtained.  Contrastadministered 70 cc Omnipaque 350, contrast discarded 30 cc    FINDINGS:  NECK  RIGHT CAROTID CIRCULATION:  Evaluation of the right carotid circulation demonstrates no traumatic   occlusion, dissection, or pseudoaneurysm.    LEFT CAROTID CIRCULATION:  Evaluation of the left carotid circulation demonstrates no traumatic   occlusion, dissection, or pseudoaneurysm.    VERTEBRAL ARTERIES:  Evaluation of the vertebral arteries reveals no evidence of a vertebral   artery occlusion or dissection.    BRAIN  ANTERIOR CIRCULATION:  Distal internal carotid arteries are patent. Calcification involves the   cavernous segments with mild degrees of narrowing  Anterior cerebral arteries are patent. A1 segment of the left anterior   cerebral artery is smaller than the right, a normal anatomic variant  Middle cerebral arteries are patent.    POSTERIOR CIRCULATION:  Distal vertebral arteries are patent. Bilateral posterior inferior   cerebellar arteries are seen.  Basilar artery is patent. Proximal superior cerebellar arteries are patent  Posterior cerebral arteries are patent.    OTHER:  No evidence of abnormal enhancement associated with the moderate to large   right frontal parenchymal hemorrhage.  No puddling of contrast.  No aneurysm or arteriovenous malformation seen.    IMPRESSION:  *  NO TRAUMATIC OCCLUSION, DISSECTION, OR PSEUDOANEURYSM.  *  NO ABNORMAL ENHANCEMENT ASSOCIATED WITH THE MODERATE TO LARGE RIGHT   FRONTAL PARENCHYMAL HEMORRHAGE.  *  NO PUDDLING OF CONTRAST. NO ANEURYSM OR AVM SEEN.    --- End of Report ---            PAWAN ODELL MD; Attending Radiologist  This document has been electronically signed. Mono 15 2022  7:49PM    < end of copied text >  < from: CT Head No Cont (22 @ 09:30) >    ACC: [id number] EXAM:  CT BRAIN                          PROCEDURE DATE:  2022          INTERPRETATION:  CLINICAL INFORMATION: Intracranial hemorrhage, follow-up.    TECHNIQUE: Portable noncontrast axial CT images were acquired of the head.    COMPARISON STUDY: CT head 2022.    FINDINGS:  Large right frontal lobe parenchymal hematoma measures approximately 7.7   x 3.5 cm, previously 7.5 x 3.2 cm, with surrounding edema and mass   effect, without interval interval change compared to prior.   Redemonstrated small intraventricular hemorrhage, similar to prior.    Moderate cerebral volume loss with proportional prominence of the sulci   and ventricles. Nonspecific white matter hypoattenuation, likely related   to chronic microvascular changes.    Left frontal arachnoid cyst, unchanged.    Mucosal thickening of the left maxillary and bilateral ethmoid sinuses.   Partially visualized nasogastric tube in situ. Mastoid air cells are   clear.    Evidence of bilateral lens surgery.    Nodisplaced calvarial fracture.      IMPRESSION:  Stable exam.    --- End of Report ---          HARRIET WATSON MD; Resident Radiology  This document has been electronically signed.  CRUZ LÓPEZ MD; Attending Radiologist  This document has been electronically signed. 2022 10:58AM    < end of copied text >    < from: CT Head No Cont (22 @ 22:13) >    ACC: [id number] EXAM:  CT BRAIN                          PROCEDURE DATE:  2022          INTERPRETATION:  Clinical indication: Altered mental status    Multiple axial sections were performed from base skull to vertex without   contrast enhancement. Coronal and sagittal reconstructions were performed   as well    This exam is compared prior head CT performed on 2020.    Again seen is abnormal high attenuation identified involving the high   right frontal cortical subcortical region. This compatible with acute   parenchymal hemorrhage surrounding edema. This acute parenchymal   hemorrhage measures approximately 7.3 x 3.2 cm and previously measured   approximately 7.7 x 3.5 cm. Localized mass effect is seen consisting of   sulcal effacement.    Extra-axial low-attenuation with adjacent bony scalloping is again seen   involving the high left frontal region. This finding measures   approximately 4.2 x 2.8 cm and previously measured approximately 4.1 x   3.1 cm. This is compatible with an arachnoid cyst.    Parenchymal volume loss and chronic microvascular ischemic changes are   identified.    Intraventricular hemorrhage is again seen.    Evaluation of structures with the appropriate window appears unremarkable    Left-sided NG tube is seen.    The visualized paranasal sinuses mastoid and middle ear regions appear   clear.    Impression: Acute parenchymal hemorrhage is again seen with some expected   evolutionary changes.    Arachnoid cyst again seen as described above.    --- End of Report ---              < from: CT Head No Cont (22 @ 09:57) >    ACC: [id number] EXAM:  CT BRAIN                          PROCEDURE DATE:  2022          INTERPRETATION:  Clinical indication: Altered mental status    Multiple axial sections were performed from base skull to vertex without   contrast enhancement. Coronal and sagittal destructions were performed as   well    This exam is compared with prior head CT performed on 2022.    Abnormal high attenuation involving the high left frontal cortical   subcortical region is again seen. This finding measures approximately 5.1   x 2.8 cm and previously measured approximately 7.3 x 3.2 cm. Surrounding   edema is again seen.    Small amount of subrecord hemorrhage is again suspected involving the   left posterior temporal/posterior frontal region    Intraventricular hemorrhage is again seen.    The size and configuration of ventricles appear unchanged    No new areas of acute hemorrhage is seen.    Extra-axial low-attenuation from the high left frontal region is again   seen with bony scalloping. This appears unchanged when compared with the   prior exam and is likely compatible with an arachnoid cyst    Evaluation of osseous structures with the appropriate window appears   unchanged    The visualized paranasal sinuses mastoid and middle ear regions appear   clear.    Orotracheal and orogastric tubes are seen.    The paranasal sinuses mastoid and middle ear regions appear clear.    The patient status post bilateral cataract surgery.    IMPRESSION: Acute parenchymal hemorrhage is again seen as well as   intraventricular hemorrhage. Subrecord hemorrhage is suspected.    --- End of Report ---            BE TOUSSAINT MD; Attending Radiologist  This document has been electronically signed. 2022 10:10AM    < end of copied text >      EEG IMPRESSION/CLINICAL CORRELATE    Abnormal EEG study.  1. Risk for seizures from the right posterior quadrant and left parietal region.   2. Structural abnormality in the right hemisphere.  3. Moderate multifocal/diffuse nonspecific cerebral dysfunction.    No seizures recorded.

## 2022-01-31 NOTE — PROGRESS NOTE ADULT - PROBLEM SELECTOR PLAN 3
hx of COVID 2 years ago and 2 weeks ago  - face tent, wean to ra as tolerated  - aspiration precautions   - febrile last night 1/24 -- repeating w/u  - f/u BCx, RVP, CXR - NGTD  - elevated d-dimer  - will f/u CT Chest -- potential aspiration PNA  - finished cefepime for 7 days --ngtd  -Bronchodilator therapy q 6 hrs and  prn sob/wheezes  -hypertonic saline nebs q 6 hrs  -cont Bipap at night for SWETA

## 2022-01-31 NOTE — PROGRESS NOTE ADULT - ATTENDING COMMENTS
Reconsulted for possible PEG placement  Extubated yesterday after transfer to ICU over weekend for respiratory failure  Would monitor extubated over next 1-2 days to ensure safe for endoscopically placed PEG  Given poor prognosis with parenchymal hemorrhage, need to clarify GOC and see if PEG is appropriate  GI to follow

## 2022-01-31 NOTE — PROGRESS NOTE ADULT - ASSESSMENT
hypoxic resp failure   sepsis  shock   much improved  off vent   abx  appreciate ICU care     IVH, SAH  fu with nsx

## 2022-01-31 NOTE — PROGRESS NOTE ADULT - ASSESSMENT
81y m W/ PMHx HTN presenting to the ED from [facility] as a transfer for R frontal parenchymal hemorrhage on CT after fall at home, course c/b worsening mental status (2/2 seizure?) s/p intubation/extubation, fever (PNA?), GI consulted for PEG    # dysphagia - 2/2 ICH, poor prognosis, unlikely to have meaningful recovery  # Acute hypoxic/hypercapnic respiratory failure: patient intubated 1/19/2022, extubated 1/22/2022, intubated 1/29/2022, and extubated 1/30/2022  # PNA - fever 1/23-1/26 likely 2/2 aspiration PNA, now afebrile w/o leukocytosis on cefepime  # Right frontal intraparenchymal hemorrhage  # thoracic aneurysm - s/p repair 7 years ago (con), aslo w/ ascending aneursym and R iliac aorta >5cm, follows w/ vascular surgery    Recommendations:  -c/w management of AMS per neurology/primary team  -f/u speech and swallow eval, especially in the setting of repeat intubation/extubations and IPH  -continue GOC discussion with family  -remainder of care per primary team      Brad Vincent, PGY-4  GI/Hepatology Fellow    MONDAY-FRIDAY 8AM-5PM:  Pager# [phone] ([facility]) or [phone] ([facility])    NON-URGENT CONSULTS:  Please email [email or web address]  AT NIGHT AND ON WEEKENDS:  Contact on-call GI fellow via answering service ([phone]) from 5pm-8am and on weekends/holidays

## 2022-01-31 NOTE — PROGRESS NOTE ADULT - ATTENDING COMMENTS
82 yo male with intraparenchymally hemorrhage  intubated for aspiration pneumonia.    1. Acute hypoxemic respiratory failure secondary to aspiration pneumonia. Pt was intubated but extubated for > 24 hrs. Requiring only nasal 02. Last does of abx today for aspiration pneumonia.  2. Neuro: IPH . Clinically and radiographically stable. Pt follows commands with Ang[place] . Continue Vimpat.  3. SWETA: Continue BiPAP 8/5 nightly.  4. Hypernatremia. Continue Free water via NGT.  5. FEN. Continue tube feeds via NGT. Plan for PEG once pt recovers  from Aspiration pneumonia.

## 2022-02-01 LAB
ALBUMIN SERPL ELPH-MCNC: 3.3 G/DL — SIGNIFICANT CHANGE UP (ref 3.3–5)
ALP SERPL-CCNC: 76 U/L — SIGNIFICANT CHANGE UP (ref 40–120)
ALT FLD-CCNC: 38 U/L — SIGNIFICANT CHANGE UP (ref 10–45)
AMMONIA BLD-MCNC: 20 UMOL/L — SIGNIFICANT CHANGE UP (ref 11–55)
ANION GAP SERPL CALC-SCNC: 13 MMOL/L — SIGNIFICANT CHANGE UP (ref 5–17)
APTT BLD: 36.7 SEC — HIGH (ref 27.5–35.5)
AST SERPL-CCNC: 53 U/L — HIGH (ref 10–40)
BASOPHILS # BLD AUTO: 0.04 K/UL — SIGNIFICANT CHANGE UP (ref 0–0.2)
BASOPHILS NFR BLD AUTO: 0.8 % — SIGNIFICANT CHANGE UP (ref 0–2)
BILIRUB SERPL-MCNC: 0.4 MG/DL — SIGNIFICANT CHANGE UP (ref 0.2–1.2)
BUN SERPL-MCNC: 52 MG/DL — HIGH (ref 7–23)
CALCIUM SERPL-MCNC: 10.3 MG/DL — SIGNIFICANT CHANGE UP (ref 8.4–10.5)
CHLORIDE SERPL-SCNC: 106 MMOL/L — SIGNIFICANT CHANGE UP (ref 96–108)
CHOLEST SERPL-MCNC: 141 MG/DL — SIGNIFICANT CHANGE UP
CO2 SERPL-SCNC: 29 MMOL/L — SIGNIFICANT CHANGE UP (ref 22–31)
CREAT SERPL-MCNC: 1.13 MG/DL — SIGNIFICANT CHANGE UP (ref 0.5–1.3)
EOSINOPHIL # BLD AUTO: 0.14 K/UL — SIGNIFICANT CHANGE UP (ref 0–0.5)
EOSINOPHIL NFR BLD AUTO: 2.7 % — SIGNIFICANT CHANGE UP (ref 0–6)
GAS PNL BLDA: SIGNIFICANT CHANGE UP
GLUCOSE BLDC GLUCOMTR-MCNC: 168 MG/DL — HIGH (ref 70–99)
GLUCOSE BLDC GLUCOMTR-MCNC: 172 MG/DL — HIGH (ref 70–99)
GLUCOSE BLDC GLUCOMTR-MCNC: 197 MG/DL — HIGH (ref 70–99)
GLUCOSE BLDC GLUCOMTR-MCNC: 200 MG/DL — HIGH (ref 70–99)
GLUCOSE BLDC GLUCOMTR-MCNC: 205 MG/DL — HIGH (ref 70–99)
GLUCOSE BLDC GLUCOMTR-MCNC: 215 MG/DL — HIGH (ref 70–99)
GLUCOSE SERPL-MCNC: 186 MG/DL — HIGH (ref 70–99)
HCT VFR BLD CALC: 41.4 % — SIGNIFICANT CHANGE UP (ref 39–50)
HDLC SERPL-MCNC: 30 MG/DL — LOW
HGB BLD-MCNC: 12.6 G/DL — LOW (ref 13–17)
IMM GRANULOCYTES NFR BLD AUTO: 1.2 % — SIGNIFICANT CHANGE UP (ref 0–1.5)
INR BLD: 1.15 RATIO — SIGNIFICANT CHANGE UP (ref 0.88–1.16)
LIPID PNL WITH DIRECT LDL SERPL: 64 MG/DL — SIGNIFICANT CHANGE UP
LYMPHOCYTES # BLD AUTO: 1.6 K/UL — SIGNIFICANT CHANGE UP (ref 1–3.3)
LYMPHOCYTES # BLD AUTO: 31.3 % — SIGNIFICANT CHANGE UP (ref 13–44)
MAGNESIUM SERPL-MCNC: 2.7 MG/DL — HIGH (ref 1.6–2.6)
MCHC RBC-ENTMCNC: 30 PG — SIGNIFICANT CHANGE UP (ref 27–34)
MCHC RBC-ENTMCNC: 30.4 GM/DL — LOW (ref 32–36)
MCV RBC AUTO: 98.6 FL — SIGNIFICANT CHANGE UP (ref 80–100)
MONOCYTES # BLD AUTO: 0.36 K/UL — SIGNIFICANT CHANGE UP (ref 0–0.9)
MONOCYTES NFR BLD AUTO: 7 % — SIGNIFICANT CHANGE UP (ref 2–14)
NEUTROPHILS # BLD AUTO: 2.92 K/UL — SIGNIFICANT CHANGE UP (ref 1.8–7.4)
NEUTROPHILS NFR BLD AUTO: 57 % — SIGNIFICANT CHANGE UP (ref 43–77)
NON HDL CHOLESTEROL: 110 MG/DL — SIGNIFICANT CHANGE UP
NRBC # BLD: 0 /100 WBCS — SIGNIFICANT CHANGE UP (ref 0–0)
NT-PROBNP SERPL-SCNC: 155 PG/ML — SIGNIFICANT CHANGE UP (ref 0–300)
PLATELET # BLD AUTO: 229 K/UL — SIGNIFICANT CHANGE UP (ref 150–400)
POTASSIUM SERPL-MCNC: 4.6 MMOL/L — SIGNIFICANT CHANGE UP (ref 3.5–5.3)
POTASSIUM SERPL-SCNC: 4.6 MMOL/L — SIGNIFICANT CHANGE UP (ref 3.5–5.3)
PROT SERPL-MCNC: 7.6 G/DL — SIGNIFICANT CHANGE UP (ref 6–8.3)
PROTHROM AB SERPL-ACNC: 13.7 SEC — HIGH (ref 10.6–13.6)
RBC # BLD: 4.2 M/UL — SIGNIFICANT CHANGE UP (ref 4.2–5.8)
RBC # FLD: 12.9 % — SIGNIFICANT CHANGE UP (ref 10.3–14.5)
SARS-COV-2 RNA SPEC QL NAA+PROBE: SIGNIFICANT CHANGE UP
SODIUM SERPL-SCNC: 148 MMOL/L — HIGH (ref 135–145)
TRIGL SERPL-MCNC: 231 MG/DL — HIGH
WBC # BLD: 5.12 K/UL — SIGNIFICANT CHANGE UP (ref 3.8–10.5)
WBC # FLD AUTO: 5.12 K/UL — SIGNIFICANT CHANGE UP (ref 3.8–10.5)

## 2022-02-01 PROCEDURE — 99233 SBSQ HOSP IP/OBS HIGH 50: CPT | Mod: GC

## 2022-02-01 PROCEDURE — 99232 SBSQ HOSP IP/OBS MODERATE 35: CPT

## 2022-02-01 RX ADMIN — LACOSAMIDE 100 MILLIGRAM(S): 50 TABLET ORAL at 18:48

## 2022-02-01 RX ADMIN — Medication 3 MILLILITER(S): at 06:26

## 2022-02-01 RX ADMIN — SODIUM CHLORIDE 3 MILLILITER(S): 9 INJECTION INTRAMUSCULAR; INTRAVENOUS; SUBCUTANEOUS at 12:57

## 2022-02-01 RX ADMIN — Medication 2: at 13:14

## 2022-02-01 RX ADMIN — Medication 3 MILLILITER(S): at 17:37

## 2022-02-01 RX ADMIN — Medication 3 MILLILITER(S): at 00:15

## 2022-02-01 RX ADMIN — SODIUM CHLORIDE 3 MILLILITER(S): 9 INJECTION INTRAMUSCULAR; INTRAVENOUS; SUBCUTANEOUS at 06:26

## 2022-02-01 RX ADMIN — Medication 2: at 18:49

## 2022-02-01 RX ADMIN — NYSTATIN CREAM 1 APPLICATION(S): 100000 CREAM TOPICAL at 06:43

## 2022-02-01 RX ADMIN — Medication 2: at 02:12

## 2022-02-01 RX ADMIN — ENOXAPARIN SODIUM 40 MILLIGRAM(S): 100 INJECTION SUBCUTANEOUS at 17:38

## 2022-02-01 RX ADMIN — SENNA PLUS 2 TABLET(S): 8.6 TABLET ORAL at 22:46

## 2022-02-01 RX ADMIN — LACOSAMIDE 100 MILLIGRAM(S): 50 TABLET ORAL at 06:42

## 2022-02-01 RX ADMIN — ENOXAPARIN SODIUM 40 MILLIGRAM(S): 100 INJECTION SUBCUTANEOUS at 06:26

## 2022-02-01 RX ADMIN — Medication 3 MILLILITER(S): at 12:56

## 2022-02-01 RX ADMIN — NYSTATIN CREAM 1 APPLICATION(S): 100000 CREAM TOPICAL at 17:38

## 2022-02-01 RX ADMIN — SODIUM CHLORIDE 3 MILLILITER(S): 9 INJECTION INTRAMUSCULAR; INTRAVENOUS; SUBCUTANEOUS at 00:15

## 2022-02-01 RX ADMIN — SODIUM CHLORIDE 3 MILLILITER(S): 9 INJECTION INTRAMUSCULAR; INTRAVENOUS; SUBCUTANEOUS at 17:37

## 2022-02-01 RX ADMIN — Medication 2: at 06:42

## 2022-02-01 NOTE — PROGRESS NOTE ADULT - SUBJECTIVE AND OBJECTIVE BOX
Interval Events:   No acute events noted, however unable to obtain full HPI due to underlying mental status.    ROS:   Unable to fully obtain ROS.    Hospital Medications:  albuterol/ipratropium for Nebulization 3 milliLiter(s) Nebulizer every 6 hours  enoxaparin Injectable 40 milliGRAM(s) SubCutaneous every 12 hours  insulin lispro (ADMELOG) corrective regimen sliding scale   SubCutaneous every 6 hours  lacosamide Solution 100 milliGRAM(s) Oral two times a day  nystatin Cream 1 Application(s) Topical every 12 hours  senna 2 Tablet(s) Oral at bedtime  sodium chloride 0.9% for Nebulization 3 milliLiter(s) Nebulizer every 6 hours      PHYSICAL EXAM:   Vital Signs:  Vital Signs Last 24 Hrs  T(C): 36.8 (01 Feb 2022 05:35), Max: 36.8 (31 Jan 2022 12:54)  T(F): 98.2 (01 Feb 2022 05:35), Max: 98.2 (31 Jan 2022 12:54)  HR: 80 (01 Feb 2022 05:35) (71 - 92)  BP: 123/83 (01 Feb 2022 05:35) (123/83 - 150/93)  BP(mean): 105 (31 Jan 2022 12:00) (93 - 105)  RR: 19 (01 Feb 2022 05:35) (18 - 20)  SpO2: 94% (01 Feb 2022 05:35) (90% - 100%)  Daily     Daily     GENERAL: no acute distress  NEURO: not fully alert/oriented  HEENT: anicteric sclera, no conjunctival pallor appreciated  CHEST: no respiratory distress, no accessory muscle use  CARDIAC: regular rate, +S1/S2  ABDOMEN: soft, nondistended, nontender, no rebound or guarding  EXTREMITIES: warm, well perfused, no edema  SKIN: no lesions noted    LABS: reviewed                        12.6   5.12  )-----------( 229      ( 01 Feb 2022 06:24 )             41.4     02-01    148<H>  |  106  |  52<H>  ----------------------------<  186<H>  4.6   |  29  |  1.13    Ca    10.3      01 Feb 2022 06:24  Phos  2.0     01-31  Mg     2.7     02-01    TPro  7.6  /  Alb  3.3  /  TBili  0.4  /  DBili  x   /  AST  53<H>  /  ALT  38  /  AlkPhos  76  02-01    LIVER FUNCTIONS - ( 01 Feb 2022 06:24 )  Alb: 3.3 g/dL / Pro: 7.6 g/dL / ALK PHOS: 76 U/L / ALT: 38 U/L / AST: 53 U/L / GGT: x             Interval Diagnostic Studies: see sunrise for full report

## 2022-02-01 NOTE — PROGRESS NOTE ADULT - ASSESSMENT
hypoxic resp failure   sepsis  shock   much improved  off vent  plan as per primary team    HTN  stable     IVH, SAH  fu with nsx

## 2022-02-01 NOTE — PROGRESS NOTE ADULT - PROBLEM SELECTOR PLAN 6
- DVT: lovenox BID  - Diet: TF, speech swallow eval  - Bowel regimen: miralax, senna  - ETHICS: full code, waiting for PEG placement per family pt will take blood pressure meds Ramipril and Amlodipine AM of surgery as prescribed

## 2022-02-01 NOTE — PROVIDER CONTACT NOTE (OTHER) - RECOMMENDATIONS
Notify provider
Nebualizer tx; draw JUNAID JOHNSON at bedside to see pt
Provider at bedside.
STAT head CT scan
fluid bolus, provider at bedside
ISIDRO Hall at bedside to assess patient
Increase o2, abg
Provider at bedside.

## 2022-02-01 NOTE — PROGRESS NOTE ADULT - SUBJECTIVE AND OBJECTIVE BOX
DATE OF SERVICE: 02-01-22 @ 12:39    Subjective: Patient seen and examined. No new events except as noted.     SUBJECTIVE/ROS:        MEDICATIONS:  MEDICATIONS  (STANDING):  albuterol/ipratropium for Nebulization 3 milliLiter(s) Nebulizer every 6 hours  enoxaparin Injectable 40 milliGRAM(s) SubCutaneous every 12 hours  insulin lispro (ADMELOG) corrective regimen sliding scale   SubCutaneous every 6 hours  lacosamide Solution 100 milliGRAM(s) Oral two times a day  nystatin Cream 1 Application(s) Topical every 12 hours  senna 2 Tablet(s) Oral at bedtime  sodium chloride 0.9% for Nebulization 3 milliLiter(s) Nebulizer every 6 hours      PHYSICAL EXAM:  T(C): 36.8 (02-01-22 @ 05:35), Max: 36.8 (01-31-22 @ 12:54)  HR: 80 (02-01-22 @ 05:35) (71 - 91)  BP: 123/83 (02-01-22 @ 05:35) (123/83 - 150/93)  RR: 19 (02-01-22 @ 05:35) (18 - 20)  SpO2: 94% (02-01-22 @ 05:35) (92% - 100%)  Wt(kg): --  I&O's Summary    31 Jan 2022 07:01  -  01 Feb 2022 07:00  --------------------------------------------------------  IN: 1155 mL / OUT: 100 mL / NET: 1055 mL            JVP: Normal  Neck: supple  Lung: clear   CV: S1 S2 , Murmur:  Abd: soft  Ext: No edema  neuro: Awake   Psych: flat affect  Skin: normal``    LABS/DATA:    CARDIAC MARKERS:                                12.6   5.12  )-----------( 229      ( 01 Feb 2022 06:24 )             41.4     02-01    148<H>  |  106  |  52<H>  ----------------------------<  186<H>  4.6   |  29  |  1.13    Ca    10.3      01 Feb 2022 06:24  Phos  2.0     01-31  Mg     2.7     02-01    TPro  7.6  /  Alb  3.3  /  TBili  0.4  /  DBili  x   /  AST  53<H>  /  ALT  38  /  AlkPhos  76  02-01    proBNP: Serum Pro-Brain Natriuretic Peptide: 155 pg/mL (02-01 @ 06:24)    Lipid Profile:   HgA1c:   TSH:     TELE:  EKG:

## 2022-02-01 NOTE — PROGRESS NOTE ADULT - ATTENDING COMMENTS
81M with PMH of HTN, thoracic aneurysm s/p repair, SWETA on CPAP, known hx ascending aorta aneurysm measuring 4cm, infrarenal AAA 5cm and right common iliac aneurysm measuring 5cm follows w/ vascular surgery who presented to NSICU 1/5 from [facility] as a transfer for R frontal parenchymal hemorrhage with mass effect on CT after fall at home. He was evaluated by vascular surg, determined no surg intervention at this time, and evaluated by Neuro surg who offered STX-guided resection vs medical management however family preferred only minimally invasive intervention if necessary. Prolonged hospital course c/b worsening mental status, aspiration PNA and hypercarbic resp failure requiring intubation (1/19 - 22, and 1/29-30) and ICU stay, seizurelike activity 1/16 on vimpat, and dysphagia now pending PEG placement. Completed cefepime and transferred to [facility] 1/31.  Pt seen and examined. Mental status remains poor.  Per palliative note, family wish for life prolonging measures  Was tentatively planned for EGD/ PEG placement 2/2 but family wishes to defer for now  Rest as documented above    [facility] Division of Hospital Medicine  Evelin Lopez MD  Pager (M-F, 8A-5P): [phone]  Other Times:  [phone]

## 2022-02-01 NOTE — PROGRESS NOTE ADULT - ASSESSMENT
81y m W/  HTN presenting to the ED from [facility] as a transfer for R frontal parenchymal hemorrhage on CT after fall at home.  CTH R hemisphere IPH  CTA H/N no aneurysm or AVM  repeat CTH stable   CK 3000  1/26 CT chest wtih opacities  1/27 CTH normal evolutionary changes no new findings   1/29 RRT for hypoxia. intubated. in MICU  CTH 1/30 still wtih IVH and SAH.  extubated  EEG 1/30 without seizures but potential risk from R post quadrant and L parietla   Impression: IPH of unclear etiology.  may be 2/2 HTN. need to r/o CAA or underlying mass   - nsx f/u.   - f/u palliative   - check NH3   - was on cefepime for infection., now off   - SBP <160  - DVT ppx okay. hold AC/AP  -  on vimpat 100mg BID--> continue   - MRI brain w/ and w/o when able  - GI for PEG?, now with NGT.  needs peg eval   - no need for hypertonics  - cardio following. known thoracic aneurysm s/p repair.   - f/uj pulmo for resp status.    - telemetry  - PT/OT/SS/SLP, OOBC  - check FS, glucose control <180  - GI/DVT ppx  - Thank you for allowing me to participate in the care of this patient. Call with questions.   Carlin Neal MD  Vascular Neurology  Office: [phone]

## 2022-02-01 NOTE — PROGRESS NOTE ADULT - PROBLEM SELECTOR PLAN 1
A&Ox0. 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  Structural abnormality in the right hemisphere. Moderate multifocal/diffuse nonspecific cerebral dysfunction. No seizures recorded.  -Neuro checks q 4 hrs and prn for changes  -activity as tolerated  -physical therapy consult when stable A&Ox0. 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  Structural abnormality in the right hemisphere. Moderate multifocal/diffuse nonspecific cerebral dysfunction. No seizures recorded.  -Neuro checks q 4 hrs and prn for changes  -activity as tolerated  -physical therapy consult when stable  - no vascular intervention indicated at this time

## 2022-02-01 NOTE — PROGRESS NOTE ADULT - SUBJECTIVE AND OBJECTIVE BOX
Micah Hansen, PGY-1  Internal Medicine  [facility] Team; [phone]      PROGRESS NOTE:     Patient is a 81y old  Male who presents with a chief complaint of R ICH (31 Jan 2022 12:23)      SUBJECTIVE AND OVERNIGHT EVENTS:     ADDITIONAL REVIEW OF SYSTEMS:    MEDICATIONS  (STANDING):  albuterol/ipratropium for Nebulization 3 milliLiter(s) Nebulizer every 6 hours  enoxaparin Injectable 40 milliGRAM(s) SubCutaneous every 12 hours  insulin lispro (ADMELOG) corrective regimen sliding scale   SubCutaneous every 6 hours  lacosamide Solution 100 milliGRAM(s) Oral two times a day  nystatin Cream 1 Application(s) Topical every 12 hours  senna 2 Tablet(s) Oral at bedtime  sodium chloride 0.9% for Nebulization 3 milliLiter(s) Nebulizer every 6 hours    MEDICATIONS  (PRN):    CAPILLARY BLOOD GLUCOSE    POCT Blood Glucose.: 168 mg/dL (01 Feb 2022 06:13)  POCT Blood Glucose.: 200 mg/dL (01 Feb 2022 02:04)  POCT Blood Glucose.: 215 mg/dL (01 Feb 2022 00:12)  POCT Blood Glucose.: 183 mg/dL (31 Jan 2022 18:20)  POCT Blood Glucose.: 163 mg/dL (31 Jan 2022 11:02)    I&O's Detail    31 Jan 2022 07:01  -  01 Feb 2022 07:00  --------------------------------------------------------  IN:    Enteral Tube Flush: 790 mL    Glucerna 1.5: 365 mL  Total IN: 1155 mL    OUT:    Voided (mL): 100 mL  Total OUT: 100 mL    Total NET: 1055 mL          PHYSICAL EXAM:  Vital Signs Last 24 Hrs  T(C): 36.8 (01 Feb 2022 05:35), Max: 36.8 (31 Jan 2022 12:54)  T(F): 98.2 (01 Feb 2022 05:35), Max: 98.2 (31 Jan 2022 12:54)  HR: 80 (01 Feb 2022 05:35) (71 - 92)  BP: 123/83 (01 Feb 2022 05:35) (123/83 - 161/71)  BP(mean): 105 (31 Jan 2022 12:00) (91 - 105)  RR: 19 (01 Feb 2022 05:35) (18 - 23)  SpO2: 94% (01 Feb 2022 05:35) (90% - 100%)    CONSTITUTIONAL: NAD, well-developed  HEENT: NC/AT, EOMI  RESPIRATORY: No increased work of breathing, CTAB, no wheezes or crackles appreciated  CARDIOVASCULAR: RRR, S1 and S2 present, no m/r/g  ABDOMEN: soft, NT, ND, bowel sounds present  EXTREMITIES: No LE edema  MUSCULOSKELETAL: no joint swelling or tenderness to palpation  NEURO: A&Ox1, responding to questions and commands    LABS:                        12.6   5.12  )-----------( 229      ( 01 Feb 2022 06:24 )             41.4     01-31    148<H>  |  106  |  49<H>  ----------------------------<  195<H>  4.0   |  33<H>  |  0.99    Ca    10.4      31 Jan 2022 00:38  Phos  2.0     01-31  Mg     2.7     01-31    TPro  7.3  /  Alb  3.3  /  TBili  0.5  /  DBili  x   /  AST  57<H>  /  ALT  31  /  AlkPhos  73  01-31    PT/INR - ( 01 Feb 2022 06:24 )   PT: 13.7 sec;   INR: 1.15 ratio         PTT - ( 01 Feb 2022 06:24 )  PTT:36.7 sec      Culture - Sputum (collected 29 Jan 2022 13:58)  Source: .Sputum Sputum  Gram Stain (30 Jan 2022 07:20):    Moderate polymorphonuclear leukocytes per low power field    Rare Squamous epithelial cells per low power field    No organisms seen per oil power field  Final Report (31 Jan 2022 19:22):    Normal Respiratory Blanche present    Culture - Blood (collected 29 Jan 2022 13:53)  Source: .Blood Blood-Venous  Preliminary Report (30 Jan 2022 14:01):    No growth to date.    Culture - Blood (collected 29 Jan 2022 13:53)  Source: .Blood Blood-Peripheral  Preliminary Report (30 Jan 2022 14:01):    No growth to date.      RADIOLOGY & ADDITIONAL TESTS:    INTERPRETATION: Clinical indication: Altered mental status    Multiple axial sections were performed from base skull to vertex without contrast enhancement. Coronal and sagittal destructions were performed as well    This exam is compared with prior head CT performed on January 27, 2022.    Abnormal high attenuation involving the high left frontal cortical subcortical region is again seen. This finding measures approximately 5.1 x 2.8 cm and previously measured approximately 7.3 x 3.2 cm. Surrounding edema is again seen.    Small amount of subrecord hemorrhage is again suspected involving the left posterior temporal/posterior frontal region    Intraventricular hemorrhage is again seen.    The size and configuration of ventricles appear unchanged    No new areas of acute hemorrhage is seen.    Extra-axial low-attenuation from the high left frontal region is again seen with bony scalloping. This appears unchanged when compared with the prior exam and is likely compatible with an arachnoid cyst    Evaluation of osseous structures with the appropriate window appears unchanged    The visualized paranasal sinuses mastoid and middle ear regions appear clear.    Orotracheal and orogastric tubes are seen.    The paranasal sinuses mastoid and middle ear regions appear clear.    The patient status post bilateral cataract surgery.    IMPRESSION: Acute parenchymal hemorrhage is again seen as well as intraventricular hemorrhage. Subrecord hemorrhage is suspected.   Micah Hansen, PGY-1  Internal Medicine  [facility] Team; [phone]      PROGRESS NOTE:     Patient is a 81y old  Male who presents with a chief complaint of R ICH (31 Jan 2022 12:23)      SUBJECTIVE AND OVERNIGHT EVENTS:   Patient was seen and examined at bedside this morning. [place]  services were utilized during the encounter and he was able to respond and follow commands. He was dysarthric throughout but denied any pain or complaints. He had no acute events overnight.     ADDITIONAL REVIEW OF SYSTEMS:    MEDICATIONS  (STANDING):  albuterol/ipratropium for Nebulization 3 milliLiter(s) Nebulizer every 6 hours  enoxaparin Injectable 40 milliGRAM(s) SubCutaneous every 12 hours  insulin lispro (ADMELOG) corrective regimen sliding scale   SubCutaneous every 6 hours  lacosamide Solution 100 milliGRAM(s) Oral two times a day  nystatin Cream 1 Application(s) Topical every 12 hours  senna 2 Tablet(s) Oral at bedtime  sodium chloride 0.9% for Nebulization 3 milliLiter(s) Nebulizer every 6 hours    MEDICATIONS  (PRN):    CAPILLARY BLOOD GLUCOSE    POCT Blood Glucose.: 168 mg/dL (01 Feb 2022 06:13)  POCT Blood Glucose.: 200 mg/dL (01 Feb 2022 02:04)  POCT Blood Glucose.: 215 mg/dL (01 Feb 2022 00:12)  POCT Blood Glucose.: 183 mg/dL (31 Jan 2022 18:20)  POCT Blood Glucose.: 163 mg/dL (31 Jan 2022 11:02)    I&O's Detail    31 Jan 2022 07:01  -  01 Feb 2022 07:00  --------------------------------------------------------  IN:    Enteral Tube Flush: 790 mL    Glucerna 1.5: 365 mL  Total IN: 1155 mL    OUT:    Voided (mL): 100 mL  Total OUT: 100 mL    Total NET: 1055 mL          PHYSICAL EXAM:  Vital Signs Last 24 Hrs  T(C): 36.8 (01 Feb 2022 05:35), Max: 36.8 (31 Jan 2022 12:54)  T(F): 98.2 (01 Feb 2022 05:35), Max: 98.2 (31 Jan 2022 12:54)  HR: 80 (01 Feb 2022 05:35) (71 - 92)  BP: 123/83 (01 Feb 2022 05:35) (123/83 - 161/71)  BP(mean): 105 (31 Jan 2022 12:00) (91 - 105)  RR: 19 (01 Feb 2022 05:35) (18 - 23)  SpO2: 94% (01 Feb 2022 05:35) (90% - 100%)    CONSTITUTIONAL: NAD, well-developed  HEENT: NC/AT, EOMI  RESPIRATORY: No increased work of breathing, CTAB, no wheezes or crackles appreciated  CARDIOVASCULAR: RRR, S1 and S2 present, no m/r/g  ABDOMEN: soft, NT, ND, bowel sounds present  EXTREMITIES: No LE edema  MUSCULOSKELETAL: no joint swelling or tenderness to palpation  NEURO: A&Ox1, responding to questions and commands    LABS:                        12.6   5.12  )-----------( 229      ( 01 Feb 2022 06:24 )             41.4     01-31    148<H>  |  106  |  49<H>  ----------------------------<  195<H>  4.0   |  33<H>  |  0.99    Ca    10.4      31 Jan 2022 00:38  Phos  2.0     01-31  Mg     2.7     01-31    TPro  7.3  /  Alb  3.3  /  TBili  0.5  /  DBili  x   /  AST  57<H>  /  ALT  31  /  AlkPhos  73  01-31    PT/INR - ( 01 Feb 2022 06:24 )   PT: 13.7 sec;   INR: 1.15 ratio         PTT - ( 01 Feb 2022 06:24 )  PTT:36.7 sec      Culture - Sputum (collected 29 Jan 2022 13:58)  Source: .Sputum Sputum  Gram Stain (30 Jan 2022 07:20):    Moderate polymorphonuclear leukocytes per low power field    Rare Squamous epithelial cells per low power field    No organisms seen per oil power field  Final Report (31 Jan 2022 19:22):    Normal Respiratory Blanche present    Culture - Blood (collected 29 Jan 2022 13:53)  Source: .Blood Blood-Venous  Preliminary Report (30 Jan 2022 14:01):    No growth to date.    Culture - Blood (collected 29 Jan 2022 13:53)  Source: .Blood Blood-Peripheral  Preliminary Report (30 Jan 2022 14:01):    No growth to date.      RADIOLOGY & ADDITIONAL TESTS:    INTERPRETATION: Clinical indication: Altered mental status    Multiple axial sections were performed from base skull to vertex without contrast enhancement. Coronal and sagittal destructions were performed as well    This exam is compared with prior head CT performed on January 27, 2022.    Abnormal high attenuation involving the high left frontal cortical subcortical region is again seen. This finding measures approximately 5.1 x 2.8 cm and previously measured approximately 7.3 x 3.2 cm. Surrounding edema is again seen.    Small amount of subrecord hemorrhage is again suspected involving the left posterior temporal/posterior frontal region    Intraventricular hemorrhage is again seen.    The size and configuration of ventricles appear unchanged    No new areas of acute hemorrhage is seen.    Extra-axial low-attenuation from the high left frontal region is again seen with bony scalloping. This appears unchanged when compared with the prior exam and is likely compatible with an arachnoid cyst    Evaluation of osseous structures with the appropriate window appears unchanged    The visualized paranasal sinuses mastoid and middle ear regions appear clear.    Orotracheal and orogastric tubes are seen.    The paranasal sinuses mastoid and middle ear regions appear clear.    The patient status post bilateral cataract surgery.    IMPRESSION: Acute parenchymal hemorrhage is again seen as well as intraventricular hemorrhage. Subrecord hemorrhage is suspected.

## 2022-02-01 NOTE — PROGRESS NOTE ADULT - PROBLEM SELECTOR PLAN 2
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

## 2022-02-01 NOTE — PROGRESS NOTE ADULT - PROBLEM SELECTOR PLAN 3
hx of COVID 2 years ago and 2 weeks ago  - face tent, wean to ra as tolerated  - aspiration precautions   - febrile last night 1/24 -- repeating w/u  - f/u BCx, RVP, CXR - NGTD  - elevated d-dimer  - will f/u CT Chest -- potential aspiration PNA  - finished cefepime for 7 days --ngtd  -Bronchodilator therapy q 6 hrs and  prn sob/wheezes  -hypertonic saline nebs q 6 hrs  -cont Bipap at night for SWETA hx of COVID 2 years ago and 2 weeks ago  - aspiration precautions   - febrile last night 1/24 -- repeating w/u  - f/u BCx, RVP, CXR - NGTD  - elevated d-dimer  - will f/u CT Chest -- potential aspiration PNA  - finished cefepime for 7 days --ngtd  -Bronchodilator therapy q 6 hrs and  prn sob/wheezes  -hypertonic saline nebs q 6 hrs  -cont Bipap at night for SWETA

## 2022-02-01 NOTE — PROGRESS NOTE ADULT - ASSESSMENT
81 yr old male with stated hx significant for HTN, OSH, s/p AAA repair, Ascending Ao aneurysm, infrarenal AAA, Rt common iliac aneurysm who presented from OSH for further management of IPH. Course c/b possible sz activity, recurrent AMS, recurrent hypoxic resp failure secondary to aspiration pneum requiring intubation 1/29 was transferred to MICU for recurrent hypoxic resp failure secondary to possible aspiration PNA and septic shock of unknown origin.

## 2022-02-01 NOTE — PROGRESS NOTE ADULT - ASSESSMENT
81y m W/ PMHx HTN presenting to the ED from [facility] as a transfer for R frontal parenchymal hemorrhage on CT after fall at home, course c/b worsening mental status (2/2 seizure?) s/p intubation/extubation, fever (PNA?), GI consulted for PEG    # dysphagia - 2/2 ICH, poor prognosis, unlikely to have meaningful recovery  # Acute hypoxic/hypercapnic respiratory failure: patient intubated 1/19/2022, extubated 1/22/2022, intubated 1/29/2022, and extubated 1/30/2022  # PNA - fever 1/23-1/26 likely 2/2 aspiration PNA, now afebrile w/o leukocytosis on cefepime  # Right frontal intraparenchymal hemorrhage  # thoracic aneurysm - s/p repair 7 years ago (con), aslo w/ ascending aneursym and R iliac aorta >5cm, follows w/ vascular surgery    Recommendations:  -c/w management of AMS per neurology/primary team  -f/u speech and swallow eval, especially in the setting of repeat intubation/extubations and IPH.  However assessment limited given mental status  -continue GOC discussion with family  -monitor for next 1-2 days to ensure medically stable following extubation 1/30/2022  -can keep NPO for tentative PEG on 2/2/2022 pending schedule availability medical optimization, and family's wishes  -remainder of care per primary team      Brad Vincent, PGY-4  GI/Hepatology Fellow    MONDAY-FRIDAY 8AM-5PM:  Pager# [phone] ([facility]) or [phone] ([facility])    NON-URGENT CONSULTS:  Please email [email or web address] OR [email or web address]  AT NIGHT AND ON WEEKENDS:  Contact on-call GI fellow via answering service ([phone]) from 5pm-8am and on weekends/holidays

## 2022-02-01 NOTE — PROGRESS NOTE ADULT - SUBJECTIVE AND OBJECTIVE BOX
Neurology Progress note  S: patient seen and examined, now on floor. doing okay.     Brief HPI:  81y m W/  HTN presenting to the ED from [facility] as a transfer for R frontal parenchymal hemorrhage on CT after fall at home. Patient is poor historian but states that he was diagnosed with COVID 2 weeks ago and has been feeling "tired" at home, states he felt dizzy earlier today but unable to state what time he fell or further elucidate. Patient denies AC use. Per EMS was also recently diagnosed with a AAA but has been unable to undergo surgical repair due to recent dx of COVID. Patient still endorsing he feels "tired", denies current headache, vision changes, dizziness, chest pain, nausea, back pain, focal numbness/tingling. Endorses weakness to LLE 2/2 pain. (15 Mono 2022 15:50)    Medications:     MEDICATIONS  (STANDING):  albuterol/ipratropium for Nebulization 3 milliLiter(s) Nebulizer every 6 hours  enoxaparin Injectable 40 milliGRAM(s) SubCutaneous every 12 hours  insulin lispro (ADMELOG) corrective regimen sliding scale   SubCutaneous every 6 hours  lacosamide Solution 100 milliGRAM(s) Oral two times a day  nystatin Cream 1 Application(s) Topical every 12 hours  senna 2 Tablet(s) Oral at bedtime  sodium chloride 0.9% for Nebulization 3 milliLiter(s) Nebulizer every 6 hours    MEDICATIONS  (PRN):       Vitals  Vital Signs Last 24 Hrs  T(C): 36.8 (22 @ 05:35), Max: 36.8 (22 @ 12:54)  T(F): 98.2 (22 @ 05:35), Max: 98.2 (22 @ 12:54)  HR: 80 (22 @ 05:35) (71 - 92)  BP: 123/83 (22 @ 05:35) (123/83 - 150/93)  BP(mean): 105 (22 @ 12:00) (93 - 105)  RR: 19 (22 @ 05:35) (18 - 20)  SpO2: 94% (22 @ 05:35) (90% - 100%)          General Exam:   General Appearance: Appropriately dressed and in no acute distress       Head: Normocephalic, atraumatic and no dysmorphic features  Ear, Nose, and Throat: Moist mucous membranes +NGT    CVS: S1S2+  Resp: No SOB, no wheeze or rhonchi  GI: soft NT/ND  Extremities: No edema or cyanosis  Skin: No bruises or rashes     Neurological Exam:    Mental Status: Awake, alert and oriented x 1-2.  Able to follow simple verbal commands intermittently. Able to name and repeat.+ dysarthria    Cranial Nerves: PERRL, EOMI, VFFC, sensation V1-V3 intact, L f acial asymmetry, equal elevation of palate,  , tongue is midline on protrusion. no obvious papilledema on fundoscopic exam. hearing is grossly intact.   Motor: moving R full strength LUE 3/5/ LLE 1-2/5, tremor in uppers at times, myoclonus when sedation off  Sensation: withdraws R>L  Reflexes: 1+ throughout at biceps, brachioradialis, triceps, patellars and ankles bilaterally and equal. No clonus. R toe and L toe were both downgoing.  Coordination: unable   Gait: unable     Data/Labs/Imaging which I personally reviewed.     Labs:   CBC Full  -  ( 2022 06:24 )  WBC Count : 5.12 K/uL  RBC Count : 4.20 M/uL  Hemoglobin : 12.6 g/dL  Hematocrit : 41.4 %  Platelet Count - Automated : 229 K/uL  Mean Cell Volume : 98.6 fl  Mean Cell Hemoglobin : 30.0 pg  Mean Cell Hemoglobin Concentration : 30.4 gm/dL  Auto Neutrophil # : 2.92 K/uL  Auto Lymphocyte # : 1.60 K/uL  Auto Monocyte # : 0.36 K/uL  Auto Eosinophil # : 0.14 K/uL  Auto Basophil # : 0.04 K/uL  Auto Neutrophil % : 57.0 %  Auto Lymphocyte % : 31.3 %  Auto Monocyte % : 7.0 %  Auto Eosinophil % : 2.7 %  Auto Basophil % : 0.8 %    02-    148<H>  |  106  |  52<H>  ----------------------------<  186<H>  4.6   |  29  |  1.13    Ca    10.3      2022 06:24  Phos  2.0       Mg     2.7         TPro  7.6  /  Alb  3.3  /  TBili  0.4  /  DBili  x   /  AST  53<H>  /  ALT  38  /  AlkPhos  76            Urinalysis Basic - ( 2022 21:31 )    Color: Yellow / Appearance: Clear / S.023 / pH: x  Gluc: x / Ketone: Negative  / Bili: Negative / Urobili: Negative   Blood: x / Protein: 100 / Nitrite: Negative   Leuk Esterase: Negative / RBC: 0 /hpf / WBC 0 /HPF   Sq Epi: x / Non Sq Epi: 1 /hpf / Bacteria: Negative    < from: CT Angio Head w/ IV Cont (01.15.22 @ 17:16) >    ACC: [id number] EXAM:  CT ANGIO NECK (W)AW IC                        ACC: [id number] EXAM:  CT ANGIO BRAIN (W)AW IC                          PROCEDURE DATE:  01/15/2022          INTERPRETATION:  CT ANGIOGRAPHY OF THE NECK AND BRAIN.    CLINICAL INFORMATION:81 years Male ICH    TECHNIQUE:  CT angiography of the neck and brain was performed during the dynamic   administration of intravenous contrast.  MIP reconstructions were   performed and reviewed. Multiplanar reformations were obtained.  Contrastadministered 70 cc Omnipaque 350, contrast discarded 30 cc    FINDINGS:  NECK  RIGHT CAROTID CIRCULATION:  Evaluation of the right carotid circulation demonstrates no traumatic   occlusion, dissection, or pseudoaneurysm.    LEFT CAROTID CIRCULATION:  Evaluation of the left carotid circulation demonstrates no traumatic   occlusion, dissection, or pseudoaneurysm.    VERTEBRAL ARTERIES:  Evaluation of the vertebral arteries reveals no evidence of a vertebral   artery occlusion or dissection.    BRAIN  ANTERIOR CIRCULATION:  Distal internal carotid arteries are patent. Calcification involves the   cavernous segments with mild degrees of narrowing  Anterior cerebral arteries are patent. A1 segment of the left anterior   cerebral artery is smaller than the right, a normal anatomic variant  Middle cerebral arteries are patent.    POSTERIOR CIRCULATION:  Distal vertebral arteries are patent. Bilateral posterior inferior   cerebellar arteries are seen.  Basilar artery is patent. Proximal superior cerebellar arteries are patent  Posterior cerebral arteries are patent.    OTHER:  No evidence of abnormal enhancement associated with the moderate to large   right frontal parenchymal hemorrhage.  No puddling of contrast.  No aneurysm or arteriovenous malformation seen.    IMPRESSION:  *  NO TRAUMATIC OCCLUSION, DISSECTION, OR PSEUDOANEURYSM.  *  NO ABNORMAL ENHANCEMENT ASSOCIATED WITH THE MODERATE TO LARGE RIGHT   FRONTAL PARENCHYMAL HEMORRHAGE.  *  NO PUDDLING OF CONTRAST. NO ANEURYSM OR AVM SEEN.    --- End of Report ---            PAWAN ODELL MD; Attending Radiologist  This document has been electronically signed. Mono 15 2022  7:49PM    < end of copied text >  < from: CT Head No Cont (22 @ 09:30) >    ACC: [id number] EXAM:  CT BRAIN                          PROCEDURE DATE:  2022          INTERPRETATION:  CLINICAL INFORMATION: Intracranial hemorrhage, follow-up.    TECHNIQUE: Portable noncontrast axial CT images were acquired of the head.    COMPARISON STUDY: CT head 2022.    FINDINGS:  Large right frontal lobe parenchymal hematoma measures approximately 7.7   x 3.5 cm, previously 7.5 x 3.2 cm, with surrounding edema and mass   effect, without interval interval change compared to prior.   Redemonstrated small intraventricular hemorrhage, similar to prior.    Moderate cerebral volume loss with proportional prominence of the sulci   and ventricles. Nonspecific white matter hypoattenuation, likely related   to chronic microvascular changes.    Left frontal arachnoid cyst, unchanged.    Mucosal thickening of the left maxillary and bilateral ethmoid sinuses.   Partially visualized nasogastric tube in situ. Mastoid air cells are   clear.    Evidence of bilateral lens surgery.    Nodisplaced calvarial fracture.      IMPRESSION:  Stable exam.    --- End of Report ---          HARRIET WATSON MD; Resident Radiology  This document has been electronically signed.  CRUZ LÓPEZ MD; Attending Radiologist  This document has been electronically signed. 2022 10:58AM    < end of copied text >    < from: CT Head No Cont (22 @ 22:13) >    ACC: [id number] EXAM:  CT BRAIN                          PROCEDURE DATE:  2022          INTERPRETATION:  Clinical indication: Altered mental status    Multiple axial sections were performed from base skull to vertex without   contrast enhancement. Coronal and sagittal reconstructions were performed   as well    This exam is compared prior head CT performed on 2020.    Again seen is abnormal high attenuation identified involving the high   right frontal cortical subcortical region. This compatible with acute   parenchymal hemorrhage surrounding edema. This acute parenchymal   hemorrhage measures approximately 7.3 x 3.2 cm and previously measured   approximately 7.7 x 3.5 cm. Localized mass effect is seen consisting of   sulcal effacement.    Extra-axial low-attenuation with adjacent bony scalloping is again seen   involving the high left frontal region. This finding measures   approximately 4.2 x 2.8 cm and previously measured approximately 4.1 x   3.1 cm. This is compatible with an arachnoid cyst.    Parenchymal volume loss and chronic microvascular ischemic changes are   identified.    Intraventricular hemorrhage is again seen.    Evaluation of structures with the appropriate window appears unremarkable    Left-sided NG tube is seen.    The visualized paranasal sinuses mastoid and middle ear regions appear   clear.    Impression: Acute parenchymal hemorrhage is again seen with some expected   evolutionary changes.    Arachnoid cyst again seen as described above.    --- End of Report ---              < from: CT Head No Cont (22 @ 09:57) >    ACC: [id number] EXAM:  CT BRAIN                          PROCEDURE DATE:  2022          INTERPRETATION:  Clinical indication: Altered mental status    Multiple axial sections were performed from base skull to vertex without   contrast enhancement. Coronal and sagittal destructions were performed as   well    This exam is compared with prior head CT performed on 2022.    Abnormal high attenuation involving the high left frontal cortical   subcortical region is again seen. This finding measures approximately 5.1   x 2.8 cm and previously measured approximately 7.3 x 3.2 cm. Surrounding   edema is again seen.    Small amount of subrecord hemorrhage is again suspected involving the   left posterior temporal/posterior frontal region    Intraventricular hemorrhage is again seen.    The size and configuration of ventricles appear unchanged    No new areas of acute hemorrhage is seen.    Extra-axial low-attenuation from the high left frontal region is again   seen with bony scalloping. This appears unchanged when compared with the   prior exam and is likely compatible with an arachnoid cyst    Evaluation of osseous structures with the appropriate window appears   unchanged    The visualized paranasal sinuses mastoid and middle ear regions appear   clear.    Orotracheal and orogastric tubes are seen.    The paranasal sinuses mastoid and middle ear regions appear clear.    The patient status post bilateral cataract surgery.    IMPRESSION: Acute parenchymal hemorrhage is again seen as well as   intraventricular hemorrhage. Subrecord hemorrhage is suspected.    --- End of Report ---            BE TOUSSAINT MD; Attending Radiologist  This document has been electronically signed. 2022 10:10AM    < end of copied text >      EEG IMPRESSION/CLINICAL CORRELATE    Abnormal EEG study.  1. Risk for seizures from the right posterior quadrant and left parietal region.   2. Structural abnormality in the right hemisphere.  3. Moderate multifocal/diffuse nonspecific cerebral dysfunction.    No seizures recorded.

## 2022-02-02 LAB
ALBUMIN SERPL ELPH-MCNC: 3.4 G/DL — SIGNIFICANT CHANGE UP (ref 3.3–5)
ALP SERPL-CCNC: 72 U/L — SIGNIFICANT CHANGE UP (ref 40–120)
ALT FLD-CCNC: 42 U/L — SIGNIFICANT CHANGE UP (ref 10–45)
ANION GAP SERPL CALC-SCNC: 10 MMOL/L — SIGNIFICANT CHANGE UP (ref 5–17)
ANION GAP SERPL CALC-SCNC: 10 MMOL/L — SIGNIFICANT CHANGE UP (ref 5–17)
AST SERPL-CCNC: 48 U/L — HIGH (ref 10–40)
BILIRUB SERPL-MCNC: 0.5 MG/DL — SIGNIFICANT CHANGE UP (ref 0.2–1.2)
BUN SERPL-MCNC: 48 MG/DL — HIGH (ref 7–23)
BUN SERPL-MCNC: 50 MG/DL — HIGH (ref 7–23)
CALCIUM SERPL-MCNC: 10 MG/DL — SIGNIFICANT CHANGE UP (ref 8.4–10.5)
CALCIUM SERPL-MCNC: 10 MG/DL — SIGNIFICANT CHANGE UP (ref 8.4–10.5)
CHLORIDE SERPL-SCNC: 108 MMOL/L — SIGNIFICANT CHANGE UP (ref 96–108)
CHLORIDE SERPL-SCNC: 111 MMOL/L — HIGH (ref 96–108)
CO2 SERPL-SCNC: 30 MMOL/L — SIGNIFICANT CHANGE UP (ref 22–31)
CO2 SERPL-SCNC: 31 MMOL/L — SIGNIFICANT CHANGE UP (ref 22–31)
CREAT SERPL-MCNC: 1.08 MG/DL — SIGNIFICANT CHANGE UP (ref 0.5–1.3)
CREAT SERPL-MCNC: 1.15 MG/DL — SIGNIFICANT CHANGE UP (ref 0.5–1.3)
GLUCOSE BLDC GLUCOMTR-MCNC: 157 MG/DL — HIGH (ref 70–99)
GLUCOSE BLDC GLUCOMTR-MCNC: 164 MG/DL — HIGH (ref 70–99)
GLUCOSE BLDC GLUCOMTR-MCNC: 180 MG/DL — HIGH (ref 70–99)
GLUCOSE BLDC GLUCOMTR-MCNC: 188 MG/DL — HIGH (ref 70–99)
GLUCOSE BLDC GLUCOMTR-MCNC: 210 MG/DL — HIGH (ref 70–99)
GLUCOSE SERPL-MCNC: 175 MG/DL — HIGH (ref 70–99)
GLUCOSE SERPL-MCNC: 223 MG/DL — HIGH (ref 70–99)
HCT VFR BLD CALC: 37.4 % — LOW (ref 39–50)
HGB BLD-MCNC: 11.7 G/DL — LOW (ref 13–17)
MAGNESIUM SERPL-MCNC: 2.7 MG/DL — HIGH (ref 1.6–2.6)
MCHC RBC-ENTMCNC: 30.9 PG — SIGNIFICANT CHANGE UP (ref 27–34)
MCHC RBC-ENTMCNC: 31.3 GM/DL — LOW (ref 32–36)
MCV RBC AUTO: 98.7 FL — SIGNIFICANT CHANGE UP (ref 80–100)
NRBC # BLD: 0 /100 WBCS — SIGNIFICANT CHANGE UP (ref 0–0)
PHOSPHATE SERPL-MCNC: 3.8 MG/DL — SIGNIFICANT CHANGE UP (ref 2.5–4.5)
PLATELET # BLD AUTO: 287 K/UL — SIGNIFICANT CHANGE UP (ref 150–400)
POTASSIUM SERPL-MCNC: 4.1 MMOL/L — SIGNIFICANT CHANGE UP (ref 3.5–5.3)
POTASSIUM SERPL-MCNC: 4.3 MMOL/L — SIGNIFICANT CHANGE UP (ref 3.5–5.3)
POTASSIUM SERPL-SCNC: 4.1 MMOL/L — SIGNIFICANT CHANGE UP (ref 3.5–5.3)
POTASSIUM SERPL-SCNC: 4.3 MMOL/L — SIGNIFICANT CHANGE UP (ref 3.5–5.3)
PROT SERPL-MCNC: 7.3 G/DL — SIGNIFICANT CHANGE UP (ref 6–8.3)
RBC # BLD: 3.79 M/UL — LOW (ref 4.2–5.8)
RBC # FLD: 13.2 % — SIGNIFICANT CHANGE UP (ref 10.3–14.5)
SODIUM SERPL-SCNC: 148 MMOL/L — HIGH (ref 135–145)
SODIUM SERPL-SCNC: 152 MMOL/L — HIGH (ref 135–145)
WBC # BLD: 6.23 K/UL — SIGNIFICANT CHANGE UP (ref 3.8–10.5)
WBC # FLD AUTO: 6.23 K/UL — SIGNIFICANT CHANGE UP (ref 3.8–10.5)

## 2022-02-02 PROCEDURE — 99233 SBSQ HOSP IP/OBS HIGH 50: CPT | Mod: GC

## 2022-02-02 RX ORDER — SODIUM CHLORIDE 9 MG/ML
500 INJECTION, SOLUTION INTRAVENOUS
Refills: 0 | Status: DISCONTINUED | OUTPATIENT
Start: 2022-02-02 | End: 2022-02-06

## 2022-02-02 RX ADMIN — ENOXAPARIN SODIUM 40 MILLIGRAM(S): 100 INJECTION SUBCUTANEOUS at 05:14

## 2022-02-02 RX ADMIN — Medication 3 MILLILITER(S): at 00:16

## 2022-02-02 RX ADMIN — NYSTATIN CREAM 1 APPLICATION(S): 100000 CREAM TOPICAL at 05:14

## 2022-02-02 RX ADMIN — SODIUM CHLORIDE 3 MILLILITER(S): 9 INJECTION INTRAMUSCULAR; INTRAVENOUS; SUBCUTANEOUS at 11:46

## 2022-02-02 RX ADMIN — ENOXAPARIN SODIUM 40 MILLIGRAM(S): 100 INJECTION SUBCUTANEOUS at 17:06

## 2022-02-02 RX ADMIN — SODIUM CHLORIDE 3 MILLILITER(S): 9 INJECTION INTRAMUSCULAR; INTRAVENOUS; SUBCUTANEOUS at 05:32

## 2022-02-02 RX ADMIN — NYSTATIN CREAM 1 APPLICATION(S): 100000 CREAM TOPICAL at 17:06

## 2022-02-02 RX ADMIN — Medication 3 MILLILITER(S): at 11:46

## 2022-02-02 RX ADMIN — SODIUM CHLORIDE 100 MILLILITER(S): 9 INJECTION, SOLUTION INTRAVENOUS at 09:39

## 2022-02-02 RX ADMIN — LACOSAMIDE 100 MILLIGRAM(S): 50 TABLET ORAL at 17:05

## 2022-02-02 RX ADMIN — Medication 2: at 05:15

## 2022-02-02 RX ADMIN — Medication 2: at 00:16

## 2022-02-02 RX ADMIN — SODIUM CHLORIDE 3 MILLILITER(S): 9 INJECTION INTRAMUSCULAR; INTRAVENOUS; SUBCUTANEOUS at 00:15

## 2022-02-02 RX ADMIN — Medication 2: at 11:44

## 2022-02-02 RX ADMIN — Medication 3 MILLILITER(S): at 17:06

## 2022-02-02 RX ADMIN — SODIUM CHLORIDE 3 MILLILITER(S): 9 INJECTION INTRAMUSCULAR; INTRAVENOUS; SUBCUTANEOUS at 17:05

## 2022-02-02 RX ADMIN — LACOSAMIDE 100 MILLIGRAM(S): 50 TABLET ORAL at 05:14

## 2022-02-02 RX ADMIN — SENNA PLUS 2 TABLET(S): 8.6 TABLET ORAL at 21:21

## 2022-02-02 RX ADMIN — Medication 3 MILLILITER(S): at 05:14

## 2022-02-02 RX ADMIN — Medication 4: at 17:07

## 2022-02-02 NOTE — PROGRESS NOTE ADULT - SUBJECTIVE AND OBJECTIVE BOX
*****************************************  Olinda Muniz MD I PGY1  Internal Medicine  Pager NS: [phone] I [facility]: [phone]  *****************************************      Patient is a 81y old  Male who presents with a chief complaint of R ICH (01 Feb 2022 12:38)      SUBJECTIVE :      MEDICATIONS  (STANDING):  albuterol/ipratropium for Nebulization 3 milliLiter(s) Nebulizer every 6 hours  enoxaparin Injectable 40 milliGRAM(s) SubCutaneous every 12 hours  insulin lispro (ADMELOG) corrective regimen sliding scale   SubCutaneous every 6 hours  lacosamide Solution 100 milliGRAM(s) Oral two times a day  nystatin Cream 1 Application(s) Topical every 12 hours  senna 2 Tablet(s) Oral at bedtime  sodium chloride 0.9% for Nebulization 3 milliLiter(s) Nebulizer every 6 hours    MEDICATIONS  (PRN):      CAPILLARY BLOOD GLUCOSE      POCT Blood Glucose.: 164 mg/dL (02 Feb 2022 05:13)  POCT Blood Glucose.: 188 mg/dL (02 Feb 2022 00:07)  POCT Blood Glucose.: 172 mg/dL (01 Feb 2022 18:02)  POCT Blood Glucose.: 197 mg/dL (01 Feb 2022 12:54)  POCT Blood Glucose.: 205 mg/dL (01 Feb 2022 11:58)    I&O's Summary    01 Feb 2022 07:01  -  02 Feb 2022 07:00  --------------------------------------------------------  IN: 60 mL / OUT: 0 mL / NET: 60 mL        PHYSICAL EXAM:  Vital Signs Last 24 Hrs  T(C): 37 (02 Feb 2022 05:00), Max: 37.2 (01 Feb 2022 21:00)  T(F): 98.6 (02 Feb 2022 05:00), Max: 98.9 (01 Feb 2022 21:00)  HR: 81 (02 Feb 2022 05:00) (81 - 93)  BP: 127/82 (02 Feb 2022 05:00) (124/63 - 136/86)  BP(mean): --  RR: 18 (02 Feb 2022 05:00) (18 - 20)  SpO2: 97% (02 Feb 2022 05:00) (93% - 97%)    CONSTITUTIONAL: NAD, well-developed  HEENT: NC/AT, EOMI  RESPIRATORY: No increased work of breathing, CTAB, no wheezes or crackles appreciated  CARDIOVASCULAR: RRR, S1 and S2 present, no m/r/g  ABDOMEN: soft, NT, ND, bowel sounds present  EXTREMITIES: No LE edema  MUSCULOSKELETAL: no joint swelling or tenderness to palpation  NEURO: A&Ox1, responding to questions and commands      LABS:                        11.7   6.23  )-----------( 287      ( 02 Feb 2022 06:57 )             37.4     02-01    148<H>  |  106  |  52<H>  ----------------------------<  186<H>  4.6   |  29  |  1.13    Ca    10.3      01 Feb 2022 06:24  Mg     2.7     02-01    TPro  7.6  /  Alb  3.3  /  TBili  0.4  /  DBili  x   /  AST  53<H>  /  ALT  38  /  AlkPhos  76  02-01    PT/INR - ( 01 Feb 2022 06:24 )   PT: 13.7 sec;   INR: 1.15 ratio         PTT - ( 01 Feb 2022 06:24 )  PTT:36.7 sec            RADIOLOGY & ADDITIONAL TESTS:  Results Reviewed:   Imaging Personally Reviewed:  Electrocardiogram Personally Reviewed:    COORDINATION OF CARE:  Care Discussed with Consultants/Other Providers [Y/N]:  Prior or Outpatient Records Reviewed [Y/N]:     *****************************************  Olinda Muniz MD I PGY1  Internal Medicine  Pager NS: [phone] I [facility]: [phone]  *****************************************      Patient is a 81y old  Male who presents with a chief complaint of R ICH (01 Feb 2022 12:38)      SUBJECTIVE : NAEO. Pt seen and examined at bedside. Able to follow commands. Does not offer any complaints.         MEDICATIONS  (STANDING):  albuterol/ipratropium for Nebulization 3 milliLiter(s) Nebulizer every 6 hours  enoxaparin Injectable 40 milliGRAM(s) SubCutaneous every 12 hours  insulin lispro (ADMELOG) corrective regimen sliding scale   SubCutaneous every 6 hours  lacosamide Solution 100 milliGRAM(s) Oral two times a day  nystatin Cream 1 Application(s) Topical every 12 hours  senna 2 Tablet(s) Oral at bedtime  sodium chloride 0.9% for Nebulization 3 milliLiter(s) Nebulizer every 6 hours    MEDICATIONS  (PRN):      CAPILLARY BLOOD GLUCOSE      POCT Blood Glucose.: 164 mg/dL (02 Feb 2022 05:13)  POCT Blood Glucose.: 188 mg/dL (02 Feb 2022 00:07)  POCT Blood Glucose.: 172 mg/dL (01 Feb 2022 18:02)  POCT Blood Glucose.: 197 mg/dL (01 Feb 2022 12:54)  POCT Blood Glucose.: 205 mg/dL (01 Feb 2022 11:58)    I&O's Summary    01 Feb 2022 07:01  -  02 Feb 2022 07:00  --------------------------------------------------------  IN: 60 mL / OUT: 0 mL / NET: 60 mL        PHYSICAL EXAM:  Vital Signs Last 24 Hrs  T(C): 37 (02 Feb 2022 05:00), Max: 37.2 (01 Feb 2022 21:00)  T(F): 98.6 (02 Feb 2022 05:00), Max: 98.9 (01 Feb 2022 21:00)  HR: 81 (02 Feb 2022 05:00) (81 - 93)  BP: 127/82 (02 Feb 2022 05:00) (124/63 - 136/86)  BP(mean): --  RR: 18 (02 Feb 2022 05:00) (18 - 20)  SpO2: 97% (02 Feb 2022 05:00) (93% - 97%)    CONSTITUTIONAL: NAD, well-developed  HEENT: NC/AT, EOMI  RESPIRATORY: No increased work of breathing, CTAB, no wheezes or crackles appreciated  CARDIOVASCULAR: RRR, S1 and S2 present, no m/r/g  ABDOMEN: soft, NT, ND, bowel sounds present  EXTREMITIES: No LE edema  MUSCULOSKELETAL: no joint swelling or tenderness to palpation  NEURO: A&Ox1, responding to questions and commands      LABS:                        11.7   6.23  )-----------( 287      ( 02 Feb 2022 06:57 )             37.4     02-01    148<H>  |  106  |  52<H>  ----------------------------<  186<H>  4.6   |  29  |  1.13    Ca    10.3      01 Feb 2022 06:24  Mg     2.7     02-01    TPro  7.6  /  Alb  3.3  /  TBili  0.4  /  DBili  x   /  AST  53<H>  /  ALT  38  /  AlkPhos  76  02-01    PT/INR - ( 01 Feb 2022 06:24 )   PT: 13.7 sec;   INR: 1.15 ratio         PTT - ( 01 Feb 2022 06:24 )  PTT:36.7 sec            RADIOLOGY & ADDITIONAL TESTS:  Results Reviewed:   Imaging Personally Reviewed:  Electrocardiogram Personally Reviewed:    COORDINATION OF CARE:  Care Discussed with Consultants/Other Providers [Y/N]:  Prior or Outpatient Records Reviewed [Y/N]:

## 2022-02-02 NOTE — PROGRESS NOTE ADULT - SUBJECTIVE AND OBJECTIVE BOX
DATE OF SERVICE: 02-02-22 @ 14:53    Subjective: Patient seen and examined. No new events except as noted.     SUBJECTIVE/ROS:        MEDICATIONS:  MEDICATIONS  (STANDING):  albuterol/ipratropium for Nebulization 3 milliLiter(s) Nebulizer every 6 hours  enoxaparin Injectable 40 milliGRAM(s) SubCutaneous every 12 hours  insulin lispro (ADMELOG) corrective regimen sliding scale   SubCutaneous every 6 hours  lacosamide Solution 100 milliGRAM(s) Oral two times a day  nystatin Cream 1 Application(s) Topical every 12 hours  senna 2 Tablet(s) Oral at bedtime  sodium chloride 0.45%. 500 milliLiter(s) (100 mL/Hr) IV Continuous <Continuous>  sodium chloride 0.9% for Nebulization 3 milliLiter(s) Nebulizer every 6 hours      PHYSICAL EXAM:  T(C): 36.4 (02-02-22 @ 13:03), Max: 37.2 (02-01-22 @ 21:00)  HR: 77 (02-02-22 @ 13:03) (77 - 88)  BP: 106/78 (02-02-22 @ 13:03) (106/78 - 127/82)  RR: 20 (02-02-22 @ 13:03) (18 - 20)  SpO2: 93% (02-02-22 @ 13:03) (93% - 97%)  Wt(kg): --  I&O's Summary    01 Feb 2022 07:01  -  02 Feb 2022 07:00  --------------------------------------------------------  IN: 60 mL / OUT: 0 mL / NET: 60 mL            JVP: Normal  Neck: supple  Lung: clear   CV: S1 S2 , Murmur:  Abd: soft  Ext: No edema  neuro: Awake   Psych: flat affect  Skin: normal``    LABS/DATA:    CARDIAC MARKERS:                                11.7   6.23  )-----------( 287      ( 02 Feb 2022 06:57 )             37.4     02-02    152<H>  |  111<H>  |  50<H>  ----------------------------<  175<H>  4.1   |  31  |  1.15    Ca    10.0      02 Feb 2022 06:57  Phos  3.8     02-02  Mg     2.7     02-02    TPro  7.3  /  Alb  3.4  /  TBili  0.5  /  DBili  x   /  AST  48<H>  /  ALT  42  /  AlkPhos  72  02-02    proBNP:   Lipid Profile:   HgA1c:   TSH:     TELE:  EKG:

## 2022-02-02 NOTE — PROGRESS NOTE ADULT - PROBLEM SELECTOR PLAN 3
hx of COVID 2 years ago and 2 weeks ago  - aspiration precautions   - febrile last night 1/24 -- repeating w/u  - f/u BCx, RVP, CXR - NGTD  - elevated d-dimer  - will f/u CT Chest -- potential aspiration PNA  - finished cefepime for 7 days --ngtd  -Bronchodilator therapy q 6 hrs and  prn sob/wheezes  -hypertonic saline nebs q 6 hrs  -cont Bipap at night for SWETA

## 2022-02-02 NOTE — PROGRESS NOTE ADULT - PROBLEM SELECTOR PLAN 2
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

## 2022-02-02 NOTE — PROGRESS NOTE ADULT - ASSESSMENT
81y m W/  HTN presenting to the ED from [facility] as a transfer for R frontal parenchymal hemorrhage on CT after fall at home.  CTH R hemisphere IPH  CTA H/N no aneurysm or AVM  repeat CTH stable   CK 3000  1/26 CT chest wtih opacities  1/27 CTH normal evolutionary changes no new findings   1/29 RRT for hypoxia. intubated. in MICU  CTH 1/30 still wtih IVH and SAH.  extubated  EEG 1/30 without seizures but potential risk from R post quadrant and L parietla   Impression: IPH of unclear etiology.  may be 2/2 HTN. need to r/o CAA or underlying mass   - nsx f/u.   - f/u palliative , ethics, full code   - check NH3   - was on cefepime for infection., now off   - SBP <160  - DVT ppx okay. hold AC/AP  -  on vimpat 100mg BID--> continue   - MRI brain w/ and w/o when able  - GI for PEG?, now with NGT.  needs peg eval --> planning for peg next week   - no need for hypertonics  - cardio following. known thoracic aneurysm s/p repair.   - f/uj pulmo for resp status.    - telemetry  - PT/OT/SS/SLP, OOBC  - check FS, glucose control <180  - GI/DVT ppx  - Thank you for allowing me to participate in the care of this patient. Call with questions.   -ethic and palliative called   Carlin Neal MD  Vascular Neurology  Office: [phone]

## 2022-02-02 NOTE — PROGRESS NOTE ADULT - SUBJECTIVE AND OBJECTIVE BOX
Neurology Progress note  S: patient seen and examined,  on floor. doing okay. needs peg     Brief HPI:  81y m W/  HTN presenting to the ED from [facility] as a transfer for R frontal parenchymal hemorrhage on CT after fall at home. Patient is poor historian but states that he was diagnosed with COVID 2 weeks ago and has been feeling "tired" at home, states he felt dizzy earlier today but unable to state what time he fell or further elucidate. Patient denies AC use. Per EMS was also recently diagnosed with a AAA but has been unable to undergo surgical repair due to recent dx of COVID. Patient still endorsing he feels "tired", denies current headache, vision changes, dizziness, chest pain, nausea, back pain, focal numbness/tingling. Endorses weakness to LLE 2/2 pain. (15 Mono 2022 15:50)    Medications:     MEDICATIONS  (STANDING):  albuterol/ipratropium for Nebulization 3 milliLiter(s) Nebulizer every 6 hours  enoxaparin Injectable 40 milliGRAM(s) SubCutaneous every 12 hours  insulin lispro (ADMELOG) corrective regimen sliding scale   SubCutaneous every 6 hours  lacosamide Solution 100 milliGRAM(s) Oral two times a day  nystatin Cream 1 Application(s) Topical every 12 hours  senna 2 Tablet(s) Oral at bedtime  sodium chloride 0.45%. 500 milliLiter(s) (100 mL/Hr) IV Continuous <Continuous>  sodium chloride 0.9% for Nebulization 3 milliLiter(s) Nebulizer every 6 hours    MEDICATIONS  (PRN):         Vitals    Vital Signs Last 24 Hrs  T(C): 36.4 (22 @ 13:03), Max: 37.2 (22 @ 21:00)  T(F): 97.6 (22 @ 13:03), Max: 98.9 (22 @ 21:00)  HR: 77 (22 @ 13:03) (77 - 88)  BP: 106/78 (22 @ 13:03) (106/78 - 127/82)  BP(mean): --  RR: 20 (22 @ 13:03) (18 - 20)  SpO2: 93% (22 @ 13:03) (93% - 97%)        General Exam:   General Appearance: Appropriately dressed and in no acute distress       Head: Normocephalic, atraumatic and no dysmorphic features  Ear, Nose, and Throat: Moist mucous membranes +NGT    CVS: S1S2+  Resp: No SOB, no wheeze or rhonchi  GI: soft NT/ND  Extremities: No edema or cyanosis  Skin: No bruises or rashes     Neurological Exam:    Mental Status: Awake, alert and oriented x 1-2.  Able to follow simple verbal commands intermittently. Able to name and repeat.+ dysarthria    Cranial Nerves: PERRL, EOMI, VFFC, sensation V1-V3 intact, L f acial asymmetry, equal elevation of palate,  , tongue is midline on protrusion. no obvious papilledema on fundoscopic exam. hearing is grossly intact.   Motor: moving R full strength LUE 3/5/ LLE 1-2/5, tremor in uppers at times, myoclonus when sedation off  Sensation: withdraws R>L  Reflexes: 1+ throughout at biceps, brachioradialis, triceps, patellars and ankles bilaterally and equal. No clonus. R toe and L toe were both downgoing.  Coordination: unable   Gait: unable     Data/Labs/Imaging which I personally reviewed.     Labs:   CBC Full  -  ( 2022 06:57 )  WBC Count : 6.23 K/uL  RBC Count : 3.79 M/uL  Hemoglobin : 11.7 g/dL  Hematocrit : 37.4 %  Platelet Count - Automated : 287 K/uL  Mean Cell Volume : 98.7 fl  Mean Cell Hemoglobin : 30.9 pg  Mean Cell Hemoglobin Concentration : 31.3 gm/dL  Auto Neutrophil # : x  Auto Lymphocyte # : x  Auto Monocyte # : x  Auto Eosinophil # : x  Auto Basophil # : x  Auto Neutrophil % : x  Auto Lymphocyte % : x  Auto Monocyte % : x  Auto Eosinophil % : x  Auto Basophil % : x    02    152<H>  |  111<H>  |  50<H>  ----------------------------<  175<H>  4.1   |  31  |  1.15    Ca    10.0      2022 06:57  Phos  3.8     -  Mg     2.7         TPro  7.3  /  Alb  3.4  /  TBili  0.5  /  DBili  x   /  AST  48<H>  /  ALT  42  /  AlkPhos  72          Urinalysis Basic - ( 2022 21:31 )    Color: Yellow / Appearance: Clear / S.023 / pH: x  Gluc: x / Ketone: Negative  / Bili: Negative / Urobili: Negative   Blood: x / Protein: 100 / Nitrite: Negative   Leuk Esterase: Negative / RBC: 0 /hpf / WBC 0 /HPF   Sq Epi: x / Non Sq Epi: 1 /hpf / Bacteria: Negative    < from: CT Angio Head w/ IV Cont (01.15.22 @ 17:16) >    ACC: [id number] EXAM:  CT ANGIO NECK (W)AW IC                        ACC: [id number] EXAM:  CT ANGIO BRAIN (W)AW IC                          PROCEDURE DATE:  01/15/2022          INTERPRETATION:  CT ANGIOGRAPHY OF THE NECK AND BRAIN.    CLINICAL INFORMATION:81 years Male ICH    TECHNIQUE:  CT angiography of the neck and brain was performed during the dynamic   administration of intravenous contrast.  MIP reconstructions were   performed and reviewed. Multiplanar reformations were obtained.  Contrastadministered 70 cc Omnipaque 350, contrast discarded 30 cc    FINDINGS:  NECK  RIGHT CAROTID CIRCULATION:  Evaluation of the right carotid circulation demonstrates no traumatic   occlusion, dissection, or pseudoaneurysm.    LEFT CAROTID CIRCULATION:  Evaluation of the left carotid circulation demonstrates no traumatic   occlusion, dissection, or pseudoaneurysm.    VERTEBRAL ARTERIES:  Evaluation of the vertebral arteries reveals no evidence of a vertebral   artery occlusion or dissection.    BRAIN  ANTERIOR CIRCULATION:  Distal internal carotid arteries are patent. Calcification involves the   cavernous segments with mild degrees of narrowing  Anterior cerebral arteries are patent. A1 segment of the left anterior   cerebral artery is smaller than the right, a normal anatomic variant  Middle cerebral arteries are patent.    POSTERIOR CIRCULATION:  Distal vertebral arteries are patent. Bilateral posterior inferior   cerebellar arteries are seen.  Basilar artery is patent. Proximal superior cerebellar arteries are patent  Posterior cerebral arteries are patent.    OTHER:  No evidence of abnormal enhancement associated with the moderate to large   right frontal parenchymal hemorrhage.  No puddling of contrast.  No aneurysm or arteriovenous malformation seen.    IMPRESSION:  *  NO TRAUMATIC OCCLUSION, DISSECTION, OR PSEUDOANEURYSM.  *  NO ABNORMAL ENHANCEMENT ASSOCIATED WITH THE MODERATE TO LARGE RIGHT   FRONTAL PARENCHYMAL HEMORRHAGE.  *  NO PUDDLING OF CONTRAST. NO ANEURYSM OR AVM SEEN.    --- End of Report ---            PAWAN ODELL MD; Attending Radiologist  This document has been electronically signed. Mono 15 2022  7:49PM    < end of copied text >  < from: CT Head No Cont (22 @ 09:30) >    ACC: [id number] EXAM:  CT BRAIN                          PROCEDURE DATE:  2022          INTERPRETATION:  CLINICAL INFORMATION: Intracranial hemorrhage, follow-up.    TECHNIQUE: Portable noncontrast axial CT images were acquired of the head.    COMPARISON STUDY: CT head 2022.    FINDINGS:  Large right frontal lobe parenchymal hematoma measures approximately 7.7   x 3.5 cm, previously 7.5 x 3.2 cm, with surrounding edema and mass   effect, without interval interval change compared to prior.   Redemonstrated small intraventricular hemorrhage, similar to prior.    Moderate cerebral volume loss with proportional prominence of the sulci   and ventricles. Nonspecific white matter hypoattenuation, likely related   to chronic microvascular changes.    Left frontal arachnoid cyst, unchanged.    Mucosal thickening of the left maxillary and bilateral ethmoid sinuses.   Partially visualized nasogastric tube in situ. Mastoid air cells are   clear.    Evidence of bilateral lens surgery.    Nodisplaced calvarial fracture.      IMPRESSION:  Stable exam.    --- End of Report ---          HARRIET WATSON MD; Resident Radiology  This document has been electronically signed.  CRUZ LÓPEZ MD; Attending Radiologist  This document has been electronically signed. 2022 10:58AM    < end of copied text >    < from: CT Head No Cont (22 @ 22:13) >    ACC: [id number] EXAM:  CT BRAIN                          PROCEDURE DATE:  2022          INTERPRETATION:  Clinical indication: Altered mental status    Multiple axial sections were performed from base skull to vertex without   contrast enhancement. Coronal and sagittal reconstructions were performed   as well    This exam is compared prior head CT performed on 2020.    Again seen is abnormal high attenuation identified involving the high   right frontal cortical subcortical region. This compatible with acute   parenchymal hemorrhage surrounding edema. This acute parenchymal   hemorrhage measures approximately 7.3 x 3.2 cm and previously measured   approximately 7.7 x 3.5 cm. Localized mass effect is seen consisting of   sulcal effacement.    Extra-axial low-attenuation with adjacent bony scalloping is again seen   involving the high left frontal region. This finding measures   approximately 4.2 x 2.8 cm and previously measured approximately 4.1 x   3.1 cm. This is compatible with an arachnoid cyst.    Parenchymal volume loss and chronic microvascular ischemic changes are   identified.    Intraventricular hemorrhage is again seen.    Evaluation of structures with the appropriate window appears unremarkable    Left-sided NG tube is seen.    The visualized paranasal sinuses mastoid and middle ear regions appear   clear.    Impression: Acute parenchymal hemorrhage is again seen with some expected   evolutionary changes.    Arachnoid cyst again seen as described above.    --- End of Report ---              < from: CT Head No Cont (22 @ 09:57) >    ACC: [id number] EXAM:  CT BRAIN                          PROCEDURE DATE:  2022          INTERPRETATION:  Clinical indication: Altered mental status    Multiple axial sections were performed from base skull to vertex without   contrast enhancement. Coronal and sagittal destructions were performed as   well    This exam is compared with prior head CT performed on 2022.    Abnormal high attenuation involving the high left frontal cortical   subcortical region is again seen. This finding measures approximately 5.1   x 2.8 cm and previously measured approximately 7.3 x 3.2 cm. Surrounding   edema is again seen.    Small amount of subrecord hemorrhage is again suspected involving the   left posterior temporal/posterior frontal region    Intraventricular hemorrhage is again seen.    The size and configuration of ventricles appear unchanged    No new areas of acute hemorrhage is seen.    Extra-axial low-attenuation from the high left frontal region is again   seen with bony scalloping. This appears unchanged when compared with the   prior exam and is likely compatible with an arachnoid cyst    Evaluation of osseous structures with the appropriate window appears   unchanged    The visualized paranasal sinuses mastoid and middle ear regions appear   clear.    Orotracheal and orogastric tubes are seen.    The paranasal sinuses mastoid and middle ear regions appear clear.    The patient status post bilateral cataract surgery.    IMPRESSION: Acute parenchymal hemorrhage is again seen as well as   intraventricular hemorrhage. Subrecord hemorrhage is suspected.    --- End of Report ---            BE TOUSSAINT MD; Attending Radiologist  This document has been electronically signed. 2022 10:10AM    < end of copied text >      EEG IMPRESSION/CLINICAL CORRELATE    Abnormal EEG study.  1. Risk for seizures from the right posterior quadrant and left parietal region.   2. Structural abnormality in the right hemisphere.  3. Moderate multifocal/diffuse nonspecific cerebral dysfunction.    No seizures recorded.

## 2022-02-02 NOTE — PROGRESS NOTE ADULT - ATTENDING COMMENTS
81M with PMH of HTN, thoracic aneurysm s/p repair, SWETA on CPAP, known hx ascending aorta aneurysm measuring 4cm, infrarenal AAA 5cm and right common iliac aneurysm measuring 5cm follows w/ vascular surgery who presented to NSICU 1/5 from [facility] as a transfer for R frontal parenchymal hemorrhage with mass effect on CT after fall at home. He was evaluated by vascular surg, determined no surg intervention at this time, and evaluated by Neuro surg who offered STX-guided resection vs medical management however family preferred only minimally invasive intervention if necessary. Prolonged hospital course c/b worsening mental status, aspiration PNA and hypercarbic resp failure requiring intubation (1/19 - 22, and 1/29-30) and ICU stay, seizurelike activity 1/16 on vimpat, and dysphagia now pending PEG placement. Completed cefepime and transferred to [facility] 1/31.  Pt seen and examined. Mental status remains poor.  Per palliative note, family wish for life prolonging measures  Was tentatively planned for EGD/ PEG placement 2/2 but family wishes to defer for now  Given how long pt has been on NGT, will need to address long term nutrition   Increase FWB for hypernatremia  Rest as documented above    [facility] Division of Hospital Medicine  Evelin Lopez MD  Pager (M-F, 8A-5P): [phone]  Other Times:  [phone].

## 2022-02-02 NOTE — PROGRESS NOTE ADULT - PROBLEM SELECTOR PLAN 1
A&Ox0. 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  Structural abnormality in the right hemisphere. Moderate multifocal/diffuse nonspecific cerebral dysfunction. No seizures recorded.  -Neuro checks q 4 hrs and prn for changes  -activity as tolerated  -physical therapy consult when stable  - no vascular intervention indicated at this time

## 2022-02-03 LAB
ALBUMIN SERPL ELPH-MCNC: 3.6 G/DL — SIGNIFICANT CHANGE UP (ref 3.3–5)
ALP SERPL-CCNC: 74 U/L — SIGNIFICANT CHANGE UP (ref 40–120)
ALT FLD-CCNC: 47 U/L — HIGH (ref 10–45)
ANION GAP SERPL CALC-SCNC: 10 MMOL/L — SIGNIFICANT CHANGE UP (ref 5–17)
AST SERPL-CCNC: 43 U/L — HIGH (ref 10–40)
BILIRUB SERPL-MCNC: 0.5 MG/DL — SIGNIFICANT CHANGE UP (ref 0.2–1.2)
BUN SERPL-MCNC: 45 MG/DL — HIGH (ref 7–23)
CALCIUM SERPL-MCNC: 10.1 MG/DL — SIGNIFICANT CHANGE UP (ref 8.4–10.5)
CHLORIDE SERPL-SCNC: 107 MMOL/L — SIGNIFICANT CHANGE UP (ref 96–108)
CO2 SERPL-SCNC: 30 MMOL/L — SIGNIFICANT CHANGE UP (ref 22–31)
CREAT SERPL-MCNC: 1.07 MG/DL — SIGNIFICANT CHANGE UP (ref 0.5–1.3)
CULTURE RESULTS: SIGNIFICANT CHANGE UP
CULTURE RESULTS: SIGNIFICANT CHANGE UP
GLUCOSE BLDC GLUCOMTR-MCNC: 151 MG/DL — HIGH (ref 70–99)
GLUCOSE BLDC GLUCOMTR-MCNC: 211 MG/DL — HIGH (ref 70–99)
GLUCOSE BLDC GLUCOMTR-MCNC: 217 MG/DL — HIGH (ref 70–99)
GLUCOSE SERPL-MCNC: 170 MG/DL — HIGH (ref 70–99)
HCT VFR BLD CALC: 38.2 % — LOW (ref 39–50)
HGB BLD-MCNC: 11.6 G/DL — LOW (ref 13–17)
MAGNESIUM SERPL-MCNC: 2.7 MG/DL — HIGH (ref 1.6–2.6)
MCHC RBC-ENTMCNC: 30.1 PG — SIGNIFICANT CHANGE UP (ref 27–34)
MCHC RBC-ENTMCNC: 30.4 GM/DL — LOW (ref 32–36)
MCV RBC AUTO: 99 FL — SIGNIFICANT CHANGE UP (ref 80–100)
NRBC # BLD: 0 /100 WBCS — SIGNIFICANT CHANGE UP (ref 0–0)
PHOSPHATE SERPL-MCNC: 4.5 MG/DL — SIGNIFICANT CHANGE UP (ref 2.5–4.5)
PLATELET # BLD AUTO: 262 K/UL — SIGNIFICANT CHANGE UP (ref 150–400)
POTASSIUM SERPL-MCNC: 4.4 MMOL/L — SIGNIFICANT CHANGE UP (ref 3.5–5.3)
POTASSIUM SERPL-SCNC: 4.4 MMOL/L — SIGNIFICANT CHANGE UP (ref 3.5–5.3)
PROT SERPL-MCNC: 7.3 G/DL — SIGNIFICANT CHANGE UP (ref 6–8.3)
RBC # BLD: 3.86 M/UL — LOW (ref 4.2–5.8)
RBC # FLD: 13.1 % — SIGNIFICANT CHANGE UP (ref 10.3–14.5)
SODIUM SERPL-SCNC: 147 MMOL/L — HIGH (ref 135–145)
SPECIMEN SOURCE: SIGNIFICANT CHANGE UP
SPECIMEN SOURCE: SIGNIFICANT CHANGE UP
WBC # BLD: 5.98 K/UL — SIGNIFICANT CHANGE UP (ref 3.8–10.5)
WBC # FLD AUTO: 5.98 K/UL — SIGNIFICANT CHANGE UP (ref 3.8–10.5)

## 2022-02-03 PROCEDURE — 99232 SBSQ HOSP IP/OBS MODERATE 35: CPT | Mod: GC

## 2022-02-03 PROCEDURE — 99233 SBSQ HOSP IP/OBS HIGH 50: CPT | Mod: GC

## 2022-02-03 RX ADMIN — SODIUM CHLORIDE 3 MILLILITER(S): 9 INJECTION INTRAMUSCULAR; INTRAVENOUS; SUBCUTANEOUS at 23:13

## 2022-02-03 RX ADMIN — Medication 2: at 05:09

## 2022-02-03 RX ADMIN — SODIUM CHLORIDE 3 MILLILITER(S): 9 INJECTION INTRAMUSCULAR; INTRAVENOUS; SUBCUTANEOUS at 17:05

## 2022-02-03 RX ADMIN — NYSTATIN CREAM 1 APPLICATION(S): 100000 CREAM TOPICAL at 17:05

## 2022-02-03 RX ADMIN — Medication 3 MILLILITER(S): at 05:09

## 2022-02-03 RX ADMIN — Medication 4: at 14:05

## 2022-02-03 RX ADMIN — NYSTATIN CREAM 1 APPLICATION(S): 100000 CREAM TOPICAL at 05:10

## 2022-02-03 RX ADMIN — LACOSAMIDE 100 MILLIGRAM(S): 50 TABLET ORAL at 05:10

## 2022-02-03 RX ADMIN — ENOXAPARIN SODIUM 40 MILLIGRAM(S): 100 INJECTION SUBCUTANEOUS at 17:05

## 2022-02-03 RX ADMIN — SODIUM CHLORIDE 3 MILLILITER(S): 9 INJECTION INTRAMUSCULAR; INTRAVENOUS; SUBCUTANEOUS at 05:09

## 2022-02-03 RX ADMIN — Medication 3 MILLILITER(S): at 17:05

## 2022-02-03 RX ADMIN — SODIUM CHLORIDE 3 MILLILITER(S): 9 INJECTION INTRAMUSCULAR; INTRAVENOUS; SUBCUTANEOUS at 00:18

## 2022-02-03 RX ADMIN — Medication 3 MILLILITER(S): at 23:12

## 2022-02-03 RX ADMIN — Medication 2: at 00:18

## 2022-02-03 RX ADMIN — Medication 3 MILLILITER(S): at 13:12

## 2022-02-03 RX ADMIN — Medication 4: at 18:30

## 2022-02-03 RX ADMIN — ENOXAPARIN SODIUM 40 MILLIGRAM(S): 100 INJECTION SUBCUTANEOUS at 05:09

## 2022-02-03 RX ADMIN — SENNA PLUS 2 TABLET(S): 8.6 TABLET ORAL at 23:12

## 2022-02-03 RX ADMIN — SODIUM CHLORIDE 3 MILLILITER(S): 9 INJECTION INTRAMUSCULAR; INTRAVENOUS; SUBCUTANEOUS at 13:12

## 2022-02-03 RX ADMIN — Medication 3 MILLILITER(S): at 00:18

## 2022-02-03 RX ADMIN — LACOSAMIDE 100 MILLIGRAM(S): 50 TABLET ORAL at 17:11

## 2022-02-03 NOTE — PROGRESS NOTE ADULT - PROBLEM SELECTOR PLAN 1
-AOx1 today, improved status  - neuro checks q4h  - Vimpat 100mg q12  - MRI w/wo when able  - CTH repeat w/ improved ICH x2  -Neuro checks q 4 hrs and prn for changes  -PT eval; OT following

## 2022-02-03 NOTE — PROVIDER CONTACT NOTE (OTHER) - BACKGROUND
IPH
pt adm for R IPH.
s/p fall & intercerebral hemorrhage.
Pt with intracranial hemorrhage.
S/p fall, R frontal iph
Pt with cerebral hemorrhage.
S/p fall, R frontal iph
Pt with intracerebral hemorrhage.
Pt with intracranial hemorrhage, increasing AMS.
pt admitted 1/15 for R frontal IPH.
pt adm for R IPH.
Pt. admitted for nontraumatic intracerebral hemorrhage. PMHX hypernatremia, dysphagia, HTN, DM, HTN.
S/p fall, R frontal iph

## 2022-02-03 NOTE — PROVIDER CONTACT NOTE (OTHER) - ASSESSMENT
T: 98.2 HR 69 /75 (92) RR: 21 87% Normal sinus on monitor. on 4lmp nc. No neurological changes
Vitals stable see mar. No neurological changes, patient speaking during twitching/tremor episodes. Patient on EEG.
pt is not following commands and is more lethargic. pt is only oriented to self.
unarousable to painful stimuli; thick secretions noted upon deep suctioning; pt O2 stat decreasing despite suctioning efforts, chest PT, and face tent.
Vitals stable see mar. No neurological changes, patient speaking during twitching/tremor episodes. Patient on EEG.
Pt not receptive to . Appears more lethargic today compared to yesterday. Not following commands but grimacing to pain. VS stable. Opened eyes during patient hygiene care earlier today.
Pt. A&Ox1 (baseline from admission), VS as charted. No s/s of distress or any chest pain.
neurologically remains unchanged
Unable to flush NG tube. Feeds held.
HR: 74 BP: 80/37 (50) RR: 18 89% on Fio2 40% Normal sinus on cardiac monitor. Neuro exam unchanged from prior assessment, no movementx4 extrems, patient received queenie and prop of intubation, team aware.

## 2022-02-03 NOTE — PROGRESS NOTE ADULT - ASSESSMENT
· Assessment	  82 y/o male pt with bilat foot wounds  - pt seen and evaluated  - no signs of infection noted at this time  - rec betadine to wounds bilat and leave open to air   - rec z flow boots at all times  - will monitor during this admission periodically, reconsult sooner as necessary

## 2022-02-03 NOTE — PROGRESS NOTE ADULT - ATTENDING COMMENTS
As per discussion with family -> still wishing to defer on PEG at this time pending S&S and further discussions with neurology  Please call GI back when the above complete for consideration of PEG

## 2022-02-03 NOTE — PROGRESS NOTE ADULT - ASSESSMENT
81y m W/  HTN, thoracic aneurysm s/p repair, SWETA on CPAP, known hx ascending aorta aneurysm measuring 4cm, infrarenal AAA 5cm and right common iliac aneurysm presenting to the ED from [facility] as a transfer for R frontal parenchymal hemorrhage on CT after fall at home.  CTH R hemisphere IPH  CTA H/N no aneurysm or AVM  repeat CTH stable   CK 3000  NH3 WNL   1/26 CT chest wtih opacities  1/27 CTH normal evolutionary changes no new findings   1/29 RRT for hypoxia. intubated. in MICU  CTH 1/30 still wtih IVH and SAH.  extubated  EEG 1/30 without seizures but potential risk from R post quadrant and L parietal  2/3 today opens eyes speaks, answers questions intermittently, in B/L UE restraints still.   Impression: R IPH of unclear etiology.  may be 2/2 HTN. need to r/o CAA or underlying mass   - nsx f/u.   - f/u palliative , ethics, full code   - was on cefepime for infection., now off   - SBP <160  - DVT ppx okay. hold AC/AP  -  on vimpat 100mg BID--> continue   - MRI brain w/ and w/o when able  - GI for PEG?, now with NGT.  needs peg eval --> planning for peg next week   - no need for hypertonics  - cardio following. known thoracic aneurysm s/p repair.   - f/uj pulmo for resp status.    - telemetry  - PT/OT/SS/SLP, OOBC  - check FS, glucose control <180  - GI/DVT ppx  - Thank you for allowing me to participate in the care of this patient. Call with questions.   -ethic and palliative called   - stroke team to reach out to family today.   Carlin Neal MD  Vascular Neurology  Office: [phone]  81y m W/  HTN, thoracic aneurysm s/p repair, SWETA on CPAP, known hx ascending aorta aneurysm measuring 4cm, infrarenal AAA 5cm and right common iliac aneurysm presenting to the ED from [facility] as a transfer for R frontal parenchymal hemorrhage on CT after fall at home.  CTH R hemisphere IPH  CTA H/N no aneurysm or AVM  repeat CTH stable   CK 3000  NH3 WNL   1/26 CT chest wtih opacities  1/27 CTH normal evolutionary changes no new findings   1/29 RRT for hypoxia. intubated. in MICU  CTH 1/30 still wtih IVH and SAH.  extubated  EEG 1/30 without seizures but potential risk from R post quadrant and L parietal  2/3 today opens eyes speaks, answers questions intermittently, in B/L UE restraints still.   Impression: R IPH of unclear etiology.  may be 2/2 HTN. need to r/o CAA or underlying mass   - nsx f/u.   - f/u palliative , ethics, full code   - was on cefepime for infection., now off   - SBP <160  - DVT ppx okay. hold AC/AP  -  on vimpat 100mg BID--> continue   - MRI brain w/ and w/o when able  - GI for PEG?, now with NGT.  needs peg eval --> planning for peg next week   - no need for hypertonics  - cardio following. known thoracic aneurysm s/p repair.   - f/uj pulmo for resp status.    - telemetry  - PT/OT/SS/SLP, OOBC  - check FS, glucose control <180  - GI/DVT ppx  - Thank you for allowing me to participate in the care of this patient. Call with questions.   -ethic and palliative called   - stroke team to reach out to family today.  stroke team spoke wtih 2 family members including including a physician in there family. all neuro questions answered   Carlin Neal MD  Vascular Neurology  Office: [phone]

## 2022-02-03 NOTE — PROGRESS NOTE ADULT - ASSESSMENT
81y m W/ PMHx HTN presenting to the ED from [facility] as a transfer for R frontal parenchymal hemorrhage on CT after fall at home, course c/b worsening mental status (2/2 seizure?) s/p intubation/extubation, fever (PNA?), GI consulted for PEG    # dysphagia - 2/2 ICH, poor prognosis, unlikely to have meaningful recovery  # Acute hypoxic/hypercapnic respiratory failure: patient intubated 1/19/2022, extubated 1/22/2022, intubated 1/29/2022, and extubated 1/30/2022  # PNA - fever 1/23-1/26 likely 2/2 aspiration PNA, now afebrile w/o leukocytosis on cefepime  # Right frontal intraparenchymal hemorrhage  # thoracic aneurysm - s/p repair 7 years ago (con), aslo w/ ascending aneursym and R iliac aorta >5cm, follows w/ vascular surgery    Recommendations:  -c/w management of AMS per neurology/primary team  -f/u speech and swallow eval, especially in the setting of repeat intubation/extubations and IPH.  However assessment limited given mental status  -continue GOC discussion with family, still wanting to follow mental status and repeat speech and swallow evaluation  -please call back with GOC finalized with family regarding PEG  -remainder of care per primary team      Brad Vincent, PGY-4  GI/Hepatology Fellow    MONDAY-FRIDAY 8AM-5PM:  Pager# [phone] ([facility]) or [phone] ([facility])    NON-URGENT CONSULTS:  Please email [email or web address]  AT NIGHT AND ON WEEKENDS:  Contact on-call GI fellow via answering service ([phone]) from 5pm-8am and on weekends/holidays

## 2022-02-03 NOTE — PROGRESS NOTE ADULT - PROBLEM SELECTOR PLAN 5
DVT: lovenox BID  Diet: TF, speech swallow eval  Dispo: pending S+S and PT re-eval         Full Code

## 2022-02-03 NOTE — PROGRESS NOTE ADULT - SUBJECTIVE AND OBJECTIVE BOX
Interval Events:   No acute events noted, however unable to obtain full HPI due to underlying mental status.    ROS:   Unable to fully obtain ROS.    Hospital Medications:  albuterol/ipratropium for Nebulization 3 milliLiter(s) Nebulizer every 6 hours  enoxaparin Injectable 40 milliGRAM(s) SubCutaneous every 12 hours  insulin lispro (ADMELOG) corrective regimen sliding scale   SubCutaneous every 6 hours  lacosamide Solution 100 milliGRAM(s) Oral two times a day  nystatin Cream 1 Application(s) Topical every 12 hours  senna 2 Tablet(s) Oral at bedtime  sodium chloride 0.45%. 500 milliLiter(s) IV Continuous <Continuous>  sodium chloride 0.9% for Nebulization 3 milliLiter(s) Nebulizer every 6 hours      PHYSICAL EXAM:   Vital Signs:  Vital Signs Last 24 Hrs  T(C): 36.8 (03 Feb 2022 05:00), Max: 36.8 (03 Feb 2022 05:00)  T(F): 98.2 (03 Feb 2022 05:00), Max: 98.2 (03 Feb 2022 05:00)  HR: 79 (03 Feb 2022 10:34) (72 - 90)  BP: 122/96 (03 Feb 2022 05:00) (106/78 - 145/90)  BP(mean): --  RR: 18 (03 Feb 2022 05:00) (18 - 20)  SpO2: 97% (03 Feb 2022 10:34) (93% - 97%)  Daily     Daily     GENERAL: no acute distress  NEURO: not fully alert/oriented  HEENT: anicteric sclera, no conjunctival pallor appreciated  CHEST: no respiratory distress, no accessory muscle use  CARDIAC: regular rate, +S1/S2  ABDOMEN: soft, nondistended, nontender, no rebound or guarding  EXTREMITIES: warm, well perfused, no edema  SKIN: no lesions noted    LABS: reviewed                        11.6   5.98  )-----------( 262      ( 03 Feb 2022 06:36 )             38.2     02-03    147<H>  |  107  |  45<H>  ----------------------------<  170<H>  4.4   |  30  |  1.07    Ca    10.1      03 Feb 2022 06:38  Phos  4.5     02-03  Mg     2.7     02-03    TPro  7.3  /  Alb  3.6  /  TBili  0.5  /  DBili  x   /  AST  43<H>  /  ALT  47<H>  /  AlkPhos  74  02-03    LIVER FUNCTIONS - ( 03 Feb 2022 06:38 )  Alb: 3.6 g/dL / Pro: 7.3 g/dL / ALK PHOS: 74 U/L / ALT: 47 U/L / AST: 43 U/L / GGT: x             Interval Diagnostic Studies: see sunrise for full report

## 2022-02-03 NOTE — PROGRESS NOTE ADULT - ASSESSMENT
hypoxic resp failure   sepsis  shock   much improved  off vent  plan as per primary team    HTN  elevated diastolic BP   can add low dose amlodipine     IVH, SAH  fu with nsx

## 2022-02-03 NOTE — PROGRESS NOTE ADULT - SUBJECTIVE AND OBJECTIVE BOX
DATE OF SERVICE: 02-03-22 @ 10:25    Subjective: Patient seen and examined. No new events except as noted.     SUBJECTIVE/ROS:        MEDICATIONS:  MEDICATIONS  (STANDING):  albuterol/ipratropium for Nebulization 3 milliLiter(s) Nebulizer every 6 hours  enoxaparin Injectable 40 milliGRAM(s) SubCutaneous every 12 hours  insulin lispro (ADMELOG) corrective regimen sliding scale   SubCutaneous every 6 hours  lacosamide Solution 100 milliGRAM(s) Oral two times a day  nystatin Cream 1 Application(s) Topical every 12 hours  senna 2 Tablet(s) Oral at bedtime  sodium chloride 0.45%. 500 milliLiter(s) (100 mL/Hr) IV Continuous <Continuous>  sodium chloride 0.9% for Nebulization 3 milliLiter(s) Nebulizer every 6 hours      PHYSICAL EXAM:  T(C): 36.8 (02-03-22 @ 05:00), Max: 36.8 (02-03-22 @ 05:00)  HR: 80 (02-03-22 @ 05:00) (72 - 90)  BP: 122/96 (02-03-22 @ 05:00) (106/78 - 145/90)  RR: 18 (02-03-22 @ 05:00) (18 - 20)  SpO2: 94% (02-03-22 @ 05:00) (93% - 96%)  Wt(kg): --  I&O's Summary    02 Feb 2022 07:01  -  03 Feb 2022 07:00  --------------------------------------------------------  IN: 1860 mL / OUT: 0 mL / NET: 1860 mL    03 Feb 2022 07:01  -  03 Feb 2022 10:25  --------------------------------------------------------  IN: 140 mL / OUT: 0 mL / NET: 140 mL            JVP: Normal  Neck: supple  Lung: clear   CV: S1 S2 , Murmur:  Abd: soft  Ext: No edema  neuro: Awake / alert  Psych: flat affect  Skin: normal``    LABS/DATA:    CARDIAC MARKERS:                                11.6   5.98  )-----------( 262      ( 03 Feb 2022 06:36 )             38.2     02-03    147<H>  |  107  |  45<H>  ----------------------------<  170<H>  4.4   |  30  |  1.07    Ca    10.1      03 Feb 2022 06:38  Phos  4.5     02-03  Mg     2.7     02-03    TPro  7.3  /  Alb  3.6  /  TBili  0.5  /  DBili  x   /  AST  43<H>  /  ALT  47<H>  /  AlkPhos  74  02-03    proBNP:   Lipid Profile:   HgA1c:   TSH:     TELE:  EKG:

## 2022-02-03 NOTE — CHART NOTE - NSCHARTNOTEFT_GEN_A_CORE
Mr. Robert Woody is an 81y m W/ PMHx HTN presenting to the ED from [facility] as a transfer for R frontal parenchymal hemorrhage on CT after fall at home. Patient is poor historian but states that he was diagnosed with COVID 2 weeks ago and has been feeling "tired" at home, states he felt dizzy earlier today but unable to state what time he fell or further elucidate. Patient denies AC use. Per EMS was also recently diagnosed with a AAA but has been unable to undergo surgical repair due to recent dx of COVID.   >Course complicated by possible seizure activity, recurrent AMS, recurrent hypoxic resp failure secondary to aspiration pneumonia requiring intubation 1/29 was transferred to MICU for recurrent hypoxic resp failure secondary to possible aspiration PNA and septic shock of unknown origin. CT head on 1/30/2022 revealed acute parenchymal hemorrhage is again seen as well as intraventricular hemorrhage. Subrecord hemorrhage is suspected.   > Vascular surgery for Aortic and iliac aneurysms. Will monitor and treat possible as outpatient due to c/i to AC  >GI previously consulted for PEG, planned tentatively for 1/31/22. However since then patient w/ hypoxic resp failure requiring intubation and septic shock, now on pressors, transferred to MICU.       Swallow history: No reports in SCM. No studies in PACS. Pt is new to this service from this admission. Initial evaluation completed on  >Pt is a 81 M s/p fall and R ICH in presence of encephalopathy; AMS/Lethargic state. Oropharyngeal swallow skills p/w a deficit in presence of AMS, limited evaluation given poor participation, ability to follow commands, as well as remain alert. Pt is at a high risk for aspiration given current AMS. Seen 1/31 with recommendations for npo, notable coughing w/ conservative textures.     TODAY, pt was seen for re-evaluation of swallow function.  Recent transfer to floor from MICU. NGT in situ R nares. +Mild harsh vocal quality, speaking in a low volume. Utilized [place] . Encountered pt awake/alert, attempting to communicate however at times  not able to understand the patient, improvement in communication as assessment progressed.  Xerostomia+. Offered moderately thick liquids via tsp x7 and puree textures x5 trials with adequate orientation/reception, ability to strip the bolus from the tsp, with suspected reduced control of bolus posteriorly. Pharyngeal phase marked by suspected latent swallow response, initially double swallows w. first two trials which were not present with subsequent trials, improvement in vocal quality and volume as trials progressed. Can not r/o silent aspiration at bedside. No additional trials given concern for possible airway invasion. He maintained his alertness throughout. Purposeful proactive rounding reinforced and 5 Ps addressed. Pt left in no distress.     Impression; Currently mentation has improved since previous intervention dates, more responsive and able to follow simple commands. Oropharyngeal swallow skills p/w deficits with regards to control of bolus with suspected latent swallow response, concern for reduced pharyngeal constriction with concern for pharyngeal residues which may lead to airway invasion. He remains at risk for aspiration given clinical course  Recommendations:  >NPO/NGT in the interim   >Stringent oral care to reduced oral pathogens   >Aspiration precautions with keofeed   >Swallow tx to remain on consult during hospital course with possible intervention required at time of d/c pending improvement   >MBS ORDER- tentatively for 2/4     D/w Team this date via Jodee pineda.     GOAL; Pt to adhere to dysphagia management during course    Cassie Lindsey MS CCC-SLP pager [phone]

## 2022-02-03 NOTE — PROGRESS NOTE ADULT - SUBJECTIVE AND OBJECTIVE BOX
Neurology Progress note  S: patient seen and examined,  on floor. doing okay. needs peg ethics cancelled     Brief HPI:  81y m W/  HTN presenting to the ED from [facility] as a transfer for R frontal parenchymal hemorrhage on CT after fall at home. Patient is poor historian but states that he was diagnosed with COVID 2 weeks ago and has been feeling "tired" at home, states he felt dizzy earlier today but unable to state what time he fell or further elucidate. Patient denies AC use. Per EMS was also recently diagnosed with a AAA but has been unable to undergo surgical repair due to recent dx of COVID. Patient still endorsing he feels "tired", denies current headache, vision changes, dizziness, chest pain, nausea, back pain, focal numbness/tingling. Endorses weakness to LLE 2/2 pain. (15 Mono 2022 15:50)    Medications:   MEDICATIONS  (STANDING):  albuterol/ipratropium for Nebulization 3 milliLiter(s) Nebulizer every 6 hours  enoxaparin Injectable 40 milliGRAM(s) SubCutaneous every 12 hours  insulin lispro (ADMELOG) corrective regimen sliding scale   SubCutaneous every 6 hours  lacosamide Solution 100 milliGRAM(s) Oral two times a day  nystatin Cream 1 Application(s) Topical every 12 hours  senna 2 Tablet(s) Oral at bedtime  sodium chloride 0.45%. 500 milliLiter(s) (100 mL/Hr) IV Continuous <Continuous>  sodium chloride 0.9% for Nebulization 3 milliLiter(s) Nebulizer every 6 hours    MEDICATIONS  (PRN):           Vitals    Vital Signs Last 24 Hrs  T(C): 36.8 (22 @ 05:00), Max: 36.8 (22 @ 05:00)  T(F): 98.2 (22 @ 05:00), Max: 98.2 (22 @ 05:00)  HR: 79 (22 @ 10:34) (72 - 90)  BP: 122/96 (22 @ 05:00) (106/78 - 145/90)  BP(mean): --  RR: 18 (22 @ 05:00) (18 - 20)  SpO2: 97% (22 @ 10:34) (93% - 97%)          General Exam:   General Appearance: Appropriately dressed and in no acute distress       Head: Normocephalic, atraumatic and no dysmorphic features  Ear, Nose, and Throat: Moist mucous membranes +NGT    CVS: S1S2+  Resp: No SOB, no wheeze or rhonchi  GI: soft NT/ND  Extremities: No edema or cyanosis  Skin: No bruises or rashes     Neurological Exam:    Mental Status: Awake, alert and oriented x 1-2.  Able to follow simple verbal commands intermittently. Able to name and repeat.+ dysarthria    Cranial Nerves: PERRL, EOMI, VFFC, sensation V1-V3 intact, L facial, equal elevation of palate,  , tongue is midline on protrusion. no obvious papilledema on fundoscopic exam. hearing is grossly intact.   Motor: moving R full strength LUE 3/5/ LLE 1-2/5, tremor in uppers at times, myoclonus    Sensation: withdraws R>L  Reflexes: 1+ throughout at biceps, brachioradialis, triceps, patellars and ankles bilaterally and equal. No clonus. R toe and L toe were both downgoing.  Coordination: unable   Gait: unable     Data/Labs/Imaging which I personally reviewed.     Labs:   CBC Full  -  ( 2022 06:36 )  WBC Count : 5.98 K/uL  RBC Count : 3.86 M/uL  Hemoglobin : 11.6 g/dL  Hematocrit : 38.2 %  Platelet Count - Automated : 262 K/uL  Mean Cell Volume : 99.0 fl  Mean Cell Hemoglobin : 30.1 pg  Mean Cell Hemoglobin Concentration : 30.4 gm/dL  Auto Neutrophil # : x  Auto Lymphocyte # : x  Auto Monocyte # : x  Auto Eosinophil # : x  Auto Basophil # : x  Auto Neutrophil % : x  Auto Lymphocyte % : x  Auto Monocyte % : x  Auto Eosinophil % : x  Auto Basophil % : x    02-03    147<H>  |  107  |  45<H>  ----------------------------<  170<H>  4.4   |  30  |  1.07    Ca    10.1      2022 06:38  Phos  4.5     02-03  Mg     2.7     02-03    TPro  7.3  /  Alb  3.6  /  TBili  0.5  /  DBili  x   /  AST  43<H>  /  ALT  47<H>  /  AlkPhos  74  02-        Urinalysis Basic - ( 2022 21:31 )    Color: Yellow / Appearance: Clear / S.023 / pH: x  Gluc: x / Ketone: Negative  / Bili: Negative / Urobili: Negative   Blood: x / Protein: 100 / Nitrite: Negative   Leuk Esterase: Negative / RBC: 0 /hpf / WBC 0 /HPF   Sq Epi: x / Non Sq Epi: 1 /hpf / Bacteria: Negative    < from: CT Angio Head w/ IV Cont (01.15.22 @ 17:16) >    ACC: [id number] EXAM:  CT ANGIO NECK (W)AW IC                        ACC: [id number] EXAM:  CT ANGIO BRAIN (W)AW IC                          PROCEDURE DATE:  01/15/2022          INTERPRETATION:  CT ANGIOGRAPHY OF THE NECK AND BRAIN.    CLINICAL INFORMATION:81 years Male ICH    TECHNIQUE:  CT angiography of the neck and brain was performed during the dynamic   administration of intravenous contrast.  MIP reconstructions were   performed and reviewed. Multiplanar reformations were obtained.  Contrastadministered 70 cc Omnipaque 350, contrast discarded 30 cc    FINDINGS:  NECK  RIGHT CAROTID CIRCULATION:  Evaluation of the right carotid circulation demonstrates no traumatic   occlusion, dissection, or pseudoaneurysm.    LEFT CAROTID CIRCULATION:  Evaluation of the left carotid circulation demonstrates no traumatic   occlusion, dissection, or pseudoaneurysm.    VERTEBRAL ARTERIES:  Evaluation of the vertebral arteries reveals no evidence of a vertebral   artery occlusion or dissection.    BRAIN  ANTERIOR CIRCULATION:  Distal internal carotid arteries are patent. Calcification involves the   cavernous segments with mild degrees of narrowing  Anterior cerebral arteries are patent. A1 segment of the left anterior   cerebral artery is smaller than the right, a normal anatomic variant  Middle cerebral arteries are patent.    POSTERIOR CIRCULATION:  Distal vertebral arteries are patent. Bilateral posterior inferior   cerebellar arteries are seen.  Basilar artery is patent. Proximal superior cerebellar arteries are patent  Posterior cerebral arteries are patent.    OTHER:  No evidence of abnormal enhancement associated with the moderate to large   right frontal parenchymal hemorrhage.  No puddling of contrast.  No aneurysm or arteriovenous malformation seen.    IMPRESSION:  *  NO TRAUMATIC OCCLUSION, DISSECTION, OR PSEUDOANEURYSM.  *  NO ABNORMAL ENHANCEMENT ASSOCIATED WITH THE MODERATE TO LARGE RIGHT   FRONTAL PARENCHYMAL HEMORRHAGE.  *  NO PUDDLING OF CONTRAST. NO ANEURYSM OR AVM SEEN.    --- End of Report ---            PAWAN ODELL MD; Attending Radiologist  This document has been electronically signed. Mono 15 2022  7:49PM    < end of copied text >  < from: CT Head No Cont (22 @ 09:30) >    ACC: [id number] EXAM:  CT BRAIN                          PROCEDURE DATE:  2022          INTERPRETATION:  CLINICAL INFORMATION: Intracranial hemorrhage, follow-up.    TECHNIQUE: Portable noncontrast axial CT images were acquired of the head.    COMPARISON STUDY: CT head 2022.    FINDINGS:  Large right frontal lobe parenchymal hematoma measures approximately 7.7   x 3.5 cm, previously 7.5 x 3.2 cm, with surrounding edema and mass   effect, without interval interval change compared to prior.   Redemonstrated small intraventricular hemorrhage, similar to prior.    Moderate cerebral volume loss with proportional prominence of the sulci   and ventricles. Nonspecific white matter hypoattenuation, likely related   to chronic microvascular changes.    Left frontal arachnoid cyst, unchanged.    Mucosal thickening of the left maxillary and bilateral ethmoid sinuses.   Partially visualized nasogastric tube in situ. Mastoid air cells are   clear.    Evidence of bilateral lens surgery.    Nodisplaced calvarial fracture.      IMPRESSION:  Stable exam.    --- End of Report ---          HARRIET WATSON MD; Resident Radiology  This document has been electronically signed.  CRUZ LÓPEZ MD; Attending Radiologist  This document has been electronically signed. 2022 10:58AM    < end of copied text >    < from: CT Head No Cont (22 @ 22:13) >    ACC: [id number] EXAM:  CT BRAIN                          PROCEDURE DATE:  2022          INTERPRETATION:  Clinical indication: Altered mental status    Multiple axial sections were performed from base skull to vertex without   contrast enhancement. Coronal and sagittal reconstructions were performed   as well    This exam is compared prior head CT performed on 2020.    Again seen is abnormal high attenuation identified involving the high   right frontal cortical subcortical region. This compatible with acute   parenchymal hemorrhage surrounding edema. This acute parenchymal   hemorrhage measures approximately 7.3 x 3.2 cm and previously measured   approximately 7.7 x 3.5 cm. Localized mass effect is seen consisting of   sulcal effacement.    Extra-axial low-attenuation with adjacent bony scalloping is again seen   involving the high left frontal region. This finding measures   approximately 4.2 x 2.8 cm and previously measured approximately 4.1 x   3.1 cm. This is compatible with an arachnoid cyst.    Parenchymal volume loss and chronic microvascular ischemic changes are   identified.    Intraventricular hemorrhage is again seen.    Evaluation of structures with the appropriate window appears unremarkable    Left-sided NG tube is seen.    The visualized paranasal sinuses mastoid and middle ear regions appear   clear.    Impression: Acute parenchymal hemorrhage is again seen with some expected   evolutionary changes.    Arachnoid cyst again seen as described above.    --- End of Report ---              < from: CT Head No Cont (22 @ 09:57) >    ACC: [id number] EXAM:  CT BRAIN                          PROCEDURE DATE:  2022          INTERPRETATION:  Clinical indication: Altered mental status    Multiple axial sections were performed from base skull to vertex without   contrast enhancement. Coronal and sagittal destructions were performed as   well    This exam is compared with prior head CT performed on 2022.    Abnormal high attenuation involving the high left frontal cortical   subcortical region is again seen. This finding measures approximately 5.1   x 2.8 cm and previously measured approximately 7.3 x 3.2 cm. Surrounding   edema is again seen.    Small amount of subrecord hemorrhage is again suspected involving the   left posterior temporal/posterior frontal region    Intraventricular hemorrhage is again seen.    The size and configuration of ventricles appear unchanged    No new areas of acute hemorrhage is seen.    Extra-axial low-attenuation from the high left frontal region is again   seen with bony scalloping. This appears unchanged when compared with the   prior exam and is likely compatible with an arachnoid cyst    Evaluation of osseous structures with the appropriate window appears   unchanged    The visualized paranasal sinuses mastoid and middle ear regions appear   clear.    Orotracheal and orogastric tubes are seen.    The paranasal sinuses mastoid and middle ear regions appear clear.    The patient status post bilateral cataract surgery.    IMPRESSION: Acute parenchymal hemorrhage is again seen as well as   intraventricular hemorrhage. Subrecord hemorrhage is suspected.    --- End of Report ---            BE TOUSSAINT MD; Attending Radiologist  This document has been electronically signed. 2022 10:10AM    < end of copied text >      EEG IMPRESSION/CLINICAL CORRELATE    Abnormal EEG study.  1. Risk for seizures from the right posterior quadrant and left parietal region.   2. Structural abnormality in the right hemisphere.  3. Moderate multifocal/diffuse nonspecific cerebral dysfunction.    No seizures recorded.

## 2022-02-03 NOTE — PROGRESS NOTE ADULT - ASSESSMENT
81 yr old male with stated hx significant for HTN, OSH, s/p AAA repair, Ascending Ao aneurysm, infrarenal AAA, Rt common iliac aneurysm who presented from OSH for further management of IPH. Course c/b possible sz activity, recurrent AMS, aspiration PNA s/p MICU stay for hypoxic resp failure. Now medically improving, pending official PT and S+S eval.

## 2022-02-03 NOTE — PROGRESS NOTE ADULT - SUBJECTIVE AND OBJECTIVE BOX
PROGRESS NOTE:     Jodee Workman MD/PhD PGY-3  Internal Medicine [facility]  **After 7pm, please contact night float**    Patient is a 81y old  Male who presents with a chief complaint of R ICH (03 Feb 2022 13:01)      SUBJECTIVE / OVERNIGHT EVENTS: No acute events ON. Much more alert this AM, oriented to self, obeying some commands and attempting to speak full sentences. Denies pain.      MEDICATIONS  (STANDING):  albuterol/ipratropium for Nebulization 3 milliLiter(s) Nebulizer every 6 hours  enoxaparin Injectable 40 milliGRAM(s) SubCutaneous every 12 hours  insulin lispro (ADMELOG) corrective regimen sliding scale   SubCutaneous every 6 hours  lacosamide Solution 100 milliGRAM(s) Oral two times a day  nystatin Cream 1 Application(s) Topical every 12 hours  senna 2 Tablet(s) Oral at bedtime  sodium chloride 0.45%. 500 milliLiter(s) (100 mL/Hr) IV Continuous <Continuous>  sodium chloride 0.9% for Nebulization 3 milliLiter(s) Nebulizer every 6 hours    MEDICATIONS  (PRN):      CAPILLARY BLOOD GLUCOSE      POCT Blood Glucose.: 217 mg/dL (03 Feb 2022 13:32)  POCT Blood Glucose.: 151 mg/dL (03 Feb 2022 05:04)  POCT Blood Glucose.: 157 mg/dL (02 Feb 2022 23:57)  POCT Blood Glucose.: 210 mg/dL (02 Feb 2022 17:01)    I&O's Summary    02 Feb 2022 07:01  -  03 Feb 2022 07:00  --------------------------------------------------------  IN: 1860 mL / OUT: 0 mL / NET: 1860 mL    03 Feb 2022 07:01  -  03 Feb 2022 13:35  --------------------------------------------------------  IN: 140 mL / OUT: 0 mL / NET: 140 mL        PHYSICAL EXAM:  Vital Signs Last 24 Hrs  T(C): 36.8 (03 Feb 2022 05:00), Max: 36.8 (03 Feb 2022 05:00)  T(F): 98.2 (03 Feb 2022 05:00), Max: 98.2 (03 Feb 2022 05:00)  HR: 79 (03 Feb 2022 10:34) (72 - 90)  BP: 122/96 (03 Feb 2022 05:00) (122/96 - 145/90)  BP(mean): --  RR: 18 (03 Feb 2022 05:00) (18 - 18)  SpO2: 97% (03 Feb 2022 10:34) (94% - 97%)    CONSTITUTIONAL: NAD, well-developed  RESPIRATORY: CTAB, no wheezes, rales, rhonchi. Good inspiratory effort  CARDIOVASCULAR: RRR, no murmurs, rubs, gallops. 2+ brachioradial and pedal pulses. Limbs warm and well-perfused. No edema.  ABDOMEN: Soft, nontender, nondistended. Normoactive bowel sounds  MUSCULOSKELETAL: L hemiparesis, normal tone.   PSYCH: AOx1.     LABS:                        11.6   5.98  )-----------( 262      ( 03 Feb 2022 06:36 )             38.2     02-03    147<H>  |  107  |  45<H>  ----------------------------<  170<H>  4.4   |  30  |  1.07    Ca    10.1      03 Feb 2022 06:38  Phos  4.5     02-03  Mg     2.7     02-03    TPro  7.3  /  Alb  3.6  /  TBili  0.5  /  DBili  x   /  AST  43<H>  /  ALT  47<H>  /  AlkPhos  74  02-03                RADIOLOGY & ADDITIONAL TESTS:  Results Reviewed: y  Imaging Personally Reviewed: y  Electrocardiogram Personally Reviewed: y    COORDINATION OF CARE:  Care Discussed with Consultants/Other Providers [Y/N]: y  Prior or Outpatient Records Reviewed [Y/N]: y

## 2022-02-03 NOTE — CONSULT NOTE ADULT - SUBJECTIVE AND OBJECTIVE BOX
Consult requested by: Jodee Workman		Role: Resident		Service: Medicine  Attending: Evelin Lopez MD   Consultant: Joe See DrPH, [facility]				Contact #s: [phone]  Consult purpose: To assist the team in the ethical dilemma posed by an 81-year-old man with a history of stroke whose family is deferring decisions regarding PEG placement.    Clinical summary: 81-year-old male with stated hx significant for hypertension s/p AAA repair, Ascending Ao aneurysm, infrarenal AAA, right common iliac aneurysm who presented from outside hospital for further management of intraparenchymal hemorrhage . Course complicated by possible seizure activity, recurrent AMS, recurrent hypoxic resp failure secondary to aspiration pneumonia requiring intubation 1/29 was transferred to MICU for recurrent hypoxic resp failure secondary to possible aspiration PNA and septic shock of unknown origin. CT head on 1/30/2022 revealed acute parenchymal hemorrhage is again seen as well as intraventricular hemorrhage. Subrecord hemorrhage is suspected.     Patient has a poor prognosis and dysphagia. Ethics consulted as patient’s family is struggling with the decision to place a PEG for enteral nutrition.  			  Prognosis Estimate (survival in hrs, days, wks, mos, yrs): poor  Patient Decision-Making Capacity: Has Capacity Lacks capacity secondary to medical condition  Patient Aware of:  Diagnosis:   Yes    No   Unknown    Prognosis:   Yes    No   Unknown       Name of medical decision-maker should patient lack capacity (relationship): Austin [phone] (brother – retired surgeon)  Role:   Health Care Agent      Legal Surrogate   Other Stakeholders: Amy [phone] (point of contact with updates)  Other Decision-Maker (i.e., HCA or Surrogate) Aware of:  Diagnosis: Yes   No      Prognosis:   Yes     No  Evidence of Patient’s Preference of Life-Sustaining Treatment (Written or Oral): none identified  Resuscitation status:   DNR:  Yes   No      DNI:  Yes   No

## 2022-02-03 NOTE — PROGRESS NOTE ADULT - SUBJECTIVE AND OBJECTIVE BOX
Podiatry pager #: [phone]/ [phone]    Patient is a 81y old  Male who presents with a chief complaint of R ICH (04 Feb 2022 10:42)Podiatry seen today for follow-up of foot wound       INTERVAL HPI/OVERNIGHT EVENTS:  Patient seen and evaluated at bedside.  Pt is resting comfortable in NAD. Denies N/V/F/C.  Pain rated at X/10    Allergies    No Known Allergies    Intolerances        Vital Signs Last 24 Hrs  T(C): 36.6 (04 Feb 2022 13:14), Max: 36.7 (04 Feb 2022 11:04)  T(F): 97.8 (04 Feb 2022 13:14), Max: 98 (04 Feb 2022 11:04)  HR: 79 (04 Feb 2022 15:01) (60 - 95)  BP: 136/76 (04 Feb 2022 13:14) (118/77 - 144/80)  BP(mean): --  RR: 20 (04 Feb 2022 13:14) (17 - 22)  SpO2: 91% (04 Feb 2022 15:01) (91% - 98%)    albuterol/ipratropium for Nebulization 3 milliLiter(s) Nebulizer every 6 hours  enoxaparin Injectable 40 milliGRAM(s) SubCutaneous every 12 hours  insulin lispro (ADMELOG) corrective regimen sliding scale   SubCutaneous every 6 hours  lacosamide Solution 100 milliGRAM(s) Oral two times a day  nystatin Cream 1 Application(s) Topical every 12 hours  senna 2 Tablet(s) Oral at bedtime  sodium chloride 0.45%. 500 milliLiter(s) IV Continuous <Continuous>  sodium chloride 0.9% for Nebulization 3 milliLiter(s) Nebulizer every 6 hours      LABS:                        10.9   5.77  )-----------( 233      ( 04 Feb 2022 10:21 )             35.5     02-04    148<H>  |  107  |  39<H>  ----------------------------<  163<H>  4.1   |  31  |  0.90    Ca    9.4      04 Feb 2022 10:21  Phos  3.5     02-04  Mg     2.5     02-04    TPro  7.3  /  Alb  3.6  /  TBili  0.5  /  DBili  x   /  AST  43<H>  /  ALT  47<H>  /  AlkPhos  74  02-03        CAPILLARY BLOOD GLUCOSE      POCT Blood Glucose.: 228 mg/dL (04 Feb 2022 12:47)  POCT Blood Glucose.: 179 mg/dL (04 Feb 2022 12:29)  POCT Blood Glucose.: 138 mg/dL (04 Feb 2022 06:28)  POCT Blood Glucose.: 155 mg/dL (04 Feb 2022 00:17)  POCT Blood Glucose.: 211 mg/dL (03 Feb 2022 18:23)      Lower Extremity Physical Exam:  Vasular: DP/PT 2/4, B/L, CFT <3 seconds B/L, Temperature gradient WNL, B/L.   Neuro: unable to assess  Musculoskeletal/Ortho: contracted hammertoes 2-5 bilat  Skin: bilat foot partial thickness wounds secondary to abrasions toes 2/3/4 on left and 2nd toe on right with no signs of infection    RADIOLOGY & ADDITIONAL TESTS:

## 2022-02-03 NOTE — CONSULT NOTE ADULT - CONSULT REQUESTED DATE/TIME
28-Jan-2022 12:27
15-Mono-2022 18:07
03-Feb-2022 09:00
19-Jan-2022 12:00
21-Jan-2022 13:33
18-Jan-2022 17:03
29-Jan-2022 04:43

## 2022-02-03 NOTE — CONSULT NOTE ADULT - ASSESSMENT
Discussions: Conversation with Patient’s Daughter Amy ([phone]). Ethicist SHALONDA See spoke with patient’s daughter Amy. Discussed the role of ethics in the hospital. Amy confirmed understanding. Discussed the patient’s prior mon with COVID-19 and the three months he spent in rehab. She explained that multiple providers expressed the patient’s poor prognosis and that he will likely never recover. She described that the patient as "a fighter" and that she is not interested in Hospice right now. When discussing the PEG, she explained the patient had improved over the past few days and wishes not to alter his bodily integrity at this point while he has shown some improvement. Amy explained that she and her uncle (the patient’s brother and Health Care Proxy – a retired surgeon) would want to speak with neurology and have a repeat Speech and Swallow evaluation. Amy also requested to speak with neurology to discuss the extent of the neurologic prognosis.     Bioethics analysis: The principle of respect for autonomous persons acknowledges a patient’s right to hold views, to make choices and to take actions based on their values and beliefs (Kat & Chugach, ). Furthermore, this principle acknowledges the patient’s dignity and bodily integrity. Essential to this principle is the capacity for autonomous choice. In other words, the patient’s capacity to decide what can and cannot be done to her according to her set of values. When a patient does not have the ability to make decisions for herself, we must protect her autonomy by finding an appropriate surrogate decision maker.     The Family Health Care Decision Act (FHDCA) (Dary, 2010) sets forth criteria for decisions for Adult Patients by Surrogate in order of priority, the persons who may act as a surrogate decision maker for the incapable patient, i.e.:  – Health Care Proxy  - an [facility] Article [address] court-appointed guardian (if there is one);  – the spouse or domestic partner (as defined in the FHCDA);  – an adult child;  – a parent;  – a brother or sister;  – a close friend.    The patient has an adult child, Amy, and brother, Austin, who fall on different levels of the priority list. The patient’s brother is the patient’s Health Care Proxy and the dutiful agent who can make medical decisions for the patient.    Generally, the bioethical principle of beneficence promotes the best possible health or quality of living or dying: with aggressive interventions when these help prolong life with "good quality," with comfort care when cure and remission are not possible and the aim is to achieve the best "quality of life possible given the current circumstances". This principle tends to go hand-in-hand with the other bioethical principle non-maleficence, or do no harm. This principle promotes the physician’s virtue to provide care that wouldn’t cause harm to the patient or provide care that would not be medically beneficial to the patient. Clinicians utilize the concept of proportionality, the balance between beneficence and non-maleficence, to determine the best interest of the patient.    Considering the patient’s current clinical picture, recent decline, and sub-optimal nutritional intake, it is not unreasonable to consider the placement of a feeding tube to optimize the patient’s nutritional status.  However, the analysis of beneficence and non-maleficence must go further than just the clinical indications of the proposed therapy. The patient has a recurrent history of dislodging her nasogastric tube. Though not a certainty, the patient may pull out her PEG once place. As such, even if a PEG is place, there is no guarantee that the role for restraints has ceased. Going further, the clinical indications for PEG place in stroke patients is evidence-based. PEG placements for patients with dysphagia following a stroke have had positive outcomes like improved nutritional status and swallowing capability (Ha & Raya, 2003; Shavon, 2005), but do have risks that could result in further morbidity (Kellie, 2015).    Review of the literature suggests the patient would benefit from a PEG. Having discussed with the patient’s daughter, Amy, it appears she and her family are well aware of the patient’s medical condition and that a PEG could be necessary if the patient’s condition doesn’t improve. However, given the narrative she provided to this consultant regarding the patient’s mon with COVID-19 and journey through rehabilitation, she feels adamant that altering his bodily integrity at this point can be deferred until a repeat Speech and Swallow evaluation can be obtained. She explained that the family is not interested in Hospice at this point and firmly believes the patient would want to have a "fighting chance".    Recommendation  Thank you for consulting Medical Ethics. The team is commended for their virtue in the care and support of Mr. Woody and his family.    The ethical analysis and literature review confirms that a PEG is indicated to improve nutritional status in stroke patients. In this case, the patient has numerous other medical conditions that impact his prognosis. However, given the extensive narrative provided by the patient’s daughter, it appears the patient would want to continue care with possible hopes for recovery.    Ethics recommends a repeat Speech and Swallow examination to clarify the patient’s need for an immediate PEG. Also, the patient’s daughter expressed interest in speaking with Neurology to discuss the extent of the patient’s neurologic prognosis.    More than 50% of the time of this consultation was spent in coordination of Care of Patient    References  Kat CABRERA & Dann MOSS (2019). Principles of Biomedical Ethics. [employer].    KANE Dawson, & MARLENI Dos Santos (2003). Percutaneous endoscopic gastrostomy (PEG) for enteral nutrition in patients with stroke. Scandinavian Journal of Gastroenterology, 38(9), 962-966.    KELLEN Sadns. (2005). To PEG or not to PEG. Practical Gastroenterology, 29(7), 16-31.    NURIA Hopkins. (2015). Enteral tube feeding for dysphagic stroke patients. [place] [employer] Journal of Nursing, 24(3), 138-145.    DEENA Foote. (2010). The Family Health Care Decisions Act: A Summary of Key Provisions. Health Law Journal, 15(1), 32-35.

## 2022-02-03 NOTE — CONSULT NOTE ADULT - CONSULT REASON
To assist the team in the ethical dilemma posed by an 81-year-old man with a history of stroke whose family is deferring decisions regarding PEG placement.
cardiac eval
PEG
bilateral foot wounds
ongoing management of ICH
AAA, iliac aneurysm
Advanced care planning.

## 2022-02-03 NOTE — PROGRESS NOTE ADULT - ATTENDING COMMENTS
81M with PMH of HTN, thoracic aneurysm s/p repair, SWETA on CPAP, known hx ascending aorta aneurysm measuring 4cm, infrarenal AAA 5cm and right common iliac aneurysm measuring 5cm follows w/ vascular surgery who presented to NSICU 1/5 from [facility] as a transfer for R frontal parenchymal hemorrhage with mass effect on CT after fall at home. He was evaluated by vascular surg, determined no surg intervention at this time, and evaluated by Neuro surg who offered STX-guided resection vs medical management however family preferred only minimally invasive intervention if necessary. Prolonged hospital course c/b worsening mental status, aspiration PNA and hypercarbic resp failure requiring intubation (1/19 - 22, and 1/29-30) and ICU stay, seizurelike activity 1/16 on vimpat, and dysphagia now pending PEG placement. Completed cefepime and transferred to [facility] [place] 1/31.  Pt seen and examined. Mental status remains poor, but stable. AAO to self only.  Per palliative note, family wish for life prolonging measures  Was tentatively planned for EGD/ PEG placement 2/2 but family wishes to defer for now  Given how long pt has been on NGT, will need to address long term nutrition   OOB to chair as tolerated  Family requesting surveillance imaging but not currently indicated; if mental or hemodynamic status changes, appropriate imaging will be ordered.  Rest as documented above    [facility] Division of Hospital Medicine  Evelin Lopez MD  Pager (M-F, 8A-5P): [phone]  Other Times:  [phone]. Quadrant Reporting?: no

## 2022-02-04 LAB
ANION GAP SERPL CALC-SCNC: 10 MMOL/L — SIGNIFICANT CHANGE UP (ref 5–17)
BUN SERPL-MCNC: 39 MG/DL — HIGH (ref 7–23)
CALCIUM SERPL-MCNC: 9.4 MG/DL — SIGNIFICANT CHANGE UP (ref 8.4–10.5)
CHLORIDE SERPL-SCNC: 107 MMOL/L — SIGNIFICANT CHANGE UP (ref 96–108)
CO2 SERPL-SCNC: 31 MMOL/L — SIGNIFICANT CHANGE UP (ref 22–31)
CREAT SERPL-MCNC: 0.9 MG/DL — SIGNIFICANT CHANGE UP (ref 0.5–1.3)
GLUCOSE BLDC GLUCOMTR-MCNC: 138 MG/DL — HIGH (ref 70–99)
GLUCOSE BLDC GLUCOMTR-MCNC: 155 MG/DL — HIGH (ref 70–99)
GLUCOSE BLDC GLUCOMTR-MCNC: 179 MG/DL — HIGH (ref 70–99)
GLUCOSE BLDC GLUCOMTR-MCNC: 228 MG/DL — HIGH (ref 70–99)
GLUCOSE BLDC GLUCOMTR-MCNC: 237 MG/DL — HIGH (ref 70–99)
GLUCOSE SERPL-MCNC: 163 MG/DL — HIGH (ref 70–99)
HCT VFR BLD CALC: 35.5 % — LOW (ref 39–50)
HGB BLD-MCNC: 10.9 G/DL — LOW (ref 13–17)
MAGNESIUM SERPL-MCNC: 2.5 MG/DL — SIGNIFICANT CHANGE UP (ref 1.6–2.6)
MCHC RBC-ENTMCNC: 30.6 PG — SIGNIFICANT CHANGE UP (ref 27–34)
MCHC RBC-ENTMCNC: 30.7 GM/DL — LOW (ref 32–36)
MCV RBC AUTO: 99.7 FL — SIGNIFICANT CHANGE UP (ref 80–100)
NRBC # BLD: 0 /100 WBCS — SIGNIFICANT CHANGE UP (ref 0–0)
PHOSPHATE SERPL-MCNC: 3.5 MG/DL — SIGNIFICANT CHANGE UP (ref 2.5–4.5)
PLATELET # BLD AUTO: 233 K/UL — SIGNIFICANT CHANGE UP (ref 150–400)
POTASSIUM SERPL-MCNC: 4.1 MMOL/L — SIGNIFICANT CHANGE UP (ref 3.5–5.3)
POTASSIUM SERPL-SCNC: 4.1 MMOL/L — SIGNIFICANT CHANGE UP (ref 3.5–5.3)
RBC # BLD: 3.56 M/UL — LOW (ref 4.2–5.8)
RBC # FLD: 13.1 % — SIGNIFICANT CHANGE UP (ref 10.3–14.5)
SODIUM SERPL-SCNC: 148 MMOL/L — HIGH (ref 135–145)
WBC # BLD: 5.77 K/UL — SIGNIFICANT CHANGE UP (ref 3.8–10.5)
WBC # FLD AUTO: 5.77 K/UL — SIGNIFICANT CHANGE UP (ref 3.8–10.5)

## 2022-02-04 PROCEDURE — 74230 X-RAY XM SWLNG FUNCJ C+: CPT | Mod: 26

## 2022-02-04 PROCEDURE — 99233 SBSQ HOSP IP/OBS HIGH 50: CPT | Mod: GC

## 2022-02-04 RX ORDER — SENNA PLUS 8.6 MG/1
2 TABLET ORAL AT BEDTIME
Refills: 0 | Status: DISCONTINUED | OUTPATIENT
Start: 2022-02-04 | End: 2022-02-10

## 2022-02-04 RX ORDER — LACOSAMIDE 50 MG/1
100 TABLET ORAL
Refills: 0 | Status: DISCONTINUED | OUTPATIENT
Start: 2022-02-04 | End: 2022-02-10

## 2022-02-04 RX ADMIN — NYSTATIN CREAM 1 APPLICATION(S): 100000 CREAM TOPICAL at 06:23

## 2022-02-04 RX ADMIN — Medication 3 MILLILITER(S): at 06:23

## 2022-02-04 RX ADMIN — Medication 2: at 12:30

## 2022-02-04 RX ADMIN — LACOSAMIDE 100 MILLIGRAM(S): 50 TABLET ORAL at 06:25

## 2022-02-04 RX ADMIN — SENNA PLUS 2 TABLET(S): 8.6 TABLET ORAL at 21:41

## 2022-02-04 RX ADMIN — SODIUM CHLORIDE 3 MILLILITER(S): 9 INJECTION INTRAMUSCULAR; INTRAVENOUS; SUBCUTANEOUS at 06:23

## 2022-02-04 RX ADMIN — Medication 3 MILLILITER(S): at 18:19

## 2022-02-04 RX ADMIN — Medication 4: at 18:19

## 2022-02-04 RX ADMIN — NYSTATIN CREAM 1 APPLICATION(S): 100000 CREAM TOPICAL at 18:21

## 2022-02-04 RX ADMIN — Medication 3 MILLILITER(S): at 12:12

## 2022-02-04 RX ADMIN — ENOXAPARIN SODIUM 40 MILLIGRAM(S): 100 INJECTION SUBCUTANEOUS at 06:22

## 2022-02-04 RX ADMIN — SODIUM CHLORIDE 3 MILLILITER(S): 9 INJECTION INTRAMUSCULAR; INTRAVENOUS; SUBCUTANEOUS at 18:32

## 2022-02-04 RX ADMIN — Medication 2: at 00:48

## 2022-02-04 RX ADMIN — LACOSAMIDE 100 MILLIGRAM(S): 50 TABLET ORAL at 18:21

## 2022-02-04 RX ADMIN — ENOXAPARIN SODIUM 40 MILLIGRAM(S): 100 INJECTION SUBCUTANEOUS at 18:21

## 2022-02-04 RX ADMIN — SODIUM CHLORIDE 3 MILLILITER(S): 9 INJECTION INTRAMUSCULAR; INTRAVENOUS; SUBCUTANEOUS at 13:02

## 2022-02-04 NOTE — PROGRESS NOTE ADULT - ATTENDING COMMENTS
81M with PMH of HTN, thoracic aneurysm s/p repair, SWETA on CPAP, known hx ascending aorta aneurysm measuring 4cm, infrarenal AAA 5cm and right common iliac aneurysm measuring 5cm follows w/ vascular surgery who presented to NSICU 1/5 from [facility] as a transfer for R frontal parenchymal hemorrhage with mass effect on CT after fall at home. He was evaluated by vascular surg, determined no surg intervention at this time, and evaluated by Neuro surg who offered STX-guided resection vs medical management however family preferred only minimally invasive intervention if necessary. Prolonged hospital course c/b worsening mental status, aspiration PNA and hypercarbic resp failure requiring intubation (1/19 - 22, and 1/29-30) and ICU stay, seizurelike activity 1/16 on vimpat, and dysphagia now pending PEG placement. Completed cefepime and transferred to [facility] 1/31.  Pt seen and examined. Mental status slowly improving, but stable. AAO to self only.  Per palliative note, family wish for life prolonging measures  Was tentatively planned for EGD/ PEG placement 2/2 but family wishes to defer for now. Planned for MBS study today. If ot cleared, will need to reassess need for PEG placement for long term feeding.    Family requesting surveillance imaging but not currently indicated; if mental or hemodynamic status changes, appropriate imaging will be ordered.  Rest as documented above    [facility] Division of Hospital Medicine  Evelin Lopez MD  Pager (M-F, 8A-5P): [phone]  Other Times:  [phone].

## 2022-02-04 NOTE — SWALLOW VFSS/MBS ASSESSMENT ADULT - SLP GENERAL OBSERVATIONS
Pt encountered in radiology, secure in JOSE ARMANDO chair. +2L NC. [place] speaking,  utilized. Cooperative with assessment.

## 2022-02-04 NOTE — PROGRESS NOTE ADULT - SUBJECTIVE AND OBJECTIVE BOX
Neurology Progress note  S: patient seen and examined,  on floor. doing okay. SS today     Brief HPI:  81y m W/  HTN presenting to the ED from [facility] as a transfer for R frontal parenchymal hemorrhage on CT after fall at home. Patient is poor historian but states that he was diagnosed with COVID 2 weeks ago and has been feeling "tired" at home, states he felt dizzy earlier today but unable to state what time he fell or further elucidate. Patient denies AC use. Per EMS was also recently diagnosed with a AAA but has been unable to undergo surgical repair due to recent dx of COVID. Patient still endorsing he feels "tired", denies current headache, vision changes, dizziness, chest pain, nausea, back pain, focal numbness/tingling. Endorses weakness to LLE 2/2 pain. (15 Mono 2022 15:50)    Medications:   MEDICATIONS  (STANDING):  albuterol/ipratropium for Nebulization 3 milliLiter(s) Nebulizer every 6 hours  enoxaparin Injectable 40 milliGRAM(s) SubCutaneous every 12 hours  insulin lispro (ADMELOG) corrective regimen sliding scale   SubCutaneous every 6 hours  lacosamide Solution 100 milliGRAM(s) Oral two times a day  nystatin Cream 1 Application(s) Topical every 12 hours  senna 2 Tablet(s) Oral at bedtime  sodium chloride 0.45%. 500 milliLiter(s) (100 mL/Hr) IV Continuous <Continuous>  sodium chloride 0.9% for Nebulization 3 milliLiter(s) Nebulizer every 6 hours    MEDICATIONS  (PRN):             Vitals    Orthostatic VS  Vital Signs Last 24 Hrs  T(C): 36.6 (22 @ 13:14), Max: 36.7 (22 @ 11:04)  T(F): 97.8 (22 @ 13:14), Max: 98 (22 @ 11:04)  HR: 86 (22 @ 13:14) (60 - 97)  BP: 136/76 (22 @ 13:14) (118/77 - 144/80)  BP(mean): --  RR: 20 (22 @ 13:14) (17 - 22)  SpO2: 94% (02-04-22 @ 13:14) (94% - 98%)            General Exam:   General Appearance: Appropriately dressed and in no acute distress       Head: Normocephalic, atraumatic and no dysmorphic features  Ear, Nose, and Throat: Moist mucous membranes +NGT    CVS: S1S2+  Resp: No SOB, no wheeze or rhonchi  GI: soft NT/ND  Extremities: No edema or cyanosis  Skin: No bruises or rashes     Neurological Exam:    Mental Status: Awake, alert and oriented x 1-2.  Able to follow simple verbal commands intermittently. Able to name and repeat.+ dysarthria    Cranial Nerves: PERRL, EOMI, VFFC, sensation V1-V3 intact, L facial, equal elevation of palate,  , tongue is midline on protrusion. no obvious papilledema on fundoscopic exam. hearing is grossly intact.   Motor: moving R full strength LUE 3/5/ LLE 1-2/5, tremor in uppers at times, myoclonus    Sensation: withdraws R>L  Reflexes: 1+ throughout at biceps, brachioradialis, triceps, patellars and ankles bilaterally and equal. No clonus. R toe and L toe were both downgoing.  Coordination: unable   Gait: unable     Data/Labs/Imaging which I personally reviewed.     Labs:   CBC Full  -  ( 2022 10:21 )  WBC Count : 5.77 K/uL  RBC Count : 3.56 M/uL  Hemoglobin : 10.9 g/dL  Hematocrit : 35.5 %  Platelet Count - Automated : 233 K/uL  Mean Cell Volume : 99.7 fl  Mean Cell Hemoglobin : 30.6 pg  Mean Cell Hemoglobin Concentration : 30.7 gm/dL  Auto Neutrophil # : x  Auto Lymphocyte # : x  Auto Monocyte # : x  Auto Eosinophil # : x  Auto Basophil # : x  Auto Neutrophil % : x  Auto Lymphocyte % : x  Auto Monocyte % : x  Auto Eosinophil % : x  Auto Basophil % : x    02-04    148<H>  |  107  |  39<H>  ----------------------------<  163<H>  4.1   |  31  |  0.90    Ca    9.4      2022 10:21  Phos  3.5     02-04  Mg     2.5     02-04    TPro  7.3  /  Alb  3.6  /  TBili  0.5  /  DBili  x   /  AST  43<H>  /  ALT  47<H>  /  AlkPhos  74  02-          Urinalysis Basic - ( 2022 21:31 )    Color: Yellow / Appearance: Clear / S.023 / pH: x  Gluc: x / Ketone: Negative  / Bili: Negative / Urobili: Negative   Blood: x / Protein: 100 / Nitrite: Negative   Leuk Esterase: Negative / RBC: 0 /hpf / WBC 0 /HPF   Sq Epi: x / Non Sq Epi: 1 /hpf / Bacteria: Negative    < from: CT Angio Head w/ IV Cont (01.15.22 @ 17:16) >    ACC: [id number] EXAM:  CT ANGIO NECK (W)AW IC                        ACC: [id number] EXAM:  CT ANGIO BRAIN (W)AW IC                          PROCEDURE DATE:  01/15/2022          INTERPRETATION:  CT ANGIOGRAPHY OF THE NECK AND BRAIN.    CLINICAL INFORMATION:81 years Male ICH    TECHNIQUE:  CT angiography of the neck and brain was performed during the dynamic   administration of intravenous contrast.  MIP reconstructions were   performed and reviewed. Multiplanar reformations were obtained.  Contrastadministered 70 cc Omnipaque 350, contrast discarded 30 cc    FINDINGS:  NECK  RIGHT CAROTID CIRCULATION:  Evaluation of the right carotid circulation demonstrates no traumatic   occlusion, dissection, or pseudoaneurysm.    LEFT CAROTID CIRCULATION:  Evaluation of the left carotid circulation demonstrates no traumatic   occlusion, dissection, or pseudoaneurysm.    VERTEBRAL ARTERIES:  Evaluation of the vertebral arteries reveals no evidence of a vertebral   artery occlusion or dissection.    BRAIN  ANTERIOR CIRCULATION:  Distal internal carotid arteries are patent. Calcification involves the   cavernous segments with mild degrees of narrowing  Anterior cerebral arteries are patent. A1 segment of the left anterior   cerebral artery is smaller than the right, a normal anatomic variant  Middle cerebral arteries are patent.    POSTERIOR CIRCULATION:  Distal vertebral arteries are patent. Bilateral posterior inferior   cerebellar arteries are seen.  Basilar artery is patent. Proximal superior cerebellar arteries are patent  Posterior cerebral arteries are patent.    OTHER:  No evidence of abnormal enhancement associated with the moderate to large   right frontal parenchymal hemorrhage.  No puddling of contrast.  No aneurysm or arteriovenous malformation seen.    IMPRESSION:  *  NO TRAUMATIC OCCLUSION, DISSECTION, OR PSEUDOANEURYSM.  *  NO ABNORMAL ENHANCEMENT ASSOCIATED WITH THE MODERATE TO LARGE RIGHT   FRONTAL PARENCHYMAL HEMORRHAGE.  *  NO PUDDLING OF CONTRAST. NO ANEURYSM OR AVM SEEN.    --- End of Report ---            PAWAN ODELL MD; Attending Radiologist  This document has been electronically signed. Mono 15 2022  7:49PM    < end of copied text >  < from: CT Head No Cont (22 @ 09:30) >    ACC: [id number] EXAM:  CT BRAIN                          PROCEDURE DATE:  2022          INTERPRETATION:  CLINICAL INFORMATION: Intracranial hemorrhage, follow-up.    TECHNIQUE: Portable noncontrast axial CT images were acquired of the head.    COMPARISON STUDY: CT head 2022.    FINDINGS:  Large right frontal lobe parenchymal hematoma measures approximately 7.7   x 3.5 cm, previously 7.5 x 3.2 cm, with surrounding edema and mass   effect, without interval interval change compared to prior.   Redemonstrated small intraventricular hemorrhage, similar to prior.    Moderate cerebral volume loss with proportional prominence of the sulci   and ventricles. Nonspecific white matter hypoattenuation, likely related   to chronic microvascular changes.    Left frontal arachnoid cyst, unchanged.    Mucosal thickening of the left maxillary and bilateral ethmoid sinuses.   Partially visualized nasogastric tube in situ. Mastoid air cells are   clear.    Evidence of bilateral lens surgery.    Nodisplaced calvarial fracture.      IMPRESSION:  Stable exam.    --- End of Report ---          HARRIET WATSON MD; Resident Radiology  This document has been electronically signed.  CRUZ LÓPEZ MD; Attending Radiologist  This document has been electronically signed. 2022 10:58AM    < end of copied text >    < from: CT Head No Cont (22 @ 22:13) >    ACC: [id number] EXAM:  CT BRAIN                          PROCEDURE DATE:  2022          INTERPRETATION:  Clinical indication: Altered mental status    Multiple axial sections were performed from base skull to vertex without   contrast enhancement. Coronal and sagittal reconstructions were performed   as well    This exam is compared prior head CT performed on 2020.    Again seen is abnormal high attenuation identified involving the high   right frontal cortical subcortical region. This compatible with acute   parenchymal hemorrhage surrounding edema. This acute parenchymal   hemorrhage measures approximately 7.3 x 3.2 cm and previously measured   approximately 7.7 x 3.5 cm. Localized mass effect is seen consisting of   sulcal effacement.    Extra-axial low-attenuation with adjacent bony scalloping is again seen   involving the high left frontal region. This finding measures   approximately 4.2 x 2.8 cm and previously measured approximately 4.1 x   3.1 cm. This is compatible with an arachnoid cyst.    Parenchymal volume loss and chronic microvascular ischemic changes are   identified.    Intraventricular hemorrhage is again seen.    Evaluation of structures with the appropriate window appears unremarkable    Left-sided NG tube is seen.    The visualized paranasal sinuses mastoid and middle ear regions appear   clear.    Impression: Acute parenchymal hemorrhage is again seen with some expected   evolutionary changes.    Arachnoid cyst again seen as described above.    --- End of Report ---              < from: CT Head No Cont (22 @ 09:57) >    ACC: [id number] EXAM:  CT BRAIN                          PROCEDURE DATE:  2022          INTERPRETATION:  Clinical indication: Altered mental status    Multiple axial sections were performed from base skull to vertex without   contrast enhancement. Coronal and sagittal destructions were performed as   well    This exam is compared with prior head CT performed on 2022.    Abnormal high attenuation involving the high left frontal cortical   subcortical region is again seen. This finding measures approximately 5.1   x 2.8 cm and previously measured approximately 7.3 x 3.2 cm. Surrounding   edema is again seen.    Small amount of subrecord hemorrhage is again suspected involving the   left posterior temporal/posterior frontal region    Intraventricular hemorrhage is again seen.    The size and configuration of ventricles appear unchanged    No new areas of acute hemorrhage is seen.    Extra-axial low-attenuation from the high left frontal region is again   seen with bony scalloping. This appears unchanged when compared with the   prior exam and is likely compatible with an arachnoid cyst    Evaluation of osseous structures with the appropriate window appears   unchanged    The visualized paranasal sinuses mastoid and middle ear regions appear   clear.    Orotracheal and orogastric tubes are seen.    The paranasal sinuses mastoid and middle ear regions appear clear.    The patient status post bilateral cataract surgery.    IMPRESSION: Acute parenchymal hemorrhage is again seen as well as   intraventricular hemorrhage. Subrecord hemorrhage is suspected.    --- End of Report ---            BE TOUSSAINT MD; Attending Radiologist  This document has been electronically signed. 2022 10:10AM    < end of copied text >      EEG IMPRESSION/CLINICAL CORRELATE    Abnormal EEG study.  1. Risk for seizures from the right posterior quadrant and left parietal region.   2. Structural abnormality in the right hemisphere.  3. Moderate multifocal/diffuse nonspecific cerebral dysfunction.    No seizures recorded.

## 2022-02-04 NOTE — PROGRESS NOTE ADULT - PROBLEM SELECTOR PLAN 1
-AOx1 today, improved status  - neuro checks q4h  - Vimpat 100mg q12  - MRI w/wo when able  - CTH repeat w/ improved ICH x2  -Neuro checks q 4 hrs and prn for changes  -PT eval; OT following -AOx1 today, improved status  - neuro checks q4h  - Vimpat 100mg q12  - MRI w/wo when able  - CTH repeat w/ improved ICH x2  -Neuro checks q 4 hrs and prn for changes  -PT eval; OT following  - MBS scheduled for Monday 2/7/22

## 2022-02-04 NOTE — SWALLOW VFSS/MBS ASSESSMENT ADULT - DIAGNOSTIC IMPRESSIONS
Pt presents with an oropharyngeal dysphagia, characterized by prolonged mastication with minced/moist textures, premature spillage with less viscous consistencies, and trace laryngeal penetration with minced/moist and thin liquids. Difficult to visualize if material is fully retrieved from laryngeal vestibule given patient positioning/ high shoulder girdle. Would therefore favor initiation of a conservative dysphagia diet.

## 2022-02-04 NOTE — SWALLOW VFSS/MBS ASSESSMENT ADULT - LARYNGEAL PENETRATION DURING THE SWALLOW - COUGH
Shallow, over the arytenoids, retrieved during subsequent swallow/Trace Along the laryngeal surface of the epiglottis, difficult to assess whether fully retrieved 2/2 patient positioning/Trace

## 2022-02-04 NOTE — SWALLOW VFSS/MBS ASSESSMENT ADULT - RECOMMENDED FEEDING/EATING TECHNIQUES
encourage slow rate of intake/crush medication (when feasible)/maintain upright posture during/after eating for 30 mins/small sips/bites

## 2022-02-04 NOTE — PROGRESS NOTE ADULT - ASSESSMENT
81y m W/  HTN, thoracic aneurysm s/p repair, SWETA on CPAP, known hx ascending aorta aneurysm measuring 4cm, infrarenal AAA 5cm and right common iliac aneurysm presenting to the ED from [facility] as a transfer for R frontal parenchymal hemorrhage on CT after fall at home.  CTH R hemisphere IPH  CTA H/N no aneurysm or AVM  repeat CTH stable   CK 3000  NH3 WNL   1/26 CT chest wtih opacities  1/27 CTH normal evolutionary changes no new findings   1/29 RRT for hypoxia. intubated. in MICU  CTH 1/30 still wtih IVH and SAH.  extubated  EEG 1/30 without seizures but potential risk from R post quadrant and L parietal  2/3   eyes speaks, answers questions intermittently, in B/L UE restraints still.   2/4 SS/MBS today   Impression: R IPH of unclear etiology.  may be 2/2 HTN. need to r/o CAA or underlying mass   - nsx f/u.   - f/u palliative , ethics, full code   - was on cefepime for infection., now off   - SBP <160  - DVT ppx okay. hold AC/AP  -  on vimpat 100mg BID--> continue   - MRI brain w/ and w/o when able  - GI for PEG?, now with NGT.  needs peg eval --> planning for peg next week   - no need for hypertonics  - cardio following. known thoracic aneurysm s/p repair.   - f/uj pulmo for resp status.    - telemetry  - PT/OT/SS/SLP, OOBC  - check FS, glucose control <180  - GI/DVT ppx  - Thank you for allowing me to participate in the care of this patient. Call with questions.   -ethic and palliative called   - stroke team to reach out to family 2/3.  stroke team spoke wtih 2 family members including including a physician in there family. all neuro questions answered   Carlin Neal MD  Vascular Neurology  Office: [phone]

## 2022-02-04 NOTE — SWALLOW VFSS/MBS ASSESSMENT ADULT - ORAL PHASE
within functional limits Trace-minimal residue/within functional limits Premature spillage to the pyriform sinuses Minimal oral cavity residue clears with repeat swallow

## 2022-02-04 NOTE — SWALLOW VFSS/MBS ASSESSMENT ADULT - LARYNGEAL PENETRATION DURING THE SWALLOW - SILENT
Difficult to assess whether fully retrieved 2/2 patient postioning; suspect trace material remains along the laryngeal surface of the epiglottis/Trace

## 2022-02-04 NOTE — PROGRESS NOTE ADULT - SUBJECTIVE AND OBJECTIVE BOX
Micah Hansen, PGY-1  Internal Medicine  [employer]; #[phone]      PROGRESS NOTE:     Patient is a 81y old  Male who presents with a chief complaint of R ICH (03 Feb 2022 13:34)      SUBJECTIVE AND OVERNIGHT EVENTS:     ADDITIONAL REVIEW OF SYSTEMS:    MEDICATIONS  (STANDING):  albuterol/ipratropium for Nebulization 3 milliLiter(s) Nebulizer every 6 hours  enoxaparin Injectable 40 milliGRAM(s) SubCutaneous every 12 hours  insulin lispro (ADMELOG) corrective regimen sliding scale   SubCutaneous every 6 hours  lacosamide Solution 100 milliGRAM(s) Oral two times a day  nystatin Cream 1 Application(s) Topical every 12 hours  senna 2 Tablet(s) Oral at bedtime  sodium chloride 0.45%. 500 milliLiter(s) (100 mL/Hr) IV Continuous <Continuous>  sodium chloride 0.9% for Nebulization 3 milliLiter(s) Nebulizer every 6 hours    MEDICATIONS  (PRN):      CAPILLARY BLOOD GLUCOSE      POCT Blood Glucose.: 138 mg/dL (04 Feb 2022 06:28)  POCT Blood Glucose.: 155 mg/dL (04 Feb 2022 00:17)  POCT Blood Glucose.: 211 mg/dL (03 Feb 2022 18:23)  POCT Blood Glucose.: 217 mg/dL (03 Feb 2022 13:32)    I&O's Summary    03 Feb 2022 07:01  -  04 Feb 2022 07:00  --------------------------------------------------------  IN: 1595 mL / OUT: 0 mL / NET: 1595 mL        PHYSICAL EXAM:  Vital Signs Last 24 Hrs  T(C): 36.6 (04 Feb 2022 06:01), Max: 36.6 (03 Feb 2022 14:00)  T(F): 97.8 (04 Feb 2022 06:01), Max: 97.8 (03 Feb 2022 14:00)  HR: 72 (04 Feb 2022 06:01) (72 - 97)  BP: 137/78 (04 Feb 2022 06:01) (121/61 - 144/80)  RR: 20 (04 Feb 2022 06:01) (18 - 22)  SpO2: 95% (04 Feb 2022 06:01) (94% - 98%)    CONSTITUTIONAL: NAD, well-developed  HEENT: NC/AT, EOMI  RESPIRATORY: No increased work of breathing, CTAB, no wheezes or crackles appreciated  CARDIOVASCULAR: RRR, S1 and S2 present, no m/r/g  ABDOMEN: soft, NT, ND, bowel sounds present  EXTREMITIES: No LE edema  MUSCULOSKELETAL: no joint swelling or tenderness to palpation  NEURO: A&Ox3, moving all extremities    LABS:                        11.6   5.98  )-----------( 262      ( 03 Feb 2022 06:36 )             38.2     02-03    147<H>  |  107  |  45<H>  ----------------------------<  170<H>  4.4   |  30  |  1.07    Ca    10.1      03 Feb 2022 06:38  Phos  4.5     02-03  Mg     2.7     02-03    TPro  7.3  /  Alb  3.6  /  TBili  0.5  /  DBili  x   /  AST  43<H>  /  ALT  47<H>  /  AlkPhos  74  02-03                RADIOLOGY & ADDITIONAL TESTS:  CT BRAIN 1/30  This exam is compared with prior head CT performed on January 27, 2022.    Abnormal high attenuation involving the high left frontal cortical subcortical region is again seen. This finding measures approximately 5.1 x 2.8 cm and previously measured approximately 7.3 x 3.2 cm. Surrounding edema is again seen.    Small amount of subrecord hemorrhage is again suspected involving the left posterior temporal/posterior frontal region    Intraventricular hemorrhage is again seen.    The size and configuration of ventricles appear unchanged    No new areas of acute hemorrhage is seen.    Extra-axial low-attenuation from the high left frontal region is again seen with bony scalloping. This appears unchanged when compared with the prior exam and is likely compatible with an arachnoid cyst    Evaluation of osseous structures with the appropriate window appears unchanged    The visualized paranasal sinuses mastoid and middle ear regions appear clear.    Orotracheal and orogastric tubes are seen.    The paranasal sinuses mastoid and middle ear regions appear clear.    The patient status post bilateral cataract surgery.    IMPRESSION: Acute parenchymal hemorrhage is again seen as well as intraventricular hemorrhage. Subrecord hemorrhage is suspected.     Micah Hansen, PGY-1  Internal Medicine  [employer]; #[phone]      PROGRESS NOTE:     Patient is a 81y old  Male who presents with a chief complaint of R ICH (03 Feb 2022 13:34)      SUBJECTIVE AND OVERNIGHT EVENTS:   Patient was seen and examined at bedside this morning. He denied any pain and stated that he was thirsty and wanted to drink. He stated that his breathing was fine, with a NC and NRB on, denying any fevers. He had no acute events or complaints overnight.     ADDITIONAL REVIEW OF SYSTEMS:    MEDICATIONS  (STANDING):  albuterol/ipratropium for Nebulization 3 milliLiter(s) Nebulizer every 6 hours  enoxaparin Injectable 40 milliGRAM(s) SubCutaneous every 12 hours  insulin lispro (ADMELOG) corrective regimen sliding scale   SubCutaneous every 6 hours  lacosamide Solution 100 milliGRAM(s) Oral two times a day  nystatin Cream 1 Application(s) Topical every 12 hours  senna 2 Tablet(s) Oral at bedtime  sodium chloride 0.45%. 500 milliLiter(s) (100 mL/Hr) IV Continuous <Continuous>  sodium chloride 0.9% for Nebulization 3 milliLiter(s) Nebulizer every 6 hours    MEDICATIONS  (PRN):      CAPILLARY BLOOD GLUCOSE    POCT Blood Glucose.: 138 mg/dL (04 Feb 2022 06:28)  POCT Blood Glucose.: 155 mg/dL (04 Feb 2022 00:17)  POCT Blood Glucose.: 211 mg/dL (03 Feb 2022 18:23)  POCT Blood Glucose.: 217 mg/dL (03 Feb 2022 13:32)    I&O's Summary    03 Feb 2022 07:01  -  04 Feb 2022 07:00  --------------------------------------------------------  IN: 1595 mL / OUT: 0 mL / NET: 1595 mL        PHYSICAL EXAM:  Vital Signs Last 24 Hrs  T(C): 36.6 (04 Feb 2022 06:01), Max: 36.6 (03 Feb 2022 14:00)  T(F): 97.8 (04 Feb 2022 06:01), Max: 97.8 (03 Feb 2022 14:00)  HR: 72 (04 Feb 2022 06:01) (72 - 97)  BP: 137/78 (04 Feb 2022 06:01) (121/61 - 144/80)  RR: 20 (04 Feb 2022 06:01) (18 - 22)  SpO2: 95% (04 Feb 2022 06:01) (94% - 98%)    CONSTITUTIONAL: NAD, well-developed, on NC and NRB mask. used Moldovan translater   HEENT: NC/AT, EOMI  RESPIRATORY: No increased work of breathing, CTAB, no wheezes or crackles appreciated  CARDIOVASCULAR: RRR, S1 and S2 present, no m/r/g  ABDOMEN: soft, NT, ND, bowel sounds present  EXTREMITIES: No LE edema  MUSCULOSKELETAL: no joint swelling or tenderness to palpation  NEURO: A&Ox1, conversive, following commands, moving all extremities    LABS:                        10.9   5.77  )-----------( 233      ( 04 Feb 2022 10:21 )             35.5     02-04    148<H>  |  107  |  39<H>  ----------------------------<  163<H>  4.1   |  31  |  0.90    Ca    9.4      04 Feb 2022 10:21  Phos  3.5     02-04  Mg     2.5     02-04    TPro  7.3  /  Alb  3.6  /  TBili  0.5  /  DBili  x   /  AST  43<H>  /  ALT  47<H>  /  AlkPhos  74  02-03      RADIOLOGY & ADDITIONAL TESTS:  CT BRAIN 1/30  This exam is compared with prior head CT performed on January 27, 2022.    Abnormal high attenuation involving the high left frontal cortical subcortical region is again seen. This finding measures approximately 5.1 x 2.8 cm and previously measured approximately 7.3 x 3.2 cm. Surrounding edema is again seen.    Small amount of subrecord hemorrhage is again suspected involving the left posterior temporal/posterior frontal region    Intraventricular hemorrhage is again seen.    The size and configuration of ventricles appear unchanged    No new areas of acute hemorrhage is seen.    Extra-axial low-attenuation from the high left frontal region is again seen with bony scalloping. This appears unchanged when compared with the prior exam and is likely compatible with an arachnoid cyst    Evaluation of osseous structures with the appropriate window appears unchanged    The visualized paranasal sinuses mastoid and middle ear regions appear clear.    Orotracheal and orogastric tubes are seen.    The paranasal sinuses mastoid and middle ear regions appear clear.    The patient status post bilateral cataract surgery.    IMPRESSION: Acute parenchymal hemorrhage is again seen as well as intraventricular hemorrhage. Subrecord hemorrhage is suspected.

## 2022-02-04 NOTE — SWALLOW VFSS/MBS ASSESSMENT ADULT - ROSENBEK'S PENETRATION ASPIRATION SCALE
(1) no aspiration, contrast does not enter airway (3) contrast remains above the vocal cords, visible residue remains (penetration) (2) contrast enters airway, remains above the vocal cords, no residue remains (penetration)

## 2022-02-04 NOTE — SWALLOW VFSS/MBS ASSESSMENT ADULT - ASPIRATION PRECAUTIONS
Monitor for s/s aspiration/laryngeal penetration. If noted:  D/C p.o. intake, provide non-oral nutrition/hydration/meds, and contact this service @ [id number]

## 2022-02-04 NOTE — PROGRESS NOTE ADULT - PROBLEM SELECTOR PLAN 2
-currently normotensive off medications, will monitor  Hx of thoracic aneurysm repair 7 years ago (con), also with 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  TTE EF 70%, no WMA  - SBP goal 100-160  - vascular recs -- no intervention

## 2022-02-04 NOTE — PROGRESS NOTE ADULT - SUBJECTIVE AND OBJECTIVE BOX
DATE OF SERVICE: 02-04-22 @ 08:21    Subjective: Patient seen and examined. No new events except as noted.     SUBJECTIVE/ROS:        MEDICATIONS:  MEDICATIONS  (STANDING):  albuterol/ipratropium for Nebulization 3 milliLiter(s) Nebulizer every 6 hours  enoxaparin Injectable 40 milliGRAM(s) SubCutaneous every 12 hours  insulin lispro (ADMELOG) corrective regimen sliding scale   SubCutaneous every 6 hours  lacosamide Solution 100 milliGRAM(s) Oral two times a day  nystatin Cream 1 Application(s) Topical every 12 hours  senna 2 Tablet(s) Oral at bedtime  sodium chloride 0.45%. 500 milliLiter(s) (100 mL/Hr) IV Continuous <Continuous>  sodium chloride 0.9% for Nebulization 3 milliLiter(s) Nebulizer every 6 hours      PHYSICAL EXAM:  T(C): 36.6 (02-04-22 @ 06:01), Max: 36.6 (02-03-22 @ 14:00)  HR: 72 (02-04-22 @ 06:01) (72 - 97)  BP: 137/78 (02-04-22 @ 06:01) (121/61 - 144/80)  RR: 20 (02-04-22 @ 06:01) (18 - 22)  SpO2: 95% (02-04-22 @ 06:01) (94% - 98%)  Wt(kg): --  I&O's Summary    03 Feb 2022 07:01  -  04 Feb 2022 07:00  --------------------------------------------------------  IN: 1945 mL / OUT: 0 mL / NET: 1945 mL            JVP: Normal  Neck: supple  Lung: clear   CV: S1 S2 , Murmur:  Abd: soft  Ext: No edema  neuro: Awake   Psych: flat affect  Skin: normal``    LABS/DATA:    CARDIAC MARKERS:                                11.6   5.98  )-----------( 262      ( 03 Feb 2022 06:36 )             38.2     02-03    147<H>  |  107  |  45<H>  ----------------------------<  170<H>  4.4   |  30  |  1.07    Ca    10.1      03 Feb 2022 06:38  Phos  4.5     02-03  Mg     2.7     02-03    TPro  7.3  /  Alb  3.6  /  TBili  0.5  /  DBili  x   /  AST  43<H>  /  ALT  47<H>  /  AlkPhos  74  02-03    proBNP:   Lipid Profile:   HgA1c:   TSH:     TELE:  EKG:

## 2022-02-05 LAB
ANION GAP SERPL CALC-SCNC: 14 MMOL/L — SIGNIFICANT CHANGE UP (ref 5–17)
BUN SERPL-MCNC: 37 MG/DL — HIGH (ref 7–23)
CALCIUM SERPL-MCNC: 10.1 MG/DL — SIGNIFICANT CHANGE UP (ref 8.4–10.5)
CHLORIDE SERPL-SCNC: 105 MMOL/L — SIGNIFICANT CHANGE UP (ref 96–108)
CO2 SERPL-SCNC: 28 MMOL/L — SIGNIFICANT CHANGE UP (ref 22–31)
CREAT SERPL-MCNC: 0.97 MG/DL — SIGNIFICANT CHANGE UP (ref 0.5–1.3)
GLUCOSE BLDC GLUCOMTR-MCNC: 155 MG/DL — HIGH (ref 70–99)
GLUCOSE BLDC GLUCOMTR-MCNC: 183 MG/DL — HIGH (ref 70–99)
GLUCOSE BLDC GLUCOMTR-MCNC: 189 MG/DL — HIGH (ref 70–99)
GLUCOSE BLDC GLUCOMTR-MCNC: 204 MG/DL — HIGH (ref 70–99)
GLUCOSE BLDC GLUCOMTR-MCNC: 222 MG/DL — HIGH (ref 70–99)
GLUCOSE BLDC GLUCOMTR-MCNC: 229 MG/DL — HIGH (ref 70–99)
GLUCOSE SERPL-MCNC: 150 MG/DL — HIGH (ref 70–99)
HCT VFR BLD CALC: 41.3 % — SIGNIFICANT CHANGE UP (ref 39–50)
HGB BLD-MCNC: 12.5 G/DL — LOW (ref 13–17)
MAGNESIUM SERPL-MCNC: 2.6 MG/DL — SIGNIFICANT CHANGE UP (ref 1.6–2.6)
MCHC RBC-ENTMCNC: 30.1 PG — SIGNIFICANT CHANGE UP (ref 27–34)
MCHC RBC-ENTMCNC: 30.3 GM/DL — LOW (ref 32–36)
MCV RBC AUTO: 99.5 FL — SIGNIFICANT CHANGE UP (ref 80–100)
NRBC # BLD: 0 /100 WBCS — SIGNIFICANT CHANGE UP (ref 0–0)
PHOSPHATE SERPL-MCNC: 2.8 MG/DL — SIGNIFICANT CHANGE UP (ref 2.5–4.5)
PLATELET # BLD AUTO: 223 K/UL — SIGNIFICANT CHANGE UP (ref 150–400)
POTASSIUM SERPL-MCNC: 4.2 MMOL/L — SIGNIFICANT CHANGE UP (ref 3.5–5.3)
POTASSIUM SERPL-SCNC: 4.2 MMOL/L — SIGNIFICANT CHANGE UP (ref 3.5–5.3)
RBC # BLD: 4.15 M/UL — LOW (ref 4.2–5.8)
RBC # FLD: 13.2 % — SIGNIFICANT CHANGE UP (ref 10.3–14.5)
SODIUM SERPL-SCNC: 147 MMOL/L — HIGH (ref 135–145)
WBC # BLD: 5.38 K/UL — SIGNIFICANT CHANGE UP (ref 3.8–10.5)
WBC # FLD AUTO: 5.38 K/UL — SIGNIFICANT CHANGE UP (ref 3.8–10.5)

## 2022-02-05 PROCEDURE — 99232 SBSQ HOSP IP/OBS MODERATE 35: CPT | Mod: GC

## 2022-02-05 RX ADMIN — Medication 2: at 06:58

## 2022-02-05 RX ADMIN — ENOXAPARIN SODIUM 40 MILLIGRAM(S): 100 INJECTION SUBCUTANEOUS at 05:38

## 2022-02-05 RX ADMIN — Medication 3 MILLILITER(S): at 11:58

## 2022-02-05 RX ADMIN — SENNA PLUS 2 TABLET(S): 8.6 TABLET ORAL at 21:33

## 2022-02-05 RX ADMIN — SODIUM CHLORIDE 3 MILLILITER(S): 9 INJECTION INTRAMUSCULAR; INTRAVENOUS; SUBCUTANEOUS at 17:03

## 2022-02-05 RX ADMIN — Medication 4: at 13:06

## 2022-02-05 RX ADMIN — SODIUM CHLORIDE 3 MILLILITER(S): 9 INJECTION INTRAMUSCULAR; INTRAVENOUS; SUBCUTANEOUS at 06:09

## 2022-02-05 RX ADMIN — Medication 3 MILLILITER(S): at 06:10

## 2022-02-05 RX ADMIN — Medication 3 MILLILITER(S): at 17:03

## 2022-02-05 RX ADMIN — Medication 4: at 01:01

## 2022-02-05 RX ADMIN — LACOSAMIDE 100 MILLIGRAM(S): 50 TABLET ORAL at 05:38

## 2022-02-05 RX ADMIN — NYSTATIN CREAM 1 APPLICATION(S): 100000 CREAM TOPICAL at 17:03

## 2022-02-05 RX ADMIN — Medication 3 MILLILITER(S): at 01:00

## 2022-02-05 RX ADMIN — SODIUM CHLORIDE 3 MILLILITER(S): 9 INJECTION INTRAMUSCULAR; INTRAVENOUS; SUBCUTANEOUS at 11:58

## 2022-02-05 RX ADMIN — NYSTATIN CREAM 1 APPLICATION(S): 100000 CREAM TOPICAL at 06:10

## 2022-02-05 RX ADMIN — ENOXAPARIN SODIUM 40 MILLIGRAM(S): 100 INJECTION SUBCUTANEOUS at 17:04

## 2022-02-05 RX ADMIN — Medication 2: at 18:07

## 2022-02-05 RX ADMIN — LACOSAMIDE 100 MILLIGRAM(S): 50 TABLET ORAL at 17:07

## 2022-02-05 RX ADMIN — SODIUM CHLORIDE 3 MILLILITER(S): 9 INJECTION INTRAMUSCULAR; INTRAVENOUS; SUBCUTANEOUS at 00:55

## 2022-02-05 NOTE — PROGRESS NOTE ADULT - PROBLEM SELECTOR PLAN 1
-AOx1 today, improved status  - neuro checks q4h  - Vimpat 100mg q12  - MRI w/wo when able  - CTH repeat w/ improved ICH x2  -Neuro checks q 4 hrs and prn for changes  -PT eval; OT following  - MBS scheduled for Monday 2/7/22

## 2022-02-05 NOTE — PROGRESS NOTE ADULT - SUBJECTIVE AND OBJECTIVE BOX
Neurology Progress note  S: patient seen and examined,  on floor. doing okay.now on puree diet      Brief HPI:  81y m W/  HTN presenting to the ED from [facility] as a transfer for R frontal parenchymal hemorrhage on CT after fall at home. Patient is poor historian but states that he was diagnosed with COVID 2 weeks ago and has been feeling "tired" at home, states he felt dizzy earlier today but unable to state what time he fell or further elucidate. Patient denies AC use. Per EMS was also recently diagnosed with a AAA but has been unable to undergo surgical repair due to recent dx of COVID. Patient still endorsing he feels "tired", denies current headache, vision changes, dizziness, chest pain, nausea, back pain, focal numbness/tingling. Endorses weakness to LLE 2/2 pain. (15 Mono 2022 15:50)    Medications:   MEDICATIONS  (STANDING):  albuterol/ipratropium for Nebulization 3 milliLiter(s) Nebulizer every 6 hours  enoxaparin Injectable 40 milliGRAM(s) SubCutaneous every 12 hours  insulin lispro (ADMELOG) corrective regimen sliding scale   SubCutaneous every 6 hours  lacosamide Solution 100 milliGRAM(s) Oral two times a day  nystatin Cream 1 Application(s) Topical every 12 hours  senna 2 Tablet(s) Oral at bedtime  sodium chloride 0.45%. 500 milliLiter(s) (100 mL/Hr) IV Continuous <Continuous>  sodium chloride 0.9% for Nebulization 3 milliLiter(s) Nebulizer every 6 hours    MEDICATIONS  (PRN):             Vitals      Vital Signs Last 24 Hrs  T(C): 36.7 (22 @ 05:00), Max: 36.7 (22 @ 11:04)  T(F): 98 (22 @ 05:00), Max: 98 (22 @ 11:04)  HR: 87 (22 @ 08:30) (61 - 87)  BP: 136/80 (22 @ 05:00) (118/77 - 136/80)  BP(mean): --  RR: 20 (22 @ 05:28) (17 - 20)  SpO2: 94% (22 @ 08:30) (91% - 96%)            General Exam:   General Appearance: Appropriately dressed and in no acute distress       Head: Normocephalic, atraumatic and no dysmorphic features  Ear, Nose, and Throat: Moist mucous membranes +NGT    CVS: S1S2+  Resp: No SOB, no wheeze or rhonchi  GI: soft NT/ND  Extremities: No edema or cyanosis  Skin: No bruises or rashes     Neurological Exam:    Mental Status: Awake, alert and oriented x 1-2.  Able to follow simple verbal commands intermittently. Able to name and repeat.+ dysarthria    Cranial Nerves: PERRL, EOMI, VFFC, sensation V1-V3 intact, L facial, equal elevation of palate,  , tongue is midline on protrusion. no obvious papilledema on fundoscopic exam. hearing is grossly intact.   Motor: moving R full strength LUE 3/5/ LLE 1-2/5, tremor in uppers at times, myoclonus    Sensation: withdraws R>L  Reflexes: 1+ throughout at biceps, brachioradialis, triceps, patellars and ankles bilaterally and equal. No clonus. R toe and L toe were both downgoing.  Coordination: unable   Gait: unable     Data/Labs/Imaging which I personally reviewed.     Labs:   CBC Full  -  ( 2022 06:53 )  WBC Count : 5.38 K/uL  RBC Count : 4.15 M/uL  Hemoglobin : 12.5 g/dL  Hematocrit : 41.3 %  Platelet Count - Automated : 223 K/uL  Mean Cell Volume : 99.5 fl  Mean Cell Hemoglobin : 30.1 pg  Mean Cell Hemoglobin Concentration : 30.3 gm/dL  Auto Neutrophil # : x  Auto Lymphocyte # : x  Auto Monocyte # : x  Auto Eosinophil # : x  Auto Basophil # : x  Auto Neutrophil % : x  Auto Lymphocyte % : x  Auto Monocyte % : x  Auto Eosinophil % : x  Auto Basophil % : x    02-05    147<H>  |  105  |  37<H>  ----------------------------<  150<H>  4.2   |  28  |  0.97    Ca    10.1      2022 06:50  Phos  2.8     02-05  Mg     2.6     02-05        Urinalysis Basic - ( 2022 21:31 )    Color: Yellow / Appearance: Clear / S.023 / pH: x  Gluc: x / Ketone: Negative  / Bili: Negative / Urobili: Negative   Blood: x / Protein: 100 / Nitrite: Negative   Leuk Esterase: Negative / RBC: 0 /hpf / WBC 0 /HPF   Sq Epi: x / Non Sq Epi: 1 /hpf / Bacteria: Negative    < from: CT Angio Head w/ IV Cont (01.15.22 @ 17:16) >    ACC: [id number] EXAM:  CT ANGIO NECK (W)AW IC                        ACC: [id number] EXAM:  CT ANGIO BRAIN (W)AW IC                          PROCEDURE DATE:  01/15/2022          INTERPRETATION:  CT ANGIOGRAPHY OF THE NECK AND BRAIN.    CLINICAL INFORMATION:81 years Male ICH    TECHNIQUE:  CT angiography of the neck and brain was performed during the dynamic   administration of intravenous contrast.  MIP reconstructions were   performed and reviewed. Multiplanar reformations were obtained.  Contrastadministered 70 cc Omnipaque 350, contrast discarded 30 cc    FINDINGS:  NECK  RIGHT CAROTID CIRCULATION:  Evaluation of the right carotid circulation demonstrates no traumatic   occlusion, dissection, or pseudoaneurysm.    LEFT CAROTID CIRCULATION:  Evaluation of the left carotid circulation demonstrates no traumatic   occlusion, dissection, or pseudoaneurysm.    VERTEBRAL ARTERIES:  Evaluation of the vertebral arteries reveals no evidence of a vertebral   artery occlusion or dissection.    BRAIN  ANTERIOR CIRCULATION:  Distal internal carotid arteries are patent. Calcification involves the   cavernous segments with mild degrees of narrowing  Anterior cerebral arteries are patent. A1 segment of the left anterior   cerebral artery is smaller than the right, a normal anatomic variant  Middle cerebral arteries are patent.    POSTERIOR CIRCULATION:  Distal vertebral arteries are patent. Bilateral posterior inferior   cerebellar arteries are seen.  Basilar artery is patent. Proximal superior cerebellar arteries are patent  Posterior cerebral arteries are patent.    OTHER:  No evidence of abnormal enhancement associated with the moderate to large   right frontal parenchymal hemorrhage.  No puddling of contrast.  No aneurysm or arteriovenous malformation seen.    IMPRESSION:  *  NO TRAUMATIC OCCLUSION, DISSECTION, OR PSEUDOANEURYSM.  *  NO ABNORMAL ENHANCEMENT ASSOCIATED WITH THE MODERATE TO LARGE RIGHT   FRONTAL PARENCHYMAL HEMORRHAGE.  *  NO PUDDLING OF CONTRAST. NO ANEURYSM OR AVM SEEN.    --- End of Report ---            PAWAN ODELL MD; Attending Radiologist  This document has been electronically signed. Mono 15 2022  7:49PM    < end of copied text >  < from: CT Head No Cont (22 @ 09:30) >    ACC: [id number] EXAM:  CT BRAIN                          PROCEDURE DATE:  2022          INTERPRETATION:  CLINICAL INFORMATION: Intracranial hemorrhage, follow-up.    TECHNIQUE: Portable noncontrast axial CT images were acquired of the head.    COMPARISON STUDY: CT head 2022.    FINDINGS:  Large right frontal lobe parenchymal hematoma measures approximately 7.7   x 3.5 cm, previously 7.5 x 3.2 cm, with surrounding edema and mass   effect, without interval interval change compared to prior.   Redemonstrated small intraventricular hemorrhage, similar to prior.    Moderate cerebral volume loss with proportional prominence of the sulci   and ventricles. Nonspecific white matter hypoattenuation, likely related   to chronic microvascular changes.    Left frontal arachnoid cyst, unchanged.    Mucosal thickening of the left maxillary and bilateral ethmoid sinuses.   Partially visualized nasogastric tube in situ. Mastoid air cells are   clear.    Evidence of bilateral lens surgery.    Nodisplaced calvarial fracture.      IMPRESSION:  Stable exam.    --- End of Report ---          HARRIET WATSON MD; Resident Radiology  This document has been electronically signed.  CRUZ LÓPEZ MD; Attending Radiologist  This document has been electronically signed. 2022 10:58AM    < end of copied text >    < from: CT Head No Cont (22 @ 22:13) >    ACC: [id number] EXAM:  CT BRAIN                          PROCEDURE DATE:  2022          INTERPRETATION:  Clinical indication: Altered mental status    Multiple axial sections were performed from base skull to vertex without   contrast enhancement. Coronal and sagittal reconstructions were performed   as well    This exam is compared prior head CT performed on 2020.    Again seen is abnormal high attenuation identified involving the high   right frontal cortical subcortical region. This compatible with acute   parenchymal hemorrhage surrounding edema. This acute parenchymal   hemorrhage measures approximately 7.3 x 3.2 cm and previously measured   approximately 7.7 x 3.5 cm. Localized mass effect is seen consisting of   sulcal effacement.    Extra-axial low-attenuation with adjacent bony scalloping is again seen   involving the high left frontal region. This finding measures   approximately 4.2 x 2.8 cm and previously measured approximately 4.1 x   3.1 cm. This is compatible with an arachnoid cyst.    Parenchymal volume loss and chronic microvascular ischemic changes are   identified.    Intraventricular hemorrhage is again seen.    Evaluation of structures with the appropriate window appears unremarkable    Left-sided NG tube is seen.    The visualized paranasal sinuses mastoid and middle ear regions appear   clear.    Impression: Acute parenchymal hemorrhage is again seen with some expected   evolutionary changes.    Arachnoid cyst again seen as described above.    --- End of Report ---              < from: CT Head No Cont (22 @ 09:57) >    ACC: [id number] EXAM:  CT BRAIN                          PROCEDURE DATE:  2022          INTERPRETATION:  Clinical indication: Altered mental status    Multiple axial sections were performed from base skull to vertex without   contrast enhancement. Coronal and sagittal destructions were performed as   well    This exam is compared with prior head CT performed on 2022.    Abnormal high attenuation involving the high left frontal cortical   subcortical region is again seen. This finding measures approximately 5.1   x 2.8 cm and previously measured approximately 7.3 x 3.2 cm. Surrounding   edema is again seen.    Small amount of subrecord hemorrhage is again suspected involving the   left posterior temporal/posterior frontal region    Intraventricular hemorrhage is again seen.    The size and configuration of ventricles appear unchanged    No new areas of acute hemorrhage is seen.    Extra-axial low-attenuation from the high left frontal region is again   seen with bony scalloping. This appears unchanged when compared with the   prior exam and is likely compatible with an arachnoid cyst    Evaluation of osseous structures with the appropriate window appears   unchanged    The visualized paranasal sinuses mastoid and middle ear regions appear   clear.    Orotracheal and orogastric tubes are seen.    The paranasal sinuses mastoid and middle ear regions appear clear.    The patient status post bilateral cataract surgery.    IMPRESSION: Acute parenchymal hemorrhage is again seen as well as   intraventricular hemorrhage. Subrecord hemorrhage is suspected.    --- End of Report ---            BE TOUSSAINT MD; Attending Radiologist  This document has been electronically signed. 2022 10:10AM    < end of copied text >      EEG IMPRESSION/CLINICAL CORRELATE    Abnormal EEG study.  1. Risk for seizures from the right posterior quadrant and left parietal region.   2. Structural abnormality in the right hemisphere.  3. Moderate multifocal/diffuse nonspecific cerebral dysfunction.    No seizures recorded.

## 2022-02-05 NOTE — PROGRESS NOTE ADULT - ASSESSMENT
81y m W/  HTN, thoracic aneurysm s/p repair, SWETA on CPAP, known hx ascending aorta aneurysm measuring 4cm, infrarenal AAA 5cm and right common iliac aneurysm presenting to the ED from [facility] as a transfer for R frontal parenchymal hemorrhage on CT after fall at home.  CTH R hemisphere IPH  CTA H/N no aneurysm or AVM  repeat CTH stable   CK 3000  NH3 WNL   1/26 CT chest wtih opacities  1/27 CTH normal evolutionary changes no new findings   1/29 RRT for hypoxia. intubated. in MICU  CTH 1/30 still wtih IVH and SAH.  extubated  EEG 1/30 without seizures but potential risk from R post quadrant and L parietal  2/3   eyes speaks, answers questions intermittently, in B/L UE restraints still.   2/4 SS/MBS today   2/5 now on puree diet   Impression: R IPH of unclear etiology.  may be 2/2 HTN. need to r/o CAA or underlying mass   - nsx f/u.   - no need for PEG on diet   - was on cefepime for infection., now off   - SBP <160  - DVT ppx okay. hold AC/AP  -  on vimpat 100mg BID--> continue   - MRI brain w/ and w/o when able  - no need for hypertonics  - cardio following. known thoracic aneurysm s/p repair.   - f/uj pulmo for resp status.    - telemetry  - PT/OT/SS/SLP, OOBC  - check FS, glucose control <180  - GI/DVT ppx  - Thank you for allowing me to participate in the care of this patient. Call with questions.   -ethic and palliative called   - stroke team to reach out to family 2/3.  stroke team spoke wt 2 family members including including a physician in there family. all neuro questions answered   Carlin Neal MD  Vascular Neurology  Office: [phone]

## 2022-02-05 NOTE — PROVIDER CONTACT NOTE (OTHER) - ACTION/TREATMENT ORDERED:
Provider is ok with not giving insulin (2units) since he was covered at 6:48am this morning by night nurse.

## 2022-02-05 NOTE — PROGRESS NOTE ADULT - PROBLEM SELECTOR PLAN 5
DVT: lovenox BID  Diet: TF, speech swallow eval  Dispo: pending S+S and PT re-eval         Full Code DVT: lovenox BID  Diet: puree, mildly thick liquids  Dispo: pending S+S and PT re-eval         Full Code

## 2022-02-05 NOTE — PROVIDER CONTACT NOTE (OTHER) - DATE AND TIME:
01-Feb-2022 14:33
05-Feb-2022 09:07
19-Jan-2022 18:00
18-Jan-2022 23:00
20-Jan-2022 01:15
20-Jan-2022 20:15
28-Jan-2022 21:20
02-Feb-2022 23:15
16-Jan-2022 01:00
19-Jan-2022 00:00
29-Jan-2022 00:39
29-Jan-2022 01:05
29-Jan-2022 05:33
16-Jan-2022 07:10

## 2022-02-05 NOTE — PROGRESS NOTE ADULT - PROBLEM SELECTOR PLAN 3
-2/2 to neurological event  -current on tube feeds, pending S+S eval   -aspiration precautions -2/2 to neurological event  - puree diet with mildly thick liquids per S&S  -aspiration precautions

## 2022-02-05 NOTE — PROVIDER CONTACT NOTE (OTHER) - NAME OF MD/NP/PA/DO NOTIFIED:
Wesley Canales MD
Insulin
ISIDRO Rhodes at bedside
Etienne Canales
Etienne JOHNSON
ISIDRO Hall
ISIDRO Herrera
ISIDRO Rhodes at bedside
ISIDRO Rhodes at bedside
WES Reinoso
T1 Micah Hansen
ISIDRO Rhodes at bedside
MD Etienne
MD Briggs

## 2022-02-05 NOTE — PROGRESS NOTE ADULT - ATTENDING COMMENTS
presented with ICH and poor mental status  now much improved  bleed is stable on serial imaging  he  is tolerating feeds  consider MRIb now that mental status is improved  continue AEDs  neuro/nsgx follow up  likely dc to rehab

## 2022-02-05 NOTE — PROGRESS NOTE ADULT - SUBJECTIVE AND OBJECTIVE BOX
DATE OF SERVICE: 02-05-22 @ 13:39    Subjective: Patient seen and examined. No new events except as noted.     SUBJECTIVE/ROS:        MEDICATIONS:  MEDICATIONS  (STANDING):  albuterol/ipratropium for Nebulization 3 milliLiter(s) Nebulizer every 6 hours  enoxaparin Injectable 40 milliGRAM(s) SubCutaneous every 12 hours  insulin lispro (ADMELOG) corrective regimen sliding scale   SubCutaneous every 6 hours  lacosamide Solution 100 milliGRAM(s) Oral two times a day  nystatin Cream 1 Application(s) Topical every 12 hours  senna 2 Tablet(s) Oral at bedtime  sodium chloride 0.45%. 500 milliLiter(s) (100 mL/Hr) IV Continuous <Continuous>  sodium chloride 0.9% for Nebulization 3 milliLiter(s) Nebulizer every 6 hours      PHYSICAL EXAM:  T(C): 36.7 (02-05-22 @ 13:00), Max: 36.7 (02-05-22 @ 05:00)  HR: 75 (02-05-22 @ 13:00) (61 - 87)  BP: 112/72 (02-05-22 @ 13:00) (112/72 - 136/80)  RR: 20 (02-05-22 @ 13:00) (18 - 20)  SpO2: 96% (02-05-22 @ 13:00) (91% - 96%)  Wt(kg): --  I&O's Summary    04 Feb 2022 07:01  -  05 Feb 2022 07:00  --------------------------------------------------------  IN: 1070 mL / OUT: 0 mL / NET: 1070 mL            JVP: Normal  Neck: supple  Lung: clear   CV: S1 S2 , Murmur:  Abd: soft  Ext: No edema  neuro: Awake / alert  Psych: flat affect  Skin: normal``    LABS/DATA:    CARDIAC MARKERS:                                12.5   5.38  )-----------( 223      ( 05 Feb 2022 06:53 )             41.3     02-05    147<H>  |  105  |  37<H>  ----------------------------<  150<H>  4.2   |  28  |  0.97    Ca    10.1      05 Feb 2022 06:50  Phos  2.8     02-05  Mg     2.6     02-05      proBNP:   Lipid Profile:   HgA1c:   TSH:     TELE:  EKG:

## 2022-02-05 NOTE — PROGRESS NOTE ADULT - SUBJECTIVE AND OBJECTIVE BOX
Micah Hansen, PGY-1  Internal Medicine  [employer]; #[phone]      PROGRESS NOTE:     Patient is a 81y old  Male who presents with a chief complaint of R ICH (03 Feb 2022 13:34)      SUBJECTIVE AND OVERNIGHT EVENTS:       ADDITIONAL REVIEW OF SYSTEMS:    MEDICATIONS  (STANDING):  albuterol/ipratropium for Nebulization 3 milliLiter(s) Nebulizer every 6 hours  enoxaparin Injectable 40 milliGRAM(s) SubCutaneous every 12 hours  insulin lispro (ADMELOG) corrective regimen sliding scale   SubCutaneous every 6 hours  lacosamide Solution 100 milliGRAM(s) Oral two times a day  nystatin Cream 1 Application(s) Topical every 12 hours  senna 2 Tablet(s) Oral at bedtime  sodium chloride 0.45%. 500 milliLiter(s) (100 mL/Hr) IV Continuous <Continuous>  sodium chloride 0.9% for Nebulization 3 milliLiter(s) Nebulizer every 6 hours    MEDICATIONS  (PRN):      CAPILLARY BLOOD GLUCOSE    POCT Blood Glucose.: 189 mg/dL (05 Feb 2022 06:56)  POCT Blood Glucose.: 204 mg/dL (05 Feb 2022 00:34)  POCT Blood Glucose.: 237 mg/dL (04 Feb 2022 17:44)  POCT Blood Glucose.: 228 mg/dL (04 Feb 2022 12:47)  POCT Blood Glucose.: 179 mg/dL (04 Feb 2022 12:29)      I&O's Summary    04 Feb 2022 07:01  -  05 Feb 2022 07:00  --------------------------------------------------------  IN: 1070 mL / OUT: 0 mL / NET: 1070 mL          PHYSICAL EXAM:  Vital Signs Last 24 Hrs  T(C): 36.7 (05 Feb 2022 05:00), Max: 36.7 (04 Feb 2022 11:04)  T(F): 98 (05 Feb 2022 05:00), Max: 98 (04 Feb 2022 11:04)  HR: 72 (05 Feb 2022 05:00) (60 - 86)  BP: 136/80 (05 Feb 2022 05:00) (118/77 - 136/80)  RR: 20 (05 Feb 2022 05:28) (17 - 20)  SpO2: 95% (05 Feb 2022 05:28) (91% - 96%)    CONSTITUTIONAL: NAD, well-developed, on NC and NRB mask. used [place]er translater   HEENT: NC/AT, EOMI  RESPIRATORY: No increased work of breathing, CTAB, no wheezes or crackles appreciated  CARDIOVASCULAR: RRR, S1 and S2 present, no m/r/g  ABDOMEN: soft, NT, ND, bowel sounds present  EXTREMITIES: No LE edema  MUSCULOSKELETAL: no joint swelling or tenderness to palpation  NEURO: A&Ox1, conversive, following commands, moving all extremities    LABS:                        12.5   5.38  )-----------( 223      ( 05 Feb 2022 06:53 )             41.3     02-04    148<H>  |  107  |  39<H>  ----------------------------<  163<H>  4.1   |  31  |  0.90    Ca    9.4      04 Feb 2022 10:21  Phos  3.5     02-04  Mg     2.5     02-04    TPro  7.3  /  Alb  3.6  /  TBili  0.5  /  DBili  x   /  AST  43<H>  /  ALT  47<H>  /  AlkPhos  74  02-03      RADIOLOGY & ADDITIONAL TESTS:  CT BRAIN 1/30  This exam is compared with prior head CT performed on January 27, 2022.    Abnormal high attenuation involving the high left frontal cortical subcortical region is again seen. This finding measures approximately 5.1 x 2.8 cm and previously measured approximately 7.3 x 3.2 cm. Surrounding edema is again seen.    Small amount of subrecord hemorrhage is again suspected involving the left posterior temporal/posterior frontal region    Intraventricular hemorrhage is again seen.    The size and configuration of ventricles appear unchanged    No new areas of acute hemorrhage is seen.    Extra-axial low-attenuation from the high left frontal region is again seen with bony scalloping. This appears unchanged when compared with the prior exam and is likely compatible with an arachnoid cyst    Evaluation of osseous structures with the appropriate window appears unchanged    The visualized paranasal sinuses mastoid and middle ear regions appear clear.    Orotracheal and orogastric tubes are seen.    The paranasal sinuses mastoid and middle ear regions appear clear.    The patient status post bilateral cataract surgery.    IMPRESSION: Acute parenchymal hemorrhage is again seen as well as intraventricular hemorrhage. Subrecord hemorrhage is suspected.     Micah Hansen, PGY-1  Internal Medicine  [facility] Teams; #[phone]      PROGRESS NOTE:     Patient is a 81y old  Male who presents with a chief complaint of R ICH (03 Feb 2022 13:34)      SUBJECTIVE AND OVERNIGHT EVENTS:   Patient was seen and examined at bedside this morning. He was more tired today but responded to  when prompted. He had no acute events overnight.     ADDITIONAL REVIEW OF SYSTEMS:    MEDICATIONS  (STANDING):  albuterol/ipratropium for Nebulization 3 milliLiter(s) Nebulizer every 6 hours  enoxaparin Injectable 40 milliGRAM(s) SubCutaneous every 12 hours  insulin lispro (ADMELOG) corrective regimen sliding scale   SubCutaneous every 6 hours  lacosamide Solution 100 milliGRAM(s) Oral two times a day  nystatin Cream 1 Application(s) Topical every 12 hours  senna 2 Tablet(s) Oral at bedtime  sodium chloride 0.45%. 500 milliLiter(s) (100 mL/Hr) IV Continuous <Continuous>  sodium chloride 0.9% for Nebulization 3 milliLiter(s) Nebulizer every 6 hours    MEDICATIONS  (PRN):      CAPILLARY BLOOD GLUCOSE    POCT Blood Glucose.: 189 mg/dL (05 Feb 2022 06:56)  POCT Blood Glucose.: 204 mg/dL (05 Feb 2022 00:34)  POCT Blood Glucose.: 237 mg/dL (04 Feb 2022 17:44)  POCT Blood Glucose.: 228 mg/dL (04 Feb 2022 12:47)  POCT Blood Glucose.: 179 mg/dL (04 Feb 2022 12:29)      I&O's Summary    04 Feb 2022 07:01  -  05 Feb 2022 07:00  --------------------------------------------------------  IN: 1070 mL / OUT: 0 mL / NET: 1070 mL          PHYSICAL EXAM:  Vital Signs Last 24 Hrs  T(C): 36.7 (05 Feb 2022 05:00), Max: 36.7 (04 Feb 2022 11:04)  T(F): 98 (05 Feb 2022 05:00), Max: 98 (04 Feb 2022 11:04)  HR: 72 (05 Feb 2022 05:00) (60 - 86)  BP: 136/80 (05 Feb 2022 05:00) (118/77 - 136/80)  RR: 20 (05 Feb 2022 05:28) (17 - 20)  SpO2: 95% (05 Feb 2022 05:28) (91% - 96%)    CONSTITUTIONAL: NAD, well-developed, on NC. used [place] citizen translater   HEENT: NC/AT, EOMI  RESPIRATORY: No increased work of breathing, CTAB, no wheezes or crackles appreciated  CARDIOVASCULAR: RRR, S1 and S2 present, no m/r/g  ABDOMEN: soft, NT, ND, bowel sounds present  EXTREMITIES: No LE edema  MUSCULOSKELETAL: no joint swelling or tenderness to palpation  NEURO: A&Ox1, following commands, moving all extremities    LABS:                        12.5   5.38  )-----------( 223      ( 05 Feb 2022 06:53 )             41.3     02-04    148<H>  |  107  |  39<H>  ----------------------------<  163<H>  4.1   |  31  |  0.90    Ca    9.4      04 Feb 2022 10:21  Phos  3.5     02-04  Mg     2.5     02-04    TPro  7.3  /  Alb  3.6  /  TBili  0.5  /  DBili  x   /  AST  43<H>  /  ALT  47<H>  /  AlkPhos  74  02-03      RADIOLOGY & ADDITIONAL TESTS:  CT BRAIN 1/30  This exam is compared with prior head CT performed on January 27, 2022.    Abnormal high attenuation involving the high left frontal cortical subcortical region is again seen. This finding measures approximately 5.1 x 2.8 cm and previously measured approximately 7.3 x 3.2 cm. Surrounding edema is again seen.    Small amount of subrecord hemorrhage is again suspected involving the left posterior temporal/posterior frontal region    Intraventricular hemorrhage is again seen.    The size and configuration of ventricles appear unchanged    No new areas of acute hemorrhage is seen.    Extra-axial low-attenuation from the high left frontal region is again seen with bony scalloping. This appears unchanged when compared with the prior exam and is likely compatible with an arachnoid cyst    Evaluation of osseous structures with the appropriate window appears unchanged    The visualized paranasal sinuses mastoid and middle ear regions appear clear.    Orotracheal and orogastric tubes are seen.    The paranasal sinuses mastoid and middle ear regions appear clear.    The patient status post bilateral cataract surgery.    IMPRESSION: Acute parenchymal hemorrhage is again seen as well as intraventricular hemorrhage. Subrecord hemorrhage is suspected.

## 2022-02-06 LAB
ANION GAP SERPL CALC-SCNC: 15 MMOL/L — SIGNIFICANT CHANGE UP (ref 5–17)
BUN SERPL-MCNC: 32 MG/DL — HIGH (ref 7–23)
CALCIUM SERPL-MCNC: 10 MG/DL — SIGNIFICANT CHANGE UP (ref 8.4–10.5)
CHLORIDE SERPL-SCNC: 104 MMOL/L — SIGNIFICANT CHANGE UP (ref 96–108)
CO2 SERPL-SCNC: 29 MMOL/L — SIGNIFICANT CHANGE UP (ref 22–31)
CREAT SERPL-MCNC: 1.01 MG/DL — SIGNIFICANT CHANGE UP (ref 0.5–1.3)
GLUCOSE BLDC GLUCOMTR-MCNC: 142 MG/DL — HIGH (ref 70–99)
GLUCOSE BLDC GLUCOMTR-MCNC: 189 MG/DL — HIGH (ref 70–99)
GLUCOSE BLDC GLUCOMTR-MCNC: 194 MG/DL — HIGH (ref 70–99)
GLUCOSE BLDC GLUCOMTR-MCNC: 198 MG/DL — HIGH (ref 70–99)
GLUCOSE BLDC GLUCOMTR-MCNC: 237 MG/DL — HIGH (ref 70–99)
GLUCOSE SERPL-MCNC: 142 MG/DL — HIGH (ref 70–99)
HCT VFR BLD CALC: 38.7 % — LOW (ref 39–50)
HGB BLD-MCNC: 11.7 G/DL — LOW (ref 13–17)
MAGNESIUM SERPL-MCNC: 2.4 MG/DL — SIGNIFICANT CHANGE UP (ref 1.6–2.6)
MCHC RBC-ENTMCNC: 30.2 GM/DL — LOW (ref 32–36)
MCHC RBC-ENTMCNC: 30.2 PG — SIGNIFICANT CHANGE UP (ref 27–34)
MCV RBC AUTO: 99.7 FL — SIGNIFICANT CHANGE UP (ref 80–100)
NRBC # BLD: 0 /100 WBCS — SIGNIFICANT CHANGE UP (ref 0–0)
PHOSPHATE SERPL-MCNC: 3 MG/DL — SIGNIFICANT CHANGE UP (ref 2.5–4.5)
PLATELET # BLD AUTO: 211 K/UL — SIGNIFICANT CHANGE UP (ref 150–400)
POTASSIUM SERPL-MCNC: 3.8 MMOL/L — SIGNIFICANT CHANGE UP (ref 3.5–5.3)
POTASSIUM SERPL-SCNC: 3.8 MMOL/L — SIGNIFICANT CHANGE UP (ref 3.5–5.3)
RBC # BLD: 3.88 M/UL — LOW (ref 4.2–5.8)
RBC # FLD: 13.5 % — SIGNIFICANT CHANGE UP (ref 10.3–14.5)
SODIUM SERPL-SCNC: 148 MMOL/L — HIGH (ref 135–145)
WBC # BLD: 4.49 K/UL — SIGNIFICANT CHANGE UP (ref 3.8–10.5)
WBC # FLD AUTO: 4.49 K/UL — SIGNIFICANT CHANGE UP (ref 3.8–10.5)

## 2022-02-06 PROCEDURE — 99232 SBSQ HOSP IP/OBS MODERATE 35: CPT | Mod: GC

## 2022-02-06 RX ORDER — SODIUM CHLORIDE 9 MG/ML
1000 INJECTION, SOLUTION INTRAVENOUS
Refills: 0 | Status: COMPLETED | OUTPATIENT
Start: 2022-02-06 | End: 2022-02-06

## 2022-02-06 RX ADMIN — SODIUM CHLORIDE 3 MILLILITER(S): 9 INJECTION INTRAMUSCULAR; INTRAVENOUS; SUBCUTANEOUS at 12:18

## 2022-02-06 RX ADMIN — LACOSAMIDE 100 MILLIGRAM(S): 50 TABLET ORAL at 17:23

## 2022-02-06 RX ADMIN — SODIUM CHLORIDE 3 MILLILITER(S): 9 INJECTION INTRAMUSCULAR; INTRAVENOUS; SUBCUTANEOUS at 00:17

## 2022-02-06 RX ADMIN — Medication 4: at 12:41

## 2022-02-06 RX ADMIN — SODIUM CHLORIDE 3 MILLILITER(S): 9 INJECTION INTRAMUSCULAR; INTRAVENOUS; SUBCUTANEOUS at 05:10

## 2022-02-06 RX ADMIN — SODIUM CHLORIDE 3 MILLILITER(S): 9 INJECTION INTRAMUSCULAR; INTRAVENOUS; SUBCUTANEOUS at 23:31

## 2022-02-06 RX ADMIN — LACOSAMIDE 100 MILLIGRAM(S): 50 TABLET ORAL at 05:12

## 2022-02-06 RX ADMIN — Medication 2: at 17:42

## 2022-02-06 RX ADMIN — Medication 3 MILLILITER(S): at 05:10

## 2022-02-06 RX ADMIN — Medication 3 MILLILITER(S): at 12:18

## 2022-02-06 RX ADMIN — Medication 2: at 00:17

## 2022-02-06 RX ADMIN — SODIUM CHLORIDE 100 MILLILITER(S): 9 INJECTION, SOLUTION INTRAVENOUS at 12:39

## 2022-02-06 RX ADMIN — NYSTATIN CREAM 1 APPLICATION(S): 100000 CREAM TOPICAL at 17:24

## 2022-02-06 RX ADMIN — ENOXAPARIN SODIUM 40 MILLIGRAM(S): 100 INJECTION SUBCUTANEOUS at 05:10

## 2022-02-06 RX ADMIN — Medication 3 MILLILITER(S): at 00:16

## 2022-02-06 RX ADMIN — SODIUM CHLORIDE 3 MILLILITER(S): 9 INJECTION INTRAMUSCULAR; INTRAVENOUS; SUBCUTANEOUS at 17:23

## 2022-02-06 RX ADMIN — SODIUM CHLORIDE 100 MILLILITER(S): 9 INJECTION, SOLUTION INTRAVENOUS at 20:40

## 2022-02-06 RX ADMIN — NYSTATIN CREAM 1 APPLICATION(S): 100000 CREAM TOPICAL at 05:10

## 2022-02-06 RX ADMIN — ENOXAPARIN SODIUM 40 MILLIGRAM(S): 100 INJECTION SUBCUTANEOUS at 17:23

## 2022-02-06 RX ADMIN — Medication 3 MILLILITER(S): at 23:31

## 2022-02-06 RX ADMIN — Medication 3 MILLILITER(S): at 17:23

## 2022-02-06 NOTE — PROGRESS NOTE ADULT - ASSESSMENT
81y m W/  HTN, thoracic aneurysm s/p repair, SWETA on CPAP, known hx ascending aorta aneurysm measuring 4cm, infrarenal AAA 5cm and right common iliac aneurysm presenting to the ED from [facility] as a transfer for R frontal parenchymal hemorrhage on CT after fall at home.  CTH R hemisphere IPH  CTA H/N no aneurysm or AVM  repeat CTH stable   CK 3000  NH3 WNL   1/26 CT chest wtih opacities  1/27 CTH normal evolutionary changes no new findings   1/29 RRT for hypoxia. intubated. in MICU  CTH 1/30 still wtih IVH and SAH.  extubated  EEG 1/30 without seizures but potential risk from R post quadrant and L parietal  2/3   eyes speaks, answers questions intermittently, in B/L UE restraints still.   2/4 SS/MBS today   2/5 now on puree diet   no neuro change slowly improving   Impression: R IPH of unclear etiology.  may be 2/2 HTN. need to r/o CAA or underlying mass   - nsx f/u.   - no need for PEG on diet   - was on cefepime for infection., now off   - SBP <160  - DVT ppx okay. hold AC/AP  -  on vimpat 100mg BID--> continue   - MRI brain w/ and w/o when able  - no need for hypertonics  - cardio following. known thoracic aneurysm s/p repair.   - f/u  pulmo for resp status.    - telemetry  - PT/OT/SS/SLP, OOBC  - check FS, glucose control <180  - GI/DVT ppx  - Thank you for allowing me to participate in the care of this patient. Call with questions.   -ethic and palliative called   - stroke team to reach out to family 2/3.  stroke team spoke wtih 2 family members including including a physician in there family. all neuro questions answered   Carlin Neal MD  Vascular Neurology  Office: [phone]

## 2022-02-06 NOTE — PROGRESS NOTE ADULT - SUBJECTIVE AND OBJECTIVE BOX
Micah Hansen, PGY-1  Internal Medicine  [employer]; #[phone]      PROGRESS NOTE:     Patient is a 81y old  Male who presents with a chief complaint of R ICH (03 Feb 2022 13:34)      SUBJECTIVE AND OVERNIGHT EVENTS:        ADDITIONAL REVIEW OF SYSTEMS:    MEDICATIONS  (STANDING):  albuterol/ipratropium for Nebulization 3 milliLiter(s) Nebulizer every 6 hours  enoxaparin Injectable 40 milliGRAM(s) SubCutaneous every 12 hours  insulin lispro (ADMELOG) corrective regimen sliding scale   SubCutaneous every 6 hours  lacosamide Solution 100 milliGRAM(s) Oral two times a day  nystatin Cream 1 Application(s) Topical every 12 hours  senna 2 Tablet(s) Oral at bedtime  sodium chloride 0.45%. 500 milliLiter(s) (100 mL/Hr) IV Continuous <Continuous>  sodium chloride 0.9% for Nebulization 3 milliLiter(s) Nebulizer every 6 hours    MEDICATIONS  (PRN):      CAPILLARY BLOOD GLUCOSE  POCT Blood Glucose.: 142 mg/dL (06 Feb 2022 05:14)  POCT Blood Glucose.: 189 mg/dL (06 Feb 2022 00:00)  POCT Blood Glucose.: 229 mg/dL (05 Feb 2022 21:25)  POCT Blood Glucose.: 183 mg/dL (05 Feb 2022 17:56)  POCT Blood Glucose.: 222 mg/dL (05 Feb 2022 13:02)  POCT Blood Glucose.: 155 mg/dL (05 Feb 2022 08:50)      I&O's Summary    05 Feb 2022 07:01  -  06 Feb 2022 07:00  --------------------------------------------------------  IN: 350 mL / OUT: 0 mL / NET: 350 mL      PHYSICAL EXAM:  Vital Signs Last 24 Hrs  T(C): 36.9 (06 Feb 2022 05:00), Max: 36.9 (06 Feb 2022 05:00)  T(F): 98.5 (06 Feb 2022 05:00), Max: 98.5 (06 Feb 2022 05:00)  HR: 73 (06 Feb 2022 05:00) (73 - 87)  BP: 142/84 (06 Feb 2022 05:00) (112/72 - 142/84)  RR: 18 (06 Feb 2022 05:00) (18 - 20)  SpO2: 93% (06 Feb 2022 05:00) (92% - 96%)    CONSTITUTIONAL: NAD, well-developed, on NC. used Australian translater   HEENT: NC/AT, EOMI  RESPIRATORY: No increased work of breathing, CTAB, no wheezes or crackles appreciated  CARDIOVASCULAR: RRR, S1 and S2 present, no m/r/g  ABDOMEN: soft, NT, ND, bowel sounds present  EXTREMITIES: No LE edema  MUSCULOSKELETAL: no joint swelling or tenderness to palpation  NEURO: A&Ox1, following commands, moving all extremities    LABS:                        12.5   5.38  )-----------( 223      ( 05 Feb 2022 06:53 )             41.3     02-04    148<H>  |  107  |  39<H>  ----------------------------<  163<H>  4.1   |  31  |  0.90    Ca    9.4      04 Feb 2022 10:21  Phos  3.5     02-04  Mg     2.5     02-04    TPro  7.3  /  Alb  3.6  /  TBili  0.5  /  DBili  x   /  AST  43<H>  /  ALT  47<H>  /  AlkPhos  74  02-03      RADIOLOGY & ADDITIONAL TESTS:  CT BRAIN 1/30  This exam is compared with prior head CT performed on January 27, 2022.    Abnormal high attenuation involving the high left frontal cortical subcortical region is again seen. This finding measures approximately 5.1 x 2.8 cm and previously measured approximately 7.3 x 3.2 cm. Surrounding edema is again seen.    Small amount of subrecord hemorrhage is again suspected involving the left posterior temporal/posterior frontal region    Intraventricular hemorrhage is again seen.    The size and configuration of ventricles appear unchanged    No new areas of acute hemorrhage is seen.    Extra-axial low-attenuation from the high left frontal region is again seen with bony scalloping. This appears unchanged when compared with the prior exam and is likely compatible with an arachnoid cyst    Evaluation of osseous structures with the appropriate window appears unchanged    The visualized paranasal sinuses mastoid and middle ear regions appear clear.    Orotracheal and orogastric tubes are seen.    The paranasal sinuses mastoid and middle ear regions appear clear.    The patient status post bilateral cataract surgery.    IMPRESSION: Acute parenchymal hemorrhage is again seen as well as intraventricular hemorrhage. Subrecord hemorrhage is suspected.     Micah Hansen, PGY-1  Internal Medicine  [employer]; #[phone]      PROGRESS NOTE:     Patient is a 81y old  Male who presents with a chief complaint of R ICH (03 Feb 2022 13:34)    SUBJECTIVE AND OVERNIGHT EVENTS:   Patient was seen and examined at bedside this morning. He was in bed comfortably with NC on and used the [place]  during the exam. He did not answer all questions posed to him, but denied any pain, fevers, or troubles breathing. He had no events overnight. On telemetry he was sinus 70-80s with no events.      ADDITIONAL REVIEW OF SYSTEMS:    MEDICATIONS  (STANDING):  albuterol/ipratropium for Nebulization 3 milliLiter(s) Nebulizer every 6 hours  enoxaparin Injectable 40 milliGRAM(s) SubCutaneous every 12 hours  insulin lispro (ADMELOG) corrective regimen sliding scale   SubCutaneous every 6 hours  lacosamide Solution 100 milliGRAM(s) Oral two times a day  nystatin Cream 1 Application(s) Topical every 12 hours  senna 2 Tablet(s) Oral at bedtime  sodium chloride 0.45%. 500 milliLiter(s) (100 mL/Hr) IV Continuous <Continuous>  sodium chloride 0.9% for Nebulization 3 milliLiter(s) Nebulizer every 6 hours    MEDICATIONS  (PRN):      CAPILLARY BLOOD GLUCOSE  POCT Blood Glucose.: 142 mg/dL (06 Feb 2022 05:14)  POCT Blood Glucose.: 189 mg/dL (06 Feb 2022 00:00)  POCT Blood Glucose.: 229 mg/dL (05 Feb 2022 21:25)  POCT Blood Glucose.: 183 mg/dL (05 Feb 2022 17:56)  POCT Blood Glucose.: 222 mg/dL (05 Feb 2022 13:02)  POCT Blood Glucose.: 155 mg/dL (05 Feb 2022 08:50)      I&O's Summary    05 Feb 2022 07:01  -  06 Feb 2022 07:00  --------------------------------------------------------  IN: 350 mL / OUT: 0 mL / NET: 350 mL      PHYSICAL EXAM:  Vital Signs Last 24 Hrs  T(C): 36.9 (06 Feb 2022 05:00), Max: 36.9 (06 Feb 2022 05:00)  T(F): 98.5 (06 Feb 2022 05:00), Max: 98.5 (06 Feb 2022 05:00)  HR: 73 (06 Feb 2022 05:00) (73 - 87)  BP: 142/84 (06 Feb 2022 05:00) (112/72 - 142/84)  RR: 18 (06 Feb 2022 05:00) (18 - 20)  SpO2: 93% (06 Feb 2022 05:00) (92% - 96%)    CONSTITUTIONAL: NAD, well-developed, on NC. used [place]o translater   HEENT: NC/AT, EOMI  RESPIRATORY: No increased work of breathing, CTAB, no wheezes or crackles appreciated  CARDIOVASCULAR: RRR, S1 and S2 present, no m/r/g  ABDOMEN: soft, NT, ND, bowel sounds present  EXTREMITIES: No LE edema  MUSCULOSKELETAL: no joint swelling or tenderness to palpation  NEURO: A&Ox1, responsive to     LABS:                        11.7   4.49  )-----------( 211      ( 06 Feb 2022 07:12 )             38.7     02-06    148<H>  |  104  |  32<H>  ----------------------------<  142<H>  3.8   |  29  |  1.01    Ca    10.0      06 Feb 2022 07:09  Phos  3.0     02-06  Mg     2.4     02-06    TPro  7.3  /  Alb  3.6  /  TBili  0.5  /  DBili  x   /  AST  43<H>  /  ALT  47<H>  /  AlkPhos  74  02-03      RADIOLOGY & ADDITIONAL TESTS:  CT BRAIN 1/30  This exam is compared with prior head CT performed on January 27, 2022.    Abnormal high attenuation involving the high left frontal cortical subcortical region is again seen. This finding measures approximately 5.1 x 2.8 cm and previously measured approximately 7.3 x 3.2 cm. Surrounding edema is again seen.    Small amount of subrecord hemorrhage is again suspected involving the left posterior temporal/posterior frontal region    Intraventricular hemorrhage is again seen.    The size and configuration of ventricles appear unchanged    No new areas of acute hemorrhage is seen.    Extra-axial low-attenuation from the high left frontal region is again seen with bony scalloping. This appears unchanged when compared with the prior exam and is likely compatible with an arachnoid cyst    Evaluation of osseous structures with the appropriate window appears unchanged    The visualized paranasal sinuses mastoid and middle ear regions appear clear.    Orotracheal and orogastric tubes are seen.    The paranasal sinuses mastoid and middle ear regions appear clear.    The patient status post bilateral cataract surgery.    IMPRESSION: Acute parenchymal hemorrhage is again seen as well as intraventricular hemorrhage. Subrecord hemorrhage is suspected.

## 2022-02-06 NOTE — PROGRESS NOTE ADULT - ATTENDING COMMENTS
presented with ICH and poor mental status  now much improved  bleed is stable on serial imaging  he  is tolerating feeds  consider MRIb now that mental status is improved  continue AEDs  neuro/nsgx follow up  likely dc to rehab .

## 2022-02-06 NOTE — PROGRESS NOTE ADULT - SUBJECTIVE AND OBJECTIVE BOX
Neurology Progress note  S: patient seen and examined,  on floor. doing okay.now on puree diet      Brief HPI:  81y m W/  HTN presenting to the ED from [facility] as a transfer for R frontal parenchymal hemorrhage on CT after fall at home. Patient is poor historian but states that he was diagnosed with COVID 2 weeks ago and has been feeling "tired" at home, states he felt dizzy earlier today but unable to state what time he fell or further elucidate. Patient denies AC use. Per EMS was also recently diagnosed with a AAA but has been unable to undergo surgical repair due to recent dx of COVID. Patient still endorsing he feels "tired", denies current headache, vision changes, dizziness, chest pain, nausea, back pain, focal numbness/tingling. Endorses weakness to LLE 2/2 pain. (15 Mono 2022 15:50)    Medications:     MEDICATIONS  (STANDING):  albuterol/ipratropium for Nebulization 3 milliLiter(s) Nebulizer every 6 hours  enoxaparin Injectable 40 milliGRAM(s) SubCutaneous every 12 hours  insulin lispro (ADMELOG) corrective regimen sliding scale   SubCutaneous every 6 hours  lacosamide Solution 100 milliGRAM(s) Oral two times a day  nystatin Cream 1 Application(s) Topical every 12 hours  senna 2 Tablet(s) Oral at bedtime  sodium chloride 0.9% for Nebulization 3 milliLiter(s) Nebulizer every 6 hours    MEDICATIONS  (PRN):           Vitals      Vital Signs Last 24 Hrs  T(C): 36.9 (22 @ 05:00), Max: 36.9 (22 @ 05:00)  T(F): 98.5 (22 @ 05:00), Max: 98.5 (22 @ 05:00)  HR: 73 (22 @ 05:00) (73 - 77)  BP: 142/84 (22 @ 05:00) (112/72 - 142/84)  BP(mean): --  RR: 18 (22 @ 05:00) (18 - 20)  SpO2: 93% (22 @ 05:00) (92% - 96%)              General Exam:   General Appearance: Appropriately dressed and in no acute distress       Head: Normocephalic, atraumatic and no dysmorphic features  Ear, Nose, and Throat: Moist mucous membranes +NGT    CVS: S1S2+  Resp: No SOB, no wheeze or rhonchi  GI: soft NT/ND  Extremities: No edema or cyanosis  Skin: No bruises or rashes     Neurological Exam:    Mental Status: Awake, alert and oriented x 1-2.  Able to follow simple verbal commands intermittently. Able to name and repeat.+ dysarthria    Cranial Nerves: PERRL, EOMI, VFFC, sensation V1-V3 intact, L facial, equal elevation of palate,  , tongue is midline on protrusion. no obvious papilledema on fundoscopic exam. hearing is grossly intact.   Motor: moving R full strength LUE 3/5/ LLE 1-2/5, tremor in uppers at times, myoclonus    Sensation: withdraws R>L  Reflexes: 1+ throughout at biceps, brachioradialis, triceps, patellars and ankles bilaterally and equal. No clonus. R toe and L toe were both downgoing.  Coordination: unable   Gait: unable     Data/Labs/Imaging which I personally reviewed.   CBC Full  -  ( 2022 07:12 )  WBC Count : 4.49 K/uL  RBC Count : 3.88 M/uL  Hemoglobin : 11.7 g/dL  Hematocrit : 38.7 %  Platelet Count - Automated : 211 K/uL  Mean Cell Volume : 99.7 fl  Mean Cell Hemoglobin : 30.2 pg  Mean Cell Hemoglobin Concentration : 30.2 gm/dL  Auto Neutrophil # : x  Auto Lymphocyte # : x  Auto Monocyte # : x  Auto Eosinophil # : x  Auto Basophil # : x  Auto Neutrophil % : x  Auto Lymphocyte % : x  Auto Monocyte % : x  Auto Eosinophil % : x  Auto Basophil % : x     02-    148<H>  |  104  |  32<H>  ----------------------------<  142<H>  3.8   |  29  |  1.01    Ca    10.0      2022 07:09  Phos  3.0     02-06  Mg     2.4     02-06          Urinalysis Basic - ( 2022 21:31 )    Color: Yellow / Appearance: Clear / S.023 / pH: x  Gluc: x / Ketone: Negative  / Bili: Negative / Urobili: Negative   Blood: x / Protein: 100 / Nitrite: Negative   Leuk Esterase: Negative / RBC: 0 /hpf / WBC 0 /HPF   Sq Epi: x / Non Sq Epi: 1 /hpf / Bacteria: Negative    < from: CT Angio Head w/ IV Cont (01.15.22 @ 17:16) >    ACC: [id number] EXAM:  CT ANGIO NECK (W)AW IC                        ACC: [id number] EXAM:  CT ANGIO BRAIN (W)AW IC                          PROCEDURE DATE:  01/15/2022          INTERPRETATION:  CT ANGIOGRAPHY OF THE NECK AND BRAIN.    CLINICAL INFORMATION:81 years Male ICH    TECHNIQUE:  CT angiography of the neck and brain was performed during the dynamic   administration of intravenous contrast.  MIP reconstructions were   performed and reviewed. Multiplanar reformations were obtained.  Contrastadministered 70 cc Omnipaque 350, contrast discarded 30 cc    FINDINGS:  NECK  RIGHT CAROTID CIRCULATION:  Evaluation of the right carotid circulation demonstrates no traumatic   occlusion, dissection, or pseudoaneurysm.    LEFT CAROTID CIRCULATION:  Evaluation of the left carotid circulation demonstrates no traumatic   occlusion, dissection, or pseudoaneurysm.    VERTEBRAL ARTERIES:  Evaluation of the vertebral arteries reveals no evidence of a vertebral   artery occlusion or dissection.    BRAIN  ANTERIOR CIRCULATION:  Distal internal carotid arteries are patent. Calcification involves the   cavernous segments with mild degrees of narrowing  Anterior cerebral arteries are patent. A1 segment of the left anterior   cerebral artery is smaller than the right, a normal anatomic variant  Middle cerebral arteries are patent.    POSTERIOR CIRCULATION:  Distal vertebral arteries are patent. Bilateral posterior inferior   cerebellar arteries are seen.  Basilar artery is patent. Proximal superior cerebellar arteries are patent  Posterior cerebral arteries are patent.    OTHER:  No evidence of abnormal enhancement associated with the moderate to large   right frontal parenchymal hemorrhage.  No puddling of contrast.  No aneurysm or arteriovenous malformation seen.    IMPRESSION:  *  NO TRAUMATIC OCCLUSION, DISSECTION, OR PSEUDOANEURYSM.  *  NO ABNORMAL ENHANCEMENT ASSOCIATED WITH THE MODERATE TO LARGE RIGHT   FRONTAL PARENCHYMAL HEMORRHAGE.  *  NO PUDDLING OF CONTRAST. NO ANEURYSM OR AVM SEEN.    --- End of Report ---            PAWAN ODELL MD; Attending Radiologist  This document has been electronically signed. Mono 15 2022  7:49PM    < end of copied text >  < from: CT Head No Cont (22 @ 09:30) >    ACC: [id number] EXAM:  CT BRAIN                          PROCEDURE DATE:  2022          INTERPRETATION:  CLINICAL INFORMATION: Intracranial hemorrhage, follow-up.    TECHNIQUE: Portable noncontrast axial CT images were acquired of the head.    COMPARISON STUDY: CT head 2022.    FINDINGS:  Large right frontal lobe parenchymal hematoma measures approximately 7.7   x 3.5 cm, previously 7.5 x 3.2 cm, with surrounding edema and mass   effect, without interval interval change compared to prior.   Redemonstrated small intraventricular hemorrhage, similar to prior.    Moderate cerebral volume loss with proportional prominence of the sulci   and ventricles. Nonspecific white matter hypoattenuation, likely related   to chronic microvascular changes.    Left frontal arachnoid cyst, unchanged.    Mucosal thickening of the left maxillary and bilateral ethmoid sinuses.   Partially visualized nasogastric tube in situ. Mastoid air cells are   clear.    Evidence of bilateral lens surgery.    Nodisplaced calvarial fracture.      IMPRESSION:  Stable exam.    --- End of Report ---          HARRIET WATSON MD; Resident Radiology  This document has been electronically signed.  CRUZ LÓPEZ MD; Attending Radiologist  This document has been electronically signed. 2022 10:58AM    < end of copied text >    < from: CT Head No Cont (22 @ 22:13) >    ACC: [id number] EXAM:  CT BRAIN                          PROCEDURE DATE:  2022          INTERPRETATION:  Clinical indication: Altered mental status    Multiple axial sections were performed from base skull to vertex without   contrast enhancement. Coronal and sagittal reconstructions were performed   as well    This exam is compared prior head CT performed on 2020.    Again seen is abnormal high attenuation identified involving the high   right frontal cortical subcortical region. This compatible with acute   parenchymal hemorrhage surrounding edema. This acute parenchymal   hemorrhage measures approximately 7.3 x 3.2 cm and previously measured   approximately 7.7 x 3.5 cm. Localized mass effect is seen consisting of   sulcal effacement.    Extra-axial low-attenuation with adjacent bony scalloping is again seen   involving the high left frontal region. This finding measures   approximately 4.2 x 2.8 cm and previously measured approximately 4.1 x   3.1 cm. This is compatible with an arachnoid cyst.    Parenchymal volume loss and chronic microvascular ischemic changes are   identified.    Intraventricular hemorrhage is again seen.    Evaluation of structures with the appropriate window appears unremarkable    Left-sided NG tube is seen.    The visualized paranasal sinuses mastoid and middle ear regions appear   clear.    Impression: Acute parenchymal hemorrhage is again seen with some expected   evolutionary changes.    Arachnoid cyst again seen as described above.    --- End of Report ---              < from: CT Head No Cont (22 @ 09:57) >    ACC: [id number] EXAM:  CT BRAIN                          PROCEDURE DATE:  2022          INTERPRETATION:  Clinical indication: Altered mental status    Multiple axial sections were performed from base skull to vertex without   contrast enhancement. Coronal and sagittal destructions were performed as   well    This exam is compared with prior head CT performed on 2022.    Abnormal high attenuation involving the high left frontal cortical   subcortical region is again seen. This finding measures approximately 5.1   x 2.8 cm and previously measured approximately 7.3 x 3.2 cm. Surrounding   edema is again seen.    Small amount of subrecord hemorrhage is again suspected involving the   left posterior temporal/posterior frontal region    Intraventricular hemorrhage is again seen.    The size and configuration of ventricles appear unchanged    No new areas of acute hemorrhage is seen.    Extra-axial low-attenuation from the high left frontal region is again   seen with bony scalloping. This appears unchanged when compared with the   prior exam and is likely compatible with an arachnoid cyst    Evaluation of osseous structures with the appropriate window appears   unchanged    The visualized paranasal sinuses mastoid and middle ear regions appear   clear.    Orotracheal and orogastric tubes are seen.    The paranasal sinuses mastoid and middle ear regions appear clear.    The patient status post bilateral cataract surgery.    IMPRESSION: Acute parenchymal hemorrhage is again seen as well as   intraventricular hemorrhage. Subrecord hemorrhage is suspected.    --- End of Report ---            BE TOUSSAINT MD; Attending Radiologist  This document has been electronically signed. 2022 10:10AM    < end of copied text >      EEG IMPRESSION/CLINICAL CORRELATE    Abnormal EEG study.  1. Risk for seizures from the right posterior quadrant and left parietal region.   2. Structural abnormality in the right hemisphere.  3. Moderate multifocal/diffuse nonspecific cerebral dysfunction.    No seizures recorded.

## 2022-02-06 NOTE — PROGRESS NOTE ADULT - SUBJECTIVE AND OBJECTIVE BOX
DATE OF SERVICE: 02-06-22 @ 10:07    Subjective: Patient seen and examined. No new events except as noted.     SUBJECTIVE/ROS:        MEDICATIONS:  MEDICATIONS  (STANDING):  albuterol/ipratropium for Nebulization 3 milliLiter(s) Nebulizer every 6 hours  enoxaparin Injectable 40 milliGRAM(s) SubCutaneous every 12 hours  insulin lispro (ADMELOG) corrective regimen sliding scale   SubCutaneous every 6 hours  lacosamide Solution 100 milliGRAM(s) Oral two times a day  nystatin Cream 1 Application(s) Topical every 12 hours  senna 2 Tablet(s) Oral at bedtime  sodium chloride 0.9% for Nebulization 3 milliLiter(s) Nebulizer every 6 hours      PHYSICAL EXAM:  T(C): 36.9 (02-06-22 @ 05:00), Max: 36.9 (02-06-22 @ 05:00)  HR: 73 (02-06-22 @ 05:00) (73 - 77)  BP: 142/84 (02-06-22 @ 05:00) (112/72 - 142/84)  RR: 18 (02-06-22 @ 05:00) (18 - 20)  SpO2: 93% (02-06-22 @ 05:00) (92% - 96%)  Wt(kg): --  I&O's Summary    05 Feb 2022 07:01  -  06 Feb 2022 07:00  --------------------------------------------------------  IN: 470 mL / OUT: 0 mL / NET: 470 mL            JVP: Normal  Neck: supple  Lung: clear   CV: S1 S2 , Murmur:  Abd: soft  Ext: No edema  neuro: Awake  Psych: flat affect  Skin: normal``    LABS/DATA:    CARDIAC MARKERS:                                11.7   4.49  )-----------( 211      ( 06 Feb 2022 07:12 )             38.7     02-06    148<H>  |  104  |  32<H>  ----------------------------<  142<H>  3.8   |  29  |  1.01    Ca    10.0      06 Feb 2022 07:09  Phos  3.0     02-06  Mg     2.4     02-06      proBNP:   Lipid Profile:   HgA1c:   TSH:     TELE:  EKG:

## 2022-02-06 NOTE — PROGRESS NOTE ADULT - PROBLEM SELECTOR PLAN 5
DVT: lovenox BID  Diet: puree, mildly thick liquids  Dispo: pending S+S and PT re-eval         Full Code

## 2022-02-07 LAB
ANION GAP SERPL CALC-SCNC: 10 MMOL/L — SIGNIFICANT CHANGE UP (ref 5–17)
BUN SERPL-MCNC: 26 MG/DL — HIGH (ref 7–23)
CALCIUM SERPL-MCNC: 9 MG/DL — SIGNIFICANT CHANGE UP (ref 8.4–10.5)
CHLORIDE SERPL-SCNC: 103 MMOL/L — SIGNIFICANT CHANGE UP (ref 96–108)
CO2 SERPL-SCNC: 29 MMOL/L — SIGNIFICANT CHANGE UP (ref 22–31)
CREAT SERPL-MCNC: 0.96 MG/DL — SIGNIFICANT CHANGE UP (ref 0.5–1.3)
GLUCOSE BLDC GLUCOMTR-MCNC: 145 MG/DL — HIGH (ref 70–99)
GLUCOSE BLDC GLUCOMTR-MCNC: 170 MG/DL — HIGH (ref 70–99)
GLUCOSE BLDC GLUCOMTR-MCNC: 178 MG/DL — HIGH (ref 70–99)
GLUCOSE BLDC GLUCOMTR-MCNC: 186 MG/DL — HIGH (ref 70–99)
GLUCOSE BLDC GLUCOMTR-MCNC: 204 MG/DL — HIGH (ref 70–99)
GLUCOSE SERPL-MCNC: 154 MG/DL — HIGH (ref 70–99)
HCT VFR BLD CALC: 34.4 % — LOW (ref 39–50)
HGB BLD-MCNC: 10.8 G/DL — LOW (ref 13–17)
MAGNESIUM SERPL-MCNC: 2.2 MG/DL — SIGNIFICANT CHANGE UP (ref 1.6–2.6)
MCHC RBC-ENTMCNC: 30.6 PG — SIGNIFICANT CHANGE UP (ref 27–34)
MCHC RBC-ENTMCNC: 31.4 GM/DL — LOW (ref 32–36)
MCV RBC AUTO: 97.5 FL — SIGNIFICANT CHANGE UP (ref 80–100)
NRBC # BLD: 0 /100 WBCS — SIGNIFICANT CHANGE UP (ref 0–0)
PHOSPHATE SERPL-MCNC: 2.8 MG/DL — SIGNIFICANT CHANGE UP (ref 2.5–4.5)
PLATELET # BLD AUTO: 167 K/UL — SIGNIFICANT CHANGE UP (ref 150–400)
POTASSIUM SERPL-MCNC: 3.8 MMOL/L — SIGNIFICANT CHANGE UP (ref 3.5–5.3)
POTASSIUM SERPL-SCNC: 3.8 MMOL/L — SIGNIFICANT CHANGE UP (ref 3.5–5.3)
RBC # BLD: 3.53 M/UL — LOW (ref 4.2–5.8)
RBC # FLD: 13.4 % — SIGNIFICANT CHANGE UP (ref 10.3–14.5)
SODIUM SERPL-SCNC: 142 MMOL/L — SIGNIFICANT CHANGE UP (ref 135–145)
WBC # BLD: 4.42 K/UL — SIGNIFICANT CHANGE UP (ref 3.8–10.5)
WBC # FLD AUTO: 4.42 K/UL — SIGNIFICANT CHANGE UP (ref 3.8–10.5)

## 2022-02-07 PROCEDURE — 99232 SBSQ HOSP IP/OBS MODERATE 35: CPT | Mod: GC

## 2022-02-07 RX ADMIN — Medication 3 MILLILITER(S): at 18:14

## 2022-02-07 RX ADMIN — NYSTATIN CREAM 1 APPLICATION(S): 100000 CREAM TOPICAL at 06:55

## 2022-02-07 RX ADMIN — NYSTATIN CREAM 1 APPLICATION(S): 100000 CREAM TOPICAL at 18:13

## 2022-02-07 RX ADMIN — ENOXAPARIN SODIUM 40 MILLIGRAM(S): 100 INJECTION SUBCUTANEOUS at 18:13

## 2022-02-07 RX ADMIN — LACOSAMIDE 100 MILLIGRAM(S): 50 TABLET ORAL at 06:54

## 2022-02-07 RX ADMIN — LACOSAMIDE 100 MILLIGRAM(S): 50 TABLET ORAL at 19:02

## 2022-02-07 RX ADMIN — Medication 4: at 12:56

## 2022-02-07 RX ADMIN — Medication 2: at 00:37

## 2022-02-07 RX ADMIN — Medication 3 MILLILITER(S): at 12:54

## 2022-02-07 RX ADMIN — Medication 2: at 21:30

## 2022-02-07 RX ADMIN — Medication 3 MILLILITER(S): at 06:55

## 2022-02-07 RX ADMIN — SODIUM CHLORIDE 3 MILLILITER(S): 9 INJECTION INTRAMUSCULAR; INTRAVENOUS; SUBCUTANEOUS at 12:53

## 2022-02-07 RX ADMIN — Medication 2: at 18:14

## 2022-02-07 RX ADMIN — SODIUM CHLORIDE 3 MILLILITER(S): 9 INJECTION INTRAMUSCULAR; INTRAVENOUS; SUBCUTANEOUS at 06:55

## 2022-02-07 RX ADMIN — SODIUM CHLORIDE 3 MILLILITER(S): 9 INJECTION INTRAMUSCULAR; INTRAVENOUS; SUBCUTANEOUS at 18:13

## 2022-02-07 RX ADMIN — ENOXAPARIN SODIUM 40 MILLIGRAM(S): 100 INJECTION SUBCUTANEOUS at 06:54

## 2022-02-07 NOTE — PROGRESS NOTE ADULT - SUBJECTIVE AND OBJECTIVE BOX
Neurology Progress note  S: patient seen and examined,  on floor. doing okay. on diet in chair. MR pending     Brief HPI:  81y m W/  HTN presenting to the ED from [facility] as a transfer for R frontal parenchymal hemorrhage on CT after fall at home. Patient is poor historian but states that he was diagnosed with COVID 2 weeks ago and has been feeling "tired" at home, states he felt dizzy earlier today but unable to state what time he fell or further elucidate. Patient denies AC use. Per EMS was also recently diagnosed with a AAA but has been unable to undergo surgical repair due to recent dx of COVID. Patient still endorsing he feels "tired", denies current headache, vision changes, dizziness, chest pain, nausea, back pain, focal numbness/tingling. Endorses weakness to LLE 2/2 pain. (15 Mono 2022 15:50)    Medications:     MEDICATIONS  (STANDING):  albuterol/ipratropium for Nebulization 3 milliLiter(s) Nebulizer every 6 hours  enoxaparin Injectable 40 milliGRAM(s) SubCutaneous every 12 hours  insulin lispro (ADMELOG) corrective regimen sliding scale   SubCutaneous every 6 hours  lacosamide Solution 100 milliGRAM(s) Oral two times a day  nystatin Cream 1 Application(s) Topical every 12 hours  senna 2 Tablet(s) Oral at bedtime  sodium chloride 0.9% for Nebulization 3 milliLiter(s) Nebulizer every 6 hours    MEDICATIONS  (PRN):             Vitals      Vital Signs Last 24 Hrs  T(C): 36.8 (2022 13:31), Max: 36.9 (2022 21:46)  T(F): 98.3 (2022 13:31), Max: 98.4 (2022 21:46)  HR: 77 (2022 13:31) (4 - 87)  BP: 144/83 (2022 13:31) (137/75 - 144/83)  BP(mean): --  RR: 18 (2022 13:31) (18 - 18)  SpO2: 100% (2022 13:31) (95% - 100%)            General Exam:   General Appearance: Appropriately dressed and in no acute distress       Head: Normocephalic, atraumatic and no dysmorphic features  Ear, Nose, and Throat: Moist mucous membranes +NGT    CVS: S1S2+  Resp: No SOB, no wheeze or rhonchi  GI: soft NT/ND  Extremities: No edema or cyanosis  Skin: No bruises or rashes     Neurological Exam:    Mental Status: Awake, alert and oriented x 1-2.  Able to follow simple verbal commands intermittently. Able to name and repeat.+ dysarthria    Cranial Nerves: PERRL, EOMI, VFFC, sensation V1-V3 intact, L facial, equal elevation of palate,  , tongue is midline on protrusion. no obvious papilledema on fundoscopic exam. hearing is grossly intact.   Motor: moving R full strength LUE 3/5/ LLE 1-2/5, tremor in uppers at times, myoclonus    Sensation: withdraws R>L  Reflexes: 1+ throughout at biceps, brachioradialis, triceps, patellars and ankles bilaterally and equal. No clonus. R toe and L toe were both downgoing.  Coordination: unable   Gait: unable     Data/Labs/Imaging which I personally reviewed.     CBC Full  -  ( 2022 06:04 )  WBC Count : 4.42 K/uL  RBC Count : 3.53 M/uL  Hemoglobin : 10.8 g/dL  Hematocrit : 34.4 %  Platelet Count - Automated : 167 K/uL  Mean Cell Volume : 97.5 fl  Mean Cell Hemoglobin : 30.6 pg  Mean Cell Hemoglobin Concentration : 31.4 gm/dL  Auto Neutrophil # : x  Auto Lymphocyte # : x  Auto Monocyte # : x  Auto Eosinophil # : x  Auto Basophil # : x  Auto Neutrophil % : x  Auto Lymphocyte % : x  Auto Monocyte % : x  Auto Eosinophil % : x  Auto Basophil % : x        142  |  103  |  26<H>  ----------------------------<  154<H>  3.8   |  29  |  0.96    Ca    9.0      2022 06:04  Phos  2.8     02-  Mg     2.2     -            Urinalysis Basic - ( 2022 21:31 )    Color: Yellow / Appearance: Clear / S.023 / pH: x  Gluc: x / Ketone: Negative  / Bili: Negative / Urobili: Negative   Blood: x / Protein: 100 / Nitrite: Negative   Leuk Esterase: Negative / RBC: 0 /hpf / WBC 0 /HPF   Sq Epi: x / Non Sq Epi: 1 /hpf / Bacteria: Negative    < from: CT Angio Head w/ IV Cont (01.15.22 @ 17:16) >    ACC: [id number] EXAM:  CT ANGIO NECK (W)AW IC                        ACC: [id number] EXAM:  CT ANGIO BRAIN (W)AW IC                          PROCEDURE DATE:  01/15/2022          INTERPRETATION:  CT ANGIOGRAPHY OF THE NECK AND BRAIN.    CLINICAL INFORMATION:81 years Male ICH    TECHNIQUE:  CT angiography of the neck and brain was performed during the dynamic   administration of intravenous contrast.  MIP reconstructions were   performed and reviewed. Multiplanar reformations were obtained.  Contrastadministered 70 cc Omnipaque 350, contrast discarded 30 cc    FINDINGS:  NECK  RIGHT CAROTID CIRCULATION:  Evaluation of the right carotid circulation demonstrates no traumatic   occlusion, dissection, or pseudoaneurysm.    LEFT CAROTID CIRCULATION:  Evaluation of the left carotid circulation demonstrates no traumatic   occlusion, dissection, or pseudoaneurysm.    VERTEBRAL ARTERIES:  Evaluation of the vertebral arteries reveals no evidence of a vertebral   artery occlusion or dissection.    BRAIN  ANTERIOR CIRCULATION:  Distal internal carotid arteries are patent. Calcification involves the   cavernous segments with mild degrees of narrowing  Anterior cerebral arteries are patent. A1 segment of the left anterior   cerebral artery is smaller than the right, a normal anatomic variant  Middle cerebral arteries are patent.    POSTERIOR CIRCULATION:  Distal vertebral arteries are patent. Bilateral posterior inferior   cerebellar arteries are seen.  Basilar artery is patent. Proximal superior cerebellar arteries are patent  Posterior cerebral arteries are patent.    OTHER:  No evidence of abnormal enhancement associated with the moderate to large   right frontal parenchymal hemorrhage.  No puddling of contrast.  No aneurysm or arteriovenous malformation seen.    IMPRESSION:  *  NO TRAUMATIC OCCLUSION, DISSECTION, OR PSEUDOANEURYSM.  *  NO ABNORMAL ENHANCEMENT ASSOCIATED WITH THE MODERATE TO LARGE RIGHT   FRONTAL PARENCHYMAL HEMORRHAGE.  *  NO PUDDLING OF CONTRAST. NO ANEURYSM OR AVM SEEN.    --- End of Report ---            PAWAN ODELL MD; Attending Radiologist  This document has been electronically signed. Mono 15 2022  7:49PM    < end of copied text >  < from: CT Head No Cont (22 @ 09:30) >    ACC: [id number] EXAM:  CT BRAIN                          PROCEDURE DATE:  2022          INTERPRETATION:  CLINICAL INFORMATION: Intracranial hemorrhage, follow-up.    TECHNIQUE: Portable noncontrast axial CT images were acquired of the head.    COMPARISON STUDY: CT head 2022.    FINDINGS:  Large right frontal lobe parenchymal hematoma measures approximately 7.7   x 3.5 cm, previously 7.5 x 3.2 cm, with surrounding edema and mass   effect, without interval interval change compared to prior.   Redemonstrated small intraventricular hemorrhage, similar to prior.    Moderate cerebral volume loss with proportional prominence of the sulci   and ventricles. Nonspecific white matter hypoattenuation, likely related   to chronic microvascular changes.    Left frontal arachnoid cyst, unchanged.    Mucosal thickening of the left maxillary and bilateral ethmoid sinuses.   Partially visualized nasogastric tube in situ. Mastoid air cells are   clear.    Evidence of bilateral lens surgery.    Nodisplaced calvarial fracture.      IMPRESSION:  Stable exam.    --- End of Report ---          HARRIET WATSON MD; Resident Radiology  This document has been electronically signed.  CRUZ LÓPEZ MD; Attending Radiologist  This document has been electronically signed. 2022 10:58AM    < end of copied text >    < from: CT Head No Cont (22 @ 22:13) >    ACC: [id number] EXAM:  CT BRAIN                          PROCEDURE DATE:  2022          INTERPRETATION:  Clinical indication: Altered mental status    Multiple axial sections were performed from base skull to vertex without   contrast enhancement. Coronal and sagittal reconstructions were performed   as well    This exam is compared prior head CT performed on 2020.    Again seen is abnormal high attenuation identified involving the high   right frontal cortical subcortical region. This compatible with acute   parenchymal hemorrhage surrounding edema. This acute parenchymal   hemorrhage measures approximately 7.3 x 3.2 cm and previously measured   approximately 7.7 x 3.5 cm. Localized mass effect is seen consisting of   sulcal effacement.    Extra-axial low-attenuation with adjacent bony scalloping is again seen   involving the high left frontal region. This finding measures   approximately 4.2 x 2.8 cm and previously measured approximately 4.1 x   3.1 cm. This is compatible with an arachnoid cyst.    Parenchymal volume loss and chronic microvascular ischemic changes are   identified.    Intraventricular hemorrhage is again seen.    Evaluation of structures with the appropriate window appears unremarkable    Left-sided NG tube is seen.    The visualized paranasal sinuses mastoid and middle ear regions appear   clear.    Impression: Acute parenchymal hemorrhage is again seen with some expected   evolutionary changes.    Arachnoid cyst again seen as described above.    --- End of Report ---              < from: CT Head No Cont (22 @ 09:57) >    ACC: [id number] EXAM:  CT BRAIN                          PROCEDURE DATE:  2022          INTERPRETATION:  Clinical indication: Altered mental status    Multiple axial sections were performed from base skull to vertex without   contrast enhancement. Coronal and sagittal destructions were performed as   well    This exam is compared with prior head CT performed on 2022.    Abnormal high attenuation involving the high left frontal cortical   subcortical region is again seen. This finding measures approximately 5.1   x 2.8 cm and previously measured approximately 7.3 x 3.2 cm. Surrounding   edema is again seen.    Small amount of subrecord hemorrhage is again suspected involving the   left posterior temporal/posterior frontal region    Intraventricular hemorrhage is again seen.    The size and configuration of ventricles appear unchanged    No new areas of acute hemorrhage is seen.    Extra-axial low-attenuation from the high left frontal region is again   seen with bony scalloping. This appears unchanged when compared with the   prior exam and is likely compatible with an arachnoid cyst    Evaluation of osseous structures with the appropriate window appears   unchanged    The visualized paranasal sinuses mastoid and middle ear regions appear   clear.    Orotracheal and orogastric tubes are seen.    The paranasal sinuses mastoid and middle ear regions appear clear.    The patient status post bilateral cataract surgery.    IMPRESSION: Acute parenchymal hemorrhage is again seen as well as   intraventricular hemorrhage. Subrecord hemorrhage is suspected.    --- End of Report ---            BE TOUSSAINT MD; Attending Radiologist  This document has been electronically signed. 2022 10:10AM    < end of copied text >      EEG IMPRESSION/CLINICAL CORRELATE    Abnormal EEG study.  1. Risk for seizures from the right posterior quadrant and left parietal region.   2. Structural abnormality in the right hemisphere.  3. Moderate multifocal/diffuse nonspecific cerebral dysfunction.    No seizures recorded.

## 2022-02-07 NOTE — PROGRESS NOTE ADULT - SUBJECTIVE AND OBJECTIVE BOX
DATE OF SERVICE: 02-07-22 @ 10:13    Subjective: Patient seen and examined. No new events except as noted.     SUBJECTIVE/ROS:  feels ok       MEDICATIONS:  MEDICATIONS  (STANDING):  albuterol/ipratropium for Nebulization 3 milliLiter(s) Nebulizer every 6 hours  enoxaparin Injectable 40 milliGRAM(s) SubCutaneous every 12 hours  insulin lispro (ADMELOG) corrective regimen sliding scale   SubCutaneous every 6 hours  lacosamide Solution 100 milliGRAM(s) Oral two times a day  nystatin Cream 1 Application(s) Topical every 12 hours  senna 2 Tablet(s) Oral at bedtime  sodium chloride 0.9% for Nebulization 3 milliLiter(s) Nebulizer every 6 hours      PHYSICAL EXAM:  T(C): 36.9 (02-07-22 @ 05:52), Max: 36.9 (02-06-22 @ 21:46)  HR: 87 (02-07-22 @ 10:08) (4 - 89)  BP: 138/81 (02-07-22 @ 05:52) (109/73 - 143/84)  RR: 18 (02-07-22 @ 05:52) (18 - 18)  SpO2: 98% (02-07-22 @ 10:08) (95% - 99%)  Wt(kg): --  I&O's Summary    06 Feb 2022 07:01  -  07 Feb 2022 07:00  --------------------------------------------------------  IN: 920 mL / OUT: 0 mL / NET: 920 mL            JVP: Normal  Neck: supple  Lung: clear   CV: S1 S2 , Murmur:  Abd: soft  Ext: No edema  neuro: Awake / alert  Psych: flat affect  Skin: normal``    LABS/DATA:    CARDIAC MARKERS:                                10.8   4.42  )-----------( 167      ( 07 Feb 2022 06:04 )             34.4     02-07    142  |  103  |  26<H>  ----------------------------<  154<H>  3.8   |  29  |  0.96    Ca    9.0      07 Feb 2022 06:04  Phos  2.8     02-07  Mg     2.2     02-07      proBNP:   Lipid Profile:   HgA1c:   TSH:     TELE:  EKG:

## 2022-02-07 NOTE — PROGRESS NOTE ADULT - NSPROGADDITIONALINFOA_GEN_ALL_CORE
- Counseling on diet, exercise, and medication adherence was done  - Counseling on smoking cessation and alcohol consumption offered when appropriate.  - Pain assessed and judicious use of narcotics when appropriate was discussed.    - Stroke education given when appropriate.  - Importance of fall prevention discussed.   - Differential diagnosis and plan of care discussed with patient and/or family and primary team

## 2022-02-07 NOTE — PROGRESS NOTE ADULT - ASSESSMENT
81y m W/  HTN, thoracic aneurysm s/p repair, SWETA on CPAP, known hx ascending aorta aneurysm measuring 4cm, infrarenal AAA 5cm and right common iliac aneurysm presenting to the ED from [facility] as a transfer for R frontal parenchymal hemorrhage on CT after fall at home.  CTH R hemisphere IPH  CTA H/N no aneurysm or AVM  repeat CTH stable   CK 3000  NH3 WNL   1/26 CT chest wtih opacities  1/27 CTH normal evolutionary changes no new findings   1/29 RRT for hypoxia. intubated. in MICU  CTH 1/30 still wtih IVH and SAH.  extubated  EEG 1/30 without seizures but potential risk from R post quadrant and L parietal  2/3   eyes speaks, answers questions intermittently, in B/L UE restraints still.   2/4 SS/MBS today   2/5 now on puree diet   no neuro change slowly improving   Impression: R IPH of unclear etiology.  may be 2/2 HTN. need to r/o CAA or underlying mass   - nsx f/u.   - no need for PEG on diet   - was on cefepime for infection., now off   - SBP <160  - DVT ppx okay. hold AC/AP  -  on vimpat 100mg BID--> continue   - MRI brain w/ and w/o when able r/o CAA or underlying mass pending   - no need for hypertonics  - cardio following. known thoracic aneurysm s/p repair.   - f/u  pulmo for resp status.    - telemetry  - PT/OT/SS/SLP, OOBC  - check FS, glucose control <180  - GI/DVT ppx  - Thank you for allowing me to participate in the care of this patient. Call with questions.   -ethic and palliative called   - stroke team to reach out to family 2/3.  stroke team spoke wtih 2 family members including including a physician in there family. all neuro questions answered   - d/c plan rehab after MRI   Carlin Neal MD  Vascular Neurology  Office: [phone]

## 2022-02-07 NOTE — PROGRESS NOTE ADULT - SUBJECTIVE AND OBJECTIVE BOX
Micah Hansen, PGY-1  Internal Medicine  [employer]; #[phone]      PROGRESS NOTE:     Patient is a 81y old  Male who presents with a chief complaint of R ICH (03 Feb 2022 13:34)    SUBJECTIVE AND OVERNIGHT EVENTS:       ADDITIONAL REVIEW OF SYSTEMS:    MEDICATIONS  (STANDING):  albuterol/ipratropium for Nebulization 3 milliLiter(s) Nebulizer every 6 hours  enoxaparin Injectable 40 milliGRAM(s) SubCutaneous every 12 hours  insulin lispro (ADMELOG) corrective regimen sliding scale   SubCutaneous every 6 hours  lacosamide Solution 100 milliGRAM(s) Oral two times a day  nystatin Cream 1 Application(s) Topical every 12 hours  senna 2 Tablet(s) Oral at bedtime  sodium chloride 0.9% for Nebulization 3 milliLiter(s) Nebulizer every 6 hours    MEDICATIONS  (PRN):      CAPILLARY BLOOD GLUCOSE    POCT Blood Glucose.: 145 mg/dL (07 Feb 2022 06:43)  POCT Blood Glucose.: 170 mg/dL (07 Feb 2022 00:11)  POCT Blood Glucose.: 194 mg/dL (06 Feb 2022 21:43)  POCT Blood Glucose.: 198 mg/dL (06 Feb 2022 17:39)  POCT Blood Glucose.: 237 mg/dL (06 Feb 2022 12:32)      I&O's Summary    06 Feb 2022 07:01  -  07 Feb 2022 07:00  --------------------------------------------------------  IN: 600 mL / OUT: 0 mL / NET: 600 mL        PHYSICAL EXAM:  Vital Signs Last 24 Hrs  T(C): 36.9 (07 Feb 2022 05:52), Max: 36.9 (06 Feb 2022 21:46)  T(F): 98.4 (07 Feb 2022 05:52), Max: 98.4 (06 Feb 2022 21:46)  HR: 4 (07 Feb 2022 06:01) (4 - 89)  BP: 138/81 (07 Feb 2022 05:52) (109/73 - 143/84)  RR: 18 (07 Feb 2022 05:52) (18 - 18)  SpO2: 99% (07 Feb 2022 06:01) (94% - 99%)    CONSTITUTIONAL: NAD, well-developed, on NC. used Micronesian translater   HEENT: NC/AT, EOMI  RESPIRATORY: No increased work of breathing, CTAB, no wheezes or crackles appreciated  CARDIOVASCULAR: RRR, S1 and S2 present, no m/r/g  ABDOMEN: soft, NT, ND, bowel sounds present  EXTREMITIES: No LE edema  MUSCULOSKELETAL: no joint swelling or tenderness to palpation  NEURO: A&Ox1, responsive to     LABS:                        10.8   4.42  )-----------( 167      ( 07 Feb 2022 06:04 )             34.4     02-07    142  |  103  |  26<H>  ----------------------------<  154<H>  3.8   |  29  |  0.96    Ca    9.0      07 Feb 2022 06:04  Phos  2.8     02-07  Mg     2.2     02-07    TPro  7.3  /  Alb  3.6  /  TBili  0.5  /  DBili  x   /  AST  43<H>  /  ALT  47<H>  /  AlkPhos  74  02-03      RADIOLOGY & ADDITIONAL TESTS:  CT BRAIN 1/30  This exam is compared with prior head CT performed on January 27, 2022.    Abnormal high attenuation involving the high left frontal cortical subcortical region is again seen. This finding measures approximately 5.1 x 2.8 cm and previously measured approximately 7.3 x 3.2 cm. Surrounding edema is again seen.    Small amount of subrecord hemorrhage is again suspected involving the left posterior temporal/posterior frontal region    Intraventricular hemorrhage is again seen.    The size and configuration of ventricles appear unchanged    No new areas of acute hemorrhage is seen.    Extra-axial low-attenuation from the high left frontal region is again seen with bony scalloping. This appears unchanged when compared with the prior exam and is likely compatible with an arachnoid cyst    Evaluation of osseous structures with the appropriate window appears unchanged    The visualized paranasal sinuses mastoid and middle ear regions appear clear.    Orotracheal and orogastric tubes are seen.    The paranasal sinuses mastoid and middle ear regions appear clear.    The patient status post bilateral cataract surgery.    IMPRESSION: Acute parenchymal hemorrhage is again seen as well as intraventricular hemorrhage. Subrecord hemorrhage is suspected.     Micah Hansen, PGY-1  Internal Medicine  [employer]; #[phone]      PROGRESS NOTE:     Patient is a 81y old  Male who presents with a chief complaint of R ICH (03 Feb 2022 13:34)    SUBJECTIVE AND OVERNIGHT EVENTS:   Patient was seen and examined this morning after he was moved to chair from bed. He denied any pain, fevers, shortness of breath, or complaints otherwise. He was sinus 70s-80s on telemetry and talkative this morning.     ADDITIONAL REVIEW OF SYSTEMS:    MEDICATIONS  (STANDING):  albuterol/ipratropium for Nebulization 3 milliLiter(s) Nebulizer every 6 hours  enoxaparin Injectable 40 milliGRAM(s) SubCutaneous every 12 hours  insulin lispro (ADMELOG) corrective regimen sliding scale   SubCutaneous every 6 hours  lacosamide Solution 100 milliGRAM(s) Oral two times a day  nystatin Cream 1 Application(s) Topical every 12 hours  senna 2 Tablet(s) Oral at bedtime  sodium chloride 0.9% for Nebulization 3 milliLiter(s) Nebulizer every 6 hours    MEDICATIONS  (PRN):      CAPILLARY BLOOD GLUCOSE    POCT Blood Glucose.: 145 mg/dL (07 Feb 2022 06:43)  POCT Blood Glucose.: 170 mg/dL (07 Feb 2022 00:11)  POCT Blood Glucose.: 194 mg/dL (06 Feb 2022 21:43)  POCT Blood Glucose.: 198 mg/dL (06 Feb 2022 17:39)  POCT Blood Glucose.: 237 mg/dL (06 Feb 2022 12:32)      I&O's Summary    06 Feb 2022 07:01  -  07 Feb 2022 07:00  --------------------------------------------------------  IN: 600 mL / OUT: 0 mL / NET: 600 mL        PHYSICAL EXAM:  Vital Signs Last 24 Hrs  T(C): 36.9 (07 Feb 2022 05:52), Max: 36.9 (06 Feb 2022 21:46)  T(F): 98.4 (07 Feb 2022 05:52), Max: 98.4 (06 Feb 2022 21:46)  HR: 4 (07 Feb 2022 06:01) (4 - 89)  BP: 138/81 (07 Feb 2022 05:52) (109/73 - 143/84)  RR: 18 (07 Feb 2022 05:52) (18 - 18)  SpO2: 99% (07 Feb 2022 06:01) (94% - 99%)    CONSTITUTIONAL: NAD, well-developed, on NC. comfortable in chair  HEENT: NC/AT, EOMI  RESPIRATORY: No increased work of breathing, CTAB, no wheezes or crackles appreciated  CARDIOVASCULAR: RRR, S1 and S2 present, no m/r/g  ABDOMEN: soft, NT, ND, bowel sounds present  EXTREMITIES: No LE edema  MUSCULOSKELETAL: no joint swelling or tenderness to palpation  NEURO: A&Ox1, responsive to     LABS:                        10.8   4.42  )-----------( 167      ( 07 Feb 2022 06:04 )             34.4     02-07    142  |  103  |  26<H>  ----------------------------<  154<H>  3.8   |  29  |  0.96    Ca    9.0      07 Feb 2022 06:04  Phos  2.8     02-07  Mg     2.2     02-07    TPro  7.3  /  Alb  3.6  /  TBili  0.5  /  DBili  x   /  AST  43<H>  /  ALT  47<H>  /  AlkPhos  74  02-03      RADIOLOGY & ADDITIONAL TESTS:  CT BRAIN 1/30  This exam is compared with prior head CT performed on January 27, 2022.    Abnormal high attenuation involving the high left frontal cortical subcortical region is again seen. This finding measures approximately 5.1 x 2.8 cm and previously measured approximately 7.3 x 3.2 cm. Surrounding edema is again seen.    Small amount of subrecord hemorrhage is again suspected involving the left posterior temporal/posterior frontal region    Intraventricular hemorrhage is again seen.    The size and configuration of ventricles appear unchanged    No new areas of acute hemorrhage is seen.    Extra-axial low-attenuation from the high left frontal region is again seen with bony scalloping. This appears unchanged when compared with the prior exam and is likely compatible with an arachnoid cyst    Evaluation of osseous structures with the appropriate window appears unchanged    The visualized paranasal sinuses mastoid and middle ear regions appear clear.    Orotracheal and orogastric tubes are seen.    The paranasal sinuses mastoid and middle ear regions appear clear.    The patient status post bilateral cataract surgery.    IMPRESSION: Acute parenchymal hemorrhage is again seen as well as intraventricular hemorrhage. Subrecord hemorrhage is suspected.

## 2022-02-08 LAB
ANION GAP SERPL CALC-SCNC: 11 MMOL/L — SIGNIFICANT CHANGE UP (ref 5–17)
BUN SERPL-MCNC: 21 MG/DL — SIGNIFICANT CHANGE UP (ref 7–23)
CALCIUM SERPL-MCNC: 9.1 MG/DL — SIGNIFICANT CHANGE UP (ref 8.4–10.5)
CHLORIDE SERPL-SCNC: 103 MMOL/L — SIGNIFICANT CHANGE UP (ref 96–108)
CO2 SERPL-SCNC: 28 MMOL/L — SIGNIFICANT CHANGE UP (ref 22–31)
CREAT SERPL-MCNC: 0.85 MG/DL — SIGNIFICANT CHANGE UP (ref 0.5–1.3)
GLUCOSE BLDC GLUCOMTR-MCNC: 146 MG/DL — HIGH (ref 70–99)
GLUCOSE BLDC GLUCOMTR-MCNC: 152 MG/DL — HIGH (ref 70–99)
GLUCOSE BLDC GLUCOMTR-MCNC: 164 MG/DL — HIGH (ref 70–99)
GLUCOSE BLDC GLUCOMTR-MCNC: 205 MG/DL — HIGH (ref 70–99)
GLUCOSE SERPL-MCNC: 144 MG/DL — HIGH (ref 70–99)
HCT VFR BLD CALC: 33.4 % — LOW (ref 39–50)
HGB BLD-MCNC: 10.7 G/DL — LOW (ref 13–17)
MAGNESIUM SERPL-MCNC: 2.2 MG/DL — SIGNIFICANT CHANGE UP (ref 1.6–2.6)
MCHC RBC-ENTMCNC: 30.9 PG — SIGNIFICANT CHANGE UP (ref 27–34)
MCHC RBC-ENTMCNC: 32 GM/DL — SIGNIFICANT CHANGE UP (ref 32–36)
MCV RBC AUTO: 96.5 FL — SIGNIFICANT CHANGE UP (ref 80–100)
NRBC # BLD: 0 /100 WBCS — SIGNIFICANT CHANGE UP (ref 0–0)
PHOSPHATE SERPL-MCNC: 3 MG/DL — SIGNIFICANT CHANGE UP (ref 2.5–4.5)
PLATELET # BLD AUTO: 160 K/UL — SIGNIFICANT CHANGE UP (ref 150–400)
POTASSIUM SERPL-MCNC: 3.6 MMOL/L — SIGNIFICANT CHANGE UP (ref 3.5–5.3)
POTASSIUM SERPL-SCNC: 3.6 MMOL/L — SIGNIFICANT CHANGE UP (ref 3.5–5.3)
RBC # BLD: 3.46 M/UL — LOW (ref 4.2–5.8)
RBC # FLD: 13.6 % — SIGNIFICANT CHANGE UP (ref 10.3–14.5)
SODIUM SERPL-SCNC: 142 MMOL/L — SIGNIFICANT CHANGE UP (ref 135–145)
WBC # BLD: 4.07 K/UL — SIGNIFICANT CHANGE UP (ref 3.8–10.5)
WBC # FLD AUTO: 4.07 K/UL — SIGNIFICANT CHANGE UP (ref 3.8–10.5)

## 2022-02-08 PROCEDURE — 70553 MRI BRAIN STEM W/O & W/DYE: CPT | Mod: 26

## 2022-02-08 PROCEDURE — 99232 SBSQ HOSP IP/OBS MODERATE 35: CPT | Mod: GC

## 2022-02-08 RX ADMIN — SODIUM CHLORIDE 3 MILLILITER(S): 9 INJECTION INTRAMUSCULAR; INTRAVENOUS; SUBCUTANEOUS at 06:01

## 2022-02-08 RX ADMIN — ENOXAPARIN SODIUM 40 MILLIGRAM(S): 100 INJECTION SUBCUTANEOUS at 17:30

## 2022-02-08 RX ADMIN — Medication 4: at 13:26

## 2022-02-08 RX ADMIN — Medication 3 MILLILITER(S): at 00:11

## 2022-02-08 RX ADMIN — Medication 3 MILLILITER(S): at 06:00

## 2022-02-08 RX ADMIN — Medication 2: at 17:59

## 2022-02-08 RX ADMIN — NYSTATIN CREAM 1 APPLICATION(S): 100000 CREAM TOPICAL at 05:56

## 2022-02-08 RX ADMIN — LACOSAMIDE 100 MILLIGRAM(S): 50 TABLET ORAL at 06:10

## 2022-02-08 RX ADMIN — SODIUM CHLORIDE 3 MILLILITER(S): 9 INJECTION INTRAMUSCULAR; INTRAVENOUS; SUBCUTANEOUS at 00:12

## 2022-02-08 RX ADMIN — SODIUM CHLORIDE 3 MILLILITER(S): 9 INJECTION INTRAMUSCULAR; INTRAVENOUS; SUBCUTANEOUS at 13:29

## 2022-02-08 RX ADMIN — SODIUM CHLORIDE 3 MILLILITER(S): 9 INJECTION INTRAMUSCULAR; INTRAVENOUS; SUBCUTANEOUS at 17:31

## 2022-02-08 RX ADMIN — Medication 3 MILLILITER(S): at 17:32

## 2022-02-08 RX ADMIN — NYSTATIN CREAM 1 APPLICATION(S): 100000 CREAM TOPICAL at 17:48

## 2022-02-08 RX ADMIN — LACOSAMIDE 100 MILLIGRAM(S): 50 TABLET ORAL at 17:31

## 2022-02-08 RX ADMIN — Medication 3 MILLILITER(S): at 13:28

## 2022-02-08 RX ADMIN — ENOXAPARIN SODIUM 40 MILLIGRAM(S): 100 INJECTION SUBCUTANEOUS at 06:01

## 2022-02-08 NOTE — PROGRESS NOTE ADULT - SUBJECTIVE AND OBJECTIVE BOX
DATE OF SERVICE: 02-08-22 @ 12:25    Subjective: Patient seen and examined. No new events except as noted.     SUBJECTIVE/ROS:  feels ok       MEDICATIONS:  MEDICATIONS  (STANDING):  albuterol/ipratropium for Nebulization 3 milliLiter(s) Nebulizer every 6 hours  enoxaparin Injectable 40 milliGRAM(s) SubCutaneous every 12 hours  insulin lispro (ADMELOG) corrective regimen sliding scale   SubCutaneous every 6 hours  lacosamide Solution 100 milliGRAM(s) Oral two times a day  nystatin Cream 1 Application(s) Topical every 12 hours  senna 2 Tablet(s) Oral at bedtime  sodium chloride 0.9% for Nebulization 3 milliLiter(s) Nebulizer every 6 hours      PHYSICAL EXAM:  T(C): 36.1 (02-08-22 @ 06:00), Max: 37 (02-07-22 @ 21:17)  HR: 88 (02-08-22 @ 09:53) (70 - 93)  BP: 161/89 (02-08-22 @ 06:00) (139/83 - 161/89)  RR: 18 (02-08-22 @ 06:00) (18 - 18)  SpO2: 96% (02-08-22 @ 09:53) (96% - 100%)  Wt(kg): --  I&O's Summary    07 Feb 2022 07:01  -  08 Feb 2022 07:00  --------------------------------------------------------  IN: 240 mL / OUT: 0 mL / NET: 240 mL            JVP: Normal  Neck: supple  Lung: clear   CV: S1 S2 , Murmur:  Abd: soft  Ext: No edema  neuro: Awake / alert  Psych: flat affect  Skin: normal``    LABS/DATA:    CARDIAC MARKERS:                                10.7   4.07  )-----------( 160      ( 08 Feb 2022 07:41 )             33.4     02-08    142  |  103  |  21  ----------------------------<  144<H>  3.6   |  28  |  0.85    Ca    9.1      08 Feb 2022 07:40  Phos  3.0     02-08  Mg     2.2     02-08      proBNP:   Lipid Profile:   HgA1c:   TSH:     TELE:  EKG:         DATE OF SERVICE: 02-08-22 @ 12:25    Subjective: Patient seen and examined. No new events except as noted.     SUBJECTIVE/ROS      MEDICATIONS:  MEDICATIONS  (STANDING):  albuterol/ipratropium for Nebulization 3 milliLiter(s) Nebulizer every 6 hours  enoxaparin Injectable 40 milliGRAM(s) SubCutaneous every 12 hours  insulin lispro (ADMELOG) corrective regimen sliding scale   SubCutaneous every 6 hours  lacosamide Solution 100 milliGRAM(s) Oral two times a day  nystatin Cream 1 Application(s) Topical every 12 hours  senna 2 Tablet(s) Oral at bedtime  sodium chloride 0.9% for Nebulization 3 milliLiter(s) Nebulizer every 6 hours      PHYSICAL EXAM:  T(C): 36.1 (02-08-22 @ 06:00), Max: 37 (02-07-22 @ 21:17)  HR: 88 (02-08-22 @ 09:53) (70 - 93)  BP: 161/89 (02-08-22 @ 06:00) (139/83 - 161/89)  RR: 18 (02-08-22 @ 06:00) (18 - 18)  SpO2: 96% (02-08-22 @ 09:53) (96% - 100%)  Wt(kg): --  I&O's Summary    07 Feb 2022 07:01  -  08 Feb 2022 07:00  --------------------------------------------------------  IN: 240 mL / OUT: 0 mL / NET: 240 mL            JVP: Normal  Neck: supple  Lung: clear   CV: S1 S2 , Murmur:  Abd: soft  Ext: No edema  neuro: Awake / alert  Psych: flat affect  Skin: normal``    LABS/DATA:    CARDIAC MARKERS:                                10.7   4.07  )-----------( 160      ( 08 Feb 2022 07:41 )             33.4     02-08    142  |  103  |  21  ----------------------------<  144<H>  3.6   |  28  |  0.85    Ca    9.1      08 Feb 2022 07:40  Phos  3.0     02-08  Mg     2.2     02-08      proBNP:   Lipid Profile:   HgA1c:   TSH:     TELE:  EKG:

## 2022-02-08 NOTE — DISCHARGE NOTE PROVIDER - PROVIDER TOKENS
PROVIDER:[TOKEN:[[id number]],FOLLOWUP:[2 weeks]],PROVIDER:[TOKEN:[[id number]],FOLLOWUP:[2 weeks]] PROVIDER:[TOKEN:[[id number]],FOLLOWUP:[2 weeks]],PROVIDER:[TOKEN:[[id number]],FOLLOWUP:[2 weeks]],PROVIDER:[TOKEN:[[id number]],FOLLOWUP:[1 month]] PROVIDER:[TOKEN:[[id number]],FOLLOWUP:[1 week]],PROVIDER:[TOKEN:[[id number]],FOLLOWUP:[1 month]]

## 2022-02-08 NOTE — DISCHARGE NOTE PROVIDER - CARE PROVIDER_API CALL
Carlin Neal)  Neurology; Vascular Neurology  [address], [address]  [place] [address]  Phone: [phone]  Fax: [phone]  Follow Up Time: 2 weeks    Michael Lee  CARDIOVASCULAR DISEASE  [address], [address]  [place] [address]  Phone: [phone]  Fax: [phone]  Follow Up Time: 2 weeks   Carlin Neal)  Neurology; Vascular Neurology  [address], [address]  [place] [address]  Phone: [phone]  Fax: [phone]  Follow Up Time: 2 weeks    Michael Lee  CARDIOVASCULAR DISEASE  [address], [address]  [place] [address]  Phone: [phone]  Fax: [phone]  Follow Up Time: 2 weeks    Chaitanya Hays)  Vascular Surgery  [address], [address]  [place] [address]  Phone: [phone]  Fax: [phone]  Follow Up Time: 1 month   Carlin Neal)  Neurology; Vascular Neurology  [address] [facility] [address]  [place] [address]  Phone: [phone]  Fax: [phone]  Follow Up Time: 1 week    Chaitanya Hays)  Vascular Surgery  [address], [address]  [place] [address]  Phone: [phone]  Fax: [phone]  Follow Up Time: 1 month

## 2022-02-08 NOTE — DISCHARGE NOTE PROVIDER - NSDCFUADDAPPT_GEN_ALL_CORE_FT
Please follow-up with your PCP, Cardiologist and Vascular Neurologist after discharge.  Please follow-up with your PCP, Cardiologist, Vascular Neurologist and Vascular surgeon after discharge.

## 2022-02-08 NOTE — PROGRESS NOTE ADULT - PROBLEM SELECTOR PLAN 1
-AOx1 today, improved status  - neuro checks q4h  - Vimpat 100mg q12  - MRI w/wo when able  - CTH repeat w/ improved ICH x2  -Neuro checks q 4 hrs and prn for changes  -PT eval; OT following  - MBS scheduled for Monday 2/7/22 -AOx1 today, improved status  - neuro checks q4h  - Vimpat 100mg q12  - MRI w/wo when able  - CTH repeat w/ improved ICH x2  -Neuro checks q 4 hrs and prn for changes  -PT eval; OT following  - pending MRI prior to d/c

## 2022-02-08 NOTE — DISCHARGE NOTE PROVIDER - CARE PROVIDERS DIRECT ADDRESSES
,DirectAddress_Unknown,DirectAddress_Unknown ,DirectAddress_Unknown,DirectAddress_Unknown,[email or web address] ,DirectAddress_Unknown,[email or web address]

## 2022-02-08 NOTE — DISCHARGE NOTE PROVIDER - HOSPITAL COURSE
81 yr male with PMHx HTN, SWETA on home CPAP, DM2,  Ascending Ao aneurysm 4cm, infrarenal AAA 5 cm s/p AAA repair 2015, Rt common iliac aneurysm 5cm (CT scan at [facility] 11/2020) meant for vascular intervention however unable due to COVID-19+ two weeks prior to presentation,  who was  transferred to [facility]  NSICU 1/5/22 after being found to have Rt frontal parenchymal hemorrhage with mass effect without midline shift found on CTH after presenting originally to [facility] with fatigue, dizziness, s/p fall at home. Pt at [facility] was evaluated by vascular surg, determined no surg intervention at this time, pt evaluated by Neuro surg who offered MIND clinical trial, STX-guided resection vs medical management however family preferred only minimally invasive intervention if necessary. 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 Pt initially placed on antihypertensive therapy, hypertonic saline therapy.     This hospital course c/b sz like activity 1/16/22, loaded with keppra and started on vimpat therapy, EEG at that time however without epileptiform activity, development of AMS with hypoxic/hypercapnic resp distress requiring intubation 1/19/22, repeat CTH at that time without acute changes, re demonstration of IPH. Pt with eventual improvement in resp status and extubated 1/22/22.       Pt  eventually transferred to medicine floor 1/25/22. Course however further c/b development of fevers wit Tmax to 38.8 1/25/22, received CT chest 1/26/22 that demonstrated new bilateral patchy opacities concerning for aspiration pneum, started cefepime therapy, recurrent worsening AMS and hypoxic resp failure with SPO2 of 88-90's requiring re intubation 1/29/22 with transfer to MICU for hypoxic resp failure to possible aspiration pneum and septic shock of unknown origin. 81 yr male with PMHx HTN, SWETA on home CPAP, DM2,  Ascending Ao aneurysm 4cm, infrarenal AAA 5 cm s/p AAA repair 2015, Rt common iliac aneurysm 5cm (CT scan at [facility] 11/2020) meant for vascular intervention however unable due to COVID-19+ two weeks prior to presentation,  who was  transferred to [facility]  NSICU 1/5/22 after being found to have Rt frontal parenchymal hemorrhage with mass effect without midline shift found on CTH after presenting originally to [facility] with fatigue, dizziness, s/p fall at home. Pt at [facility] was evaluated by vascular surg, determined no surg intervention at this time, pt evaluated by Neuro surg who offered MIND clinical trial, STX-guided resection vs medical management however family preferred only minimally invasive intervention if necessary. 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 Pt initially placed on antihypertensive therapy, hypertonic saline therapy.     This hospital course c/b sz like activity 1/16/22, loaded with keppra and started on vimpat therapy, EEG at that time however without epileptiform activity, development of AMS with hypoxic/hypercapnic resp distress requiring intubation 1/19/22, repeat CTH at that time without acute changes, re demonstration of IPH. Pt with eventual improvement in resp status and extubated 1/22/22.       Pt  eventually transferred to medicine floor 1/25/22. Course however further c/b development of fevers wit Tmax to 38.8 1/25/22, received CT chest 1/26/22 that demonstrated new bilateral patchy opacities concerning for aspiration pneum, started cefepime therapy, recurrent worsening AMS and hypoxic resp failure with SPO2 of 88-90's requiring re intubation 1/29/22 with transfer to MICU for hypoxic resp failure to possible aspiration pneum and septic shock of unknown origin. He was extubated and stabilized for the floors on 1/31 where he continued his cefepime course. His oxygen requirements deescalated to RA and he was increasingly responsive with the [place] . Neurology and Cardiology continued to follow, and he went for MRI brain and was medically optimized for discharge to rehab. 81 yr male with PMHx HTN, SWETA on home CPAP, DM2,  Ascending Ao aneurysm 4cm, infrarenal AAA 5 cm s/p AAA repair 2015, Rt common iliac aneurysm 5cm (CT scan at [facility] 11/2020) meant for vascular intervention however unable due to COVID-19+ two weeks prior to presentation,  who was  transferred to [facility]  NSICU 1/5/22 after being found to have Rt frontal parenchymal hemorrhage with mass effect without midline shift found on CTH after presenting originally to [facility] with fatigue, dizziness, s/p fall at home. Pt at [facility] was evaluated by vascular surg, determined no surg intervention at this time, pt evaluated by Neuro surg who offered MIND clinical trial, STX-guided resection vs medical management however family preferred only minimally invasive intervention if necessary. 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 Pt initially placed on antihypertensive therapy, hypertonic saline therapy.     This hospital course c/b sz like activity 1/16/22, loaded with keppra and started on vimpat therapy, EEG at that time however without epileptiform activity, development of AMS with hypoxic/hypercapnic resp distress requiring intubation 1/19/22, repeat CTH at that time without acute changes, re demonstration of IPH. Pt with eventual improvement in resp status and extubated 1/22/22.       Pt  eventually transferred to medicine floor 1/25/22. Course however further c/b development of fevers wit Tmax to 38.8 1/25/22, received CT chest 1/26/22 that demonstrated new bilateral patchy opacities concerning for aspiration pneum, started cefepime therapy, recurrent worsening AMS and hypoxic resp failure with SPO2 of 88-90's requiring re intubation 1/29/22 with transfer to MICU for hypoxic resp failure to possible aspiration pneum and septic shock of unknown origin. He was extubated and stabilized for the floors on 1/31 where he continued his cefepime course. His oxygen requirements deescalated to RA and he was increasingly responsive with the [place] . Neurology and Cardiology continued to follow, and he went for MRI brain and CT Head showing shrinking hemorrhage. He was subsequently medically optimized for discharge to rehab.

## 2022-02-08 NOTE — DISCHARGE NOTE PROVIDER - NSFOLLOWUPCLINICS_GEN_ALL_ED_FT
[facility]  Cardiology  [address] [place] [id number]  Phone: [phone]  Fax:   Follow Up Time: 1 week

## 2022-02-08 NOTE — DISCHARGE NOTE PROVIDER - NSDCCPTREATMENT_GEN_ALL_CORE_FT
PRINCIPAL PROCEDURE  Procedure: CT, head, without contrast  Findings and Treatment: This exam is compared with prior head CT performed on January 27, 2022.  Abnormal high attenuation involving the high left frontal cortical   subcortical region is again seen. This finding measures approximately 5.1   x 2.8 cm and previously measured approximately 7.3 x 3.2 cm. Surrounding   edema is again seen.  Small amount of subrecord hemorrhage is again suspected involving the   left posterior temporal/posterior frontal region  Intraventricular hemorrhage is again seen.  The size and configuration of ventricles appear unchanged  No new areas of acute hemorrhage is seen.  Extra-axial low-attenuation from the high left frontal region is again   seen with bony scalloping. This appears unchanged when compared with the   prior exam and is likely compatible with an arachnoid cyst  Evaluation of osseous structures with the appropriate window appears   unchanged  The visualized paranasal sinuses mastoid and middle ear regions appear   clear.  Orotracheal and orogastric tubes are seen.  The paranasal sinuses mastoid and middle ear regions appear clear.  The patient status post bilateral cataract surgery.  IMPRESSION: Acute parenchymal hemorrhage is again seen as well as   intraventricular hemorrhage. Subrecord hemorrhage is suspected.        SECONDARY PROCEDURE  Procedure: CT chest w con  Findings and Treatment: FINDINGS:  LYMPH NODES: Small mediastinal lymph nodes are similar in appearance to   1/15/2022.  HEART/VASCULATURE: Heart size is normal. Aortic valve and coronary artery   calcifications.  AIRWAYS/LUNGS/PLEURA: Evaluation is limited secondary to respiratory   motion artifact. Grossly patent central airways. New patchy opacities are   seen in the dependent portions of the right upper lobe and bilateral   lower lobes.  UPPER ABDOMEN:Enteric tube tip is in the proximal stomach just distal to   the gastroesophageal junction. Ingested material is noted within the   esophagus.  Redemonstrated atrophic pancreas with a 1 cm calcification in the head of   the pancreas. Numerous additional calcifications suggest chronic   pancreatitis.  Hepatic cysts adjacent to the gallbladder fossa. Trace layering densities   within the gallbladder likely representing cholelithiasis.  BONES/SOFT TISSUES: Multiple old left rib fracture is. 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 (3:65).  IMPRESSION:  1.  New patchy bilateral dependent opacities, could occur in the setting   of pneumonia (? Clinical history of aspiration given its distribution).       PRINCIPAL PROCEDURE  Procedure: CT, head, without contrast  Findings and Treatment: COMPARISON EXAMINATION: Head CT 1/30/2022  FINDINGS:  HEAD CT:  VENTRICLES AND SULCI: Size of ventricles and sulci are unremarkable for   patient age. Tiny amount of bilateral intraventricular hemorrhage remains.  INTRA-AXIAL: No midline shift is present. There is non-specific decreased   attenuation in the white matter likely related to sequelae of   microvascular disease. Smaller right frontal lobe intraparenchymal   hemorrhage 4.8 cm AP by 2.4 cm  EXTRA-AXIAL: Stable arachnoid cyst left frontal lobe region.  INTRACRANIAL HEMORRHAGE: Yes  VISUALIZED SINUSES: No air-fluid levels are identified.  VISUALIZED MASTOIDS:  Clear.  CALVARIUM:  Intact.  MISCELLANEOUS:  None.  SOFT TISSUES: Unremarkable.  BONES: Unremarkable.  IMPRESSION:  HEAD CT: Smaller right frontal lobe intraparenchymal hemorrhage 4.8 x 2.4   cm.  Tiny remaining bilateral intraventricular hemorrhage.  Stable left frontal lobe arachnoid cyst.        SECONDARY PROCEDURE  Procedure: CT, head, without contrast  Findings and Treatment: This exam is compared with prior head CT performed on January 27, 2022.  Abnormal high attenuation involving the high left frontal cortical   subcortical region is again seen. This finding measures approximately 5.1   x 2.8 cm and previously measured approximately 7.3 x 3.2 cm. Surrounding   edema is again seen.  Small amount of subrecord hemorrhage is again suspected involving the   left posterior temporal/posterior frontal region  Intraventricular hemorrhage is again seen.  The size and configuration of ventricles appear unchanged  No new areas of acute hemorrhage is seen.  Extra-axial low-attenuation from the high left frontal region is again   seen with bony scalloping. This appears unchanged when compared with the   prior exam and is likely compatible with an arachnoid cyst  Evaluation of osseous structures with the appropriate window appears   unchanged  The visualized paranasal sinuses mastoid and middle ear regions appear   clear.  Orotracheal and orogastric tubes are seen.  The paranasal sinuses mastoid and middle ear regions appear clear.  The patient status post bilateral cataract surgery.  IMPRESSION: Acute parenchymal hemorrhage is again seen as well as   intraventricular hemorrhage. Subrecord hemorrhage is suspected.      Procedure: CT chest w con  Findings and Treatment: FINDINGS:  LYMPH NODES: Small mediastinal lymph nodes are similar in appearance to   1/15/2022.  HEART/VASCULATURE: Heart size is normal. Aortic valve and coronary artery   calcifications.  AIRWAYS/LUNGS/PLEURA: Evaluation is limited secondary to respiratory   motion artifact. Grossly patent central airways. New patchy opacities are   seen in the dependent portions of the right upper lobe and bilateral   lower lobes.  UPPER ABDOMEN:Enteric tube tip is in the proximal stomach just distal to   the gastroesophageal junction. Ingested material is noted within the   esophagus.  Redemonstrated atrophic pancreas with a 1 cm calcification in the head of   the pancreas. Numerous additional calcifications suggest chronic   pancreatitis.  Hepatic cysts adjacent to the gallbladder fossa. Trace layering densities   within the gallbladder likely representing cholelithiasis.  BONES/SOFT TISSUES: Multiple old left rib fracture is. 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 (3:65).  IMPRESSION:  1.  New patchy bilateral dependent opacities, could occur in the setting   of pneumonia (? Clinical history of aspiration given its distribution).       PRINCIPAL PROCEDURE  Procedure: CT, head, without contrast  Findings and Treatment: COMPARISON EXAMINATION: Head CT 1/30/2022  FINDINGS:  HEAD CT:  VENTRICLES AND SULCI: Size of ventricles and sulci are unremarkable for   patient age. Tiny amount of bilateral intraventricular hemorrhage remains.  INTRA-AXIAL: No midline shift is present. There is non-specific decreased   attenuation in the white matter likely related to sequelae of   microvascular disease. Smaller right frontal lobe intraparenchymal   hemorrhage 4.8 cm AP by 2.4 cm  EXTRA-AXIAL: Stable arachnoid cyst left frontal lobe region.  INTRACRANIAL HEMORRHAGE: Yes  VISUALIZED SINUSES: No air-fluid levels are identified.  VISUALIZED MASTOIDS:  Clear.  CALVARIUM:  Intact.  MISCELLANEOUS:  None.  SOFT TISSUES: Unremarkable.  BONES: Unremarkable.  IMPRESSION:  HEAD CT: Smaller right frontal lobe intraparenchymal hemorrhage 4.8 x 2.4   cm.  Tiny remaining bilateral intraventricular hemorrhage.  Stable left frontal lobe arachnoid cyst.        SECONDARY PROCEDURE  Procedure: CT, head, without contrast  Findings and Treatment: This exam is compared with prior head CT performed on January 27, 2022.  Abnormal high attenuation involving the high left frontal cortical   subcortical region is again seen. This finding measures approximately 5.1   x 2.8 cm and previously measured approximately 7.3 x 3.2 cm. Surrounding   edema is again seen.  Small amount of subrecord hemorrhage is again suspected involving the   left posterior temporal/posterior frontal region  Intraventricular hemorrhage is again seen.  The size and configuration of ventricles appear unchanged  No new areas of acute hemorrhage is seen.  Extra-axial low-attenuation from the high left frontal region is again   seen with bony scalloping. This appears unchanged when compared with the   prior exam and is likely compatible with an arachnoid cyst  Evaluation of osseous structures with the appropriate window appears   unchanged  The visualized paranasal sinuses mastoid and middle ear regions appear   clear.  Orotracheal and orogastric tubes are seen.  The paranasal sinuses mastoid and middle ear regions appear clear.  The patient status post bilateral cataract surgery.  IMPRESSION: Acute parenchymal hemorrhage is again seen as well as   intraventricular hemorrhage. Subrecord hemorrhage is suspected.      Procedure: CT chest w con  Findings and Treatment: FINDINGS:  LYMPH NODES: Small mediastinal lymph nodes are similar in appearance to   1/15/2022.  HEART/VASCULATURE: Heart size is normal. Aortic valve and coronary artery   calcifications.  AIRWAYS/LUNGS/PLEURA: Evaluation is limited secondary to respiratory   motion artifact. Grossly patent central airways. New patchy opacities are   seen in the dependent portions of the right upper lobe and bilateral   lower lobes.  UPPER ABDOMEN:Enteric tube tip is in the proximal stomach just distal to   the gastroesophageal junction. Ingested material is noted within the   esophagus.  Redemonstrated atrophic pancreas with a 1 cm calcification in the head of   the pancreas. Numerous additional calcifications suggest chronic   pancreatitis.  Hepatic cysts adjacent to the gallbladder fossa. Trace layering densities   within the gallbladder likely representing cholelithiasis.  BONES/SOFT TISSUES: Multiple old left rib fracture is. 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 (3:65).  IMPRESSION:  1.  New patchy bilateral dependent opacities, could occur in the setting   of pneumonia (? Clinical history of aspiration given its distribution).      Procedure: MRI brain  Findings and Treatment: INTERPRETATION:  CLINICAL INDICATION: Follow-up right frontal parenchymal   hemorrhage, AAA repair  Magnetic resonance imaging of the brain was carriedout with transaxial   SPGR, FLAIR, fast spin echo T2 weighted images, axial susceptibility   weighted series, diffusion weighted series and sagittal T1 weighted   series on a 1.5 Love magnet. Post contrast axial, coronal and sagittal   T1 weighted images were obtained.  10 cc of Gadavist were intravenously   injected, 0 cc were discarded.  Comparison is made with the prior brain CT of 1/30/2022.  There is abnormal signal intensity in the right frontal superior frontal   gyrus consistent withhemorrhage. This measures 7.1 cm in AP diameter by   4.2 cm transversely by 4.9 cm in craniocaudal diameter and demonstrates   signal characteristics of subacute hemorrhage. There is mild vasogenic   edema. There is moderate mass effect due to the size of the lesion on the   adjacent ventricle which is displaced inferiorly. After contrast   administration there is no significant contrast enhancement. An   underlying lesion is not excluded, recommend follow-up to resolution.  There is a small amount of hemorrhage layering in the occipital horns.   There is no hydrocephalus. Mild age-appropriate involutional changes are   identified. Small vessel white matter ischemic changes are noted.  The sellar and parasellar structures are unremarkable.  A high left frontal arachnoid cyst adjacent to the. Frontal gyrus. There   is benign bony erosion.  IMPRESSION: Large right frontal superior gyrus subacute parenchymal   hemorrhage without significant enhancement. Cannot exclude underlying   lesion. Mass effect on the right lateral ventricle. Recommend follow-up   to resolution

## 2022-02-08 NOTE — DISCHARGE NOTE PROVIDER - NSDCMRMEDTOKEN_GEN_ALL_CORE_FT
amlodipine-olmesartan 10 mg-40 mg oral tablet: 1 tab(s) orally once a day  ascorbic acid 500 mg oral tablet: 1 tab(s) orally 2 times a day  Aspir 81 oral delayed release tablet: 1 tab(s) orally once a day  Bystolic 10 mg oral tablet: 1 tab(s) orally once a day  CeleBREX 200 mg oral capsule: 1 cap(s) orally 2 times a day  doxazosin 2 mg oral tablet: 0.5 tab(s) orally once a day  folic acid 1 mg oral tablet: 1 tab(s) orally once a day  Janumet 50 mg-1000 mg oral tablet: 1 tab(s) orally 2 times a day  Linzess 145 mcg oral capsule: 1 cap(s) orally once a day  modafinil 200 mg oral tablet: 1 tab(s) orally once a day (in the morning)  olopatadine 0.1% ophthalmic solution: 1 drop(s) to each affected eye 2 times a day  omeprazole-sodium bicarbonate 20 mg-1100 mg oral capsule: 1 cap(s) orally once a day  Plavix 75 mg oral tablet: 1 tab(s) orally once a day - unclear if taking  rivastigmine 4.6 mg/24 hr transdermal film, extended release: 1 patch transdermal once a day  SUMAtriptan 100 mg oral tablet: 1 tab(s) orally 2 times a day, As Needed  Trulicity Pen 1.5 mg/0.5 mL subcutaneous solution: 1.5 milligram(s) subcutaneous every 7 days  Uloric 40 mg oral tablet: 1 tab(s) orally once a day, As Needed  Vitamin D2 1.25 mg (50,000 intl units) oral capsule: 1 cap(s) orally once a week  Zetia 10 mg oral tablet: 1 tab(s) orally once a day   amlodipine-olmesartan 10 mg-40 mg oral tablet: 1 tab(s) orally once a day  Bystolic 10 mg oral tablet: 1 tab(s) orally once a day  CeleBREX 200 mg oral capsule: 1 cap(s) orally 2 times a day  doxazosin 2 mg oral tablet: 0.5 tab(s) orally once a day  folic acid 1 mg oral tablet: 1 tab(s) orally once a day  Janumet 50 mg-1000 mg oral tablet: 1 tab(s) orally 2 times a day  lacosamide 10 mg/mL oral solution: 10 milliliter(s) orally 2 times a day  Linzess 145 mcg oral capsule: 1 cap(s) orally once a day  losartan 100 mg oral tablet: 1 tab(s) orally once a day  modafinil 200 mg oral tablet: 1 tab(s) orally once a day (in the morning)  Omega-3 1000 mg oral capsule: 4 cap(s) orally once a day  omeprazole-sodium bicarbonate 20 mg-1100 mg oral capsule: 1 cap(s) orally once a day  Plavix 75 mg oral tablet: 1 tab(s) orally once a day - unclear if taking  rivastigmine 4.6 mg/24 hr transdermal film, extended release: 1 patch transdermal once a day  simvastatin 40 mg oral tablet: 1 tab(s) orally once a day  SUMAtriptan 100 mg oral tablet: 1 tab(s) orally 2 times a day, As Needed  Trulicity Pen 1.5 mg/0.5 mL subcutaneous solution: 1.5 milligram(s) subcutaneous every 7 days  Uloric 40 mg oral tablet: 1 tab(s) orally once a day, As Needed  Vitamin D2 1.25 mg (50,000 intl units) oral capsule: 1 cap(s) orally once a week  Zetia 10 mg oral tablet: 1 tab(s) orally once a day   amLODIPine 10 mg oral tablet: 1 tab(s) orally once a day  insulin lispro 100 units/mL injectable solution: 2 Unit(s) if Glucose 151 - 200  4 Unit(s) if Glucose 201 - 250  6 Unit(s) if Glucose 251 - 300  8 Unit(s) if Glucose 301 - 350  10 Unit(s) if Glucose 351 - 400  12 Unit(s) if Glucose Greater Than 400  ipratropium-albuterol 0.5 mg-2.5 mg/3 mL inhalation solution: 3 milliliter(s) inhaled every 6 hours  lacosamide 10 mg/mL oral solution: 10 milliliter(s) orally 2 times a day  losartan 100 mg oral tablet: 1 tab(s) orally once a day  Lovenox 40 mg/0.4 mL injectable solution: 40 milliliter(s) injectable 2 times a day  nystatin 100,000 units/g topical ointment: 1 application topically once  senna oral tablet: 2 tab(s) orally once a day (at bedtime)  sodium chloride 0.9% inhalation solution: 3 milliliter(s) inhaled every 6 hours

## 2022-02-08 NOTE — PROGRESS NOTE ADULT - SUBJECTIVE AND OBJECTIVE BOX
Micah Hansen, PGY-1  Internal Medicine  [employer]; #[phone]      PROGRESS NOTE:     Patient is a 81y old  Male who presents with a chief complaint of R ICH (03 Feb 2022 13:34)    SUBJECTIVE AND OVERNIGHT EVENTS:       ADDITIONAL REVIEW OF SYSTEMS:    MEDICATIONS  (STANDING):  albuterol/ipratropium for Nebulization 3 milliLiter(s) Nebulizer every 6 hours  enoxaparin Injectable 40 milliGRAM(s) SubCutaneous every 12 hours  insulin lispro (ADMELOG) corrective regimen sliding scale   SubCutaneous every 6 hours  lacosamide Solution 100 milliGRAM(s) Oral two times a day  nystatin Cream 1 Application(s) Topical every 12 hours  senna 2 Tablet(s) Oral at bedtime  sodium chloride 0.9% for Nebulization 3 milliLiter(s) Nebulizer every 6 hours    MEDICATIONS  (PRN):      CAPILLARY BLOOD GLUCOSE    POCT Blood Glucose.: 178 mg/dL (07 Feb 2022 21:11)  POCT Blood Glucose.: 186 mg/dL (07 Feb 2022 17:23)  POCT Blood Glucose.: 204 mg/dL (07 Feb 2022 12:35)      I&O's Summary    07 Feb 2022 07:01  -  08 Feb 2022 07:00  --------------------------------------------------------  IN: 240 mL / OUT: 0 mL / NET: 240 mL        PHYSICAL EXAM:  Vital Signs Last 24 Hrs  T(C): 36.1 (08 Feb 2022 06:00), Max: 37 (07 Feb 2022 21:17)  T(F): 97 (08 Feb 2022 06:00), Max: 98.6 (07 Feb 2022 21:17)  HR: 70 (08 Feb 2022 06:00) (70 - 93)  BP: 161/89 (08 Feb 2022 06:00) (139/83 - 161/89)  RR: 18 (08 Feb 2022 06:00) (18 - 18)  SpO2: 99% (08 Feb 2022 06:00) (97% - 100%)    CONSTITUTIONAL: NAD, well-developed, on NC. comfortable in chair  HEENT: NC/AT, EOMI  RESPIRATORY: No increased work of breathing, CTAB, no wheezes or crackles appreciated  CARDIOVASCULAR: RRR, S1 and S2 present, no m/r/g  ABDOMEN: soft, NT, ND, bowel sounds present  EXTREMITIES: No LE edema  MUSCULOSKELETAL: no joint swelling or tenderness to palpation  NEURO: A&Ox1, responsive to     LABS:                        10.7   4.07  )-----------( 160      ( 08 Feb 2022 07:41 )             33.4     02-07    142  |  103  |  26<H>  ----------------------------<  154<H>  3.8   |  29  |  0.96    Ca    9.0      07 Feb 2022 06:04  Phos  2.8     02-07  Mg     2.2     02-07      TPro  7.3  /  Alb  3.6  /  TBili  0.5  /  DBili  x   /  AST  43<H>  /  ALT  47<H>  /  AlkPhos  74  02-03      RADIOLOGY & ADDITIONAL TESTS:  CT BRAIN 1/30  This exam is compared with prior head CT performed on January 27, 2022.    Abnormal high attenuation involving the high left frontal cortical subcortical region is again seen. This finding measures approximately 5.1 x 2.8 cm and previously measured approximately 7.3 x 3.2 cm. Surrounding edema is again seen.    Small amount of subrecord hemorrhage is again suspected involving the left posterior temporal/posterior frontal region    Intraventricular hemorrhage is again seen.    The size and configuration of ventricles appear unchanged    No new areas of acute hemorrhage is seen.    Extra-axial low-attenuation from the high left frontal region is again seen with bony scalloping. This appears unchanged when compared with the prior exam and is likely compatible with an arachnoid cyst    Evaluation of osseous structures with the appropriate window appears unchanged    The visualized paranasal sinuses mastoid and middle ear regions appear clear.    Orotracheal and orogastric tubes are seen.    The paranasal sinuses mastoid and middle ear regions appear clear.    The patient status post bilateral cataract surgery.    IMPRESSION: Acute parenchymal hemorrhage is again seen as well as intraventricular hemorrhage. Subrecord hemorrhage is suspected.     Micah Hansen, PGY-1  Internal Medicine  [employer]; #[phone]      PROGRESS NOTE:     Patient is a 81y old  Male who presents with a chief complaint of R ICH (03 Feb 2022 13:34)    SUBJECTIVE AND OVERNIGHT EVENTS:   Patient was seen and examined at bedside this morning. He was getting ready to be transported to the chair and was interactive with the [place] . He denied any pain, fevers, shortness of breath or complaints otherwise.     ADDITIONAL REVIEW OF SYSTEMS:    MEDICATIONS  (STANDING):  albuterol/ipratropium for Nebulization 3 milliLiter(s) Nebulizer every 6 hours  enoxaparin Injectable 40 milliGRAM(s) SubCutaneous every 12 hours  insulin lispro (ADMELOG) corrective regimen sliding scale   SubCutaneous every 6 hours  lacosamide Solution 100 milliGRAM(s) Oral two times a day  nystatin Cream 1 Application(s) Topical every 12 hours  senna 2 Tablet(s) Oral at bedtime  sodium chloride 0.9% for Nebulization 3 milliLiter(s) Nebulizer every 6 hours    MEDICATIONS  (PRN):      CAPILLARY BLOOD GLUCOSE    POCT Blood Glucose.: 178 mg/dL (07 Feb 2022 21:11)  POCT Blood Glucose.: 186 mg/dL (07 Feb 2022 17:23)  POCT Blood Glucose.: 204 mg/dL (07 Feb 2022 12:35)      I&O's Summary    07 Feb 2022 07:01  -  08 Feb 2022 07:00  --------------------------------------------------------  IN: 240 mL / OUT: 0 mL / NET: 240 mL        PHYSICAL EXAM:  Vital Signs Last 24 Hrs  T(C): 36.1 (08 Feb 2022 06:00), Max: 37 (07 Feb 2022 21:17)  T(F): 97 (08 Feb 2022 06:00), Max: 98.6 (07 Feb 2022 21:17)  HR: 70 (08 Feb 2022 06:00) (70 - 93)  BP: 161/89 (08 Feb 2022 06:00) (139/83 - 161/89)  RR: 18 (08 Feb 2022 06:00) (18 - 18)  SpO2: 99% (08 Feb 2022 06:00) (97% - 100%)    CONSTITUTIONAL: NAD, well-developed, on NC  HEENT: NC/AT, EOMI  RESPIRATORY: No increased work of breathing, CTAB, no wheezes or crackles appreciated  CARDIOVASCULAR: RRR, S1 and S2 present, no m/r/g  ABDOMEN: soft, NT, ND, bowel sounds present  EXTREMITIES: No LE edema  MUSCULOSKELETAL: no joint swelling or tenderness to palpation  NEURO: A&Ox1, responsive to     LABS:                        10.7   4.07  )-----------( 160      ( 08 Feb 2022 07:41 )             33.4     02-07    142  |  103  |  26<H>  ----------------------------<  154<H>  3.8   |  29  |  0.96    Ca    9.0      07 Feb 2022 06:04  Phos  2.8     02-07  Mg     2.2     02-07      TPro  7.3  /  Alb  3.6  /  TBili  0.5  /  DBili  x   /  AST  43<H>  /  ALT  47<H>  /  AlkPhos  74  02-03      RADIOLOGY & ADDITIONAL TESTS:  CT BRAIN 1/30  This exam is compared with prior head CT performed on January 27, 2022.    Abnormal high attenuation involving the high left frontal cortical subcortical region is again seen. This finding measures approximately 5.1 x 2.8 cm and previously measured approximately 7.3 x 3.2 cm. Surrounding edema is again seen.    Small amount of subrecord hemorrhage is again suspected involving the left posterior temporal/posterior frontal region    Intraventricular hemorrhage is again seen.    The size and configuration of ventricles appear unchanged    No new areas of acute hemorrhage is seen.    Extra-axial low-attenuation from the high left frontal region is again seen with bony scalloping. This appears unchanged when compared with the prior exam and is likely compatible with an arachnoid cyst    Evaluation of osseous structures with the appropriate window appears unchanged    The visualized paranasal sinuses mastoid and middle ear regions appear clear.    Orotracheal and orogastric tubes are seen.    The paranasal sinuses mastoid and middle ear regions appear clear.    The patient status post bilateral cataract surgery.    IMPRESSION: Acute parenchymal hemorrhage is again seen as well as intraventricular hemorrhage. Subrecord hemorrhage is suspected.

## 2022-02-08 NOTE — DISCHARGE NOTE PROVIDER - NSDCCPCAREPLAN_GEN_ALL_CORE_FT
PRINCIPAL DISCHARGE DIAGNOSIS  Diagnosis: Intraparenchymal hemorrhage of brain  Assessment and Plan of Treatment:       SECONDARY DISCHARGE DIAGNOSES  Diagnosis: Aspiration pneumonia  Assessment and Plan of Treatment:      PRINCIPAL DISCHARGE DIAGNOSIS  Diagnosis: Intraparenchymal hemorrhage of brain  Assessment and Plan of Treatment: You were admitted after being transferred from  [facility] for a R frontal parenchymal hemorrhage after suffering from a fall at home. You were admitted to the Neurosurgical ICU. Your family made the decision to forego surgery at the time. You were eventually brought to the medicine floors where we continued to manage your symptoms. During this time, your hospital course was complicated by fluctuating clinical exams and a return to the ICU after an episode of hypoxia in the setting of aspiration pneumonia. When you returned to the floors your clinical status improved significantly and you were eventually able to tolerate a diet. We removed the NGT and eventually restarted your home medications before you were optimized for discharge to rehab.   If you have return of confusion, altered mental status, or other concerning symptoms, please seek medical attention.      SECONDARY DISCHARGE DIAGNOSES  Diagnosis: Aspiration pneumonia  Assessment and Plan of Treatment: During your hospitalization, your course was complicated by aspiration pneumonia. Aspiration pneumonia happens when a substance that should not be in the lungs gets into the lungs and causes problems, a common occurrence after brain injuries. You became significantly ill despite being treated with antibiotics, and spent time in the medical ICU where you were intubated for concern of protecting your airway. You were extubated soon afterwards and finished your course of Cefepime, an antibiotic. You improved significantly and were eventually able to come off of oxygen.    Diagnosis: HTN (hypertension)  Assessment and Plan of Treatment: You have a history of hypertension and during your admission your blood pressures remained fairly low until your neared discharge. We restarted one of your BP medications but please follow-up with your PCP about getting back to your home regimen when stable. Given your hemorrhagic stroke, it is important to have tight control of your blood pressure.

## 2022-02-08 NOTE — DISCHARGE NOTE PROVIDER - REASON FOR ADMISSION
.Reviewed discharge instructions with patient. Patient verbalized understanding. No distress noted. Ambulatory from department.      Stacia Niño RN  11/19/20 [id number]
R ICH

## 2022-02-09 LAB
ANION GAP SERPL CALC-SCNC: 11 MMOL/L — SIGNIFICANT CHANGE UP (ref 5–17)
BUN SERPL-MCNC: 16 MG/DL — SIGNIFICANT CHANGE UP (ref 7–23)
CALCIUM SERPL-MCNC: 9.5 MG/DL — SIGNIFICANT CHANGE UP (ref 8.4–10.5)
CHLORIDE SERPL-SCNC: 102 MMOL/L — SIGNIFICANT CHANGE UP (ref 96–108)
CO2 SERPL-SCNC: 28 MMOL/L — SIGNIFICANT CHANGE UP (ref 22–31)
CREAT SERPL-MCNC: 0.78 MG/DL — SIGNIFICANT CHANGE UP (ref 0.5–1.3)
GLUCOSE BLDC GLUCOMTR-MCNC: 139 MG/DL — HIGH (ref 70–99)
GLUCOSE BLDC GLUCOMTR-MCNC: 146 MG/DL — HIGH (ref 70–99)
GLUCOSE BLDC GLUCOMTR-MCNC: 152 MG/DL — HIGH (ref 70–99)
GLUCOSE BLDC GLUCOMTR-MCNC: 153 MG/DL — HIGH (ref 70–99)
GLUCOSE BLDC GLUCOMTR-MCNC: 186 MG/DL — HIGH (ref 70–99)
GLUCOSE BLDC GLUCOMTR-MCNC: 194 MG/DL — HIGH (ref 70–99)
GLUCOSE SERPL-MCNC: 131 MG/DL — HIGH (ref 70–99)
HCT VFR BLD CALC: 36.4 % — LOW (ref 39–50)
HGB BLD-MCNC: 11.6 G/DL — LOW (ref 13–17)
MAGNESIUM SERPL-MCNC: 2.2 MG/DL — SIGNIFICANT CHANGE UP (ref 1.6–2.6)
MCHC RBC-ENTMCNC: 30.8 PG — SIGNIFICANT CHANGE UP (ref 27–34)
MCHC RBC-ENTMCNC: 31.9 GM/DL — LOW (ref 32–36)
MCV RBC AUTO: 96.6 FL — SIGNIFICANT CHANGE UP (ref 80–100)
NRBC # BLD: 0 /100 WBCS — SIGNIFICANT CHANGE UP (ref 0–0)
PHOSPHATE SERPL-MCNC: 3 MG/DL — SIGNIFICANT CHANGE UP (ref 2.5–4.5)
PLATELET # BLD AUTO: 144 K/UL — LOW (ref 150–400)
POTASSIUM SERPL-MCNC: 3.7 MMOL/L — SIGNIFICANT CHANGE UP (ref 3.5–5.3)
POTASSIUM SERPL-SCNC: 3.7 MMOL/L — SIGNIFICANT CHANGE UP (ref 3.5–5.3)
RBC # BLD: 3.77 M/UL — LOW (ref 4.2–5.8)
RBC # FLD: 13.5 % — SIGNIFICANT CHANGE UP (ref 10.3–14.5)
SARS-COV-2 RNA SPEC QL NAA+PROBE: SIGNIFICANT CHANGE UP
SODIUM SERPL-SCNC: 141 MMOL/L — SIGNIFICANT CHANGE UP (ref 135–145)
WBC # BLD: 3.52 K/UL — LOW (ref 3.8–10.5)
WBC # FLD AUTO: 3.52 K/UL — LOW (ref 3.8–10.5)

## 2022-02-09 PROCEDURE — 70450 CT HEAD/BRAIN W/O DYE: CPT | Mod: 26

## 2022-02-09 PROCEDURE — 99232 SBSQ HOSP IP/OBS MODERATE 35: CPT | Mod: GC

## 2022-02-09 RX ORDER — ACETAMINOPHEN 500 MG
975 TABLET ORAL ONCE
Refills: 0 | Status: COMPLETED | OUTPATIENT
Start: 2022-02-09 | End: 2022-02-09

## 2022-02-09 RX ORDER — NYSTATIN CREAM 100000 [USP'U]/G
1 CREAM TOPICAL ONCE
Refills: 0 | Status: DISCONTINUED | OUTPATIENT
Start: 2022-02-09 | End: 2022-02-10

## 2022-02-09 RX ORDER — AMLODIPINE BESYLATE 2.5 MG/1
10 TABLET ORAL DAILY
Refills: 0 | Status: DISCONTINUED | OUTPATIENT
Start: 2022-02-09 | End: 2022-02-10

## 2022-02-09 RX ORDER — LOSARTAN POTASSIUM 100 MG/1
100 TABLET, FILM COATED ORAL DAILY
Refills: 0 | Status: DISCONTINUED | OUTPATIENT
Start: 2022-02-09 | End: 2022-02-10

## 2022-02-09 RX ADMIN — SODIUM CHLORIDE 3 MILLILITER(S): 9 INJECTION INTRAMUSCULAR; INTRAVENOUS; SUBCUTANEOUS at 12:32

## 2022-02-09 RX ADMIN — Medication 2: at 13:46

## 2022-02-09 RX ADMIN — ENOXAPARIN SODIUM 40 MILLIGRAM(S): 100 INJECTION SUBCUTANEOUS at 18:57

## 2022-02-09 RX ADMIN — SODIUM CHLORIDE 3 MILLILITER(S): 9 INJECTION INTRAMUSCULAR; INTRAVENOUS; SUBCUTANEOUS at 18:56

## 2022-02-09 RX ADMIN — SENNA PLUS 2 TABLET(S): 8.6 TABLET ORAL at 21:53

## 2022-02-09 RX ADMIN — Medication 3 MILLILITER(S): at 06:13

## 2022-02-09 RX ADMIN — Medication 975 MILLIGRAM(S): at 09:16

## 2022-02-09 RX ADMIN — NYSTATIN CREAM 1 APPLICATION(S): 100000 CREAM TOPICAL at 06:13

## 2022-02-09 RX ADMIN — Medication 3 MILLILITER(S): at 18:56

## 2022-02-09 RX ADMIN — SODIUM CHLORIDE 3 MILLILITER(S): 9 INJECTION INTRAMUSCULAR; INTRAVENOUS; SUBCUTANEOUS at 06:13

## 2022-02-09 RX ADMIN — LACOSAMIDE 100 MILLIGRAM(S): 50 TABLET ORAL at 18:57

## 2022-02-09 RX ADMIN — LACOSAMIDE 100 MILLIGRAM(S): 50 TABLET ORAL at 06:17

## 2022-02-09 RX ADMIN — Medication 975 MILLIGRAM(S): at 08:16

## 2022-02-09 RX ADMIN — ENOXAPARIN SODIUM 40 MILLIGRAM(S): 100 INJECTION SUBCUTANEOUS at 06:14

## 2022-02-09 RX ADMIN — AMLODIPINE BESYLATE 10 MILLIGRAM(S): 2.5 TABLET ORAL at 12:32

## 2022-02-09 RX ADMIN — NYSTATIN CREAM 1 APPLICATION(S): 100000 CREAM TOPICAL at 17:04

## 2022-02-09 RX ADMIN — Medication 2: at 01:41

## 2022-02-09 RX ADMIN — Medication 3 MILLILITER(S): at 12:33

## 2022-02-09 NOTE — PROGRESS NOTE ADULT - ATTENDING COMMENTS
presented with ICH and poor mental status  now much improved  bleed is stable on serial imaging  he  is tolerating feeds  mrib w/o underlying lesion  continue AEDs  neuro/nsgx follow up  dc planning to rehab

## 2022-02-09 NOTE — PROGRESS NOTE ADULT - SUBJECTIVE AND OBJECTIVE BOX
DATE OF SERVICE: 02-09-22 @ 17:25    Subjective: Patient seen and examined. No new events except as noted.     SUBJECTIVE/ROS:        MEDICATIONS:  MEDICATIONS  (STANDING):  albuterol/ipratropium for Nebulization 3 milliLiter(s) Nebulizer every 6 hours  amLODIPine   Tablet 10 milliGRAM(s) Oral daily  enoxaparin Injectable 40 milliGRAM(s) SubCutaneous every 12 hours  insulin lispro (ADMELOG) corrective regimen sliding scale   SubCutaneous every 6 hours  lacosamide Solution 100 milliGRAM(s) Oral two times a day  losartan 100 milliGRAM(s) Oral daily  nystatin Cream 1 Application(s) Topical every 12 hours  senna 2 Tablet(s) Oral at bedtime  sodium chloride 0.9% for Nebulization 3 milliLiter(s) Nebulizer every 6 hours      PHYSICAL EXAM:  T(C): 36.6 (02-09-22 @ 13:00), Max: 36.6 (02-09-22 @ 13:00)  HR: 73 (02-09-22 @ 13:00) (70 - 89)  BP: 144/82 (02-09-22 @ 13:00) (121/64 - 181/81)  RR: 20 (02-09-22 @ 13:00) (18 - 20)  SpO2: 100% (02-09-22 @ 13:00) (96% - 100%)  Wt(kg): --  I&O's Summary    08 Feb 2022 07:01  -  09 Feb 2022 07:00  --------------------------------------------------------  IN: 900 mL / OUT: 0 mL / NET: 900 mL            JVP: Normal  Neck: supple  Lung: clear   CV: S1 S2 , Murmur:  Abd: soft  Ext: No edema  neuro: Awake / alert  Psych: flat affect  Skin: normal``    LABS/DATA:    CARDIAC MARKERS:                                11.6   3.52  )-----------( 144      ( 09 Feb 2022 06:38 )             36.4     02-09    141  |  102  |  16  ----------------------------<  131<H>  3.7   |  28  |  0.78    Ca    9.5      09 Feb 2022 06:38  Phos  3.0     02-09  Mg     2.2     02-09      proBNP:   Lipid Profile:   HgA1c:   TSH:     TELE:  EKG:

## 2022-02-09 NOTE — CHART NOTE - NSCHARTNOTEFT_GEN_A_CORE
Amy daughter requested to speak to Neurology over phone regarding the MRI and CT comparison.    Reviewed neuroimaging with Dr. Archer:    MR Head w/wo IV Cont (02.08.22 @ 22:49)    There is abnormal signal intensity in the right frontal superior frontal   gyrus consistent with hemorrhage. This measures 7.1 cm in AP diameter by   4.2 cm transversely by 4.9 cm in craniocaudal diameter and demonstrates   signal characteristics of subacute hemorrhage. There is mild vasogenic   edema. There is moderate mass effect due to the size of the lesion on the   adjacent ventricle which is displaced inferiorly. After contrast   administration there is no significant contrast enhancement. An   underlying lesion is not excluded, recommend follow-up to resolution.    There is a small amount of hemorrhage layering in the occipital horns.   There is no hydrocephalus. Mild age-appropriate involutional changes are   identified. Small vessel white matter ischemic changes are noted.    The sellar and parasellar structures are unremarkable.    A high left frontal arachnoid cyst adjacent to the. Frontal gyrus. There   is benign bony erosion.    The right frontal hemorrhage is stable compared with the recent CT   1/30/2022 and may be better seen on the MRI due to the increased   sensitivity of MRI to hemorrhage.    Case reviewed, chart reviewed, discussed with primary team.    Called Amy, addressed all questions and concerns. She is concerned that the width of the hemorrhage appears to be getting bigger when comparing the dimensions reported in the CT and MRI reports, however upon review the overall size and edema showing improvement.     Amy requested for patient to have a repeat CTH noncon in order to better compare the CT scan 1/30 and better evaluate dimensions on the same imaging modality.     d/w primary team, Dr. Neal Neurology Attending, Amy Amy daughter requested to speak to Neurology over phone regarding the MRI and CT comparison.    Reviewed neuroimaging with Dr. Archer:    MR Head w/wo IV Cont (02.08.22 @ 22:49)    There is abnormal signal intensity in the right frontal superior frontal   gyrus consistent with hemorrhage. This measures 7.1 cm in AP diameter by   4.2 cm transversely by 4.9 cm in craniocaudal diameter and demonstrates   signal characteristics of subacute hemorrhage. There is mild vasogenic   edema. There is moderate mass effect due to the size of the lesion on the   adjacent ventricle which is displaced inferiorly. After contrast   administration there is no significant contrast enhancement. An   underlying lesion is not excluded, recommend follow-up to resolution.    There is a small amount of hemorrhage layering in the occipital horns.   There is no hydrocephalus. Mild age-appropriate involutional changes are   identified. Small vessel white matter ischemic changes are noted.    The sellar and parasellar structures are unremarkable.    A high left frontal arachnoid cyst adjacent to the. Frontal gyrus. There   is benign bony erosion.    The right frontal hemorrhage is stable compared with the recent CT   1/30/2022 and may be better seen on the MRI due to the increased   sensitivity of MRI to hemorrhage.    Case reviewed, chart reviewed, discussed with primary team.    Patient examination in brief:  MS: eyes open, alert, can mimic commands ([facility]  was unavailable due to technical issues)  CN: R pupil appears irregular with both pupils ~3mm and reactive to light, L facial droop  Motor: all extremities appear antigravity with mild difficulty in maintaining left arm antigravity    Called Amy, addressed all questions and concerns. She is concerned that the width of the hemorrhage appears to be getting bigger when comparing the dimensions reported in the CT and MRI reports, however upon review the overall size and edema showing improvement.     Amy requested for patient to have a repeat CTH noncon in order to better compare the CT scan 1/30 and better evaluate dimensions on the same imaging modality.     d/w primary team, Dr. Neal Neurology Attending, Amy

## 2022-02-09 NOTE — PROGRESS NOTE ADULT - PROBLEM SELECTOR PLAN 1
-AOx1 today, improved status  - neuro checks q4h  - Vimpat 100mg q12  - MRI w/wo when able  - CTH repeat w/ improved ICH x2  -Neuro checks q 4 hrs and prn for changes  -PT eval; OT following  - pending MRI prior to d/c -AOx1 today, improved status  - neuro checks q4h  - Vimpat 100mg q12  - MRI w/wo - stable  - CTH repeat w/ improved ICH x2  -Neuro checks q 4 hrs and prn for changes  -PT eval; OT following  - pending MRI prior to d/c

## 2022-02-09 NOTE — PROGRESS NOTE ADULT - SUBJECTIVE AND OBJECTIVE BOX
Micah Hansen, PGY-1  Internal Medicine  [employer]; #[phone]      PROGRESS NOTE:     Patient is a 81y old  Male who presents with a chief complaint of R ICH (03 Feb 2022 13:34)    SUBJECTIVE AND OVERNIGHT EVENTS:       ADDITIONAL REVIEW OF SYSTEMS:    MEDICATIONS  (STANDING):  acetaminophen     Tablet .. 975 milliGRAM(s) Oral once  albuterol/ipratropium for Nebulization 3 milliLiter(s) Nebulizer every 6 hours  enoxaparin Injectable 40 milliGRAM(s) SubCutaneous every 12 hours  insulin lispro (ADMELOG) corrective regimen sliding scale   SubCutaneous every 6 hours  lacosamide Solution 100 milliGRAM(s) Oral two times a day  nystatin Cream 1 Application(s) Topical every 12 hours  senna 2 Tablet(s) Oral at bedtime  sodium chloride 0.9% for Nebulization 3 milliLiter(s) Nebulizer every 6 hours    MEDICATIONS  (PRN):      CAPILLARY BLOOD GLUCOSE    POCT Blood Glucose.: 153 mg/dL (09 Feb 2022 01:35)  POCT Blood Glucose.: 164 mg/dL (08 Feb 2022 21:07)  POCT Blood Glucose.: 152 mg/dL (08 Feb 2022 17:54)  POCT Blood Glucose.: 205 mg/dL (08 Feb 2022 12:34)  POCT Blood Glucose.: 146 mg/dL (08 Feb 2022 09:23)    I&O's Summary    08 Feb 2022 07:01  -  09 Feb 2022 07:00  --------------------------------------------------------  IN: 900 mL / OUT: 0 mL / NET: 900 mL      PHYSICAL EXAM:  Vital Signs Last 24 Hrs  T(C): 36.4 (09 Feb 2022 06:28), Max: 36.4 (08 Feb 2022 13:10)  T(F): 97.6 (09 Feb 2022 06:28), Max: 97.6 (09 Feb 2022 06:28)  HR: 70 (09 Feb 2022 06:28) (70 - 89)  BP: 181/81 (08 Feb 2022 21:00) (158/88 - 181/81)  RR: 18 (09 Feb 2022 06:28) (18 - 20)  SpO2: 99% (09 Feb 2022 06:28) (94% - 99%)    CONSTITUTIONAL: NAD, well-developed, on NC  HEENT: NC/AT, EOMI  RESPIRATORY: No increased work of breathing, CTAB, no wheezes or crackles appreciated  CARDIOVASCULAR: RRR, S1 and S2 present, no m/r/g  ABDOMEN: soft, NT, ND, bowel sounds present  EXTREMITIES: No LE edema  MUSCULOSKELETAL: no joint swelling or tenderness to palpation  NEURO: A&Ox1, responsive to     LABS:                        11.6   3.52  )-----------( 144      ( 09 Feb 2022 06:38 )             36.4     02-09    141  |  102  |  16  ----------------------------<  131<H>  3.7   |  28  |  0.78    Ca    9.5      09 Feb 2022 06:38  Phos  3.0     02-09  Mg     2.2     02-09      TPro  7.3  /  Alb  3.6  /  TBili  0.5  /  DBili  x   /  AST  43<H>  /  ALT  47<H>  /  AlkPhos  74  02-03      RADIOLOGY & ADDITIONAL TESTS:  CT BRAIN 1/30  This exam is compared with prior head CT performed on January 27, 2022.    Abnormal high attenuation involving the high left frontal cortical subcortical region is again seen. This finding measures approximately 5.1 x 2.8 cm and previously measured approximately 7.3 x 3.2 cm. Surrounding edema is again seen.    Small amount of subrecord hemorrhage is again suspected involving the left posterior temporal/posterior frontal region    Intraventricular hemorrhage is again seen.    The size and configuration of ventricles appear unchanged    No new areas of acute hemorrhage is seen.    Extra-axial low-attenuation from the high left frontal region is again seen with bony scalloping. This appears unchanged when compared with the prior exam and is likely compatible with an arachnoid cyst    Evaluation of osseous structures with the appropriate window appears unchanged    The visualized paranasal sinuses mastoid and middle ear regions appear clear.    Orotracheal and orogastric tubes are seen.    The paranasal sinuses mastoid and middle ear regions appear clear.    The patient status post bilateral cataract surgery.    IMPRESSION: Acute parenchymal hemorrhage is again seen as well as intraventricular hemorrhage. Subrecord hemorrhage is suspected.     Micah Hansen, PGY-1  Internal Medicine  [facility] Teams; #[phone]      PROGRESS NOTE:     Patient is a 81y old  Male who presents with a chief complaint of R ICH (03 Feb 2022 13:34)    SUBJECTIVE AND OVERNIGHT EVENTS:   Patient was seen and examined in chair this morning. He was interactive with the  and denied any acute events or complaints.     ADDITIONAL REVIEW OF SYSTEMS:    MEDICATIONS  (STANDING):  acetaminophen     Tablet .. 975 milliGRAM(s) Oral once  albuterol/ipratropium for Nebulization 3 milliLiter(s) Nebulizer every 6 hours  enoxaparin Injectable 40 milliGRAM(s) SubCutaneous every 12 hours  insulin lispro (ADMELOG) corrective regimen sliding scale   SubCutaneous every 6 hours  lacosamide Solution 100 milliGRAM(s) Oral two times a day  nystatin Cream 1 Application(s) Topical every 12 hours  senna 2 Tablet(s) Oral at bedtime  sodium chloride 0.9% for Nebulization 3 milliLiter(s) Nebulizer every 6 hours    MEDICATIONS  (PRN):      CAPILLARY BLOOD GLUCOSE    POCT Blood Glucose.: 153 mg/dL (09 Feb 2022 01:35)  POCT Blood Glucose.: 164 mg/dL (08 Feb 2022 21:07)  POCT Blood Glucose.: 152 mg/dL (08 Feb 2022 17:54)  POCT Blood Glucose.: 205 mg/dL (08 Feb 2022 12:34)  POCT Blood Glucose.: 146 mg/dL (08 Feb 2022 09:23)    I&O's Summary    08 Feb 2022 07:01  -  09 Feb 2022 07:00  --------------------------------------------------------  IN: 900 mL / OUT: 0 mL / NET: 900 mL      PHYSICAL EXAM:  Vital Signs Last 24 Hrs  T(C): 36.4 (09 Feb 2022 06:28), Max: 36.4 (08 Feb 2022 13:10)  T(F): 97.6 (09 Feb 2022 06:28), Max: 97.6 (09 Feb 2022 06:28)  HR: 70 (09 Feb 2022 06:28) (70 - 89)  BP: 181/81 (08 Feb 2022 21:00) (158/88 - 181/81)  RR: 18 (09 Feb 2022 06:28) (18 - 20)  SpO2: 99% (09 Feb 2022 06:28) (94% - 99%)    CONSTITUTIONAL: NAD, well-developed, on NC  HEENT: NC/AT, EOMI  RESPIRATORY: No increased work of breathing, CTAB, no wheezes or crackles appreciated  CARDIOVASCULAR: RRR, S1 and S2 present, no m/r/g  ABDOMEN: soft, NT, ND, bowel sounds present  EXTREMITIES: No LE edema  MUSCULOSKELETAL: no joint swelling or tenderness to palpation  NEURO: A&Ox1, responsive to     LABS:                        11.6   3.52  )-----------( 144      ( 09 Feb 2022 06:38 )             36.4     02-09    141  |  102  |  16  ----------------------------<  131<H>  3.7   |  28  |  0.78    Ca    9.5      09 Feb 2022 06:38  Phos  3.0     02-09  Mg     2.2     02-09      TPro  7.3  /  Alb  3.6  /  TBili  0.5  /  DBili  x   /  AST  43<H>  /  ALT  47<H>  /  AlkPhos  74  02-03      RADIOLOGY & ADDITIONAL TESTS:  CT BRAIN 1/30  This exam is compared with prior head CT performed on January 27, 2022.    Abnormal high attenuation involving the high left frontal cortical subcortical region is again seen. This finding measures approximately 5.1 x 2.8 cm and previously measured approximately 7.3 x 3.2 cm. Surrounding edema is again seen.    Small amount of subrecord hemorrhage is again suspected involving the left posterior temporal/posterior frontal region    Intraventricular hemorrhage is again seen.    The size and configuration of ventricles appear unchanged    No new areas of acute hemorrhage is seen.    Extra-axial low-attenuation from the high left frontal region is again seen with bony scalloping. This appears unchanged when compared with the prior exam and is likely compatible with an arachnoid cyst    Evaluation of osseous structures with the appropriate window appears unchanged    The visualized paranasal sinuses mastoid and middle ear regions appear clear.    Orotracheal and orogastric tubes are seen.    The paranasal sinuses mastoid and middle ear regions appear clear.    The patient status post bilateral cataract surgery.    IMPRESSION: Acute parenchymal hemorrhage is again seen as well as intraventricular hemorrhage. Subrecord hemorrhage is suspected.

## 2022-02-10 VITALS
TEMPERATURE: 98 F | OXYGEN SATURATION: 94 % | HEART RATE: 86 BPM | SYSTOLIC BLOOD PRESSURE: 103 MMHG | DIASTOLIC BLOOD PRESSURE: 65 MMHG | RESPIRATION RATE: 20 BRPM

## 2022-02-10 LAB
ANION GAP SERPL CALC-SCNC: 10 MMOL/L — SIGNIFICANT CHANGE UP (ref 5–17)
BUN SERPL-MCNC: 12 MG/DL — SIGNIFICANT CHANGE UP (ref 7–23)
CALCIUM SERPL-MCNC: 9.5 MG/DL — SIGNIFICANT CHANGE UP (ref 8.4–10.5)
CHLORIDE SERPL-SCNC: 98 MMOL/L — SIGNIFICANT CHANGE UP (ref 96–108)
CO2 SERPL-SCNC: 28 MMOL/L — SIGNIFICANT CHANGE UP (ref 22–31)
CREAT SERPL-MCNC: 0.74 MG/DL — SIGNIFICANT CHANGE UP (ref 0.5–1.3)
GLUCOSE BLDC GLUCOMTR-MCNC: 123 MG/DL — HIGH (ref 70–99)
GLUCOSE BLDC GLUCOMTR-MCNC: 132 MG/DL — HIGH (ref 70–99)
GLUCOSE BLDC GLUCOMTR-MCNC: 168 MG/DL — HIGH (ref 70–99)
GLUCOSE BLDC GLUCOMTR-MCNC: 185 MG/DL — HIGH (ref 70–99)
GLUCOSE SERPL-MCNC: 125 MG/DL — HIGH (ref 70–99)
HCT VFR BLD CALC: 37.7 % — LOW (ref 39–50)
HGB BLD-MCNC: 12.1 G/DL — LOW (ref 13–17)
MAGNESIUM SERPL-MCNC: 2 MG/DL — SIGNIFICANT CHANGE UP (ref 1.6–2.6)
MCHC RBC-ENTMCNC: 30.6 PG — SIGNIFICANT CHANGE UP (ref 27–34)
MCHC RBC-ENTMCNC: 32.1 GM/DL — SIGNIFICANT CHANGE UP (ref 32–36)
MCV RBC AUTO: 95.4 FL — SIGNIFICANT CHANGE UP (ref 80–100)
NRBC # BLD: 0 /100 WBCS — SIGNIFICANT CHANGE UP (ref 0–0)
PHOSPHATE SERPL-MCNC: 2.7 MG/DL — SIGNIFICANT CHANGE UP (ref 2.5–4.5)
PLATELET # BLD AUTO: 151 K/UL — SIGNIFICANT CHANGE UP (ref 150–400)
POTASSIUM SERPL-MCNC: 3.6 MMOL/L — SIGNIFICANT CHANGE UP (ref 3.5–5.3)
POTASSIUM SERPL-SCNC: 3.6 MMOL/L — SIGNIFICANT CHANGE UP (ref 3.5–5.3)
RBC # BLD: 3.95 M/UL — LOW (ref 4.2–5.8)
RBC # FLD: 13.5 % — SIGNIFICANT CHANGE UP (ref 10.3–14.5)
SODIUM SERPL-SCNC: 136 MMOL/L — SIGNIFICANT CHANGE UP (ref 135–145)
WBC # BLD: 3.64 K/UL — LOW (ref 3.8–10.5)
WBC # FLD AUTO: 3.64 K/UL — LOW (ref 3.8–10.5)

## 2022-02-10 PROCEDURE — 71260 CT THORAX DX C+: CPT | Mod: MA

## 2022-02-10 PROCEDURE — 92526 ORAL FUNCTION THERAPY: CPT

## 2022-02-10 PROCEDURE — 70450 CT HEAD/BRAIN W/O DYE: CPT | Mod: MA

## 2022-02-10 PROCEDURE — 85025 COMPLETE CBC W/AUTO DIFF WBC: CPT

## 2022-02-10 PROCEDURE — 80053 COMPREHEN METABOLIC PANEL: CPT

## 2022-02-10 PROCEDURE — 84132 ASSAY OF SERUM POTASSIUM: CPT

## 2022-02-10 PROCEDURE — 84145 PROCALCITONIN (PCT): CPT

## 2022-02-10 PROCEDURE — 92522 EVALUATE SPEECH PRODUCTION: CPT

## 2022-02-10 PROCEDURE — 99291 CRITICAL CARE FIRST HOUR: CPT | Mod: 25

## 2022-02-10 PROCEDURE — 86850 RBC ANTIBODY SCREEN: CPT

## 2022-02-10 PROCEDURE — 84295 ASSAY OF SERUM SODIUM: CPT

## 2022-02-10 PROCEDURE — 72125 CT NECK SPINE W/O DYE: CPT | Mod: MA

## 2022-02-10 PROCEDURE — 85014 HEMATOCRIT: CPT

## 2022-02-10 PROCEDURE — 82947 ASSAY GLUCOSE BLOOD QUANT: CPT

## 2022-02-10 PROCEDURE — 0225U NFCT DS DNA&RNA 21 SARSCOV2: CPT

## 2022-02-10 PROCEDURE — 85379 FIBRIN DEGRADATION QUANT: CPT

## 2022-02-10 PROCEDURE — 92611 MOTION FLUOROSCOPY/SWALLOW: CPT

## 2022-02-10 PROCEDURE — U0005: CPT

## 2022-02-10 PROCEDURE — 82435 ASSAY OF BLOOD CHLORIDE: CPT

## 2022-02-10 PROCEDURE — 95816 EEG AWAKE AND DROWSY: CPT

## 2022-02-10 PROCEDURE — 94640 AIRWAY INHALATION TREATMENT: CPT

## 2022-02-10 PROCEDURE — 74230 X-RAY XM SWLNG FUNCJ C+: CPT

## 2022-02-10 PROCEDURE — 83880 ASSAY OF NATRIURETIC PEPTIDE: CPT

## 2022-02-10 PROCEDURE — 87070 CULTURE OTHR SPECIMN AEROBIC: CPT

## 2022-02-10 PROCEDURE — 95700 EEG CONT REC W/VID EEG TECH: CPT

## 2022-02-10 PROCEDURE — 81001 URINALYSIS AUTO W/SCOPE: CPT

## 2022-02-10 PROCEDURE — 94003 VENT MGMT INPAT SUBQ DAY: CPT

## 2022-02-10 PROCEDURE — 93005 ELECTROCARDIOGRAM TRACING: CPT

## 2022-02-10 PROCEDURE — 85576 BLOOD PLATELET AGGREGATION: CPT

## 2022-02-10 PROCEDURE — C9254: CPT

## 2022-02-10 PROCEDURE — C8929: CPT

## 2022-02-10 PROCEDURE — 80048 BASIC METABOLIC PNL TOTAL CA: CPT

## 2022-02-10 PROCEDURE — 85018 HEMOGLOBIN: CPT

## 2022-02-10 PROCEDURE — 86900 BLOOD TYPING SEROLOGIC ABO: CPT

## 2022-02-10 PROCEDURE — 94002 VENT MGMT INPAT INIT DAY: CPT

## 2022-02-10 PROCEDURE — 83735 ASSAY OF MAGNESIUM: CPT

## 2022-02-10 PROCEDURE — 84443 ASSAY THYROID STIM HORMONE: CPT

## 2022-02-10 PROCEDURE — 97530 THERAPEUTIC ACTIVITIES: CPT

## 2022-02-10 PROCEDURE — 99239 HOSP IP/OBS DSCHRG MGMT >30: CPT

## 2022-02-10 PROCEDURE — 97129 THER IVNTJ 1ST 15 MIN: CPT

## 2022-02-10 PROCEDURE — 82550 ASSAY OF CK (CPK): CPT

## 2022-02-10 PROCEDURE — 82962 GLUCOSE BLOOD TEST: CPT

## 2022-02-10 PROCEDURE — 85610 PROTHROMBIN TIME: CPT

## 2022-02-10 PROCEDURE — 85520 HEPARIN ASSAY: CPT

## 2022-02-10 PROCEDURE — 87641 MR-STAPH DNA AMP PROBE: CPT

## 2022-02-10 PROCEDURE — 82140 ASSAY OF AMMONIA: CPT

## 2022-02-10 PROCEDURE — 94660 CPAP INITIATION&MGMT: CPT

## 2022-02-10 PROCEDURE — 97163 PT EVAL HIGH COMPLEX 45 MIN: CPT

## 2022-02-10 PROCEDURE — 82803 BLOOD GASES ANY COMBINATION: CPT

## 2022-02-10 PROCEDURE — 85730 THROMBOPLASTIN TIME PARTIAL: CPT

## 2022-02-10 PROCEDURE — 71250 CT THORAX DX C-: CPT

## 2022-02-10 PROCEDURE — 82553 CREATINE MB FRACTION: CPT

## 2022-02-10 PROCEDURE — 92610 EVALUATE SWALLOWING FUNCTION: CPT

## 2022-02-10 PROCEDURE — 97110 THERAPEUTIC EXERCISES: CPT

## 2022-02-10 PROCEDURE — 87040 BLOOD CULTURE FOR BACTERIA: CPT

## 2022-02-10 PROCEDURE — 83605 ASSAY OF LACTIC ACID: CPT

## 2022-02-10 PROCEDURE — 84100 ASSAY OF PHOSPHORUS: CPT

## 2022-02-10 PROCEDURE — 70498 CT ANGIOGRAPHY NECK: CPT | Mod: MA

## 2022-02-10 PROCEDURE — 74177 CT ABD & PELVIS W/CONTRAST: CPT | Mod: MA

## 2022-02-10 PROCEDURE — 82330 ASSAY OF CALCIUM: CPT

## 2022-02-10 PROCEDURE — 87640 STAPH A DNA AMP PROBE: CPT

## 2022-02-10 PROCEDURE — 70553 MRI BRAIN STEM W/O & W/DYE: CPT

## 2022-02-10 PROCEDURE — A9585: CPT

## 2022-02-10 PROCEDURE — 36415 COLL VENOUS BLD VENIPUNCTURE: CPT

## 2022-02-10 PROCEDURE — 92507 TX SP LANG VOICE COMM INDIV: CPT

## 2022-02-10 PROCEDURE — 80061 LIPID PANEL: CPT

## 2022-02-10 PROCEDURE — 97535 SELF CARE MNGMENT TRAINING: CPT

## 2022-02-10 PROCEDURE — 86901 BLOOD TYPING SEROLOGIC RH(D): CPT

## 2022-02-10 PROCEDURE — 95714 VEEG EA 12-26 HR UNMNTR: CPT

## 2022-02-10 PROCEDURE — 71045 X-RAY EXAM CHEST 1 VIEW: CPT

## 2022-02-10 PROCEDURE — 82565 ASSAY OF CREATININE: CPT

## 2022-02-10 PROCEDURE — 83036 HEMOGLOBIN GLYCOSYLATED A1C: CPT

## 2022-02-10 PROCEDURE — 72170 X-RAY EXAM OF PELVIS: CPT

## 2022-02-10 PROCEDURE — 85027 COMPLETE CBC AUTOMATED: CPT

## 2022-02-10 PROCEDURE — 93970 EXTREMITY STUDY: CPT

## 2022-02-10 PROCEDURE — 97166 OT EVAL MOD COMPLEX 45 MIN: CPT

## 2022-02-10 PROCEDURE — 70496 CT ANGIOGRAPHY HEAD: CPT | Mod: MA

## 2022-02-10 PROCEDURE — U0003: CPT

## 2022-02-10 PROCEDURE — 84484 ASSAY OF TROPONIN QUANT: CPT

## 2022-02-10 PROCEDURE — 95711 VEEG 2-12 HR UNMONITORED: CPT

## 2022-02-10 RX ORDER — NEBIVOLOL HYDROCHLORIDE 5 MG/1
1 TABLET ORAL
Qty: 0 | Refills: 0 | DISCHARGE

## 2022-02-10 RX ORDER — CELECOXIB 200 MG/1
1 CAPSULE ORAL
Qty: 0 | Refills: 0 | DISCHARGE

## 2022-02-10 RX ORDER — EZETIMIBE 10 MG/1
1 TABLET ORAL
Qty: 0 | Refills: 0 | DISCHARGE

## 2022-02-10 RX ORDER — OMEPRAZOLE AND SODIUM BICARBONATE 40; 1100 MG/1; MG/1
1 CAPSULE, GELATIN COATED ORAL
Qty: 0 | Refills: 0 | DISCHARGE

## 2022-02-10 RX ORDER — LACOSAMIDE 50 MG/1
10 TABLET ORAL
Qty: 0 | Refills: 0 | DISCHARGE
Start: 2022-02-10

## 2022-02-10 RX ORDER — ENOXAPARIN SODIUM 100 MG/ML
40 INJECTION SUBCUTANEOUS
Qty: 0 | Refills: 0 | DISCHARGE

## 2022-02-10 RX ORDER — SENNA PLUS 8.6 MG/1
2 TABLET ORAL
Qty: 0 | Refills: 0 | DISCHARGE
Start: 2022-02-10

## 2022-02-10 RX ORDER — CLOPIDOGREL BISULFATE 75 MG/1
1 TABLET, FILM COATED ORAL
Qty: 0 | Refills: 0 | DISCHARGE

## 2022-02-10 RX ORDER — FEBUXOSTAT 40 MG/1
1 TABLET ORAL
Qty: 0 | Refills: 0 | DISCHARGE

## 2022-02-10 RX ORDER — MODAFINIL 200 MG/1
1 TABLET ORAL
Qty: 0 | Refills: 0 | DISCHARGE

## 2022-02-10 RX ORDER — RIVASTIGMINE 4.6 MG/24H
1 PATCH, EXTENDED RELEASE TRANSDERMAL
Qty: 0 | Refills: 0 | DISCHARGE

## 2022-02-10 RX ORDER — OMEGA-3 ACID ETHYL ESTERS 1 G
4 CAPSULE ORAL
Qty: 0 | Refills: 0 | DISCHARGE

## 2022-02-10 RX ORDER — AMLODIPINE BESYLATE 2.5 MG/1
1 TABLET ORAL
Qty: 0 | Refills: 0 | DISCHARGE
Start: 2022-02-10

## 2022-02-10 RX ORDER — SUMATRIPTAN SUCCINATE 4 MG/.5ML
1 INJECTION, SOLUTION SUBCUTANEOUS
Qty: 0 | Refills: 0 | DISCHARGE

## 2022-02-10 RX ORDER — ERGOCALCIFEROL 1.25 MG/1
1 CAPSULE ORAL
Qty: 0 | Refills: 0 | DISCHARGE

## 2022-02-10 RX ORDER — IPRATROPIUM/ALBUTEROL SULFATE 18-103MCG
3 AEROSOL WITH ADAPTER (GRAM) INHALATION
Qty: 0 | Refills: 0 | DISCHARGE
Start: 2022-02-10

## 2022-02-10 RX ORDER — SITAGLIPTIN AND METFORMIN HYDROCHLORIDE 500; 50 MG/1; MG/1
1 TABLET, FILM COATED ORAL
Qty: 0 | Refills: 0 | DISCHARGE

## 2022-02-10 RX ORDER — AMLODIPINE BESYLATE AND OLMESARTRAN MEDOXOMIL 10; 40 MG/1; MG/1
1 TABLET, FILM COATED ORAL
Qty: 0 | Refills: 0 | DISCHARGE

## 2022-02-10 RX ORDER — OLOPATADINE HYDROCHLORIDE 1 MG/ML
1 SOLUTION/ DROPS OPHTHALMIC
Qty: 0 | Refills: 0 | DISCHARGE

## 2022-02-10 RX ORDER — LOSARTAN POTASSIUM 100 MG/1
1 TABLET, FILM COATED ORAL
Qty: 0 | Refills: 0 | DISCHARGE
Start: 2022-02-10

## 2022-02-10 RX ORDER — SIMVASTATIN 20 MG/1
1 TABLET, FILM COATED ORAL
Qty: 0 | Refills: 0 | DISCHARGE

## 2022-02-10 RX ORDER — DOXAZOSIN MESYLATE 4 MG
0.5 TABLET ORAL
Qty: 0 | Refills: 0 | DISCHARGE

## 2022-02-10 RX ORDER — FOLIC ACID 0.8 MG
1 TABLET ORAL
Qty: 0 | Refills: 0 | DISCHARGE

## 2022-02-10 RX ORDER — SODIUM CHLORIDE 9 MG/ML
3 INJECTION INTRAMUSCULAR; INTRAVENOUS; SUBCUTANEOUS
Qty: 0 | Refills: 0 | DISCHARGE
Start: 2022-02-10

## 2022-02-10 RX ORDER — ASCORBIC ACID 60 MG
1 TABLET,CHEWABLE ORAL
Qty: 0 | Refills: 0 | DISCHARGE

## 2022-02-10 RX ORDER — NYSTATIN CREAM 100000 [USP'U]/G
1 CREAM TOPICAL
Qty: 0 | Refills: 0 | DISCHARGE
Start: 2022-02-10

## 2022-02-10 RX ORDER — INSULIN LISPRO 100/ML
2 VIAL (ML) SUBCUTANEOUS
Qty: 0 | Refills: 0 | DISCHARGE
Start: 2022-02-10

## 2022-02-10 RX ORDER — LINACLOTIDE 145 UG/1
1 CAPSULE, GELATIN COATED ORAL
Qty: 0 | Refills: 0 | DISCHARGE

## 2022-02-10 RX ORDER — ASPIRIN/CALCIUM CARB/MAGNESIUM 324 MG
1 TABLET ORAL
Qty: 0 | Refills: 0 | DISCHARGE

## 2022-02-10 RX ORDER — DULAGLUTIDE 4.5 MG/.5ML
1.5 INJECTION, SOLUTION SUBCUTANEOUS
Qty: 0 | Refills: 0 | DISCHARGE

## 2022-02-10 RX ADMIN — AMLODIPINE BESYLATE 10 MILLIGRAM(S): 2.5 TABLET ORAL at 06:08

## 2022-02-10 RX ADMIN — LOSARTAN POTASSIUM 100 MILLIGRAM(S): 100 TABLET, FILM COATED ORAL at 06:09

## 2022-02-10 RX ADMIN — LACOSAMIDE 100 MILLIGRAM(S): 50 TABLET ORAL at 06:09

## 2022-02-10 RX ADMIN — Medication 2: at 18:10

## 2022-02-10 RX ADMIN — ENOXAPARIN SODIUM 40 MILLIGRAM(S): 100 INJECTION SUBCUTANEOUS at 18:03

## 2022-02-10 RX ADMIN — SODIUM CHLORIDE 3 MILLILITER(S): 9 INJECTION INTRAMUSCULAR; INTRAVENOUS; SUBCUTANEOUS at 18:03

## 2022-02-10 RX ADMIN — Medication 2: at 00:45

## 2022-02-10 RX ADMIN — ENOXAPARIN SODIUM 40 MILLIGRAM(S): 100 INJECTION SUBCUTANEOUS at 06:08

## 2022-02-10 RX ADMIN — Medication 3 MILLILITER(S): at 12:26

## 2022-02-10 RX ADMIN — Medication 3 MILLILITER(S): at 06:08

## 2022-02-10 RX ADMIN — SODIUM CHLORIDE 3 MILLILITER(S): 9 INJECTION INTRAMUSCULAR; INTRAVENOUS; SUBCUTANEOUS at 12:26

## 2022-02-10 RX ADMIN — Medication 2: at 13:27

## 2022-02-10 RX ADMIN — SODIUM CHLORIDE 3 MILLILITER(S): 9 INJECTION INTRAMUSCULAR; INTRAVENOUS; SUBCUTANEOUS at 06:08

## 2022-02-10 RX ADMIN — Medication 3 MILLILITER(S): at 18:03

## 2022-02-10 RX ADMIN — Medication 3 MILLILITER(S): at 00:44

## 2022-02-10 RX ADMIN — LACOSAMIDE 100 MILLIGRAM(S): 50 TABLET ORAL at 18:10

## 2022-02-10 RX ADMIN — SODIUM CHLORIDE 3 MILLILITER(S): 9 INJECTION INTRAMUSCULAR; INTRAVENOUS; SUBCUTANEOUS at 00:44

## 2022-02-10 NOTE — PROGRESS NOTE ADULT - PROBLEM SELECTOR PLAN 1
-AOx1 today, improved status  - neuro checks q4h  - Vimpat 100mg q12  - MRI w/wo - stable  - CTH repeat w/ improved ICH x2  -Neuro checks q 4 hrs and prn for changes  -PT eval; OT following  - pending MRI prior to d/c

## 2022-02-10 NOTE — CHART NOTE - NSCHARTNOTEFT_GEN_A_CORE
Reviewed CT head and compared to prior:    CT Head No Cont (02.09.22 @ 18:13)    IMPRESSION:    HEAD CT: Smaller right frontal lobe intraparenchymal hemorrhage 4.8 x 2.4   cm.  Tiny remaining bilateral intraventricular hemorrhage.  Stable left frontal lobe arachnoid cyst.    CT Head No Cont (01.30.22 @ 09:57)    Abnormal high attenuation involving the high left frontal cortical   subcortical region is again seen. This finding measures approximately 5.1   x 2.8 cm. Surrounding edema is again seen.    Small amount of subrecord hemorrhage is again suspected involving the   left posterior temporal/posterior frontal region    Intraventricular hemorrhage is again seen.    The size and configuration of ventricles appear unchanged    Discussed with primary team that using prior CT and most recent CT the hemorrhage is smaller (5.1 x 2.8 --> 4.8 x 2.4).    Primary team to discuss with family, dispo planning per primary team.    d/w primary team

## 2022-02-10 NOTE — PROGRESS NOTE ADULT - PROBLEM SELECTOR PROBLEM 4
DM (diabetes mellitus)

## 2022-02-10 NOTE — PROGRESS NOTE ADULT - PROBLEM SELECTOR PROBLEM 1
Cerebral parenchymal hemorrhage

## 2022-02-10 NOTE — PROGRESS NOTE ADULT - PROBLEM SELECTOR PLAN 4
Type 2 DM  - FS goal 120-180  - ISS
Type 2 DM  - FS goal 120-180  - nph 13q6h, ISS
Type 2 DM  - FS goal 120-180  - ISS
Type 2 DM  - FS goal 120-180  - nph 13q6h, ISS
Type 2 DM  - FS goal 120-180  - nph 13q6h, ISS
Type 2 DM  - FS goal 120-180  - ISS
Type 2 DM  - FS goal 120-180  - nph 13q6h, ISS
Type 2 DM  - FS goal 120-180  - ISS
Type 2 DM  - FS goal 120-180  - nph 13q6h, ISS
Type 2 DM  - FS goal 120-180  - ISS

## 2022-02-10 NOTE — PROGRESS NOTE ADULT - PROVIDER SPECIALTY LIST ADULT
Cardiology
Internal Medicine
NSICU
NSICU
Neurology
Neurology
Neurosurgery
Cardiology
Internal Medicine
MICU
NSICU
Neurology
Cardiology
Gastroenterology
Internal Medicine
Internal Medicine
MICU
NSICU
Neurology
Neurosurgery
Neurosurgery
Cardiology
NSICU
Neurology
Neurosurgery
Neurosurgery
Podiatry
Podiatry
Internal Medicine
NSICU
Neurology
Neurosurgery
Neurosurgery
Internal Medicine

## 2022-02-10 NOTE — PROGRESS NOTE ADULT - PROBLEM SELECTOR PROBLEM 5
Prophylactic measure
Hypernatremia
Hypernatremia
Prophylactic measure
Prophylactic measure
Hypernatremia
Prophylactic measure

## 2022-02-10 NOTE — PROGRESS NOTE ADULT - PROBLEM SELECTOR PROBLEM 3
Dysphagia
Pneumonia due to COVID-19 virus
Dysphagia
Pneumonia due to COVID-19 virus
Dysphagia
Pneumonia due to COVID-19 virus
Dysphagia
Dysphagia
Pneumonia due to COVID-19 virus

## 2022-02-10 NOTE — PROGRESS NOTE ADULT - SUBJECTIVE AND OBJECTIVE BOX
DATE OF SERVICE: 02-10-22 @ 10:19    Subjective: Patient seen and examined. No new events except as noted.     SUBJECTIVE/ROS:        MEDICATIONS:  MEDICATIONS  (STANDING):  albuterol/ipratropium for Nebulization 3 milliLiter(s) Nebulizer every 6 hours  amLODIPine   Tablet 10 milliGRAM(s) Oral daily  enoxaparin Injectable 40 milliGRAM(s) SubCutaneous every 12 hours  insulin lispro (ADMELOG) corrective regimen sliding scale   SubCutaneous every 6 hours  lacosamide Solution 100 milliGRAM(s) Oral two times a day  losartan 100 milliGRAM(s) Oral daily  nystatin Ointment 1 Application(s) Topical once  senna 2 Tablet(s) Oral at bedtime  sodium chloride 0.9% for Nebulization 3 milliLiter(s) Nebulizer every 6 hours      PHYSICAL EXAM:  T(C): 36.5 (02-10-22 @ 09:19), Max: 36.6 (02-09-22 @ 13:00)  HR: 82 (02-10-22 @ 09:19) (73 - 91)  BP: 134/79 (02-10-22 @ 09:19) (112/84 - 168/84)  RR: 18 (02-10-22 @ 09:19) (18 - 20)  SpO2: 96% (02-10-22 @ 09:19) (96% - 100%)  Wt(kg): --  I&O's Summary    09 Feb 2022 07:01  -  10 Feb 2022 07:00  --------------------------------------------------------  IN: 720 mL / OUT: 0 mL / NET: 720 mL            JVP: Normal  Neck: supple  Lung: clear   CV: S1 S2 , Murmur:  Abd: soft  Ext: No edema  Psych: flat affect  Skin: normal``    LABS/DATA:    CARDIAC MARKERS:                                12.1   3.64  )-----------( 151      ( 10 Feb 2022 06:20 )             37.7     02-10    136  |  98  |  12  ----------------------------<  125<H>  3.6   |  28  |  0.74    Ca    9.5      10 Feb 2022 06:20  Phos  2.7     02-10  Mg     2.0     02-10      proBNP:   Lipid Profile:   HgA1c:   TSH:     TELE:  EKG:

## 2022-02-10 NOTE — PROGRESS NOTE ADULT - REASON FOR ADMISSION
R ICH
Right frontal ICH
R ICH

## 2022-02-10 NOTE — PROGRESS NOTE ADULT - ATTENDING SUPERVISION STATEMENT
Resident
Fellow
Resident
Resident
Fellow
Resident
Fellow
ACP
Fellow
ACP
Fellow
Fellow
Resident

## 2022-02-10 NOTE — PROGRESS NOTE ADULT - SUBJECTIVE AND OBJECTIVE BOX
Micah Hansen, PGY-1  Internal Medicine  [employer]; #[phone]      PROGRESS NOTE:     Patient is a 81y old  Male who presents with a chief complaint of R ICH (03 Feb 2022 13:34)    SUBJECTIVE AND OVERNIGHT EVENTS:       ADDITIONAL REVIEW OF SYSTEMS:    MEDICATIONS  (STANDING):  albuterol/ipratropium for Nebulization 3 milliLiter(s) Nebulizer every 6 hours  amLODIPine   Tablet 10 milliGRAM(s) Oral daily  enoxaparin Injectable 40 milliGRAM(s) SubCutaneous every 12 hours  insulin lispro (ADMELOG) corrective regimen sliding scale   SubCutaneous every 6 hours  lacosamide Solution 100 milliGRAM(s) Oral two times a day  losartan 100 milliGRAM(s) Oral daily  nystatin Ointment 1 Application(s) Topical once  senna 2 Tablet(s) Oral at bedtime  sodium chloride 0.9% for Nebulization 3 milliLiter(s) Nebulizer every 6 hours    MEDICATIONS  (PRN):      CAPILLARY BLOOD GLUCOSE    POCT Blood Glucose.: 123 mg/dL (10 Feb 2022 06:43)  POCT Blood Glucose.: 152 mg/dL (09 Feb 2022 23:53)  POCT Blood Glucose.: 186 mg/dL (09 Feb 2022 21:23)  POCT Blood Glucose.: 146 mg/dL (09 Feb 2022 17:32)  POCT Blood Glucose.: 194 mg/dL (09 Feb 2022 13:38)  POCT Blood Glucose.: 139 mg/dL (09 Feb 2022 09:01)    I&O's Summary    09 Feb 2022 07:01  -  10 Feb 2022 07:00  --------------------------------------------------------  IN: 720 mL / OUT: 0 mL / NET: 720 mL          PHYSICAL EXAM:  Vital Signs Last 24 Hrs  T(C): 36.4 (10 Feb 2022 04:42), Max: 36.6 (09 Feb 2022 13:00)  T(F): 97.6 (10 Feb 2022 04:42), Max: 97.9 (09 Feb 2022 21:00)  HR: 85 (10 Feb 2022 05:35) (73 - 91)  BP: 168/84 (10 Feb 2022 04:42) (112/84 - 168/84)  RR: 18 (10 Feb 2022 04:42) (18 - 20)  SpO2: 100% (10 Feb 2022 05:35) (98% - 100%)    CONSTITUTIONAL: NAD, well-developed, on NC  HEENT: NC/AT, EOMI  RESPIRATORY: No increased work of breathing, CTAB, no wheezes or crackles appreciated  CARDIOVASCULAR: RRR, S1 and S2 present, no m/r/g  ABDOMEN: soft, NT, ND, bowel sounds present  EXTREMITIES: No LE edema  MUSCULOSKELETAL: no joint swelling or tenderness to palpation  NEURO: A&Ox1, responsive to     LABS:                        12.1   3.64  )-----------( 151      ( 10 Feb 2022 06:20 )             37.7     02-10    136  |  98  |  12  ----------------------------<  125<H>  3.6   |  28  |  0.74    Ca    9.5      10 Feb 2022 06:20  Phos  2.7     02-10  Mg     2.0     02-10      TPro  7.3  /  Alb  3.6  /  TBili  0.5  /  DBili  x   /  AST  43<H>  /  ALT  47<H>  /  AlkPhos  74  02-03      RADIOLOGY & ADDITIONAL TESTS:  CT BRAIN 1/30  This exam is compared with prior head CT performed on January 27, 2022.    Abnormal high attenuation involving the high left frontal cortical subcortical region is again seen. This finding measures approximately 5.1 x 2.8 cm and previously measured approximately 7.3 x 3.2 cm. Surrounding edema is again seen.    Small amount of subrecord hemorrhage is again suspected involving the left posterior temporal/posterior frontal region    Intraventricular hemorrhage is again seen.    The size and configuration of ventricles appear unchanged    No new areas of acute hemorrhage is seen.    Extra-axial low-attenuation from the high left frontal region is again seen with bony scalloping. This appears unchanged when compared with the prior exam and is likely compatible with an arachnoid cyst    Evaluation of osseous structures with the appropriate window appears unchanged    The visualized paranasal sinuses mastoid and middle ear regions appear clear.    Orotracheal and orogastric tubes are seen.    The paranasal sinuses mastoid and middle ear regions appear clear.    The patient status post bilateral cataract surgery.    IMPRESSION: Acute parenchymal hemorrhage is again seen as well as intraventricular hemorrhage. Subrecord hemorrhage is suspected.     Micah Hansen, PGY-1  Internal Medicine  [employer]; #[phone]      PROGRESS NOTE:     Patient is a 81y old  Male who presents with a chief complaint of R ICH (03 Feb 2022 13:34)    SUBJECTIVE AND OVERNIGHT EVENTS:   Patient was seen and examined at bedside this morning. He denied any new pain, dyspnea, fevers, or chest pain. On telemetry he was sinus 60-80s with no acute events.     ADDITIONAL REVIEW OF SYSTEMS:    MEDICATIONS  (STANDING):  albuterol/ipratropium for Nebulization 3 milliLiter(s) Nebulizer every 6 hours  amLODIPine   Tablet 10 milliGRAM(s) Oral daily  enoxaparin Injectable 40 milliGRAM(s) SubCutaneous every 12 hours  insulin lispro (ADMELOG) corrective regimen sliding scale   SubCutaneous every 6 hours  lacosamide Solution 100 milliGRAM(s) Oral two times a day  losartan 100 milliGRAM(s) Oral daily  nystatin Ointment 1 Application(s) Topical once  senna 2 Tablet(s) Oral at bedtime  sodium chloride 0.9% for Nebulization 3 milliLiter(s) Nebulizer every 6 hours    MEDICATIONS  (PRN):      CAPILLARY BLOOD GLUCOSE    POCT Blood Glucose.: 123 mg/dL (10 Feb 2022 06:43)  POCT Blood Glucose.: 152 mg/dL (09 Feb 2022 23:53)  POCT Blood Glucose.: 186 mg/dL (09 Feb 2022 21:23)  POCT Blood Glucose.: 146 mg/dL (09 Feb 2022 17:32)  POCT Blood Glucose.: 194 mg/dL (09 Feb 2022 13:38)  POCT Blood Glucose.: 139 mg/dL (09 Feb 2022 09:01)    I&O's Summary    09 Feb 2022 07:01  -  10 Feb 2022 07:00  --------------------------------------------------------  IN: 720 mL / OUT: 0 mL / NET: 720 mL          PHYSICAL EXAM:  Vital Signs Last 24 Hrs  T(C): 36.4 (10 Feb 2022 04:42), Max: 36.6 (09 Feb 2022 13:00)  T(F): 97.6 (10 Feb 2022 04:42), Max: 97.9 (09 Feb 2022 21:00)  HR: 85 (10 Feb 2022 05:35) (73 - 91)  BP: 168/84 (10 Feb 2022 04:42) (112/84 - 168/84)  RR: 18 (10 Feb 2022 04:42) (18 - 20)  SpO2: 100% (10 Feb 2022 05:35) (98% - 100%)    CONSTITUTIONAL: NAD, well-developed, on NC  HEENT: NC/AT, EOMI  RESPIRATORY: No increased work of breathing, CTAB, no wheezes or crackles appreciated  CARDIOVASCULAR: RRR, S1 and S2 present, no m/r/g  ABDOMEN: soft, NT, ND, bowel sounds present  EXTREMITIES: No LE edema  MUSCULOSKELETAL: no joint swelling or tenderness to palpation  NEURO: A&Ox1, responsive to     LABS:                        12.1   3.64  )-----------( 151      ( 10 Feb 2022 06:20 )             37.7     02-10    136  |  98  |  12  ----------------------------<  125<H>  3.6   |  28  |  0.74    Ca    9.5      10 Feb 2022 06:20  Phos  2.7     02-10  Mg     2.0     02-10      TPro  7.3  /  Alb  3.6  /  TBili  0.5  /  DBili  x   /  AST  43<H>  /  ALT  47<H>  /  AlkPhos  74  02-03      RADIOLOGY & ADDITIONAL TESTS:  CT BRAIN 1/30  This exam is compared with prior head CT performed on January 27, 2022.    Abnormal high attenuation involving the high left frontal cortical subcortical region is again seen. This finding measures approximately 5.1 x 2.8 cm and previously measured approximately 7.3 x 3.2 cm. Surrounding edema is again seen.    Small amount of subrecord hemorrhage is again suspected involving the left posterior temporal/posterior frontal region    Intraventricular hemorrhage is again seen.    The size and configuration of ventricles appear unchanged    No new areas of acute hemorrhage is seen.    Extra-axial low-attenuation from the high left frontal region is again seen with bony scalloping. This appears unchanged when compared with the prior exam and is likely compatible with an arachnoid cyst    Evaluation of osseous structures with the appropriate window appears unchanged    The visualized paranasal sinuses mastoid and middle ear regions appear clear.    Orotracheal and orogastric tubes are seen.    The paranasal sinuses mastoid and middle ear regions appear clear.    The patient status post bilateral cataract surgery.    IMPRESSION: Acute parenchymal hemorrhage is again seen as well as intraventricular hemorrhage. Subrecord hemorrhage is suspected.

## 2022-02-10 NOTE — PROGRESS NOTE ADULT - ATTENDING COMMENTS
presented with ICH and poor mental status  now much improved  bleed is stable on serial imaging  he  is tolerating feeds  mrib w/o underlying lesion  continue AEDs  neuro/nsgx follow up  stable for discharge to rehab today  35 min spent coordinating care and planning for discharge

## 2022-02-10 NOTE — CHART NOTE - NSCHARTNOTESELECT_GEN_ALL_CORE
Neurosurgery/Event Note
POCUS/Event Note
Speech & Swallow
EEG Prelim
GI/Event Note
MAR Accept/Transfer Note
MAR MICU Transfer Note/Transfer Note
MICU Accept note/Event Note
NEUROLOGY/Event Note
NEUROLOGY/Event Note
Neurosurgery/Event Note
Nutrition Services
Speech & Swallow
Transfer Note
Transfer to Medicine/Transfer Note
VTE Risk Assessment/Event Note
Vascular Fellow Note/Event Note
wound team/Event Note

## 2023-01-04 NOTE — PROGRESS NOTE ADULT - PROBLEM SELECTOR PLAN 2
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 Fabian Pratt

## 2023-01-19 NOTE — ED ADULT TRIAGE NOTE - WEIGHT IN LBS
309.9 Bi-Rhombic Flap Text: The defect edges were debeveled with a #15 scalpel blade.  Given the location of the defect and the proximity to free margins a bi-rhombic flap was deemed most appropriate.  Using a sterile surgical marker, an appropriate rhombic flap was drawn incorporating the defect. The area thus outlined was incised deep to adipose tissue with a #15 scalpel blade.  The skin margins were undermined to an appropriate distance in all directions utilizing iris scissors.

## 2023-04-20 NOTE — PROGRESS NOTE ADULT - CONVERSATION DETAILS
Had extensive discussion w/ patients daughter Maria C and brother Austin (pts HCP). Maria C provided Cuban translation for her uncle per family preference. I discussed Mr. Woody's poor neurologic status--minimal arousal and purposeful interaction, inability to eat on his own as well as our current concerns for a pneumonia currently being treated with cefepime. I discussed my impression that while he has physically stabilized, I expect significant long term cognitive deficits and he may require 24hr care for the rest of his life. I discussed that at this juncture it seems unlikely he will be able to maintain nutrition by himself and if they family desires aggressive treatment would require PEG. I provided the family with three potential treatment plans: 1. Hospice approach-recommended if family felt pt would not want to live with this quality of life or be dependent on artificial nutrition knowing that this may be a new baseline. 2. Aggressive care eg PEG, full resuscitation if indicated, reescalation to ICU if indicated 3. Cont current supportive measures, plan for PEG and rehab, but should he decline maintain DNR/DNI status. After discussion, family was in favor of PEG with hope for some recovery and not amenable to hospice at this time. We then discussed the second two choices. I recommended that he be DNR/DNI as should he have a cardiac or respiratory arrest, I believe it will be the sequelae of this irreversible and catastrophic neurologic event, and even if successful there is a low possibility for meaningful recovery. At this time patient remains full code. [facility] discussions to continue Resulted

## 2023-10-23 NOTE — PROGRESS NOTE ADULT - SUBJECTIVE AND OBJECTIVE BOX
[facility]  J Luis Almanza PA-C  [address] [place] [address]  [phone]      INTERVAL HPI/OVERNIGHT EVENTS:  Pt s/e  Gastric emptying study results noted and d/w pt    MEDICATIONS  (STANDING):  albuterol/ipratropium for Nebulization 3 milliLiter(s) Nebulizer every 4 hours  enoxaparin Injectable 40 milliGRAM(s) SubCutaneous every 24 hours  famotidine    Tablet 20 milliGRAM(s) Oral daily  influenza  Vaccine (HIGH DOSE) 0.7 milliLiter(s) IntraMuscular once  levothyroxine 88 MICROGram(s) Oral daily  methylPREDNISolone sodium succinate Injectable 40 milliGRAM(s) IV Push daily  pantoprazole    Tablet 40 milliGRAM(s) Oral two times a day  polyethylene glycol 3350 17 Gram(s) Oral daily    MEDICATIONS  (PRN):  albuterol/ipratropium for Nebulization 3 milliLiter(s) Nebulizer every 2 hours PRN Shortness of Breath  aluminum hydroxide/magnesium hydroxide/simethicone Suspension 30 milliLiter(s) Oral every 4 hours PRN Dyspepsia  diazepam    Tablet 5 milliGRAM(s) Oral daily PRN Spasm  diphenhydrAMINE Injectable 25 milliGRAM(s) IV Push every 6 hours PRN Insomnia or pruritis  guaiFENesin Oral Liquid (Sugar-Free) 100 milliGRAM(s) Oral every 6 hours PRN Cough  ondansetron Injectable 4 milliGRAM(s) IV Push every 6 hours PRN Nausea and/or Vomiting      Allergies    tetanus toxoid (Anaphylaxis)      PHYSICAL EXAM:   Vital Signs:  Vital Signs Last 24 Hrs  T(C): 37 (23 Oct 2023 12:00), Max: 37 (23 Oct 2023 12:00)  T(F): 98.6 (23 Oct 2023 12:00), Max: 98.6 (23 Oct 2023 12:00)  HR: 84 (23 Oct 2023 12:00) (70 - 97)  BP: 116/73 (23 Oct 2023 12:00) (116/67 - 122/77)  BP(mean): --  RR: 17 (23 Oct 2023 12:00) (16 - 18)  SpO2: 92% (23 Oct 2023 12:00) (92% - 97%)    Parameters below as of 23 Oct 2023 12:00  Patient On (Oxygen Delivery Method): room air      Daily     Daily Weight in k.5 (23 Oct 2023 04:48)    GENERAL:  Appears stated age  HEENT:  NC/AT  CHEST:  Full & symmetric excursion  HEART:  Regular rhythm  ABDOMEN:  Soft, non-tender, non-distended  EXTREMITIES:  no cyanosis  SKIN:  No rash  NEURO:  Alert      LABS:                        11.7   11.16 )-----------( 447      ( 23 Oct 2023 07:20 )             36.8     10-23    143  |  105  |  10  ----------------------------<  82  4.0   |  32<H>  |  0.87    Ca    10.2<H>      23 Oct 2023 07:20  Phos  3.1     10  Mg     2.1     10-22    TPro  8.0  /  Alb  3.6  /  TBili  0.4  /  DBili  x   /  AST  22  /  ALT  52  /  AlkPhos  152<H>  10      Urinalysis Basic - ( 23 Oct 2023 07:20 )    Color: x / Appearance: x / SG: x / pH: x  Gluc: 82 mg/dL / Ketone: x  / Bili: x / Urobili: x   Blood: x / Protein: x / Nitrite: x   Leuk Esterase: x / RBC: x / WBC x   Sq Epi: x / Non Sq Epi: x / Bacteria: x    IMAGING:  < from: NM Gastric Emptying Solid (10.23.23 @ 13:25) >    ACC: [id number] EXAM:  NM GASTRIC EMPTYING SOLID   ORDERED BY: SOPHIA KITCHEN     PROCEDURE DATE:  10/23/2023          INTERPRETATION:  NM GASTRIC EMPTYING SOLID STUDY    CLINICAL INFORMATION: 69-year-old woman with intractable vomiting,   bloating, abdominal pain referred to evaluate for gastroparesis.    RADIOPHARMACEUTICAL:  1 mCi Tc 99m Sulfur Colloid incorporated in a   standard meal.    MEDICATIONS: Within 24 hours before the test, the patient was not taking   any medications, which couldaffect the test results.    TECHNIQUE: Immediately before the beginning of the study, the patient's   fasting blood sugar was measured and was 82 mg/dL. The patient consumed   100 % of the radiolabeled meal consisting of 4 oz. of cooked egg whites,   two slices of toasted white bread with approximately 30 g of strawberry   jam, and 4 oz. of water over about 7 minutes. There was no postprandial   vomiting. Anterior and posterior one-minute images of the stomach were   obtained in the standing position as soon as the patient finished the   meal, and were repeated one, two, three and four hours later. After   calculation of the geometric mean, and correction for isotope decay, the   percent of activity remaining in the stomach at 1,2,3, and 4 hours was   determined.    FINDINGS:    TIME                 % RETENTION        NORMAL VALUES      0                               100%    1                               90  %                     37- 90%    2                               35  %            30- 60%    3                               23  %                  up to 30%    4                               11  %                  up to 10%    Visual inspection of the images was adequate.    IMPRESSION:    Abnormal gastric emptying study.    Borderline delayed gastric emptying study solid phase.    --- End of Report ---       MIGUEL VALERIO MD; Attending Nuclear Medicine  This document has been electronically signed. Oct 23 2023  1:28PM    < end of copied text >   DATE OF SERVICE: 01-26-22 @ 12:35    Subjective: Patient seen and examined. No new events except as noted.     SUBJECTIVE/ROS:        MEDICATIONS:  MEDICATIONS  (STANDING):  acetylcysteine 20%  Inhalation 4 milliLiter(s) Inhalation every 6 hours  albuterol/ipratropium for Nebulization 3 milliLiter(s) Nebulizer every 6 hours  amLODIPine   Tablet 10 milliGRAM(s) Oral daily  cefepime   IVPB      cefepime   IVPB 1000 milliGRAM(s) IV Intermittent every 8 hours  dextrose 40% Gel 15 Gram(s) Oral once  dextrose 5%. 1000 milliLiter(s) (50 mL/Hr) IV Continuous <Continuous>  dextrose 50% Injectable 25 Gram(s) IV Push once  dextrose 50% Injectable 12.5 Gram(s) IV Push once  dextrose 50% Injectable 25 Gram(s) IV Push once  enoxaparin Injectable 40 milliGRAM(s) SubCutaneous every 12 hours  glucagon  Injectable 1 milliGRAM(s) IntraMuscular once  insulin lispro (ADMELOG) corrective regimen sliding scale   SubCutaneous every 6 hours  insulin NPH human recombinant 13 Unit(s) SubCutaneous every 6 hours  lacosamide Solution 100 milliGRAM(s) Oral two times a day  losartan 100 milliGRAM(s) Oral daily  senna 2 Tablet(s) Oral at bedtime  sodium chloride 3%  Inhalation 4 milliLiter(s) Inhalation every 6 hours      PHYSICAL EXAM:  T(C): 37.5 (01-26-22 @ 04:16), Max: 37.5 (01-26-22 @ 04:16)  HR: 77 (01-26-22 @ 10:27) (74 - 84)  BP: 151/92 (01-26-22 @ 04:16) (131/87 - 151/92)  RR: 24 (01-26-22 @ 10:27) (20 - 24)  SpO2: 97% (01-26-22 @ 10:27) (95% - 98%)  Wt(kg): --  I&O's Summary    25 Jan 2022 07:01  -  26 Jan 2022 07:00  --------------------------------------------------------  IN: 790 mL / OUT: 400 mL / NET: 390 mL            JVP: Normal  Neck: supple  Lung: clear   CV: S1 S2 , Murmur:  Abd: soft  Ext: No edema  neuro: Awake   Psych: flat affect  Skin: normal``    LABS/DATA:    CARDIAC MARKERS:                                12.1   6.86  )-----------( 189      ( 26 Jan 2022 07:07 )             38.0     01-26    145  |  102  |  31<H>  ----------------------------<  187<H>  4.3   |  30  |  0.82    Ca    10.2      26 Jan 2022 07:00  Phos  3.7     01-26  Mg     2.5     01-26      proBNP:   Lipid Profile:   HgA1c:   TSH:     TELE:  EKG:

## 2023-11-03 NOTE — PROGRESS NOTE ADULT - PROBLEM SELECTOR PLAN 6
- DVT: lovenox BID  - Diet: TF, speech swallow eval  - Bowel regimen: miralax, senna  - ETHICS: full code, waiting for PEG placement per family There are no Wet Read(s) to document. - DVT: lovenox BID  - Diet: TF, speech swallow eval  - Bowel regimen: miralax, senna  - ETHICS: full code, waiting for PEG placement per family. Ethics consulted for complex decision making